# Patient Record
Sex: FEMALE | Race: WHITE | NOT HISPANIC OR LATINO | URBAN - METROPOLITAN AREA
[De-identification: names, ages, dates, MRNs, and addresses within clinical notes are randomized per-mention and may not be internally consistent; named-entity substitution may affect disease eponyms.]

---

## 2017-10-24 ENCOUNTER — OFFICE (OUTPATIENT)
Dept: URBAN - METROPOLITAN AREA CLINIC 64 | Facility: CLINIC | Age: 67
End: 2017-10-24
Payer: COMMERCIAL

## 2017-10-24 VITALS
SYSTOLIC BLOOD PRESSURE: 137 MMHG | HEIGHT: 64 IN | WEIGHT: 192 LBS | HEART RATE: 102 BPM | DIASTOLIC BLOOD PRESSURE: 66 MMHG

## 2017-10-24 DIAGNOSIS — R19.5 OTHER FECAL ABNORMALITIES: ICD-10-CM

## 2017-10-24 DIAGNOSIS — R10.31 RIGHT LOWER QUADRANT PAIN: ICD-10-CM

## 2017-10-24 DIAGNOSIS — K59.00 CONSTIPATION, UNSPECIFIED: ICD-10-CM

## 2017-10-24 DIAGNOSIS — M54.5 LOW BACK PAIN: ICD-10-CM

## 2017-10-24 DIAGNOSIS — R15.2 FECAL URGENCY: ICD-10-CM

## 2017-10-24 LAB
AMYLASE, SERUM: 28 U/L — LOW (ref 31–124)
C-REACTIVE PROTEIN, QUANT: 13.7 MG/L — HIGH (ref 0–4.9)
CBC WITH DIFFERENTIAL/PLATELET: BASO (ABSOLUTE): 0 X10E3/UL (ref 0–0.2)
CBC WITH DIFFERENTIAL/PLATELET: BASOS: 0 %
CBC WITH DIFFERENTIAL/PLATELET: EOS (ABSOLUTE): 0.1 X10E3/UL (ref 0–0.4)
CBC WITH DIFFERENTIAL/PLATELET: EOS: 1 %
CBC WITH DIFFERENTIAL/PLATELET: HEMATOCRIT: 46.4 % (ref 34–46.6)
CBC WITH DIFFERENTIAL/PLATELET: HEMATOLOGY COMMENTS: (no result)
CBC WITH DIFFERENTIAL/PLATELET: HEMOGLOBIN: 15.5 G/DL (ref 11.1–15.9)
CBC WITH DIFFERENTIAL/PLATELET: IMMATURE CELLS: (no result)
CBC WITH DIFFERENTIAL/PLATELET: IMMATURE GRANS (ABS): 0 X10E3/UL (ref 0–0.1)
CBC WITH DIFFERENTIAL/PLATELET: IMMATURE GRANULOCYTES: 0 %
CBC WITH DIFFERENTIAL/PLATELET: LYMPHS (ABSOLUTE): 2.9 X10E3/UL (ref 0.7–3.1)
CBC WITH DIFFERENTIAL/PLATELET: LYMPHS: 38 %
CBC WITH DIFFERENTIAL/PLATELET: MCH: 31.8 PG (ref 26.6–33)
CBC WITH DIFFERENTIAL/PLATELET: MCHC: 33.4 G/DL (ref 31.5–35.7)
CBC WITH DIFFERENTIAL/PLATELET: MCV: 95 FL (ref 79–97)
CBC WITH DIFFERENTIAL/PLATELET: MONOCYTES(ABSOLUTE): 0.5 X10E3/UL (ref 0.1–0.9)
CBC WITH DIFFERENTIAL/PLATELET: MONOCYTES: 6 %
CBC WITH DIFFERENTIAL/PLATELET: NEUTROPHILS (ABSOLUTE): 4.1 X10E3/UL (ref 1.4–7)
CBC WITH DIFFERENTIAL/PLATELET: NEUTROPHILS: 55 %
CBC WITH DIFFERENTIAL/PLATELET: NRBC: (no result)
CBC WITH DIFFERENTIAL/PLATELET: PLATELETS: 216 X10E3/UL (ref 150–379)
CBC WITH DIFFERENTIAL/PLATELET: RBC: 4.88 X10E6/UL (ref 3.77–5.28)
CBC WITH DIFFERENTIAL/PLATELET: RDW: 13.7 % (ref 12.3–15.4)
CBC WITH DIFFERENTIAL/PLATELET: WBC: 7.7 X10E3/UL (ref 3.4–10.8)
COMP. METABOLIC PANEL (14): A/G RATIO: 2.1 (ref 1.2–2.2)
COMP. METABOLIC PANEL (14): ALBUMIN, SERUM: 4.5 G/DL (ref 3.6–4.8)
COMP. METABOLIC PANEL (14): ALKALINE PHOSPHATASE, S: 139 IU/L — HIGH (ref 39–117)
COMP. METABOLIC PANEL (14): ALT (SGPT): 39 IU/L — HIGH (ref 0–32)
COMP. METABOLIC PANEL (14): AST (SGOT): 34 IU/L (ref 0–40)
COMP. METABOLIC PANEL (14): BILIRUBIN, TOTAL: 0.6 MG/DL (ref 0–1.2)
COMP. METABOLIC PANEL (14): BUN/CREATININE RATIO: 29 — HIGH (ref 12–28)
COMP. METABOLIC PANEL (14): BUN: 23 MG/DL (ref 8–27)
COMP. METABOLIC PANEL (14): CALCIUM, SERUM: 10.1 MG/DL (ref 8.7–10.3)
COMP. METABOLIC PANEL (14): CARBON DIOXIDE, TOTAL: 24 MMOL/L (ref 18–29)
COMP. METABOLIC PANEL (14): CHLORIDE, SERUM: 93 MMOL/L — LOW (ref 96–106)
COMP. METABOLIC PANEL (14): CREATININE, SERUM: 0.78 MG/DL (ref 0.57–1)
COMP. METABOLIC PANEL (14): EGFR IF AFRICN AM: 90 ML/MIN/1.73 (ref 59–?)
COMP. METABOLIC PANEL (14): EGFR IF NONAFRICN AM: 78 ML/MIN/1.73 (ref 59–?)
COMP. METABOLIC PANEL (14): GLOBULIN, TOTAL: 2.1 G/DL (ref 1.5–4.5)
COMP. METABOLIC PANEL (14): GLUCOSE, SERUM: 360 MG/DL — HIGH (ref 65–99)
COMP. METABOLIC PANEL (14): POTASSIUM, SERUM: 4 MMOL/L (ref 3.5–5.2)
COMP. METABOLIC PANEL (14): PROTEIN, TOTAL, SERUM: 6.6 G/DL (ref 6–8.5)
COMP. METABOLIC PANEL (14): SODIUM, SERUM: 140 MMOL/L (ref 134–144)
LIPASE, SERUM: 24 U/L (ref 14–72)
TSH: 3.13 UIU/ML (ref 0.45–4.5)

## 2017-10-24 PROCEDURE — 99203 OFFICE O/P NEW LOW 30 MIN: CPT | Performed by: NURSE PRACTITIONER

## 2017-10-24 RX ORDER — ONDANSETRON HYDROCHLORIDE 4 MG/1
16 TABLET, ORALLY DISINTEGRATING ORAL
Qty: 60 | Refills: 0 | Status: COMPLETED
Start: 2017-10-24 | End: 2020-12-04

## 2017-10-24 RX ORDER — POLYETHYLENE GLYCOL 3350 17 G/17G
17 POWDER, FOR SOLUTION ORAL
Qty: 1 | Refills: 2 | Status: COMPLETED
Start: 2017-10-24 | End: 2020-12-04

## 2017-10-24 RX ORDER — SORBITOL SOLUTION 70 %
SOLUTION, ORAL MISCELLANEOUS
Qty: 100 | Refills: 0 | Status: COMPLETED
Start: 2017-10-24 | End: 2020-12-04

## 2017-10-24 RX ORDER — DICYCLOMINE HYDROCHLORIDE 10 MG/1
40 CAPSULE ORAL
Qty: 60 | Refills: 1 | Status: COMPLETED
Start: 2017-10-24 | End: 2020-12-04

## 2018-01-31 ENCOUNTER — HOSPITAL ENCOUNTER (OUTPATIENT)
Dept: LAB | Facility: HOSPITAL | Age: 68
Discharge: HOME | End: 2018-01-31
Attending: SURGERY | Admitting: SURGERY

## 2018-01-31 LAB — CREAT UR-MCNC: 0.5 MG/DL (ref 0.4–1)

## 2018-02-07 ENCOUNTER — HOSPITAL ENCOUNTER (OUTPATIENT)
Dept: CARDIOLOGY | Facility: HOSPITAL | Age: 68
Discharge: HOME | End: 2018-02-07
Attending: SURGERY | Admitting: SURGERY

## 2018-04-25 ENCOUNTER — HOSPITAL ENCOUNTER (OUTPATIENT)
Dept: ONCOLOGY | Facility: CLINIC | Age: 68
Setting detail: INFUSION SERIES
Discharge: HOME | End: 2018-04-25
Attending: INTERNAL MEDICINE | Admitting: INTERNAL MEDICINE

## 2018-04-25 ENCOUNTER — CLINICAL SUPPORT (OUTPATIENT)
Dept: ONCOLOGY | Facility: HOSPITAL | Age: 68
End: 2018-04-25

## 2018-04-25 NOTE — PROGRESS NOTES
PATIENTS ONCOLOGY RECORD LOCATED IN [facility]      Subjective     Name:  LEVY GROSS     Date:  2018  Address:  [address] [place] [address]  Home: [phone]  :  1950 AGE:  68 y.o.        RECORDS OBTAINED:  Patients Oncology Record is located in [facility]

## 2019-01-02 ENCOUNTER — HOSPITAL ENCOUNTER (OUTPATIENT)
Dept: CARDIOLOGY | Facility: HOSPITAL | Age: 69
Discharge: HOME | End: 2019-01-02
Attending: FAMILY MEDICINE | Admitting: FAMILY MEDICINE

## 2020-07-31 ENCOUNTER — OFFICE VISIT (OUTPATIENT)
Dept: SURGERY | Facility: CLINIC | Age: 70
End: 2020-07-31

## 2020-07-31 VITALS
OXYGEN SATURATION: 98 % | WEIGHT: 186 LBS | HEIGHT: 64 IN | BODY MASS INDEX: 31.76 KG/M2 | SYSTOLIC BLOOD PRESSURE: 132 MMHG | TEMPERATURE: 97.3 F | HEART RATE: 72 BPM | DIASTOLIC BLOOD PRESSURE: 68 MMHG

## 2020-07-31 DIAGNOSIS — I97.640 SEROMA OF CIRCULATORY SYSTEM AFTER CARDIAC CATHETERIZATION: Primary | ICD-10-CM

## 2020-07-31 PROCEDURE — 99203 OFFICE O/P NEW LOW 30 MIN: CPT | Performed by: SURGERY

## 2020-07-31 RX ORDER — GLIPIZIDE 10 MG/1
10 TABLET, FILM COATED, EXTENDED RELEASE ORAL DAILY
COMMUNITY

## 2020-07-31 RX ORDER — HYDROCHLOROTHIAZIDE 25 MG/1
25 TABLET ORAL DAILY
COMMUNITY
Start: 2017-07-31 | End: 2021-06-23

## 2020-07-31 RX ORDER — ROSUVASTATIN CALCIUM 10 MG/1
10 TABLET, COATED ORAL DAILY
COMMUNITY
Start: 2018-01-31

## 2020-07-31 RX ORDER — METHADONE HYDROCHLORIDE 5 MG/1
10 TABLET ORAL EVERY MORNING
COMMUNITY

## 2020-07-31 RX ORDER — POTASSIUM CHLORIDE 750 MG/1
20 CAPSULE, EXTENDED RELEASE ORAL DAILY
COMMUNITY
Start: 2020-04-15

## 2020-07-31 RX ORDER — ESCITALOPRAM OXALATE 20 MG/1
TABLET ORAL
COMMUNITY
Start: 2017-09-08 | End: 2021-05-26

## 2020-07-31 RX ORDER — SENNA PLUS 8.6 MG/1
3 TABLET ORAL 2 TIMES DAILY
COMMUNITY

## 2020-07-31 RX ORDER — PRAMIPEXOLE DIHYDROCHLORIDE 1 MG/1
1 TABLET ORAL NIGHTLY
COMMUNITY
Start: 2018-01-31

## 2020-07-31 RX ORDER — LISINOPRIL 10 MG/1
10 TABLET ORAL DAILY
COMMUNITY
Start: 2020-03-11 | End: 2021-03-11

## 2020-07-31 RX ORDER — FUROSEMIDE 40 MG/1
TABLET ORAL
COMMUNITY
End: 2021-06-06

## 2020-07-31 NOTE — PROGRESS NOTES
"Subjective   Genny Soni is a 70 y.o. female.   No chief complaint on file.    /68   Pulse 72   Temp 97.3 °F (36.3 °C) (Temporal)   Ht 162.6 cm (64\")   Wt 84.4 kg (186 lb)   SpO2 98%   BMI 31.93 kg/m²     HISTORY OF PRESENT ILLNESS:  Patient is a very pleasant 70-year-old female who 2 years ago underwent a cardiac catheterization where it sounds like she had a significant post seizure hematoma.  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  It does not bother her.  Just confirmed by ultrasound.  It is been aspirated in the past and returns.      The following portions of the patient's history were reviewed and updated as appropriate: allergies, current medications, past family history, past medical history, past social history, past surgical history and problem list.    Review of Systems   Constitutional: Negative for activity change and diaphoresis.   HENT: Negative.  Negative for sore throat and voice change.    Eyes: Negative for blurred vision.   Respiratory: Negative for wheezing.    Cardiovascular: Negative for chest pain.   Endocrine: Negative.  Negative for polyuria.   Genitourinary: Negative for urinary incontinence.   Musculoskeletal: Positive for gait problem. Negative for arthralgias.   Skin: Negative for color change.   Neurological: Positive for numbness. Negative for dizziness, speech difficulty and confusion.   Hematological: Does not bruise/bleed easily.   Psychiatric/Behavioral: Negative for agitation.       Objective   Physical Exam   Constitutional: She is oriented to person, place, and time. She appears well-developed and well-nourished.   HENT:   Head: Normocephalic and atraumatic.   Eyes: EOM are normal.   Neck: Normal range of motion.   Cardiovascular: Normal rate.   Pulmonary/Chest: Effort normal.   Abdominal: Soft.   Musculoskeletal: Normal range of motion.   Lymphadenopathy:   5 cm mobile fluid collection consistent with ultrasound confirmed " chronic hematoma versus seroma   Neurological: She is alert and oriented to person, place, and time.   Skin: Skin is warm and dry.   Psychiatric: She has a normal mood and affect.         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  Secondary to this all of her other medical comorbidities I think there is no reason to proceed with any sort of intervention.  If she did decide to do something I would recommend she sees vascular secondary to her significant vascular history.    Lucie Burk MD  7/31/2020  9:15 AM

## 2020-08-21 ENCOUNTER — HOSPITAL ENCOUNTER (EMERGENCY)
Facility: HOSPITAL | Age: 70
Discharge: HOME | End: 2020-08-21
Attending: EMERGENCY MEDICINE | Admitting: EMERGENCY MEDICINE

## 2020-08-21 ENCOUNTER — APPOINTMENT (OUTPATIENT)
Dept: CT IMAGING | Facility: HOSPITAL | Age: 70
End: 2020-08-21

## 2020-08-21 VITALS
HEIGHT: 64 IN | SYSTOLIC BLOOD PRESSURE: 112 MMHG | HEART RATE: 68 BPM | OXYGEN SATURATION: 95 % | BODY MASS INDEX: 31.13 KG/M2 | RESPIRATION RATE: 16 BRPM | TEMPERATURE: 98.6 F | WEIGHT: 182.32 LBS | DIASTOLIC BLOOD PRESSURE: 74 MMHG

## 2020-08-21 DIAGNOSIS — H81.399 PERIPHERAL VERTIGO, UNSPECIFIED LATERALITY: Primary | ICD-10-CM

## 2020-08-21 DIAGNOSIS — R42 DIZZINESS: ICD-10-CM

## 2020-08-21 LAB
ANION GAP SERPL CALCULATED.3IONS-SCNC: 10 MMOL/L (ref 5–15)
BASOPHILS # BLD AUTO: 0 10*3/MM3 (ref 0–0.2)
BASOPHILS NFR BLD AUTO: 0.3 % (ref 0–1.5)
BUN SERPL-MCNC: 20 MG/DL (ref 8–23)
BUN SERPL-MCNC: ABNORMAL MG/DL
BUN/CREAT SERPL: ABNORMAL
CALCIUM SPEC-SCNC: 9.1 MG/DL (ref 8.6–10.5)
CHLORIDE SERPL-SCNC: 105 MMOL/L (ref 98–107)
CO2 SERPL-SCNC: 29 MMOL/L (ref 22–29)
CREAT SERPL-MCNC: 0.69 MG/DL (ref 0.57–1)
DEPRECATED RDW RBC AUTO: 45.1 FL (ref 37–54)
EOSINOPHIL # BLD AUTO: 0.1 10*3/MM3 (ref 0–0.4)
EOSINOPHIL NFR BLD AUTO: 1.9 % (ref 0.3–6.2)
ERYTHROCYTE [DISTWIDTH] IN BLOOD BY AUTOMATED COUNT: 13.7 % (ref 12.3–15.4)
GFR SERPL CREATININE-BSD FRML MDRD: 84 ML/MIN/1.73
GLUCOSE SERPL-MCNC: 202 MG/DL (ref 65–99)
HCT VFR BLD AUTO: 38.7 % (ref 34–46.6)
HGB BLD-MCNC: 12.8 G/DL (ref 12–15.9)
LYMPHOCYTES # BLD AUTO: 1.9 10*3/MM3 (ref 0.7–3.1)
LYMPHOCYTES NFR BLD AUTO: 25.3 % (ref 19.6–45.3)
MCH RBC QN AUTO: 31.4 PG (ref 26.6–33)
MCHC RBC AUTO-ENTMCNC: 33.1 G/DL (ref 31.5–35.7)
MCV RBC AUTO: 94.8 FL (ref 79–97)
MONOCYTES # BLD AUTO: 0.4 10*3/MM3 (ref 0.1–0.9)
MONOCYTES NFR BLD AUTO: 5.7 % (ref 5–12)
NEUTROPHILS NFR BLD AUTO: 5 10*3/MM3 (ref 1.7–7)
NEUTROPHILS NFR BLD AUTO: 66.8 % (ref 42.7–76)
NRBC BLD AUTO-RTO: 0.1 /100 WBC (ref 0–0.2)
PLATELET # BLD AUTO: 173 10*3/MM3 (ref 140–450)
PMV BLD AUTO: 7 FL (ref 6–12)
POTASSIUM SERPL-SCNC: 3.7 MMOL/L (ref 3.5–5.2)
RBC # BLD AUTO: 4.08 10*6/MM3 (ref 3.77–5.28)
SODIUM SERPL-SCNC: 144 MMOL/L (ref 136–145)
WBC # BLD AUTO: 7.4 10*3/MM3 (ref 3.4–10.8)

## 2020-08-21 PROCEDURE — 99284 EMERGENCY DEPT VISIT MOD MDM: CPT

## 2020-08-21 PROCEDURE — 85025 COMPLETE CBC W/AUTO DIFF WBC: CPT | Performed by: EMERGENCY MEDICINE

## 2020-08-21 PROCEDURE — 93005 ELECTROCARDIOGRAM TRACING: CPT | Performed by: EMERGENCY MEDICINE

## 2020-08-21 PROCEDURE — 70450 CT HEAD/BRAIN W/O DYE: CPT

## 2020-08-21 PROCEDURE — 80048 BASIC METABOLIC PNL TOTAL CA: CPT | Performed by: EMERGENCY MEDICINE

## 2020-08-21 RX ORDER — SODIUM CHLORIDE 0.9 % (FLUSH) 0.9 %
10 SYRINGE (ML) INJECTION AS NEEDED
Status: DISCONTINUED | OUTPATIENT
Start: 2020-08-21 | End: 2020-08-21 | Stop reason: HOSPADM

## 2020-08-21 RX ORDER — MECLIZINE HYDROCHLORIDE 25 MG/1
25 TABLET ORAL 3 TIMES DAILY PRN
Qty: 15 TABLET | Refills: 0 | Status: SHIPPED | OUTPATIENT
Start: 2020-08-21 | End: 2021-06-06

## 2020-08-21 RX ORDER — MECLIZINE HYDROCHLORIDE 25 MG/1
25 TABLET ORAL ONCE
Status: COMPLETED | OUTPATIENT
Start: 2020-08-21 | End: 2020-08-21

## 2020-08-21 RX ADMIN — MECLIZINE HYDROCHLORIDE 25 MG: 25 TABLET ORAL at 11:44

## 2020-08-21 RX ADMIN — SODIUM CHLORIDE 500 ML: 900 INJECTION, SOLUTION INTRAVENOUS at 11:44

## 2020-08-21 NOTE — ED PROVIDER NOTES
Subjective   History of Present Illness  Dizziness  70-year-old female states she has had some head swimming type dizziness for the last 2 months but has progressively worsened.  She states it caused her to fall a few times over the last couple of days.  She denies any focal numbness or weakness or headache or blurry vision.  She states she does feel some fullness in her ears.  She reports no fever or chills or vomiting or diarrhea.  She reports no chest pain or abdominal pain.  States symptoms seem to be worse when she tilts her head forward  Review of Systems   Constitutional: Negative.    HENT: Negative.    Eyes: Negative.    Respiratory: Negative.    Cardiovascular: Negative.    Gastrointestinal: Negative.    Genitourinary: Negative.    Musculoskeletal: Negative.    Skin: Negative.    Neurological: Positive for dizziness.   Hematological: Negative.    Psychiatric/Behavioral: Negative.        Past Medical History:   Diagnosis Date   • Arthritis    • Diabetes mellitus (CMS/[facility])    • Hyperlipidemia        Allergies   Allergen Reactions   • Ambien  [Zolpidem] Confusion   • Codeine Itching   • Sulfa Antibiotics Nausea And Vomiting     Makes her nauseated       No past surgical history on file.    No family history on file.    Social History     Socioeconomic History   • Marital status:      Spouse name: Not on file   • Number of children: Not on file   • Years of education: Not on file   • Highest education level: Not on file   Tobacco Use   • Smoking status: Current Some Day Smoker   • Smokeless tobacco: Never Used   Substance and Sexual Activity   • Alcohol use: Not Currently   • Drug use: Never   • Sexual activity: Defer     Prior to Admission medications    Medication Sig Start Date End Date Taking? Authorizing Provider   escitalopram (LEXAPRO) 20 MG tablet escitalopram 20 mg tablet   TAKE 1 TABLET BY MOUTH EVERY DAY 9/8/17   Provider, MD Pedro   furosemide (LASIX) 40 MG tablet furosemide 40 mg  "tablet   TAKE 1 TABLET BY MOUTH TWICE DAILY    Pedro Vazquez MD   glipizide (GLUCOTROL XL) 10 MG 24 hr tablet glipizide ER 10 mg tablet, extended release 24 hr   TAKE 1 TABLET BY MOUTH EVERY DAY    Pedro Vazquez MD   hydroCHLOROthiazide (HYDRODIURIL) 25 MG tablet hydrochlorothiazide 25 mg tablet 7/31/17   Pedro Vazquez MD   Insulin Glargine, 1 Unit Dial, (Toujeo SoloStar) 300 UNIT/ML solution pen-injector injection Toujeo SoloStar U-300 Insulin 300 unit/mL (1.5 mL) subcutaneous pen   Inject 60 units every day by subcutaneous route for 90 days.    Pedro Vazquez MD   lisinopril (PRINIVIL,ZESTRIL) 10 MG tablet Take 10 mg by mouth Daily. 3/11/20 3/11/21  Pedro Vazquez MD   metFORMIN (GLUCOPHAGE) 1000 MG tablet metformin 1,000 mg tablet 1/12/18   Pedro Vazquez MD   methadone (DOLOPHINE) 5 MG tablet methadone 5 mg tablet    Pedro Vazquez MD   potassium chloride (MICRO-K) 10 MEQ CR capsule potassium chloride ER 10 mEq capsule,extended release   TAKE 1 CAPSULE BY MOUTH TWICE DAILY 4/15/20   Pedro Vazquez MD   pramipexole (MIRAPEX) 1 MG tablet pramipexole 1 mg tablet 1/31/18   Pedro Vazquez MD   rosuvastatin (CRESTOR) 10 MG tablet rosuvastatin 10 mg tablet   TAKE 1 TABLET BY MOUTH EVERY DAY 1/31/18   Pedro Vazquez MD   Semaglutide,0.25 or 0.5MG/DOS, (Ozempic, 0.25 or 0.5 MG/DOSE,) 2 MG/1.5ML solution pen-injector 0.5 mg.    Pedro Vazquez MD   senna (senna) 8.6 MG tablet Take  by mouth.    Pedro Vazquez MD     /66 (Patient Position: Standing)   Pulse 52   Temp 98.6 °F (37 °C) (Oral)   Resp 16   Ht 162.6 cm (64\")   Wt 82.7 kg (182 lb 5.1 oz)   SpO2 96%   Breastfeeding No   BMI 31.30 kg/m²   I examined the patient using the appropriate personal protective equipment.          Objective   Physical Exam  General: Well-developed well-appearing, no acute distress, alert and appropriate  Eyes: Pupils round and reactive, " sclera nonicteric  HEENT: Mucous membranes moist, no mucosal swelling, tympanic membrane's clear bilaterally  Neck: Supple, no nuchal rigidity, no lymphadenopathy  Respirations: Respirations nonlabored, equal breath sounds bilaterally, clear lungs  Heart regular rate and rhythm, no murmurs rubs or gallops,   Abdomen soft nontender nondistended, no hepatosplenomegaly,  Extremities no clubbing cyanosis or edema, calves are symmetric and nontender  Neuro cranial nerves II through XII intact , normal sensory/motor function and strength in four extremities, no slurred speech, no facial droop, normal finger to nose, normal heel to shin, no nuchal rigidity  Psych oriented, pleasant affect  Skin no rash, brisk cap refill  Procedures           ED Course      Results for orders placed or performed during the hospital encounter of 08/21/20   Basic Metabolic Panel   Result Value Ref Range    Glucose 202 (H) 65 - 99 mg/dL    BUN      Creatinine 0.69 0.57 - 1.00 mg/dL    Sodium 144 136 - 145 mmol/L    Potassium 3.7 3.5 - 5.2 mmol/L    Chloride 105 98 - 107 mmol/L    CO2 29.0 22.0 - 29.0 mmol/L    Calcium 9.1 8.6 - 10.5 mg/dL    eGFR Non African Amer 84 >60 mL/min/1.73    BUN/Creatinine Ratio      Anion Gap 10.0 5.0 - 15.0 mmol/L   CBC Auto Differential   Result Value Ref Range    WBC 7.40 3.40 - 10.80 10*3/mm3    RBC 4.08 3.77 - 5.28 10*6/mm3    Hemoglobin 12.8 12.0 - 15.9 g/dL    Hematocrit 38.7 34.0 - 46.6 %    MCV 94.8 79.0 - 97.0 fL    MCH 31.4 26.6 - 33.0 pg    MCHC 33.1 31.5 - 35.7 g/dL    RDW 13.7 12.3 - 15.4 %    RDW-SD 45.1 37.0 - 54.0 fl    MPV 7.0 6.0 - 12.0 fL    Platelets 173 140 - 450 10*3/mm3    Neutrophil % 66.8 42.7 - 76.0 %    Lymphocyte % 25.3 19.6 - 45.3 %    Monocyte % 5.7 5.0 - 12.0 %    Eosinophil % 1.9 0.3 - 6.2 %    Basophil % 0.3 0.0 - 1.5 %    Neutrophils, Absolute 5.00 1.70 - 7.00 10*3/mm3    Lymphocytes, Absolute 1.90 0.70 - 3.10 10*3/mm3    Monocytes, Absolute 0.40 0.10 - 0.90 10*3/mm3     Eosinophils, Absolute 0.10 0.00 - 0.40 10*3/mm3    Basophils, Absolute 0.00 0.00 - 0.20 10*3/mm3    nRBC 0.1 0.0 - 0.2 /100 WBC   BUN   Result Value Ref Range    BUN 20 8 - 23 mg/dL     Ct Head Without Contrast    Result Date: 8/21/2020  No acute findings.  Electronically Signed By-Guilherme Lopez On:8/21/2020 12:09 PM This report was finalized on [id number] by  Guilherme Lopez, .                                         MDM  Patient presented with a vertigo type dizziness for the last couple of months.  She has a normal neurologic exam.  She was given oral meclizine.  She is resting company on reexamination her CT scan was negative and laboratory evaluation shows no acute abnormality.  She was advised the findings she is discharged good condition she is advised to follow-up with her primary care physician for consideration of neurology referral if symptoms persist.  She was prescribed meclizine for dizziness and given warning signs for return  Final diagnoses:   Peripheral vertigo, unspecified laterality   Dizziness            Mario Alberto Constantino MD  08/21/20 [id number]

## 2020-08-21 NOTE — DISCHARGE INSTRUCTIONS
Rest, drink plenty of fluids, avoid the heat.  Meclizine as prescribed for dizziness.  Return for increased pain, increased dizziness, numbness, weakness or any other concerns.

## 2020-08-27 ENCOUNTER — TRANSCRIBE ORDERS (OUTPATIENT)
Dept: ADMINISTRATIVE | Facility: HOSPITAL | Age: 70
End: 2020-08-27

## 2020-08-27 DIAGNOSIS — R42 DIZZINESS: Primary | ICD-10-CM

## 2020-09-08 ENCOUNTER — HOSPITAL ENCOUNTER (OUTPATIENT)
Dept: CARDIOLOGY | Facility: HOSPITAL | Age: 70
Discharge: HOME | End: 2020-09-08
Admitting: NURSE PRACTITIONER

## 2020-09-08 DIAGNOSIS — R42 DIZZINESS: ICD-10-CM

## 2020-09-08 LAB
BH CV XLRA MEAS LEFT CCA RATIO VEL: 142 CM/SEC
BH CV XLRA MEAS LEFT DIST CCA EDV: 21.7 CM/SEC
BH CV XLRA MEAS LEFT DIST CCA PSV: 128 CM/SEC
BH CV XLRA MEAS LEFT DIST ICA EDV: 23 CM/SEC
BH CV XLRA MEAS LEFT DIST ICA PSV: 129 CM/SEC
BH CV XLRA MEAS LEFT ICA RATIO VEL: -150 CM/SEC
BH CV XLRA MEAS LEFT ICA/CCA RATIO: -1.1
BH CV XLRA MEAS LEFT PROX CCA EDV: 21.7 CM/SEC
BH CV XLRA MEAS LEFT PROX CCA PSV: 142 CM/SEC
BH CV XLRA MEAS LEFT PROX ECA PSV: -182 CM/SEC
BH CV XLRA MEAS LEFT PROX ICA EDV: -27.7 CM/SEC
BH CV XLRA MEAS LEFT PROX ICA PSV: -148 CM/SEC
BH CV XLRA MEAS LEFT PROX SCLA PSV: 225 CM/SEC
BH CV XLRA MEAS LEFT VERTEBRAL A PSV: -70.8 CM/SEC
BH CV XLRA MEAS RIGHT CCA RATIO VEL: 169 CM/SEC
BH CV XLRA MEAS RIGHT DIST CCA EDV: 21.3 CM/SEC
BH CV XLRA MEAS RIGHT DIST CCA PSV: 122 CM/SEC
BH CV XLRA MEAS RIGHT DIST ICA EDV: -27.7 CM/SEC
BH CV XLRA MEAS RIGHT DIST ICA PSV: -140 CM/SEC
BH CV XLRA MEAS RIGHT ICA RATIO VEL: -140 CM/SEC
BH CV XLRA MEAS RIGHT ICA/CCA RATIO: -0.83
BH CV XLRA MEAS RIGHT PROX CCA EDV: 17.4 CM/SEC
BH CV XLRA MEAS RIGHT PROX CCA PSV: 169 CM/SEC
BH CV XLRA MEAS RIGHT PROX ECA PSV: -279 CM/SEC
BH CV XLRA MEAS RIGHT PROX ICA EDV: 30 CM/SEC
BH CV XLRA MEAS RIGHT PROX ICA PSV: 138 CM/SEC
BH CV XLRA MEAS RIGHT PROX SCLA PSV: 199 CM/SEC
BH CV XLRA MEAS RIGHT VERTEBRAL A PSV: 95.3 CM/SEC

## 2020-09-08 PROCEDURE — 93880 EXTRACRANIAL BILAT STUDY: CPT

## 2020-09-14 ENCOUNTER — TRANSCRIBE ORDERS (OUTPATIENT)
Dept: ADMINISTRATIVE | Facility: HOSPITAL | Age: 70
End: 2020-09-14

## 2020-09-14 ENCOUNTER — LAB (OUTPATIENT)
Dept: LAB | Facility: HOSPITAL | Age: 70
End: 2020-09-14

## 2020-09-14 ENCOUNTER — HOSPITAL ENCOUNTER (OUTPATIENT)
Dept: CARDIOLOGY | Facility: HOSPITAL | Age: 70
Discharge: HOME | End: 2020-09-14

## 2020-09-14 DIAGNOSIS — Z01.818 PRE-OP TESTING: ICD-10-CM

## 2020-09-14 DIAGNOSIS — Z01.818 PRE-OP TESTING: Primary | ICD-10-CM

## 2020-09-14 LAB
ANION GAP SERPL CALCULATED.3IONS-SCNC: 6 MMOL/L (ref 5–15)
BASOPHILS # BLD AUTO: 0.03 10*3/MM3 (ref 0–0.2)
BASOPHILS NFR BLD AUTO: 0.5 % (ref 0–1.5)
BUN SERPL-MCNC: 17 MG/DL (ref 8–23)
BUN/CREAT SERPL: 23.3 (ref 7–25)
CALCIUM SPEC-SCNC: 9.5 MG/DL (ref 8.6–10.5)
CHLORIDE SERPL-SCNC: 106 MMOL/L (ref 98–107)
CO2 SERPL-SCNC: 30 MMOL/L (ref 22–29)
CREAT SERPL-MCNC: 0.73 MG/DL (ref 0.57–1)
DEPRECATED RDW RBC AUTO: 42.4 FL (ref 37–54)
EOSINOPHIL # BLD AUTO: 0.15 10*3/MM3 (ref 0–0.4)
EOSINOPHIL NFR BLD AUTO: 2.4 % (ref 0.3–6.2)
ERYTHROCYTE [DISTWIDTH] IN BLOOD BY AUTOMATED COUNT: 12.4 % (ref 12.3–15.4)
GFR SERPL CREATININE-BSD FRML MDRD: 78 ML/MIN/1.73
GLUCOSE SERPL-MCNC: 110 MG/DL (ref 65–99)
HCT VFR BLD AUTO: 36.8 % (ref 34–46.6)
HGB BLD-MCNC: 12.4 G/DL (ref 12–15.9)
IMM GRANULOCYTES # BLD AUTO: 0.02 10*3/MM3 (ref 0–0.05)
IMM GRANULOCYTES NFR BLD AUTO: 0.3 % (ref 0–0.5)
LYMPHOCYTES # BLD AUTO: 2.1 10*3/MM3 (ref 0.7–3.1)
LYMPHOCYTES NFR BLD AUTO: 33.9 % (ref 19.6–45.3)
MCH RBC QN AUTO: 31.5 PG (ref 26.6–33)
MCHC RBC AUTO-ENTMCNC: 33.7 G/DL (ref 31.5–35.7)
MCV RBC AUTO: 93.4 FL (ref 79–97)
MONOCYTES # BLD AUTO: 0.39 10*3/MM3 (ref 0.1–0.9)
MONOCYTES NFR BLD AUTO: 6.3 % (ref 5–12)
NEUTROPHILS NFR BLD AUTO: 3.5 10*3/MM3 (ref 1.7–7)
NEUTROPHILS NFR BLD AUTO: 56.6 % (ref 42.7–76)
NRBC BLD AUTO-RTO: 0 /100 WBC (ref 0–0.2)
PLATELET # BLD AUTO: 161 10*3/MM3 (ref 140–450)
PMV BLD AUTO: 9.6 FL (ref 6–12)
POTASSIUM SERPL-SCNC: 4.2 MMOL/L (ref 3.5–5.2)
RBC # BLD AUTO: 3.94 10*6/MM3 (ref 3.77–5.28)
SODIUM SERPL-SCNC: 142 MMOL/L (ref 136–145)
WBC # BLD AUTO: 6.19 10*3/MM3 (ref 3.4–10.8)

## 2020-09-14 PROCEDURE — 93005 ELECTROCARDIOGRAM TRACING: CPT | Performed by: UROLOGY

## 2020-09-14 PROCEDURE — 80048 BASIC METABOLIC PNL TOTAL CA: CPT

## 2020-09-14 PROCEDURE — 36415 COLL VENOUS BLD VENIPUNCTURE: CPT

## 2020-09-14 PROCEDURE — 93010 ELECTROCARDIOGRAM REPORT: CPT | Performed by: INTERNAL MEDICINE

## 2020-09-14 PROCEDURE — C9803 HOPD COVID-19 SPEC COLLECT: HCPCS

## 2020-09-14 PROCEDURE — 85025 COMPLETE CBC W/AUTO DIFF WBC: CPT

## 2020-09-14 PROCEDURE — U0004 COV-19 TEST NON-CDC HGH THRU: HCPCS

## 2020-09-15 LAB — SARS-COV-2 RNA NOSE QL NAA+PROBE: NOT DETECTED

## 2020-10-14 ENCOUNTER — TRANSCRIBE ORDERS (OUTPATIENT)
Dept: ADMINISTRATIVE | Facility: HOSPITAL | Age: 70
End: 2020-10-14

## 2020-10-14 ENCOUNTER — HOSPITAL ENCOUNTER (OUTPATIENT)
Dept: CT IMAGING | Facility: HOSPITAL | Age: 70
Discharge: HOME | End: 2020-10-14

## 2020-10-14 ENCOUNTER — HOSPITAL ENCOUNTER (OUTPATIENT)
Dept: MRI IMAGING | Facility: HOSPITAL | Age: 70
Discharge: HOME | End: 2020-10-14

## 2020-10-14 DIAGNOSIS — M54.9 BACK PAIN, UNSPECIFIED BACK LOCATION, UNSPECIFIED BACK PAIN LATERALITY, UNSPECIFIED CHRONICITY: Primary | ICD-10-CM

## 2020-10-14 DIAGNOSIS — M54.50 LUMBAR BACK PAIN: Primary | ICD-10-CM

## 2020-10-14 DIAGNOSIS — M79.604 RIGHT LEG PAIN: ICD-10-CM

## 2020-10-14 DIAGNOSIS — M54.9 BACK PAIN, UNSPECIFIED BACK LOCATION, UNSPECIFIED BACK PAIN LATERALITY, UNSPECIFIED CHRONICITY: ICD-10-CM

## 2020-10-14 DIAGNOSIS — M54.50 LUMBAR BACK PAIN: ICD-10-CM

## 2020-10-14 LAB — CREAT BLDA-MCNC: 0.7 MG/DL (ref 0.6–1.3)

## 2020-10-14 PROCEDURE — 72132 CT LUMBAR SPINE W/DYE: CPT

## 2020-10-14 PROCEDURE — 82565 ASSAY OF CREATININE: CPT

## 2020-10-14 PROCEDURE — 0 IOPAMIDOL PER 1 ML: Performed by: NURSE PRACTITIONER

## 2020-10-14 RX ADMIN — IOPAMIDOL 100 ML: 755 INJECTION, SOLUTION INTRAVENOUS at 16:00

## 2020-11-02 ENCOUNTER — INPATIENT HOSPITAL (OUTPATIENT)
Dept: URBAN - METROPOLITAN AREA HOSPITAL 76 | Facility: HOSPITAL | Age: 70
End: 2020-11-02
Payer: COMMERCIAL

## 2020-11-02 DIAGNOSIS — R10.84 GENERALIZED ABDOMINAL PAIN: ICD-10-CM

## 2020-11-02 DIAGNOSIS — K59.00 CONSTIPATION, UNSPECIFIED: ICD-10-CM

## 2020-11-02 DIAGNOSIS — K62.5 HEMORRHAGE OF ANUS AND RECTUM: ICD-10-CM

## 2020-11-02 DIAGNOSIS — R19.7 DIARRHEA, UNSPECIFIED: ICD-10-CM

## 2020-11-02 PROCEDURE — 99222 1ST HOSP IP/OBS MODERATE 55: CPT | Performed by: NURSE PRACTITIONER

## 2020-11-03 ENCOUNTER — INPATIENT HOSPITAL (OUTPATIENT)
Dept: URBAN - METROPOLITAN AREA HOSPITAL 76 | Facility: HOSPITAL | Age: 70
End: 2020-11-03
Payer: COMMERCIAL

## 2020-11-03 DIAGNOSIS — K52.9 NONINFECTIVE GASTROENTERITIS AND COLITIS, UNSPECIFIED: ICD-10-CM

## 2020-11-03 DIAGNOSIS — K57.30 DIVERTICULOSIS OF LARGE INTESTINE WITHOUT PERFORATION OR ABS: ICD-10-CM

## 2020-11-03 DIAGNOSIS — K63.3 ULCER OF INTESTINE: ICD-10-CM

## 2020-11-03 DIAGNOSIS — R93.3 ABNORMAL FINDINGS ON DIAGNOSTIC IMAGING OF OTHER PARTS OF DI: ICD-10-CM

## 2020-11-03 DIAGNOSIS — K62.5 HEMORRHAGE OF ANUS AND RECTUM: ICD-10-CM

## 2020-11-03 DIAGNOSIS — R10.9 UNSPECIFIED ABDOMINAL PAIN: ICD-10-CM

## 2020-11-03 PROCEDURE — 45380 COLONOSCOPY AND BIOPSY: CPT | Performed by: INTERNAL MEDICINE

## 2020-12-04 ENCOUNTER — OFFICE (OUTPATIENT)
Dept: URBAN - METROPOLITAN AREA CLINIC 64 | Facility: CLINIC | Age: 70
End: 2020-12-04
Payer: COMMERCIAL

## 2020-12-04 VITALS — DIASTOLIC BLOOD PRESSURE: 46 MMHG | SYSTOLIC BLOOD PRESSURE: 103 MMHG | WEIGHT: 186 LBS | HEIGHT: 64 IN

## 2020-12-04 DIAGNOSIS — R14.0 ABDOMINAL DISTENSION (GASEOUS): ICD-10-CM

## 2020-12-04 DIAGNOSIS — R10.9 UNSPECIFIED ABDOMINAL PAIN: ICD-10-CM

## 2020-12-04 DIAGNOSIS — K59.00 CONSTIPATION, UNSPECIFIED: ICD-10-CM

## 2020-12-04 PROCEDURE — 99213 OFFICE O/P EST LOW 20 MIN: CPT | Performed by: NURSE PRACTITIONER

## 2020-12-04 RX ORDER — NALOXEGOL OXALATE 12.5 MG/1
12.5 TABLET, FILM COATED ORAL
Qty: 30 | Refills: 12 | Status: ACTIVE
Start: 2020-12-04

## 2021-02-12 ENCOUNTER — OFFICE (OUTPATIENT)
Dept: URBAN - METROPOLITAN AREA CLINIC 64 | Facility: CLINIC | Age: 71
End: 2021-02-12
Payer: MEDICARE

## 2021-02-12 VITALS — HEIGHT: 64 IN

## 2021-02-12 DIAGNOSIS — K59.00 CONSTIPATION, UNSPECIFIED: ICD-10-CM

## 2021-02-12 PROCEDURE — 99213 OFFICE O/P EST LOW 20 MIN: CPT | Mod: 95 | Performed by: NURSE PRACTITIONER

## 2021-04-05 ENCOUNTER — APPOINTMENT (OUTPATIENT)
Dept: CT IMAGING | Facility: HOSPITAL | Age: 71
End: 2021-04-05

## 2021-04-05 ENCOUNTER — HOSPITAL ENCOUNTER (EMERGENCY)
Facility: HOSPITAL | Age: 71
Discharge: HOME | End: 2021-04-05
Admitting: EMERGENCY MEDICINE

## 2021-04-05 VITALS
DIASTOLIC BLOOD PRESSURE: 72 MMHG | BODY MASS INDEX: 30.15 KG/M2 | OXYGEN SATURATION: 98 % | HEIGHT: 64 IN | RESPIRATION RATE: 16 BRPM | HEART RATE: 70 BPM | WEIGHT: 176.59 LBS | TEMPERATURE: 98.2 F | SYSTOLIC BLOOD PRESSURE: 118 MMHG

## 2021-04-05 DIAGNOSIS — S39.012A STRAIN OF LUMBAR REGION, INITIAL ENCOUNTER: ICD-10-CM

## 2021-04-05 DIAGNOSIS — M54.42 ACUTE LEFT-SIDED LOW BACK PAIN WITH LEFT-SIDED SCIATICA: ICD-10-CM

## 2021-04-05 DIAGNOSIS — S16.1XXA STRAIN OF NECK MUSCLE, INITIAL ENCOUNTER: ICD-10-CM
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
## 2021-04-05 LAB
ALBUMIN SERPL-MCNC: 3.9 G/DL (ref 3.5–5.2)
ALBUMIN/GLOB SERPL: 1.5 G/DL
ALP SERPL-CCNC: 100 U/L (ref 39–117)
ALT SERPL W P-5'-P-CCNC: 39 U/L (ref 1–33)
ANION GAP SERPL CALCULATED.3IONS-SCNC: 11 MMOL/L (ref 5–15)
AST SERPL-CCNC: 36 U/L (ref 1–32)
BASOPHILS # BLD AUTO: 0 10*3/MM3 (ref 0–0.2)
BASOPHILS NFR BLD AUTO: 0.3 % (ref 0–1.5)
BILIRUB SERPL-MCNC: 0.3 MG/DL (ref 0–1.2)
BILIRUB UR QL STRIP: NEGATIVE
BUN SERPL-MCNC: 27 MG/DL (ref 8–23)
BUN/CREAT SERPL: 28.7 (ref 7–25)
CALCIUM SPEC-SCNC: 9.6 MG/DL (ref 8.6–10.5)
CHLORIDE SERPL-SCNC: 102 MMOL/L (ref 98–107)
CLARITY UR: CLEAR
CO2 SERPL-SCNC: 30 MMOL/L (ref 22–29)
COLOR UR: YELLOW
CREAT SERPL-MCNC: 0.94 MG/DL (ref 0.57–1)
DEPRECATED RDW RBC AUTO: 47.3 FL (ref 37–54)
EOSINOPHIL # BLD AUTO: 0.1 10*3/MM3 (ref 0–0.4)
EOSINOPHIL NFR BLD AUTO: 1.7 % (ref 0.3–6.2)
ERYTHROCYTE [DISTWIDTH] IN BLOOD BY AUTOMATED COUNT: 14.1 % (ref 12.3–15.4)
GFR SERPL CREATININE-BSD FRML MDRD: 58 ML/MIN/1.73
GLOBULIN UR ELPH-MCNC: 2.6 GM/DL
GLUCOSE SERPL-MCNC: 99 MG/DL (ref 65–99)
GLUCOSE UR STRIP-MCNC: NEGATIVE MG/DL
HCT VFR BLD AUTO: 40.4 % (ref 34–46.6)
HGB BLD-MCNC: 13.6 G/DL (ref 12–15.9)
HGB UR QL STRIP.AUTO: NEGATIVE
HOLD SPECIMEN: NORMAL
KETONES UR QL STRIP: NEGATIVE
LEUKOCYTE ESTERASE UR QL STRIP.AUTO: NEGATIVE
LYMPHOCYTES # BLD AUTO: 2.3 10*3/MM3 (ref 0.7–3.1)
LYMPHOCYTES NFR BLD AUTO: 34.5 % (ref 19.6–45.3)
MCH RBC QN AUTO: 32 PG (ref 26.6–33)
MCHC RBC AUTO-ENTMCNC: 33.6 G/DL (ref 31.5–35.7)
MCV RBC AUTO: 95.4 FL (ref 79–97)
MONOCYTES # BLD AUTO: 0.4 10*3/MM3 (ref 0.1–0.9)
MONOCYTES NFR BLD AUTO: 5.6 % (ref 5–12)
NEUTROPHILS NFR BLD AUTO: 3.8 10*3/MM3 (ref 1.7–7)
NEUTROPHILS NFR BLD AUTO: 57.9 % (ref 42.7–76)
NITRITE UR QL STRIP: NEGATIVE
NRBC BLD AUTO-RTO: 0.1 /100 WBC (ref 0–0.2)
PH UR STRIP.AUTO: 7 [PH] (ref 5–8)
PLATELET # BLD AUTO: 167 10*3/MM3 (ref 140–450)
PMV BLD AUTO: 7.3 FL (ref 6–12)
POTASSIUM SERPL-SCNC: 3.8 MMOL/L (ref 3.5–5.2)
PROT SERPL-MCNC: 6.5 G/DL (ref 6–8.5)
PROT UR QL STRIP: NEGATIVE
RBC # BLD AUTO: 4.24 10*6/MM3 (ref 3.77–5.28)
SODIUM SERPL-SCNC: 143 MMOL/L (ref 136–145)
SP GR UR STRIP: 1.01 (ref 1–1.03)
TROPONIN T SERPL-MCNC: <0.01 NG/ML (ref 0–0.03)
UROBILINOGEN UR QL STRIP: NORMAL
WBC # BLD AUTO: 6.6 10*3/MM3 (ref 3.4–10.8)
WHOLE BLOOD HOLD SPECIMEN: NORMAL

## 2021-04-05 PROCEDURE — 93005 ELECTROCARDIOGRAM TRACING: CPT | Performed by: PHYSICIAN ASSISTANT

## 2021-04-05 PROCEDURE — 70450 CT HEAD/BRAIN W/O DYE: CPT

## 2021-04-05 PROCEDURE — 85025 COMPLETE CBC W/AUTO DIFF WBC: CPT | Performed by: PHYSICIAN ASSISTANT

## 2021-04-05 PROCEDURE — 96375 TX/PRO/DX INJ NEW DRUG ADDON: CPT

## 2021-04-05 PROCEDURE — 99284 EMERGENCY DEPT VISIT MOD MDM: CPT

## 2021-04-05 PROCEDURE — 80053 COMPREHEN METABOLIC PANEL: CPT | Performed by: PHYSICIAN ASSISTANT

## 2021-04-05 PROCEDURE — 81003 URINALYSIS AUTO W/O SCOPE: CPT | Performed by: PHYSICIAN ASSISTANT

## 2021-04-05 PROCEDURE — 72125 CT NECK SPINE W/O DYE: CPT

## 2021-04-05 PROCEDURE — 25010000002 HYDROMORPHONE PER 4 MG: Performed by: PHYSICIAN ASSISTANT

## 2021-04-05 PROCEDURE — 84484 ASSAY OF TROPONIN QUANT: CPT | Performed by: PHYSICIAN ASSISTANT

## 2021-04-05 PROCEDURE — 72131 CT LUMBAR SPINE W/O DYE: CPT

## 2021-04-05 PROCEDURE — 25010000002 ONDANSETRON PER 1 MG: Performed by: PHYSICIAN ASSISTANT

## 2021-04-05 PROCEDURE — 96374 THER/PROPH/DIAG INJ IV PUSH: CPT

## 2021-04-05 RX ORDER — SODIUM CHLORIDE 0.9 % (FLUSH) 0.9 %
10 SYRINGE (ML) INJECTION AS NEEDED
Status: DISCONTINUED | OUTPATIENT
Start: 2021-04-05 | End: 2021-04-05 | Stop reason: HOSPADM

## 2021-04-05 RX ORDER — ONDANSETRON 2 MG/ML
4 INJECTION INTRAMUSCULAR; INTRAVENOUS ONCE
Status: COMPLETED | OUTPATIENT
Start: 2021-04-05 | End: 2021-04-05

## 2021-04-05 RX ORDER — HYDROMORPHONE HCL 110MG/55ML
0.5 PATIENT CONTROLLED ANALGESIA SYRINGE INTRAVENOUS ONCE
Status: DISCONTINUED | OUTPATIENT
Start: 2021-04-05 | End: 2021-04-05

## 2021-04-05 RX ORDER — HYDROMORPHONE HCL 110MG/55ML
0.25 PATIENT CONTROLLED ANALGESIA SYRINGE INTRAVENOUS ONCE
Status: COMPLETED | OUTPATIENT
Start: 2021-04-05 | End: 2021-04-05

## 2021-04-05 RX ADMIN — ONDANSETRON 4 MG: 2 INJECTION, SOLUTION INTRAMUSCULAR; INTRAVENOUS at 17:48

## 2021-04-05 RX ADMIN — HYDROMORPHONE HYDROCHLORIDE 0.25 MG: 2 INJECTION, SOLUTION INTRAMUSCULAR; INTRAVENOUS; SUBCUTANEOUS at 17:50

## 2021-04-05 RX ADMIN — SODIUM CHLORIDE 500 ML: 9 INJECTION, SOLUTION INTRAVENOUS at 16:11

## 2021-04-05 NOTE — ED PROVIDER NOTES
Subjective   History:  Patient is a 71-year-old female who presents to the ER from her PCP after she called her to tell her that she fell yesterday and been having some dizziness.  Patient reports to me that she feels like she lost her balance she does not believe she had a syncopal episode or loss consciousness no nausea or vomiting reported.  Reports that she has had intermittent dizziness for the last year she has been worked up for this before and she does not want to be admitted today.  Patient's primary complaints here are that she has a history of low back spinal stenosis and that her fall seems to have irritated this.  She reports that she is in the process of getting referred to maybe neurosurgery she is already in pain management for this pain but she believes that she needs to be evaluated by a neurosurgeon for possible surgical fix.  She has no bowel or bladder incontinence.  It does radiate down her leg when she is standing.  She does report that after her fall she has had some neck pain.  Denies any chest pain shortness of breath abdominal pain nausea vomiting or diarrhea.    Onset: 1 day  Location: Head neck low back  Duration: Constant   character: Dizziness sharp low back pain and dull neck pain  Aggravating/Alleviating factors: none  Radiation left leg  Severity: moderate            Review of Systems   Constitutional: Negative for chills, diaphoresis, fatigue and fever.   HENT: Negative.    Respiratory: Negative for cough, choking and shortness of breath.    Cardiovascular: Negative for chest pain, palpitations and leg swelling.   Gastrointestinal: Negative for abdominal distention, abdominal pain, nausea and vomiting.   Genitourinary: Negative.    Musculoskeletal: Positive for back pain and neck pain.   Neurological: Positive for dizziness.   Psychiatric/Behavioral: Negative.        Past Medical History:   Diagnosis Date   • Arthritis    • Diabetes mellitus (CMS/[facility])    • Hyperlipidemia         Allergies   Allergen Reactions   • Ambien  [Zolpidem] Confusion   • Codeine Itching   • Sulfa Antibiotics Nausea And Vomiting     Makes her nauseated       History reviewed. No pertinent surgical history.    History reviewed. No pertinent family history.    Social History     Socioeconomic History   • Marital status:      Spouse name: Not on file   • Number of children: Not on file   • Years of education: Not on file   • Highest education level: Not on file   Tobacco Use   • Smoking status: Current Some Day Smoker   • Smokeless tobacco: Never Used   Substance and Sexual Activity   • Alcohol use: Not Currently   • Drug use: Never   • Sexual activity: Defer           Objective   Physical Exam  Vitals and nursing note reviewed.   Constitutional:       General: She is not in acute distress.     Appearance: Normal appearance. She is well-developed. She is not ill-appearing, toxic-appearing or diaphoretic.   HENT:      Head: Normocephalic and atraumatic.   Eyes:      Pupils: Pupils are equal, round, and reactive to light.   Cardiovascular:      Rate and Rhythm: Normal rate and regular rhythm.   Pulmonary:      Effort: Pulmonary effort is normal. No respiratory distress.      Breath sounds: Normal breath sounds. No stridor. No wheezing, rhonchi or rales.   Chest:      Chest wall: No tenderness.   Musculoskeletal:         General: Normal range of motion.      Cervical back: Normal range of motion and neck supple. No rigidity.   Skin:     General: Skin is warm and dry.   Neurological:      General: No focal deficit present.      Mental Status: She is alert and oriented to person, place, and time. Mental status is at baseline.      Cranial Nerves: No cranial nerve deficit.      Comments: Plantar flexion extension against resistance intact.  Left hip flexion and extension against resistance intact with increase in pain in low back.    Increase in pain with ROM of neck.  No focal pain over spinous process or  cervical paraspinal musculature.   Psychiatric:         Mood and Affect: Mood normal.         Behavior: Behavior normal.         Thought Content: Thought content normal.         Judgment: Judgment normal.         Procedures           ED Course  ED Course as of Apr 05 1739 Mon Apr 05, 2021 1736 EKG interpreted by ER physician reviewed by myself.  Sinus rhythm rate of 69    [MG]      ED Course User Index  [MG] Frida CareyKEANU           CT Head Without Contrast    Result Date: 4/5/2021  No acute intracranial abnormality. Brain MRI is more sensitive to evaluate for acute or subacute infarcts and to evaluate for intracranial metastatic disease.  Electronically Signed By-Jenn Javed MD On:4/5/2021 5:11 PM This report was finalized on [id number] by  Jenn Javed MD.    CT Cervical Spine Without Contrast    Result Date: 4/5/2021  No cervical spine fractures by CT.  Electronically Signed By-Jenn Javed MD On:4/5/2021 5:15 PM This report was finalized on [id number] by  Jenn Javed MD.    CT Lumbar Spine Without Contrast    Result Date: 4/5/2021  No lumbar spine fractures. Degenerative disc disease and facet degenerative changes described in detail.  Electronically Signed By-Jenn Javed MD On:4/5/2021 5:19 PM This report was finalized on [id number] by  Jenn Javed MD.    Labs Reviewed   COMPREHENSIVE METABOLIC PANEL - Abnormal; Notable for the following components:       Result Value    BUN 27 (*)     CO2 30.0 (*)     ALT (SGPT) 39 (*)     AST (SGOT) 36 (*)     eGFR Non African Amer 58 (*)     BUN/Creatinine Ratio 28.7 (*)     All other components within normal limits    Narrative:     GFR Normal >60  Chronic Kidney Disease <60  Kidney Failure <15     TROPONIN (IN-HOUSE) - Normal    Narrative:     Troponin T Reference Range:  <= 0.03 ng/mL-   Negative for AMI  >0.03 ng/mL-     Abnormal for myocardial necrosis.  Clinicians would have to utilize clinical acumen, EKG, Troponin and serial changes to determine  if it is an Acute Myocardial Infarction or myocardial injury due to an underlying chronic condition.       Results may be falsely decreased if patient taking Biotin.     CBC WITH AUTO DIFFERENTIAL - Normal   URINALYSIS W/ CULTURE IF INDICATED - Normal    Narrative:     Urine microscopic not indicated.   CBC AND DIFFERENTIAL    Narrative:     The following orders were created for panel order CBC & Differential.  Procedure                               Abnormality         Status                     ---------                               -----------         ------                     CBC Auto Differential[[id number]]        Normal              Final result                 Please view results for these tests on the individual orders.   EXTRA TUBES    Narrative:     The following orders were created for panel order Extra Tubes.  Procedure                               Abnormality         Status                     ---------                               -----------         ------                     Light Blue Top[[id number]]                                   Final result               Gold Top - SST[[id number]]                                   Final result                 Please view results for these tests on the individual orders.   LIGHT BLUE TOP   GOLD TOP - SST     Medications   sodium chloride 0.9 % flush 10 mL (has no administration in time range)   HYDROmorphone (DILAUDID) injection 0.5 mg (has no administration in time range)   ondansetron (ZOFRAN) injection 4 mg (has no administration in time range)   sodium chloride 0.9 % bolus 500 mL (0 mL Intravenous Stopped 4/5/21 8082)                                     MDM  Number of Diagnoses or Management Options  Acute left-sided low back pain with left-sided sciatica  Fall, initial encounter  Strain of lumbar region, initial encounter  Strain of neck muscle, initial encounter  Diagnosis management comments: I examined the patient using the appropriate personal  protective equipment.      DISPOSITION:   Chart Review:  Comorbidity:  has a past medical history of Arthritis, Diabetes mellitus (CMS/HCC), and Hyperlipidemia.    Imaging: Was interpreted by physician and reviewed by myself:  CT Head Without Contrast    Result Date: 4/5/2021  No acute intracranial abnormality. Brain MRI is more sensitive to evaluate for acute or subacute infarcts and to evaluate for intracranial metastatic disease.  Electronically Signed By-Jenn Javed MD On:4/5/2021 5:11 PM This report was finalized on [id number] by  Jenn Javed MD.    CT Cervical Spine Without Contrast    Result Date: 4/5/2021  No cervical spine fractures by CT.  Electronically Signed By-Jenn Javed MD On:4/5/2021 5:15 PM This report was finalized on [id number] by  Jenn Javed MD.    CT Lumbar Spine Without Contrast    Result Date: 4/5/2021  No lumbar spine fractures. Degenerative disc disease and facet degenerative changes described in detail.  Electronically Signed By-Jenn Javed MD On:4/5/2021 5:19 PM This report was finalized on [id number] by  Jenn Javed MD.      Disposition/Treatment:    Patient is a 71-year-old female who presents to the ER with low back pain neck pain after falling yesterday.  Patient called her PCP and told her that he was having headaches after the fall and dizziness and lightheadedness.  Patient herself reported to me that although she has had intermittent dizziness is been going on for over a year and she has been worked up for this previously she does not believe she had a syncopal episode yesterday she said that she feels like she lost her balance.  She does have worsening neck and low back pain but no bowel or bladder incontinence that is new.  No new radiculopathy just her continuous constant radiculopathy.  I do believe that patient would benefit from an outpatient MRI and neurosurgery referral these orders have been placed.  She was given a slight amount of pain medication.  She was  discharged home in stable condition.  Return precaution follow instructions were provided    Note I did offer the patient admittance for her dizziness and worsening back and leg pain and she declined saying that she has to go on vacation this week.         Amount and/or Complexity of Data Reviewed  Clinical lab tests: reviewed  Tests in the radiology section of CPT®: reviewed  Tests in the medicine section of CPT®: reviewed  Decide to obtain previous medical records or to obtain history from someone other than the patient: yes    Patient Progress  Patient progress: stable      Final diagnoses:   Fall, initial encounter   Strain of lumbar region, initial encounter   Strain of neck muscle, initial encounter   Acute left-sided low back pain with left-sided sciatica       ED Disposition  ED Disposition     ED Disposition Condition Comment    Discharge Stable           Gagan Aguilar MD  [address] [place] [address]  [phone]    Schedule an appointment as soon as possible for a visit   For further management         Medication List      No changes were made to your prescriptions during this visit.          Frida Carey PA-C  04/05/21 7257

## 2021-04-05 NOTE — ED NOTES
Pt reports falling yesterday landing on back. Pt states she hit the back of her head but denies LOC. Pt also reports bi lateral leg pain.      Allison Burton RN  04/05/21 4575

## 2021-04-05 NOTE — DISCHARGE INSTRUCTIONS
Return to the ER for any worsening symptoms follow-up with neurosurgery tomorrow for further work-up and management.

## 2021-04-08 LAB — QT INTERVAL: 404 MS

## 2021-04-19 ENCOUNTER — HOSPITAL ENCOUNTER (OUTPATIENT)
Dept: MRI IMAGING | Facility: HOSPITAL | Age: 71
Discharge: HOME | End: 2021-04-19
Admitting: PHYSICIAN ASSISTANT

## 2021-04-19 DIAGNOSIS — M54.42 ACUTE LEFT-SIDED LOW BACK PAIN WITH LEFT-SIDED SCIATICA: ICD-10-CM

## 2021-04-19 PROCEDURE — 72148 MRI LUMBAR SPINE W/O DYE: CPT

## 2021-04-26 ENCOUNTER — OFFICE VISIT (OUTPATIENT)
Dept: NEUROSURGERY | Facility: CLINIC | Age: 71
End: 2021-04-26

## 2021-04-26 ENCOUNTER — HOSPITAL ENCOUNTER (OUTPATIENT)
Dept: GENERAL RADIOLOGY | Facility: HOSPITAL | Age: 71
Discharge: HOME | End: 2021-04-26
Admitting: SPECIALIST

## 2021-04-26 VITALS
HEART RATE: 89 BPM | WEIGHT: 192 LBS | SYSTOLIC BLOOD PRESSURE: 133 MMHG | DIASTOLIC BLOOD PRESSURE: 73 MMHG | HEIGHT: 64 IN | BODY MASS INDEX: 32.78 KG/M2

## 2021-04-26 DIAGNOSIS — M54.16 LEFT LUMBAR RADICULOPATHY: ICD-10-CM

## 2021-04-26 DIAGNOSIS — Q76.2 CONGENITAL SPONDYLOLISTHESIS: ICD-10-CM

## 2021-04-26 DIAGNOSIS — Q76.2 CONGENITAL SPONDYLOLISTHESIS: Primary | ICD-10-CM

## 2021-04-26 PROCEDURE — 72120 X-RAY BEND ONLY L-S SPINE: CPT

## 2021-04-26 PROCEDURE — 99203 OFFICE O/P NEW LOW 30 MIN: CPT | Performed by: SPECIALIST

## 2021-04-26 NOTE — PROGRESS NOTES
Subjective   History of Present Illness: Genny Soni is a 71 y.o. female seen for evaluation of back pain left leg pain.    History Of Present Illness:  Genny is a 71-year-old female who has had chronic problems with back pain but has recently known severe back pain and left leg pain.  This is in L5 and S1 distribution and has become intractable and intolerable to her.  She has been on methadone for this.  She has tried physical therapy on a couple of occasions and was unable to get any relief.  She is diabetic and steroids both orally and through injections are not tolerated by her.  She did note that this last episode of pain began when she had a fall.    MRI scan shows her to have a grade 1 L5-S1 spondylolisthesis which is mild.  She has some mild foraminal stenosis but no definitive compression of the nerve seen on these films.  She has not had flexion-extension films done.  The following portions of the patient's history were reviewed and updated as appropriate: allergies, current medications, past family history, past medical history, past social history, past surgical history, and problem list.    Past Medical History:   Diagnosis Date   • Arthritis    • Diabetes mellitus (CMS/HCC)    • Hyperlipidemia    • Low back pain         History reviewed. No pertinent surgical history.       Current Outpatient Medications:   •  escitalopram (LEXAPRO) 20 MG tablet, escitalopram 20 mg tablet  TAKE 1 TABLET BY MOUTH EVERY DAY, Disp: , Rfl:   •  furosemide (LASIX) 40 MG tablet, furosemide 40 mg tablet  TAKE 1 TABLET BY MOUTH TWICE DAILY, Disp: , Rfl:   •  glipizide (GLUCOTROL XL) 10 MG 24 hr tablet, glipizide ER 10 mg tablet, extended release 24 hr  TAKE 1 TABLET BY MOUTH EVERY DAY, Disp: , Rfl:   •  hydroCHLOROthiazide (HYDRODIURIL) 25 MG tablet, hydrochlorothiazide 25 mg tablet, Disp: , Rfl:   •  Insulin Glargine, 1 Unit Dial, (Toujeo SoloStar) 300 UNIT/ML solution pen-injector injection, Toujeo SoloStar U-300 Insulin  "300 unit/mL (1.5 mL) subcutaneous pen  Inject 60 units every day by subcutaneous route for 90 days., Disp: , Rfl:   •  meclizine (ANTIVERT) 25 MG tablet, Take 1 tablet by mouth 3 (Three) Times a Day As Needed for Dizziness., Disp: 15 tablet, Rfl: 0  •  metFORMIN (GLUCOPHAGE) 1000 MG tablet, metformin 1,000 mg tablet, Disp: , Rfl:   •  methadone (DOLOPHINE) 5 MG tablet, methadone 5 mg tablet, Disp: , Rfl:   •  potassium chloride (MICRO-K) 10 MEQ CR capsule, potassium chloride ER 10 mEq capsule,extended release  TAKE 1 CAPSULE BY MOUTH TWICE DAILY, Disp: , Rfl:   •  pramipexole (MIRAPEX) 1 MG tablet, pramipexole 1 mg tablet, Disp: , Rfl:   •  rosuvastatin (CRESTOR) 10 MG tablet, rosuvastatin 10 mg tablet  TAKE 1 TABLET BY MOUTH EVERY DAY, Disp: , Rfl:   •  Semaglutide,0.25 or 0.5MG/DOS, (Ozempic, 0.25 or 0.5 MG/DOSE,) 2 MG/1.5ML solution pen-injector, 0.5 mg., Disp: , Rfl:   •  senna (senna) 8.6 MG tablet, Take  by mouth., Disp: , Rfl:      Social History     Socioeconomic History   • Marital status:      Spouse name: Not on file   • Number of children: Not on file   • Years of education: Not on file   • Highest education level: Not on file   Tobacco Use   • Smoking status: Current Some Day Smoker   • Smokeless tobacco: Never Used   Vaping Use   • Vaping Use: Never used   Substance and Sexual Activity   • Alcohol use: Not Currently   • Drug use: Never   • Sexual activity: Defer        History reviewed. No pertinent family history.     Review of Systems  Her diabetes seems to be well controlled at this time.  She denied any bowel or bladder changes.  Objective     Vitals:    04/26/21 1357   BP: 133/73   BP Location: Left arm   Patient Position: Sitting   Cuff Size: Adult   Pulse: 89   Weight: 87.1 kg (192 lb)   Height: 162.6 cm (64\")     Body mass index is 32.96 kg/m².      Physical Exam  Neurologic Exam  She is alert and oriented all modalities.  Her cranial nerve examination appears to be grossly intact.  She " has 5 or 5 motor strength but does have some breakaway weakness in the left foot.  Her gait is antalgic on the left leg.  Straight leg raise test is positive on the left at about 60 degrees.  Knee and ankle jerks are absent.      Assessment/Plan   Independent Review of Radiographic Studies:      I personally reviewed the images from the following studies.    Lumbar MRI scan    Medical Decision Making:      I think it she should have flexion-extension films done of the lumbar spine.  If she is moving at L5-S1 and L5 Smith procedure with fusion would be appropriate.  If she is not moving she would have to decide if it is worth her going through this procedure as I explained the procedure along with the risk complications of surgery and answer several of her questions.  She knows this may not relieve her pain so she wants to think about this but will call us after her x-rays were done.  Diagnoses and all orders for this visit:    1. Congenital spondylolisthesis (Primary)  -     XR Spine Lumbar Flex & Ext; Future    2. Left lumbar radiculopathy  -     XR Spine Lumbar Flex & Ext; Future      Return in about 1 month (around 5/26/2021) for Recheck.

## 2021-04-27 ENCOUNTER — TELEPHONE (OUTPATIENT)
Dept: NEUROSURGERY | Facility: CLINIC | Age: 71
End: 2021-04-27

## 2021-04-27 NOTE — TELEPHONE ENCOUNTER
Caller: Genny Soni    Relationship: Self    Best call back number: [phone]    What test was performed: YES/XRAY @[facility]      Additional notes: PT CALLED AND STATED HER XRAY WAS COMPLETED YESTERDAY AND IS IN THE CHART. SHE WANTED TO KNOW IF SHE CAN HAVE RESULTS OVER PHONE OR IF SHE HAS TO WAIT FOR HER NEXT APPT.     PLEASE CALL PT AND ADVISE.         THANK YOU

## 2021-05-26 ENCOUNTER — OFFICE VISIT (OUTPATIENT)
Dept: NEUROSURGERY | Facility: CLINIC | Age: 71
End: 2021-05-26

## 2021-05-26 VITALS
HEART RATE: 103 BPM | DIASTOLIC BLOOD PRESSURE: 61 MMHG | BODY MASS INDEX: 32.78 KG/M2 | SYSTOLIC BLOOD PRESSURE: 147 MMHG | WEIGHT: 192 LBS | HEIGHT: 64 IN

## 2021-05-26 DIAGNOSIS — M54.16 LEFT LUMBAR RADICULOPATHY: ICD-10-CM

## 2021-05-26 DIAGNOSIS — Q76.2 CONGENITAL SPONDYLOLISTHESIS: Primary | ICD-10-CM

## 2021-05-26 PROCEDURE — 99212 OFFICE O/P EST SF 10 MIN: CPT | Performed by: SPECIALIST

## 2021-05-26 RX ORDER — DULAGLUTIDE 1.5 MG/.5ML
1.5 INJECTION, SOLUTION SUBCUTANEOUS
COMMUNITY
Start: 2021-05-14

## 2021-05-26 RX ORDER — ARIPIPRAZOLE 2 MG/1
2 TABLET ORAL DAILY
COMMUNITY
Start: 2021-05-10

## 2021-05-26 RX ORDER — METHADONE HYDROCHLORIDE 10 MG/1
TABLET ORAL
COMMUNITY
Start: 2021-05-18 | End: 2021-05-26 | Stop reason: ALTCHOICE

## 2021-05-26 RX ORDER — HYDROCODONE BITARTRATE AND ACETAMINOPHEN 10; 325 MG/1; MG/1
.5-1 TABLET ORAL 3 TIMES DAILY PRN
COMMUNITY
Start: 2021-05-13

## 2021-05-26 RX ORDER — POTASSIUM CHLORIDE 750 MG/1
CAPSULE, EXTENDED RELEASE ORAL
COMMUNITY
Start: 2021-05-11 | End: 2021-05-26

## 2021-05-26 NOTE — PROGRESS NOTES
Subjective   History of Present Illness: Genny Soni is a 71 y.o. female returns for follow-up today.    History Of Present Illness: Genny has had marked increase in pain.  We did flexion-extension films of the L5-S1 spondylolisthesis severe there is gross instability with this with a movement 3 mm to 11 mm.  She is not able to control the pain now and is currently using methadone for this which she takes for other problems.  Hydrocodone did not help.    The following portions of the patient's history were reviewed and updated as appropriate: allergies, current medications, past family history, past medical history, past social history, past surgical history, and problem list.    Past Medical History:   Diagnosis Date   • Arthritis    • Diabetes mellitus (CMS/[facility])    • Hyperlipidemia    • Low back pain         History reviewed. No pertinent surgical history.       Current Outpatient Medications:   •  ARIPiprazole (ABILIFY) 2 MG tablet, Take 2 mg by mouth Daily., Disp: , Rfl:   •  furosemide (LASIX) 40 MG tablet, furosemide 40 mg tablet  TAKE 1 TABLET BY MOUTH TWICE DAILY, Disp: , Rfl:   •  glipizide (GLUCOTROL XL) 10 MG 24 hr tablet, glipizide ER 10 mg tablet, extended release 24 hr  TAKE 1 TABLET BY MOUTH EVERY DAY, Disp: , Rfl:   •  hydroCHLOROthiazide (HYDRODIURIL) 25 MG tablet, hydrochlorothiazide 25 mg tablet, Disp: , Rfl:   •  HYDROcodone-acetaminophen (NORCO)  MG per tablet, Take 0.5-1 tablets by mouth 3 (Three) Times a Day As Needed., Disp: , Rfl:   •  Insulin Glargine, 1 Unit Dial, (Toujeo SoloStar) 300 UNIT/ML solution pen-injector injection, Toujeo SoloStar U-300 Insulin 300 unit/mL (1.5 mL) subcutaneous pen  Inject 60 units every day by subcutaneous route for 90 days., Disp: , Rfl:   •  meclizine (ANTIVERT) 25 MG tablet, Take 1 tablet by mouth 3 (Three) Times a Day As Needed for Dizziness., Disp: 15 tablet, Rfl: 0  •  metFORMIN (GLUCOPHAGE) 1000 MG tablet, metformin 1,000 mg tablet, Disp: , Rfl:  "  •  methadone (DOLOPHINE) 5 MG tablet, methadone 5 mg tablet, Disp: , Rfl:   •  potassium chloride (MICRO-K) 10 MEQ CR capsule, potassium chloride ER 10 mEq capsule,extended release  TAKE 1 CAPSULE BY MOUTH TWICE DAILY, Disp: , Rfl:   •  potassium chloride (MICRO-K) 10 MEQ CR capsule, TAKE 2 CAPSULES BY MOUTH TWICE DAILY, Disp: , Rfl:   •  pramipexole (MIRAPEX) 1 MG tablet, pramipexole 1 mg tablet, Disp: , Rfl:   •  rosuvastatin (CRESTOR) 10 MG tablet, rosuvastatin 10 mg tablet  TAKE 1 TABLET BY MOUTH EVERY DAY, Disp: , Rfl:   •  Semaglutide,0.25 or 0.5MG/DOS, (Ozempic, 0.25 or 0.5 MG/DOSE,) 2 MG/1.5ML solution pen-injector, 0.5 mg., Disp: , Rfl:   •  senna (senna) 8.6 MG tablet, Take  by mouth., Disp: , Rfl:   •  Trulicity 1.5 MG/0.5ML solution pen-injector, ADMINISTER 1.5 MG UNDER THE SKIN 1 TIME A WEEK, Disp: , Rfl:   •  escitalopram (LEXAPRO) 20 MG tablet, escitalopram 20 mg tablet  TAKE 1 TABLET BY MOUTH EVERY DAY, Disp: , Rfl:   •  methadone (DOLOPHINE) 10 MG tablet, , Disp: , Rfl:      Social History     Socioeconomic History   • Marital status:      Spouse name: Not on file   • Number of children: Not on file   • Years of education: Not on file   • Highest education level: Not on file   Tobacco Use   • Smoking status: Former Smoker   • Smokeless tobacco: Never Used   Vaping Use   • Vaping Use: Never used   Substance and Sexual Activity   • Alcohol use: Not Currently   • Drug use: Never   • Sexual activity: Defer        History reviewed. No pertinent family history.     Review of Systems her activity has been diminished by the pain.    Objective     Vitals:    05/26/21 1425   BP: 147/61   BP Location: Left arm   Patient Position: Sitting   Cuff Size: Adult   Pulse: 103   Weight: 87.1 kg (192 lb)   Height: 162.6 cm (64\")     Body mass index is 32.96 kg/m².      Physical Exam  Neurologic Exam  She is in obvious discomfort.  Her motor strength is still 5/5.  Knee jerks and ankle jerks are absent.  Her " gait is slightly wide-based and she leans forward and uses a cane.  This is the only way she can achieve relief.    She has limited back mobility.  The hip mobility however is good with no evidence of knee pain with internal ex rotation of the hips.      Assessment/Plan   Independent Review of Radiographic Studies:      I personally reviewed the images from the following studies.    I reviewed the MRI scan and flexion-extension films.  She does have instability with the L5-S1 level from the spondylolisthesis.    Medical Decision Making:      I believe that she is going to require surgery for this and probably sooner than later as she does have to take care of her  at home who has dementia.  She will not be able to take care of him in this current situation with her pain.  Diagnoses and all orders for this visit:    1. Congenital spondylolisthesis (Primary)    2. Left lumbar radiculopathy      Return SURGERY TO BE SCHEDULED.

## 2021-05-27 ENCOUNTER — APPOINTMENT (OUTPATIENT)
Dept: GENERAL RADIOLOGY | Facility: HOSPITAL | Age: 71
End: 2021-05-27

## 2021-05-27 ENCOUNTER — ANESTHESIA EVENT (OUTPATIENT)
Dept: PERIOP | Facility: HOSPITAL | Age: 71
End: 2021-05-27

## 2021-05-27 ENCOUNTER — HOSPITAL ENCOUNTER (INPATIENT)
Facility: HOSPITAL | Age: 71
LOS: 2 days | Discharge: HOME | End: 2021-05-29
Attending: SPECIALIST | Admitting: SPECIALIST

## 2021-05-27 DIAGNOSIS — Z79.4 TYPE 2 DIABETES MELLITUS WITH DIABETIC PERIPHERAL ANGIOPATHY WITHOUT GANGRENE, WITH LONG-TERM CURRENT USE OF INSULIN (HCC): Primary | ICD-10-CM

## 2021-05-27 DIAGNOSIS — E11.51 TYPE 2 DIABETES MELLITUS WITH DIABETIC PERIPHERAL ANGIOPATHY WITHOUT GANGRENE, WITH LONG-TERM CURRENT USE OF INSULIN (HCC): Primary | ICD-10-CM

## 2021-05-27 DIAGNOSIS — Q76.2 CONGENITAL SPONDYLOLISTHESIS: Primary | ICD-10-CM

## 2021-05-27 LAB
ABO GROUP BLD: NORMAL
ANION GAP SERPL CALCULATED.3IONS-SCNC: 11 MMOL/L (ref 5–15)
APTT PPP: 25.1 SECONDS (ref 24–31)
BLD GP AB SCN SERPL QL: NEGATIVE
BUN SERPL-MCNC: 36 MG/DL (ref 8–23)
BUN/CREAT SERPL: 34.6 (ref 7–25)
CALCIUM SPEC-SCNC: 9.1 MG/DL (ref 8.6–10.5)
CHLORIDE SERPL-SCNC: 98 MMOL/L (ref 98–107)
CO2 SERPL-SCNC: 30 MMOL/L (ref 22–29)
CREAT SERPL-MCNC: 1.04 MG/DL (ref 0.57–1)
DEPRECATED RDW RBC AUTO: 46.4 FL (ref 37–54)
ERYTHROCYTE [DISTWIDTH] IN BLOOD BY AUTOMATED COUNT: 14 % (ref 12.3–15.4)
GFR SERPL CREATININE-BSD FRML MDRD: 52 ML/MIN/1.73
GLUCOSE BLDC GLUCOMTR-MCNC: 297 MG/DL (ref 70–105)
GLUCOSE BLDC GLUCOMTR-MCNC: 402 MG/DL (ref 70–105)
GLUCOSE SERPL-MCNC: 377 MG/DL (ref 65–99)
HCT VFR BLD AUTO: 38.3 % (ref 34–46.6)
HGB BLD-MCNC: 12.9 G/DL (ref 12–15.9)
INR PPP: 0.96 (ref 0.93–1.1)
MCH RBC QN AUTO: 32.1 PG (ref 26.6–33)
MCHC RBC AUTO-ENTMCNC: 33.8 G/DL (ref 31.5–35.7)
MCV RBC AUTO: 95.1 FL (ref 79–97)
MRSA DNA SPEC QL NAA+PROBE: NORMAL
PLATELET # BLD AUTO: 171 10*3/MM3 (ref 140–450)
PMV BLD AUTO: 7.4 FL (ref 6–12)
POTASSIUM SERPL-SCNC: 3.9 MMOL/L (ref 3.5–5.2)
PROTHROMBIN TIME: 10.6 SECONDS (ref 9.6–11.7)
RBC # BLD AUTO: 4.03 10*6/MM3 (ref 3.77–5.28)
RH BLD: POSITIVE
SARS-COV-2 RNA PNL SPEC NAA+PROBE: NOT DETECTED
SODIUM SERPL-SCNC: 139 MMOL/L (ref 136–145)
T&S EXPIRATION DATE: NORMAL
WBC # BLD AUTO: 6.5 10*3/MM3 (ref 3.4–10.8)

## 2021-05-27 PROCEDURE — 85730 THROMBOPLASTIN TIME PARTIAL: CPT | Performed by: SPECIALIST

## 2021-05-27 PROCEDURE — 86901 BLOOD TYPING SEROLOGIC RH(D): CPT | Performed by: SPECIALIST

## 2021-05-27 PROCEDURE — 93005 ELECTROCARDIOGRAM TRACING: CPT | Performed by: SPECIALIST

## 2021-05-27 PROCEDURE — 82962 GLUCOSE BLOOD TEST: CPT

## 2021-05-27 PROCEDURE — 85610 PROTHROMBIN TIME: CPT | Performed by: SPECIALIST

## 2021-05-27 PROCEDURE — 93010 ELECTROCARDIOGRAM REPORT: CPT | Performed by: INTERNAL MEDICINE

## 2021-05-27 PROCEDURE — 85027 COMPLETE CBC AUTOMATED: CPT | Performed by: SPECIALIST

## 2021-05-27 PROCEDURE — 86900 BLOOD TYPING SEROLOGIC ABO: CPT | Performed by: SPECIALIST

## 2021-05-27 PROCEDURE — 87635 SARS-COV-2 COVID-19 AMP PRB: CPT | Performed by: SPECIALIST

## 2021-05-27 PROCEDURE — 63710000001 INSULIN LISPRO (HUMAN) PER 5 UNITS: Performed by: SPECIALIST

## 2021-05-27 PROCEDURE — 80048 BASIC METABOLIC PNL TOTAL CA: CPT | Performed by: SPECIALIST

## 2021-05-27 PROCEDURE — 86900 BLOOD TYPING SEROLOGIC ABO: CPT

## 2021-05-27 PROCEDURE — 87641 MR-STAPH DNA AMP PROBE: CPT | Performed by: SPECIALIST

## 2021-05-27 PROCEDURE — 86850 RBC ANTIBODY SCREEN: CPT | Performed by: SPECIALIST

## 2021-05-27 PROCEDURE — 86901 BLOOD TYPING SEROLOGIC RH(D): CPT

## 2021-05-27 PROCEDURE — 71046 X-RAY EXAM CHEST 2 VIEWS: CPT

## 2021-05-27 RX ORDER — INSULIN LISPRO 100 [IU]/ML
0-9 INJECTION, SOLUTION INTRAVENOUS; SUBCUTANEOUS AS NEEDED
Status: DISCONTINUED | OUTPATIENT
Start: 2021-05-27 | End: 2021-05-29 | Stop reason: HOSPADM

## 2021-05-27 RX ORDER — INSULIN LISPRO 100 [IU]/ML
0-9 INJECTION, SOLUTION INTRAVENOUS; SUBCUTANEOUS
Status: DISCONTINUED | OUTPATIENT
Start: 2021-05-27 | End: 2021-05-29 | Stop reason: HOSPADM

## 2021-05-27 RX ORDER — PRAMIPEXOLE DIHYDROCHLORIDE 1 MG/1
1 TABLET ORAL NIGHTLY
Status: DISCONTINUED | OUTPATIENT
Start: 2021-05-27 | End: 2021-05-29 | Stop reason: HOSPADM

## 2021-05-27 RX ORDER — GLIPIZIDE 5 MG/1
1.25 TABLET ORAL
Status: DISCONTINUED | OUTPATIENT
Start: 2021-05-27 | End: 2021-05-29 | Stop reason: HOSPADM

## 2021-05-27 RX ORDER — LISINOPRIL 5 MG/1
10 TABLET ORAL DAILY
Status: DISCONTINUED | OUTPATIENT
Start: 2021-05-27 | End: 2021-05-27

## 2021-05-27 RX ORDER — SENNA PLUS 8.6 MG/1
1 TABLET ORAL 2 TIMES DAILY
Status: DISCONTINUED | OUTPATIENT
Start: 2021-05-27 | End: 2021-05-29 | Stop reason: HOSPADM

## 2021-05-27 RX ORDER — INSULIN GLARGINE 100 [IU]/ML
20 INJECTION, SOLUTION SUBCUTANEOUS EVERY MORNING
Status: DISCONTINUED | OUTPATIENT
Start: 2021-05-28 | End: 2021-05-28

## 2021-05-27 RX ORDER — LISINOPRIL 5 MG/1
10 TABLET ORAL
Status: DISCONTINUED | OUTPATIENT
Start: 2021-05-28 | End: 2021-05-29 | Stop reason: HOSPADM

## 2021-05-27 RX ORDER — METHADONE HYDROCHLORIDE 10 MG/1
15 TABLET ORAL NIGHTLY
Status: DISCONTINUED | OUTPATIENT
Start: 2021-05-27 | End: 2021-05-29 | Stop reason: HOSPADM

## 2021-05-27 RX ORDER — MECLIZINE HYDROCHLORIDE 25 MG/1
25 TABLET ORAL 3 TIMES DAILY PRN
Status: DISCONTINUED | OUTPATIENT
Start: 2021-05-27 | End: 2021-05-29 | Stop reason: HOSPADM

## 2021-05-27 RX ORDER — POTASSIUM CHLORIDE 750 MG/1
10 TABLET, FILM COATED, EXTENDED RELEASE ORAL DAILY
Status: DISCONTINUED | OUTPATIENT
Start: 2021-05-27 | End: 2021-05-29 | Stop reason: HOSPADM

## 2021-05-27 RX ORDER — ROSUVASTATIN CALCIUM 10 MG/1
10 TABLET, COATED ORAL DAILY
Status: DISCONTINUED | OUTPATIENT
Start: 2021-05-27 | End: 2021-05-29 | Stop reason: HOSPADM

## 2021-05-27 RX ORDER — HYDROCODONE BITARTRATE AND ACETAMINOPHEN 10; 325 MG/1; MG/1
1 TABLET ORAL 3 TIMES DAILY PRN
Status: DISCONTINUED | OUTPATIENT
Start: 2021-05-27 | End: 2021-05-29 | Stop reason: HOSPADM

## 2021-05-27 RX ORDER — ARIPIPRAZOLE 2 MG/1
2 TABLET ORAL DAILY
Status: DISCONTINUED | OUTPATIENT
Start: 2021-05-28 | End: 2021-05-29 | Stop reason: HOSPADM

## 2021-05-27 RX ORDER — NICOTINE POLACRILEX 4 MG
15 LOZENGE BUCCAL
Status: DISCONTINUED | OUTPATIENT
Start: 2021-05-27 | End: 2021-05-28

## 2021-05-27 RX ORDER — HYDROCHLOROTHIAZIDE 25 MG/1
25 TABLET ORAL DAILY
Status: DISCONTINUED | OUTPATIENT
Start: 2021-05-28 | End: 2021-05-29 | Stop reason: HOSPADM

## 2021-05-27 RX ORDER — LISINOPRIL 10 MG/1
10 TABLET ORAL DAILY
COMMUNITY

## 2021-05-27 RX ORDER — METHADONE HYDROCHLORIDE 10 MG/1
10 TABLET ORAL DAILY
Status: DISCONTINUED | OUTPATIENT
Start: 2021-05-28 | End: 2021-05-29 | Stop reason: HOSPADM

## 2021-05-27 RX ORDER — DEXTROSE MONOHYDRATE 25 G/50ML
25 INJECTION, SOLUTION INTRAVENOUS
Status: DISCONTINUED | OUTPATIENT
Start: 2021-05-27 | End: 2021-05-28

## 2021-05-27 RX ADMIN — METHADONE HYDROCHLORIDE 15 MG: 10 TABLET ORAL at 21:39

## 2021-05-27 RX ADMIN — PRAMIPEXOLE DIHYDROCHLORIDE 1 MG: 1 TABLET ORAL at 21:39

## 2021-05-27 RX ADMIN — SENNOSIDES 1 TABLET: 8.6 TABLET, FILM COATED ORAL at 21:39

## 2021-05-27 RX ADMIN — HYDROCODONE BITARTRATE AND ACETAMINOPHEN 1 TABLET: 10; 325 TABLET ORAL at 17:10

## 2021-05-27 RX ADMIN — INSULIN LISPRO 6 UNITS: 100 INJECTION, SOLUTION INTRAVENOUS; SUBCUTANEOUS at 19:56

## 2021-05-27 NOTE — ANESTHESIA PREPROCEDURE EVALUATION
Anesthesia Evaluation     Patient summary reviewed and Nursing notes reviewed   NPO Solid Status: > 6 hours  NPO Liquid Status: > 6 hours           Airway   Mallampati: II  TM distance: >3 FB  Neck ROM: full  No difficulty expected  Dental      Pulmonary - negative pulmonary ROS and normal exam    breath sounds clear to auscultation  Cardiovascular - normal exam    ECG reviewed  Rhythm: regular  Rate: normal    (+) PVD, hyperlipidemia,       Neuro/Psych- negative ROS  GI/Hepatic/Renal/Endo    (+) obesity,   diabetes mellitus poorly controlled,     Musculoskeletal     (+) arthralgias, back pain, chronic pain,   Abdominal  - normal exam   Substance History - negative use     OB/GYN          Other   arthritis,      ROS/Med Hx Other: history PONV                Anesthesia Plan    ASA 3     general     intravenous induction     Anesthetic plan, all risks, benefits, and alternatives have been provided, discussed and informed consent has been obtained with: patient.    Plan discussed with CRNA.

## 2021-05-28 ENCOUNTER — APPOINTMENT (OUTPATIENT)
Dept: GENERAL RADIOLOGY | Facility: HOSPITAL | Age: 71
End: 2021-05-28

## 2021-05-28 ENCOUNTER — ANESTHESIA (OUTPATIENT)
Dept: PERIOP | Facility: HOSPITAL | Age: 71
End: 2021-05-28

## 2021-05-28 PROBLEM — Q76.2 CONGENITAL SPONDYLOLISTHESIS: Status: RESOLVED | Noted: 2021-05-27 | Resolved: 2021-05-28

## 2021-05-28 PROBLEM — Q76.2 CONGENITAL SPONDYLOLISTHESIS OF LUMBOSACRAL REGION: Status: RESOLVED | Noted: 2021-05-27 | Resolved: 2021-05-28

## 2021-05-28 LAB
ANION GAP SERPL CALCULATED.3IONS-SCNC: 12 MMOL/L (ref 5–15)
APTT PPP: 24 SECONDS (ref 24–31)
BASOPHILS # BLD AUTO: 0 10*3/MM3 (ref 0–0.2)
BASOPHILS NFR BLD AUTO: 0.3 % (ref 0–1.5)
BUN SERPL-MCNC: 36 MG/DL (ref 8–23)
BUN/CREAT SERPL: 37.5 (ref 7–25)
CALCIUM SPEC-SCNC: 9.1 MG/DL (ref 8.6–10.5)
CHLORIDE SERPL-SCNC: 100 MMOL/L (ref 98–107)
CO2 SERPL-SCNC: 29 MMOL/L (ref 22–29)
CREAT SERPL-MCNC: 0.96 MG/DL (ref 0.57–1)
DEPRECATED RDW RBC AUTO: 46.4 FL (ref 37–54)
EOSINOPHIL # BLD AUTO: 0.2 10*3/MM3 (ref 0–0.4)
EOSINOPHIL NFR BLD AUTO: 3.2 % (ref 0.3–6.2)
ERYTHROCYTE [DISTWIDTH] IN BLOOD BY AUTOMATED COUNT: 14.1 % (ref 12.3–15.4)
GFR SERPL CREATININE-BSD FRML MDRD: 57 ML/MIN/1.73
GLUCOSE BLDC GLUCOMTR-MCNC: 118 MG/DL (ref 70–105)
GLUCOSE BLDC GLUCOMTR-MCNC: 139 MG/DL (ref 70–105)
GLUCOSE BLDC GLUCOMTR-MCNC: 238 MG/DL (ref 70–105)
GLUCOSE BLDC GLUCOMTR-MCNC: 245 MG/DL (ref 70–105)
GLUCOSE BLDC GLUCOMTR-MCNC: 352 MG/DL (ref 70–105)
GLUCOSE BLDC GLUCOMTR-MCNC: 379 MG/DL (ref 70–105)
GLUCOSE SERPL-MCNC: 145 MG/DL (ref 65–99)
HCT VFR BLD AUTO: 37.7 % (ref 34–46.6)
HGB BLD-MCNC: 12.6 G/DL (ref 12–15.9)
INR PPP: 0.96 (ref 0.93–1.1)
LYMPHOCYTES # BLD AUTO: 2.9 10*3/MM3 (ref 0.7–3.1)
LYMPHOCYTES NFR BLD AUTO: 41 % (ref 19.6–45.3)
MCH RBC QN AUTO: 31.9 PG (ref 26.6–33)
MCHC RBC AUTO-ENTMCNC: 33.5 G/DL (ref 31.5–35.7)
MCV RBC AUTO: 95.4 FL (ref 79–97)
MONOCYTES # BLD AUTO: 0.5 10*3/MM3 (ref 0.1–0.9)
MONOCYTES NFR BLD AUTO: 7.3 % (ref 5–12)
NEUTROPHILS NFR BLD AUTO: 3.4 10*3/MM3 (ref 1.7–7)
NEUTROPHILS NFR BLD AUTO: 48.2 % (ref 42.7–76)
NRBC BLD AUTO-RTO: 0.1 /100 WBC (ref 0–0.2)
PLATELET # BLD AUTO: 167 10*3/MM3 (ref 140–450)
PMV BLD AUTO: 7.3 FL (ref 6–12)
POTASSIUM SERPL-SCNC: 3.6 MMOL/L (ref 3.5–5.2)
PROTHROMBIN TIME: 10.6 SECONDS (ref 9.6–11.7)
RBC # BLD AUTO: 3.96 10*6/MM3 (ref 3.77–5.28)
SODIUM SERPL-SCNC: 141 MMOL/L (ref 136–145)
WBC # BLD AUTO: 7 10*3/MM3 (ref 3.4–10.8)

## 2021-05-28 PROCEDURE — 25010000002 PROPOFOL 200 MG/20ML EMULSION: Performed by: NURSE ANESTHETIST, CERTIFIED REGISTERED

## 2021-05-28 PROCEDURE — 85025 COMPLETE CBC W/AUTO DIFF WBC: CPT | Performed by: SPECIALIST

## 2021-05-28 PROCEDURE — 25010000002 HYDROMORPHONE PER 4 MG: Performed by: SPECIALIST

## 2021-05-28 PROCEDURE — C1713 ANCHOR/SCREW BN/BN,TIS/BN: HCPCS | Performed by: SPECIALIST

## 2021-05-28 PROCEDURE — 63710000001 INSULIN LISPRO (HUMAN) PER 5 UNITS: Performed by: SPECIALIST

## 2021-05-28 PROCEDURE — 80048 BASIC METABOLIC PNL TOTAL CA: CPT | Performed by: SPECIALIST

## 2021-05-28 PROCEDURE — 25010000002 HYDROMORPHONE PER 4 MG: Performed by: NURSE ANESTHETIST, CERTIFIED REGISTERED

## 2021-05-28 PROCEDURE — 22840 INSERT SPINE FIXATION DEVICE: CPT | Performed by: SPECIALIST

## 2021-05-28 PROCEDURE — C1889 IMPLANT/INSERT DEVICE, NOC: HCPCS | Performed by: SPECIALIST

## 2021-05-28 PROCEDURE — 01NB0ZZ RELEASE LUMBAR NERVE, OPEN APPROACH: ICD-10-PCS | Performed by: SPECIALIST

## 2021-05-28 PROCEDURE — 25010000003 BUPIVACAINE LIPOSOME 1.3 % SUSPENSION 10 ML VIAL: Performed by: SPECIALIST

## 2021-05-28 PROCEDURE — 25010000002 DEXAMETHASONE PER 1 MG: Performed by: NURSE ANESTHETIST, CERTIFIED REGISTERED

## 2021-05-28 PROCEDURE — 25010000002 NEOSTIGMINE 5 MG/5ML SOLUTION: Performed by: NURSE ANESTHETIST, CERTIFIED REGISTERED

## 2021-05-28 PROCEDURE — 25010000002 GENTAMICIN PER 80 MG: Performed by: SPECIALIST

## 2021-05-28 PROCEDURE — 25010000002 MIDAZOLAM PER 1 MG: Performed by: NURSE ANESTHETIST, CERTIFIED REGISTERED

## 2021-05-28 PROCEDURE — 25010000002 PROPOFOL 10 MG/ML EMULSION: Performed by: NURSE ANESTHETIST, CERTIFIED REGISTERED

## 2021-05-28 PROCEDURE — 25010000003 POTASSIUM CHLORIDE PER 2 MEQ: Performed by: SPECIALIST

## 2021-05-28 PROCEDURE — 25010000002 CEFAZOLIN PER 500 MG: Performed by: SPECIALIST

## 2021-05-28 PROCEDURE — 63047 LAM FACETEC & FORAMOT LUMBAR: CPT | Performed by: SPECIALIST

## 2021-05-28 PROCEDURE — 82962 GLUCOSE BLOOD TEST: CPT

## 2021-05-28 PROCEDURE — C9290 INJ, BUPIVACAINE LIPOSOME: HCPCS | Performed by: SPECIALIST

## 2021-05-28 PROCEDURE — 25010000002 ONDANSETRON PER 1 MG: Performed by: NURSE ANESTHETIST, CERTIFIED REGISTERED

## 2021-05-28 PROCEDURE — 63710000001 INSULIN GLARGINE PER 5 UNITS: Performed by: SPECIALIST

## 2021-05-28 PROCEDURE — 0SG3071 FUSION OF LUMBOSACRAL JOINT WITH AUTOLOGOUS TISSUE SUBSTITUTE, POSTERIOR APPROACH, POSTERIOR COLUMN, OPEN APPROACH: ICD-10-PCS | Performed by: SPECIALIST

## 2021-05-28 PROCEDURE — 76000 FLUOROSCOPY <1 HR PHYS/QHP: CPT

## 2021-05-28 PROCEDURE — 25010000002 FENTANYL CITRATE (PF) 100 MCG/2ML SOLUTION: Performed by: NURSE ANESTHETIST, CERTIFIED REGISTERED

## 2021-05-28 PROCEDURE — 85610 PROTHROMBIN TIME: CPT | Performed by: SPECIALIST

## 2021-05-28 PROCEDURE — 25010000002 ONDANSETRON PER 1 MG: Performed by: SPECIALIST

## 2021-05-28 PROCEDURE — 72100 X-RAY EXAM L-S SPINE 2/3 VWS: CPT

## 2021-05-28 PROCEDURE — 85730 THROMBOPLASTIN TIME PARTIAL: CPT | Performed by: SPECIALIST

## 2021-05-28 PROCEDURE — 22612 ARTHRD PST TQ 1NTRSPC LUMBAR: CPT | Performed by: SPECIALIST

## 2021-05-28 DEVICE — SCREW 54840046545 4.75 ATS MAS 6.5X45
Type: IMPLANTABLE DEVICE | Site: SPINE LUMBAR | Status: FUNCTIONAL
Brand: CD HORIZON® SPINAL SYSTEM

## 2021-05-28 DEVICE — FLOSEAL HEMOSTATIC MATRIX, 10 ML
Type: IMPLANTABLE DEVICE | Site: SPINE LUMBAR | Status: FUNCTIONAL
Brand: FLOSEAL

## 2021-05-28 RX ORDER — GLYCOPYRROLATE 1 MG/5 ML
SYRINGE (ML) INTRAVENOUS AS NEEDED
Status: DISCONTINUED | OUTPATIENT
Start: 2021-05-28 | End: 2021-05-28 | Stop reason: SURG

## 2021-05-28 RX ORDER — LIDOCAINE HYDROCHLORIDE 20 MG/ML
INJECTION, SOLUTION EPIDURAL; INFILTRATION; INTRACAUDAL; PERINEURAL AS NEEDED
Status: DISCONTINUED | OUTPATIENT
Start: 2021-05-28 | End: 2021-05-28 | Stop reason: SURG

## 2021-05-28 RX ORDER — SODIUM CHLORIDE AND POTASSIUM CHLORIDE 150; 450 MG/100ML; MG/100ML
100 INJECTION, SOLUTION INTRAVENOUS CONTINUOUS
Status: DISCONTINUED | OUTPATIENT
Start: 2021-05-28 | End: 2021-05-29 | Stop reason: HOSPADM

## 2021-05-28 RX ORDER — MIDAZOLAM HYDROCHLORIDE 1 MG/ML
INJECTION INTRAMUSCULAR; INTRAVENOUS AS NEEDED
Status: DISCONTINUED | OUTPATIENT
Start: 2021-05-28 | End: 2021-05-28 | Stop reason: SURG

## 2021-05-28 RX ORDER — FENTANYL CITRATE 50 UG/ML
INJECTION, SOLUTION INTRAMUSCULAR; INTRAVENOUS AS NEEDED
Status: DISCONTINUED | OUTPATIENT
Start: 2021-05-28 | End: 2021-05-28 | Stop reason: SURG

## 2021-05-28 RX ORDER — ONDANSETRON 2 MG/ML
INJECTION INTRAMUSCULAR; INTRAVENOUS AS NEEDED
Status: DISCONTINUED | OUTPATIENT
Start: 2021-05-28 | End: 2021-05-28 | Stop reason: SURG

## 2021-05-28 RX ORDER — DEXAMETHASONE SODIUM PHOSPHATE 4 MG/ML
INJECTION, SOLUTION INTRA-ARTICULAR; INTRALESIONAL; INTRAMUSCULAR; INTRAVENOUS; SOFT TISSUE AS NEEDED
Status: DISCONTINUED | OUTPATIENT
Start: 2021-05-28 | End: 2021-05-28 | Stop reason: SURG

## 2021-05-28 RX ORDER — OXYCODONE HYDROCHLORIDE 5 MG/1
7.5 TABLET ORAL EVERY 4 HOURS PRN
Status: DISCONTINUED | OUTPATIENT
Start: 2021-05-28 | End: 2021-05-29 | Stop reason: HOSPADM

## 2021-05-28 RX ORDER — INSULIN GLARGINE 100 [IU]/ML
5 INJECTION, SOLUTION SUBCUTANEOUS ONCE
Status: COMPLETED | OUTPATIENT
Start: 2021-05-28 | End: 2021-05-28

## 2021-05-28 RX ORDER — ACETAMINOPHEN 325 MG/1
650 TABLET ORAL ONCE AS NEEDED
Status: DISCONTINUED | OUTPATIENT
Start: 2021-05-28 | End: 2021-05-28 | Stop reason: HOSPADM

## 2021-05-28 RX ORDER — HYDROCODONE BITARTRATE AND ACETAMINOPHEN 7.5; 325 MG/1; MG/1
1 TABLET ORAL EVERY 4 HOURS PRN
Status: DISCONTINUED | OUTPATIENT
Start: 2021-05-28 | End: 2021-05-29 | Stop reason: HOSPADM

## 2021-05-28 RX ORDER — INSULIN GLARGINE 100 [IU]/ML
30 INJECTION, SOLUTION SUBCUTANEOUS EVERY MORNING
Status: DISCONTINUED | OUTPATIENT
Start: 2021-05-29 | End: 2021-05-29 | Stop reason: HOSPADM

## 2021-05-28 RX ORDER — PROPOFOL 10 MG/ML
INJECTION, EMULSION INTRAVENOUS AS NEEDED
Status: DISCONTINUED | OUTPATIENT
Start: 2021-05-28 | End: 2021-05-28 | Stop reason: SURG

## 2021-05-28 RX ORDER — ROCURONIUM BROMIDE 10 MG/ML
INJECTION, SOLUTION INTRAVENOUS AS NEEDED
Status: DISCONTINUED | OUTPATIENT
Start: 2021-05-28 | End: 2021-05-28 | Stop reason: SURG

## 2021-05-28 RX ORDER — HYDROMORPHONE HCL 110MG/55ML
0.5 PATIENT CONTROLLED ANALGESIA SYRINGE INTRAVENOUS
Status: DISCONTINUED | OUTPATIENT
Start: 2021-05-28 | End: 2021-05-29 | Stop reason: HOSPADM

## 2021-05-28 RX ORDER — KETOROLAC TROMETHAMINE 15 MG/ML
15 INJECTION, SOLUTION INTRAMUSCULAR; INTRAVENOUS EVERY 6 HOURS PRN
Status: DISCONTINUED | OUTPATIENT
Start: 2021-05-28 | End: 2021-05-29 | Stop reason: HOSPADM

## 2021-05-28 RX ORDER — ACETAMINOPHEN 650 MG/1
650 SUPPOSITORY RECTAL ONCE AS NEEDED
Status: DISCONTINUED | OUTPATIENT
Start: 2021-05-28 | End: 2021-05-28 | Stop reason: HOSPADM

## 2021-05-28 RX ORDER — KETAMINE HYDROCHLORIDE 10 MG/ML
INJECTION INTRAMUSCULAR; INTRAVENOUS AS NEEDED
Status: DISCONTINUED | OUTPATIENT
Start: 2021-05-28 | End: 2021-05-28 | Stop reason: SURG

## 2021-05-28 RX ORDER — EPHEDRINE SULFATE 50 MG/ML
INJECTION INTRAVENOUS AS NEEDED
Status: DISCONTINUED | OUTPATIENT
Start: 2021-05-28 | End: 2021-05-28 | Stop reason: SURG

## 2021-05-28 RX ORDER — ONDANSETRON 2 MG/ML
4 INJECTION INTRAMUSCULAR; INTRAVENOUS ONCE AS NEEDED
Status: COMPLETED | OUTPATIENT
Start: 2021-05-28 | End: 2021-05-28

## 2021-05-28 RX ORDER — SODIUM CHLORIDE 0.9 % (FLUSH) 0.9 %
3 SYRINGE (ML) INJECTION EVERY 12 HOURS SCHEDULED
Status: DISCONTINUED | OUTPATIENT
Start: 2021-05-28 | End: 2021-05-29 | Stop reason: HOSPADM

## 2021-05-28 RX ORDER — HYDROMORPHONE HCL 110MG/55ML
0.25 PATIENT CONTROLLED ANALGESIA SYRINGE INTRAVENOUS
Status: DISCONTINUED | OUTPATIENT
Start: 2021-05-28 | End: 2021-05-28 | Stop reason: HOSPADM

## 2021-05-28 RX ORDER — FENTANYL CITRATE 50 UG/ML
25 INJECTION, SOLUTION INTRAMUSCULAR; INTRAVENOUS
Status: DISCONTINUED | OUTPATIENT
Start: 2021-05-28 | End: 2021-05-28 | Stop reason: HOSPADM

## 2021-05-28 RX ORDER — LIDOCAINE HYDROCHLORIDE AND EPINEPHRINE 10; 10 MG/ML; UG/ML
INJECTION, SOLUTION INFILTRATION; PERINEURAL AS NEEDED
Status: DISCONTINUED | OUTPATIENT
Start: 2021-05-28 | End: 2021-05-28 | Stop reason: HOSPADM

## 2021-05-28 RX ORDER — PHENYLEPHRINE HCL IN 0.9% NACL 1 MG/10 ML
SYRINGE (ML) INTRAVENOUS AS NEEDED
Status: DISCONTINUED | OUTPATIENT
Start: 2021-05-28 | End: 2021-05-28 | Stop reason: SURG

## 2021-05-28 RX ORDER — SODIUM CHLORIDE, SODIUM LACTATE, POTASSIUM CHLORIDE, CALCIUM CHLORIDE 600; 310; 30; 20 MG/100ML; MG/100ML; MG/100ML; MG/100ML
INJECTION, SOLUTION INTRAVENOUS CONTINUOUS PRN
Status: DISCONTINUED | OUTPATIENT
Start: 2021-05-28 | End: 2021-05-28 | Stop reason: SURG

## 2021-05-28 RX ORDER — NEOSTIGMINE METHYLSULFATE 5 MG/5 ML
SYRINGE (ML) INTRAVENOUS AS NEEDED
Status: DISCONTINUED | OUTPATIENT
Start: 2021-05-28 | End: 2021-05-28 | Stop reason: SURG

## 2021-05-28 RX ORDER — ONDANSETRON 2 MG/ML
4 INJECTION INTRAMUSCULAR; INTRAVENOUS EVERY 6 HOURS PRN
Status: DISCONTINUED | OUTPATIENT
Start: 2021-05-28 | End: 2021-05-29 | Stop reason: HOSPADM

## 2021-05-28 RX ORDER — NALOXONE HCL 0.4 MG/ML
0.4 VIAL (ML) INJECTION
Status: DISCONTINUED | OUTPATIENT
Start: 2021-05-28 | End: 2021-05-29 | Stop reason: HOSPADM

## 2021-05-28 RX ORDER — ESMOLOL HYDROCHLORIDE 10 MG/ML
INJECTION INTRAVENOUS AS NEEDED
Status: DISCONTINUED | OUTPATIENT
Start: 2021-05-28 | End: 2021-05-28 | Stop reason: SURG

## 2021-05-28 RX ORDER — SODIUM CHLORIDE 0.9 % (FLUSH) 0.9 %
10 SYRINGE (ML) INJECTION AS NEEDED
Status: DISCONTINUED | OUTPATIENT
Start: 2021-05-28 | End: 2021-05-29 | Stop reason: HOSPADM

## 2021-05-28 RX ADMIN — ROCURONIUM BROMIDE 10 MG: 10 INJECTION INTRAVENOUS at 08:15

## 2021-05-28 RX ADMIN — HYDROMORPHONE HYDROCHLORIDE 0.5 MG: 2 INJECTION, SOLUTION INTRAMUSCULAR; INTRAVENOUS; SUBCUTANEOUS at 12:52

## 2021-05-28 RX ADMIN — SODIUM CHLORIDE, SODIUM LACTATE, POTASSIUM CHLORIDE, AND CALCIUM CHLORIDE: .6; .31; .03; .02 INJECTION, SOLUTION INTRAVENOUS at 10:14

## 2021-05-28 RX ADMIN — ROCURONIUM BROMIDE 10 MG: 10 INJECTION INTRAVENOUS at 09:38

## 2021-05-28 RX ADMIN — KETAMINE HYDROCHLORIDE 25 MG: 10 INJECTION, SOLUTION INTRAMUSCULAR; INTRAVENOUS at 08:16

## 2021-05-28 RX ADMIN — PROPOFOL 80 MCG/KG/MIN: 10 INJECTION, EMULSION INTRAVENOUS at 07:47

## 2021-05-28 RX ADMIN — ONDANSETRON 4 MG: 2 INJECTION INTRAMUSCULAR; INTRAVENOUS at 15:23

## 2021-05-28 RX ADMIN — Medication 100 MCG: at 08:08

## 2021-05-28 RX ADMIN — HYDROMORPHONE HYDROCHLORIDE 0.5 MG: 2 INJECTION, SOLUTION INTRAMUSCULAR; INTRAVENOUS; SUBCUTANEOUS at 18:59

## 2021-05-28 RX ADMIN — SODIUM CHLORIDE, SODIUM LACTATE, POTASSIUM CHLORIDE, AND CALCIUM CHLORIDE: .6; .31; .03; .02 INJECTION, SOLUTION INTRAVENOUS at 07:39

## 2021-05-28 RX ADMIN — INSULIN GLARGINE 5 UNITS: 100 INJECTION, SOLUTION SUBCUTANEOUS at 16:05

## 2021-05-28 RX ADMIN — OXYCODONE 7.5 MG: 5 TABLET ORAL at 15:23

## 2021-05-28 RX ADMIN — Medication 3 ML: at 21:01

## 2021-05-28 RX ADMIN — ESMOLOL HYDROCHLORIDE 20 MG: 10 INJECTION, SOLUTION INTRAVENOUS at 10:28

## 2021-05-28 RX ADMIN — SODIUM CHLORIDE AND POTASSIUM CHLORIDE 100 ML/HR: 4.5; 1.49 INJECTION, SOLUTION INTRAVENOUS at 14:50

## 2021-05-28 RX ADMIN — LIDOCAINE HYDROCHLORIDE 100 MG: 20 INJECTION, SOLUTION EPIDURAL; INFILTRATION; INTRACAUDAL; PERINEURAL at 07:44

## 2021-05-28 RX ADMIN — EPHEDRINE SULFATE 5 MG: 50 INJECTION INTRAVENOUS at 08:59

## 2021-05-28 RX ADMIN — KETAMINE HYDROCHLORIDE 10 MG: 10 INJECTION, SOLUTION INTRAMUSCULAR; INTRAVENOUS at 09:27

## 2021-05-28 RX ADMIN — Medication 100 MCG: at 08:44

## 2021-05-28 RX ADMIN — FENTANYL CITRATE 50 MCG: 50 INJECTION, SOLUTION INTRAMUSCULAR; INTRAVENOUS at 08:01

## 2021-05-28 RX ADMIN — HYDROMORPHONE HYDROCHLORIDE 0.5 MG: 2 INJECTION, SOLUTION INTRAMUSCULAR; INTRAVENOUS; SUBCUTANEOUS at 14:49

## 2021-05-28 RX ADMIN — MIDAZOLAM 1 MG: 1 INJECTION INTRAMUSCULAR; INTRAVENOUS at 07:39

## 2021-05-28 RX ADMIN — PRAMIPEXOLE DIHYDROCHLORIDE 1 MG: 1 TABLET ORAL at 21:01

## 2021-05-28 RX ADMIN — EPHEDRINE SULFATE 5 MG: 50 INJECTION INTRAVENOUS at 08:43

## 2021-05-28 RX ADMIN — Medication 100 MCG: at 08:59

## 2021-05-28 RX ADMIN — ROCURONIUM BROMIDE 10 MG: 10 INJECTION INTRAVENOUS at 08:45

## 2021-05-28 RX ADMIN — Medication 100 MCG: at 08:30

## 2021-05-28 RX ADMIN — ROCURONIUM BROMIDE 50 MG: 10 INJECTION INTRAVENOUS at 07:45

## 2021-05-28 RX ADMIN — HYDROMORPHONE HYDROCHLORIDE 0.5 MG: 2 INJECTION, SOLUTION INTRAMUSCULAR; INTRAVENOUS; SUBCUTANEOUS at 11:35

## 2021-05-28 RX ADMIN — ONDANSETRON 4 MG: 2 INJECTION INTRAMUSCULAR; INTRAVENOUS at 11:05

## 2021-05-28 RX ADMIN — PROPOFOL 10 MG: 10 INJECTION, EMULSION INTRAVENOUS at 08:25

## 2021-05-28 RX ADMIN — INSULIN LISPRO 8 UNITS: 100 INJECTION, SOLUTION INTRAVENOUS; SUBCUTANEOUS at 16:04

## 2021-05-28 RX ADMIN — Medication 0.6 MG: at 10:18

## 2021-05-28 RX ADMIN — SENNOSIDES 1 TABLET: 8.6 TABLET, FILM COATED ORAL at 21:01

## 2021-05-28 RX ADMIN — FENTANYL CITRATE 25 MCG: 50 INJECTION, SOLUTION INTRAMUSCULAR; INTRAVENOUS at 09:48

## 2021-05-28 RX ADMIN — INSULIN LISPRO 4 UNITS: 100 INJECTION, SOLUTION INTRAVENOUS; SUBCUTANEOUS at 10:48

## 2021-05-28 RX ADMIN — CEFAZOLIN SODIUM 2 G: 1 INJECTION, POWDER, FOR SOLUTION INTRAMUSCULAR; INTRAVENOUS at 07:49

## 2021-05-28 RX ADMIN — FENTANYL CITRATE 25 MCG: 50 INJECTION, SOLUTION INTRAMUSCULAR; INTRAVENOUS at 09:18

## 2021-05-28 RX ADMIN — OXYCODONE 7.5 MG: 5 TABLET ORAL at 19:50

## 2021-05-28 RX ADMIN — CEFAZOLIN SODIUM 2 G: 10 INJECTION, POWDER, FOR SOLUTION INTRAVENOUS at 16:05

## 2021-05-28 RX ADMIN — ONDANSETRON 4 MG: 2 INJECTION INTRAMUSCULAR; INTRAVENOUS at 21:01

## 2021-05-28 RX ADMIN — HYDROMORPHONE HYDROCHLORIDE 0.5 MG: 2 INJECTION, SOLUTION INTRAMUSCULAR; INTRAVENOUS; SUBCUTANEOUS at 11:12

## 2021-05-28 RX ADMIN — ONDANSETRON 4 MG: 2 INJECTION INTRAMUSCULAR; INTRAVENOUS at 10:20

## 2021-05-28 RX ADMIN — PROPOFOL 100 MG: 10 INJECTION, EMULSION INTRAVENOUS at 07:44

## 2021-05-28 RX ADMIN — Medication 4 MG: at 10:18

## 2021-05-28 RX ADMIN — METHADONE HYDROCHLORIDE 15 MG: 10 TABLET ORAL at 21:00

## 2021-05-28 RX ADMIN — ROCURONIUM BROMIDE 10 MG: 10 INJECTION INTRAVENOUS at 09:10

## 2021-05-28 RX ADMIN — DEXAMETHASONE SODIUM PHOSPHATE 10 MG: 4 INJECTION, SOLUTION INTRAMUSCULAR; INTRAVENOUS at 08:28

## 2021-05-28 RX ADMIN — HYDROMORPHONE HYDROCHLORIDE 0.25 MG: 2 INJECTION, SOLUTION INTRAMUSCULAR; INTRAVENOUS; SUBCUTANEOUS at 10:55

## 2021-05-28 RX ADMIN — GLIPIZIDE 1.25 MG: 5 TABLET ORAL at 16:04

## 2021-05-28 NOTE — ANESTHESIA POSTPROCEDURE EVALUATION
Patient: Genny Soni    Procedure Summary     Date: 05/28/21 Room / Location: [facility] OR  / [facility] MAIN OR    Anesthesia Start: 0739 Anesthesia Stop: 1036    Procedure: Lumbar 5 Smith procedure.  Lumbar 5 6 and sacral 1 transpedicular Solera screws and rods.  Posterior fusion with autologous bone. (N/A Spine Lumbar) Diagnosis:       Congenital spondylolisthesis      (Congenital spondylolisthesis [Q76.2])    Surgeons: Gagan Aguilar MD Provider: Pipo Edouard MD    Anesthesia Type: general ASA Status: 3          Anesthesia Type: general    Vitals  Vitals Value Taken Time   /43 05/28/21 1105   Temp 96.8 °F (36 °C) 05/28/21 1038   Pulse 81 05/28/21 1106   Resp 17 05/28/21 1053   SpO2 93 % 05/28/21 1106   Vitals shown include unvalidated device data.        Post Anesthesia Care and Evaluation    Patient location during evaluation: PACU  Patient participation: complete - patient cannot participate  Level of consciousness: responsive to light touch and responsive to physical stimuli  Pain score: 0  Pain management: adequate  Airway patency: patent  Anesthetic complications: No anesthetic complications  PONV Status: controlled  Cardiovascular status: acceptable and hemodynamically stable  Respiratory status: acceptable and face mask  Hydration status: acceptable    Comments: Satisfactory progress.Patient seen and examined postoperatively; vital signs stable; SpO2 greater than or equal to 90%; cardiopulmonary status stable; nausea/vomiting adequately controlled; pain adequately controlled; no apparent anesthesia complications; patient discharged from anesthesia care when discharge criteria were met

## 2021-05-28 NOTE — ANESTHESIA PROCEDURE NOTES
Airway  Urgency: elective    Date/Time: 5/28/2021 7:46 AM  Airway not difficult    General Information and Staff    Patient location during procedure: OR  Anesthesiologist: Pipo Edouard MD    Indications and Patient Condition  Indications for airway management: airway protection    Preoxygenated: yes  MILS maintained throughout  Mask difficulty assessment: 2 - vent by mask + OA or adjuvant +/- NMBA    Final Airway Details  Final airway type: endotracheal airway      Successful airway: ETT    Successful intubation technique: direct laryngoscopy  Endotracheal tube insertion site: oral  Blade: Donell  Blade size: 3  ETT size (mm): 7.0  Cormack-Lehane Classification: grade I - full view of glottis  Placement verified by: chest auscultation and capnometry   Measured from: lips  ETT/EBT  to lips (cm): 21  Number of attempts at approach: 1  Assessment: lips, teeth, and gum same as pre-op

## 2021-05-29 VITALS
WEIGHT: 189.38 LBS | BODY MASS INDEX: 37.18 KG/M2 | HEIGHT: 60 IN | TEMPERATURE: 97.8 F | RESPIRATION RATE: 18 BRPM | HEART RATE: 70 BPM | DIASTOLIC BLOOD PRESSURE: 53 MMHG | OXYGEN SATURATION: 95 % | SYSTOLIC BLOOD PRESSURE: 106 MMHG

## 2021-05-29 LAB
GLUCOSE BLDC GLUCOMTR-MCNC: 218 MG/DL (ref 70–105)
GLUCOSE BLDC GLUCOMTR-MCNC: 263 MG/DL (ref 70–105)

## 2021-05-29 PROCEDURE — 25010000002 ONDANSETRON PER 1 MG: Performed by: SPECIALIST

## 2021-05-29 PROCEDURE — 25010000002 CEFAZOLIN PER 500 MG: Performed by: SPECIALIST

## 2021-05-29 PROCEDURE — 99024 POSTOP FOLLOW-UP VISIT: CPT | Performed by: SPECIALIST

## 2021-05-29 PROCEDURE — 63710000001 INSULIN GLARGINE PER 5 UNITS: Performed by: SPECIALIST

## 2021-05-29 PROCEDURE — 25010000002 HYDROMORPHONE PER 4 MG: Performed by: SPECIALIST

## 2021-05-29 PROCEDURE — 63710000001 INSULIN LISPRO (HUMAN) PER 5 UNITS: Performed by: SPECIALIST

## 2021-05-29 PROCEDURE — 25010000002 KETOROLAC TROMETHAMINE PER 15 MG: Performed by: SPECIALIST

## 2021-05-29 PROCEDURE — 97162 PT EVAL MOD COMPLEX 30 MIN: CPT

## 2021-05-29 PROCEDURE — 97116 GAIT TRAINING THERAPY: CPT

## 2021-05-29 PROCEDURE — 82962 GLUCOSE BLOOD TEST: CPT

## 2021-05-29 RX ORDER — KETOROLAC TROMETHAMINE 10 MG/1
10 TABLET, FILM COATED ORAL EVERY 6 HOURS PRN
Qty: 25 TABLET | Refills: 0 | Status: SHIPPED | OUTPATIENT
Start: 2021-05-29 | End: 2021-06-11 | Stop reason: HOSPADM

## 2021-05-29 RX ORDER — CEPHALEXIN 500 MG/1
500 CAPSULE ORAL 3 TIMES DAILY
Qty: 12 CAPSULE | Refills: 0 | Status: SHIPPED | OUTPATIENT
Start: 2021-05-29 | End: 2021-06-06

## 2021-05-29 RX ADMIN — SENNOSIDES 1 TABLET: 8.6 TABLET, FILM COATED ORAL at 08:37

## 2021-05-29 RX ADMIN — GLIPIZIDE 1.25 MG: 5 TABLET ORAL at 08:37

## 2021-05-29 RX ADMIN — HYDROCODONE BITARTRATE AND ACETAMINOPHEN 1 TABLET: 7.5; 325 TABLET ORAL at 11:52

## 2021-05-29 RX ADMIN — ROSUVASTATIN CALCIUM 10 MG: 10 TABLET, FILM COATED ORAL at 08:36

## 2021-05-29 RX ADMIN — INSULIN LISPRO 4 UNITS: 100 INJECTION, SOLUTION INTRAVENOUS; SUBCUTANEOUS at 08:37

## 2021-05-29 RX ADMIN — LISINOPRIL 10 MG: 5 TABLET ORAL at 08:37

## 2021-05-29 RX ADMIN — KETOROLAC TROMETHAMINE 15 MG: 15 INJECTION, SOLUTION INTRAMUSCULAR; INTRAVENOUS at 06:25

## 2021-05-29 RX ADMIN — CEFAZOLIN SODIUM 2 G: 10 INJECTION, POWDER, FOR SOLUTION INTRAVENOUS at 00:07

## 2021-05-29 RX ADMIN — Medication 3 ML: at 08:38

## 2021-05-29 RX ADMIN — HYDROMORPHONE HYDROCHLORIDE 0.5 MG: 2 INJECTION, SOLUTION INTRAMUSCULAR; INTRAVENOUS; SUBCUTANEOUS at 03:09

## 2021-05-29 RX ADMIN — INSULIN LISPRO 6 UNITS: 100 INJECTION, SOLUTION INTRAVENOUS; SUBCUTANEOUS at 11:51

## 2021-05-29 RX ADMIN — HYDROCHLOROTHIAZIDE 25 MG: 25 TABLET ORAL at 08:37

## 2021-05-29 RX ADMIN — ARIPIPRAZOLE 2 MG: 2 TABLET ORAL at 08:37

## 2021-05-29 RX ADMIN — INSULIN GLARGINE 30 UNITS: 100 INJECTION, SOLUTION SUBCUTANEOUS at 08:38

## 2021-05-29 RX ADMIN — OXYCODONE 7.5 MG: 5 TABLET ORAL at 00:07

## 2021-05-29 RX ADMIN — METHADONE HYDROCHLORIDE 10 MG: 10 TABLET ORAL at 08:37

## 2021-05-29 RX ADMIN — OXYCODONE 7.5 MG: 5 TABLET ORAL at 06:03

## 2021-05-29 RX ADMIN — ONDANSETRON 4 MG: 2 INJECTION INTRAMUSCULAR; INTRAVENOUS at 06:25

## 2021-05-29 RX ADMIN — POTASSIUM CHLORIDE 10 MEQ: 750 TABLET, EXTENDED RELEASE ORAL at 08:37

## 2021-05-30 ENCOUNTER — READMISSION MANAGEMENT (OUTPATIENT)
Dept: CALL CENTER | Facility: HOSPITAL | Age: 71
End: 2021-05-30

## 2021-05-30 NOTE — OUTREACH NOTE
Prep Survey      Responses   [facility] facility patient discharged from?  [facility]   Is LACE score < 7 ?  No   Emergency Room discharge w/ pulse ox?  No   Eligibility  Readm Mgmt   Discharge diagnosis  Congenital spondylolisthesis Lumbar 5 Smith procedure.  Lumbar 5 6 and sacral 1 transpedicular Solera screws and rods.  Posterior fusion with autologous bone.   Does the patient have one of the following disease processes/diagnoses(primary or secondary)?  General Surgery   Does the patient have Home health ordered?  No   Is there a DME ordered?  No   Prep survey completed?  Yes          Cassidy Regan RN

## 2021-06-01 LAB — QT INTERVAL: 377 MS

## 2021-06-01 NOTE — CASE MANAGEMENT/SOCIAL WORK
Case Management Discharge Note      Final Note: home         Selected Continued Care - Discharged on 5/29/2021 Admission date: 5/27/2021 - Discharge disposition: Home or Self Care                 Final Discharge Disposition Code: 01 - home or self-care

## 2021-06-02 ENCOUNTER — READMISSION MANAGEMENT (OUTPATIENT)
Dept: CALL CENTER | Facility: HOSPITAL | Age: 71
End: 2021-06-02

## 2021-06-02 NOTE — OUTREACH NOTE
General Surgery Week 1 Survey      Responses   [facility] [place] patient discharged from?  [facility]   Does the patient have one of the following disease processes/diagnoses(primary or secondary)?  General Surgery   Week 1 attempt successful?  Yes   Call start time  1233   Call end time  1237   Discharge diagnosis  Congenital spondylolisthesis Lumbar 5 Smith procedure.  Lumbar 5 6 and sacral 1 transpedicular Solera screws and rods.  Posterior fusion with autologous bone.   Is patient permission given to speak with other caregiver?  Yes   Person spoke with today (if not patient) and relationship  Frank   Meds reviewed with patient/caregiver?  Yes   Is the patient having any side effects they believe may be caused by any medication additions or changes?  No   Does the patient have all medications related to this admission filled (includes all antibiotics, pain medications, etc.)  Yes   Is the patient taking all medications as directed (includes completed medication regime)?  Yes   Does the patient have a follow up appointment scheduled with their surgeon?  Yes   Has the patient kept scheduled appointments due by today?  N/A   Has home health visited the patient within 72 hours of discharge?  N/A   Psychosocial issues?  No   Did the patient receive a copy of their discharge instructions?  Yes   Nursing interventions  Reviewed instructions with patient   What is the patient's perception of their health status since discharge?  Improving   Nursing interventions  Nurse provided patient education   Is the patient /caregiver able to teach back basic post-op care?  Practice 'cough and deep breath', Drive as instructed by MD in discharge instructions, Take showers only when approved by MD-sponge bathe until then, No tub bath, swimming, or hot tub until instructed by MD, Keep incision areas clean,dry and protected, Do not remove steri-strips, Lifting as instructed by MD in discharge instructions   Is the patient/caregiver able to  teach back signs and symptoms of incisional infection?  Increased redness, swelling or pain at the incisonal site, Increased drainage or bleeding, Incisional warmth, Pus or odor from incision, Fever   Is the patient/caregiver able to teach back steps to recovery at home?  Set small, achievable goals for return to baseline health, Rest and rebuild strength, gradually increase activity, Eat a well-balance diet, Make a list of questions for surgeon's appointment   If the patient is a current smoker, are they able to teach back resources for cessation?  [id number]   Is the patient/caregiver able to teach back the hierarchy of who to call/visit for symptoms/problems? PCP, Specialist, Home health nurse, Urgent Care, ED, 911  Yes   Additional teach back comments  Trouble sleeping,  incision is healing well he says. She is eating well. Encouraged pcp f/u   Week 1 call completed?  Yes   Wrap up additional comments  Improving and will make a pcp appt he says.          Tia Burton RN

## 2021-06-06 ENCOUNTER — HOSPITAL ENCOUNTER (OUTPATIENT)
Facility: HOSPITAL | Age: 71
Setting detail: OBSERVATION
Discharge: HOME HEALTH | End: 2021-06-11
Attending: EMERGENCY MEDICINE | Admitting: FAMILY MEDICINE

## 2021-06-06 ENCOUNTER — APPOINTMENT (OUTPATIENT)
Dept: CT IMAGING | Facility: HOSPITAL | Age: 71
End: 2021-06-06

## 2021-06-06 ENCOUNTER — APPOINTMENT (OUTPATIENT)
Dept: GENERAL RADIOLOGY | Facility: HOSPITAL | Age: 71
End: 2021-06-06

## 2021-06-06 DIAGNOSIS — Z98.1 S/P LUMBAR SPINAL FUSION: ICD-10-CM

## 2021-06-06 DIAGNOSIS — I26.99 ACUTE PULMONARY EMBOLISM WITHOUT ACUTE COR PULMONALE, UNSPECIFIED PULMONARY EMBOLISM TYPE (HCC): Primary | ICD-10-CM

## 2021-06-06 DIAGNOSIS — R60.0 BILATERAL LEG EDEMA: ICD-10-CM

## 2021-06-06 LAB
ALBUMIN SERPL-MCNC: 3.9 G/DL (ref 3.5–5.2)
ALBUMIN/GLOB SERPL: 1.5 G/DL
ALP SERPL-CCNC: 127 U/L (ref 39–117)
ALT SERPL W P-5'-P-CCNC: 20 U/L (ref 1–33)
ANION GAP SERPL CALCULATED.3IONS-SCNC: 8 MMOL/L (ref 5–15)
APTT PPP: 25.1 SECONDS (ref 24–31)
AST SERPL-CCNC: 18 U/L (ref 1–32)
BACTERIA UR QL AUTO: ABNORMAL /HPF
BASOPHILS # BLD AUTO: 0 10*3/MM3 (ref 0–0.2)
BASOPHILS NFR BLD AUTO: 0.3 % (ref 0–1.5)
BILIRUB SERPL-MCNC: 0.4 MG/DL (ref 0–1.2)
BILIRUB UR QL STRIP: NEGATIVE
BUN SERPL-MCNC: 42 MG/DL (ref 8–23)
BUN/CREAT SERPL: 28 (ref 7–25)
CALCIUM SPEC-SCNC: 9.4 MG/DL (ref 8.6–10.5)
CHLORIDE SERPL-SCNC: 96 MMOL/L (ref 98–107)
CLARITY UR: ABNORMAL
CO2 SERPL-SCNC: 32 MMOL/L (ref 22–29)
COLOR UR: YELLOW
CREAT SERPL-MCNC: 1.5 MG/DL (ref 0.57–1)
DEPRECATED RDW RBC AUTO: 45.9 FL (ref 37–54)
EOSINOPHIL # BLD AUTO: 0.2 10*3/MM3 (ref 0–0.4)
EOSINOPHIL NFR BLD AUTO: 2.9 % (ref 0.3–6.2)
ERYTHROCYTE [DISTWIDTH] IN BLOOD BY AUTOMATED COUNT: 14 % (ref 12.3–15.4)
GFR SERPL CREATININE-BSD FRML MDRD: 34 ML/MIN/1.73
GLOBULIN UR ELPH-MCNC: 2.6 GM/DL
GLUCOSE SERPL-MCNC: 280 MG/DL (ref 65–99)
GLUCOSE UR STRIP-MCNC: ABNORMAL MG/DL
HCT VFR BLD AUTO: 32.9 % (ref 34–46.6)
HGB BLD-MCNC: 11.2 G/DL (ref 12–15.9)
HGB UR QL STRIP.AUTO: NEGATIVE
HOLD SPECIMEN: NORMAL
HYALINE CASTS UR QL AUTO: ABNORMAL /LPF
INR PPP: <0.93 (ref 0.93–1.1)
KETONES UR QL STRIP: NEGATIVE
LEUKOCYTE ESTERASE UR QL STRIP.AUTO: ABNORMAL
LYMPHOCYTES # BLD AUTO: 2.4 10*3/MM3 (ref 0.7–3.1)
LYMPHOCYTES NFR BLD AUTO: 31.9 % (ref 19.6–45.3)
MCH RBC QN AUTO: 31.8 PG (ref 26.6–33)
MCHC RBC AUTO-ENTMCNC: 33.9 G/DL (ref 31.5–35.7)
MCV RBC AUTO: 93.8 FL (ref 79–97)
MONOCYTES # BLD AUTO: 0.5 10*3/MM3 (ref 0.1–0.9)
MONOCYTES NFR BLD AUTO: 6.5 % (ref 5–12)
NEUTROPHILS NFR BLD AUTO: 4.3 10*3/MM3 (ref 1.7–7)
NEUTROPHILS NFR BLD AUTO: 58.4 % (ref 42.7–76)
NITRITE UR QL STRIP: NEGATIVE
NRBC BLD AUTO-RTO: 0.1 /100 WBC (ref 0–0.2)
NT-PROBNP SERPL-MCNC: 34.6 PG/ML (ref 0–900)
PH UR STRIP.AUTO: 8 [PH] (ref 5–8)
PLATELET # BLD AUTO: 222 10*3/MM3 (ref 140–450)
PMV BLD AUTO: 6.7 FL (ref 6–12)
POTASSIUM SERPL-SCNC: 5.1 MMOL/L (ref 3.5–5.2)
PROT SERPL-MCNC: 6.5 G/DL (ref 6–8.5)
PROT UR QL STRIP: NEGATIVE
PROTHROMBIN TIME: 10.2 SECONDS (ref 9.6–11.7)
RBC # BLD AUTO: 3.51 10*6/MM3 (ref 3.77–5.28)
RBC # UR: ABNORMAL /HPF
REF LAB TEST METHOD: ABNORMAL
SODIUM SERPL-SCNC: 136 MMOL/L (ref 136–145)
SP GR UR STRIP: 1.01 (ref 1–1.03)
SQUAMOUS #/AREA URNS HPF: ABNORMAL /HPF
TROPONIN T SERPL-MCNC: <0.01 NG/ML (ref 0–0.03)
UROBILINOGEN UR QL STRIP: ABNORMAL
WBC # BLD AUTO: 7.4 10*3/MM3 (ref 3.4–10.8)
WBC UR QL AUTO: ABNORMAL /HPF
WHOLE BLOOD HOLD SPECIMEN: NORMAL
WHOLE BLOOD HOLD SPECIMEN: NORMAL

## 2021-06-06 PROCEDURE — 85025 COMPLETE CBC W/AUTO DIFF WBC: CPT | Performed by: EMERGENCY MEDICINE

## 2021-06-06 PROCEDURE — 74177 CT ABD & PELVIS W/CONTRAST: CPT

## 2021-06-06 PROCEDURE — 81001 URINALYSIS AUTO W/SCOPE: CPT | Performed by: EMERGENCY MEDICINE

## 2021-06-06 PROCEDURE — 25010000002 HYDROMORPHONE PER 4 MG: Performed by: EMERGENCY MEDICINE

## 2021-06-06 PROCEDURE — 96366 THER/PROPH/DIAG IV INF ADDON: CPT

## 2021-06-06 PROCEDURE — 96375 TX/PRO/DX INJ NEW DRUG ADDON: CPT

## 2021-06-06 PROCEDURE — 71275 CT ANGIOGRAPHY CHEST: CPT

## 2021-06-06 PROCEDURE — G0378 HOSPITAL OBSERVATION PER HR: HCPCS

## 2021-06-06 PROCEDURE — 80053 COMPREHEN METABOLIC PANEL: CPT | Performed by: EMERGENCY MEDICINE

## 2021-06-06 PROCEDURE — 85730 THROMBOPLASTIN TIME PARTIAL: CPT | Performed by: EMERGENCY MEDICINE

## 2021-06-06 PROCEDURE — 93005 ELECTROCARDIOGRAM TRACING: CPT | Performed by: EMERGENCY MEDICINE

## 2021-06-06 PROCEDURE — 71045 X-RAY EXAM CHEST 1 VIEW: CPT

## 2021-06-06 PROCEDURE — 25010000002 HEPARIN (PORCINE) 25000-0.45 UT/250ML-% SOLUTION: Performed by: EMERGENCY MEDICINE

## 2021-06-06 PROCEDURE — 85610 PROTHROMBIN TIME: CPT | Performed by: EMERGENCY MEDICINE

## 2021-06-06 PROCEDURE — 0 IOPAMIDOL PER 1 ML: Performed by: EMERGENCY MEDICINE

## 2021-06-06 PROCEDURE — 25010000002 ONDANSETRON PER 1 MG: Performed by: EMERGENCY MEDICINE

## 2021-06-06 PROCEDURE — 99284 EMERGENCY DEPT VISIT MOD MDM: CPT

## 2021-06-06 PROCEDURE — 84484 ASSAY OF TROPONIN QUANT: CPT | Performed by: EMERGENCY MEDICINE

## 2021-06-06 PROCEDURE — 96376 TX/PRO/DX INJ SAME DRUG ADON: CPT

## 2021-06-06 PROCEDURE — 96365 THER/PROPH/DIAG IV INF INIT: CPT

## 2021-06-06 PROCEDURE — 83880 ASSAY OF NATRIURETIC PEPTIDE: CPT | Performed by: EMERGENCY MEDICINE

## 2021-06-06 RX ORDER — BUMETANIDE 1 MG/1
1 TABLET ORAL 2 TIMES DAILY
COMMUNITY
End: 2021-06-11 | Stop reason: HOSPADM

## 2021-06-06 RX ORDER — SODIUM CHLORIDE 0.9 % (FLUSH) 0.9 %
10 SYRINGE (ML) INJECTION AS NEEDED
Status: DISCONTINUED | OUTPATIENT
Start: 2021-06-06 | End: 2021-06-11 | Stop reason: HOSPADM

## 2021-06-06 RX ORDER — METHADONE HYDROCHLORIDE 5 MG/1
15 TABLET ORAL NIGHTLY
COMMUNITY
End: 2021-06-23

## 2021-06-06 RX ORDER — HEPARIN SODIUM 10000 [USP'U]/100ML
10.9 INJECTION, SOLUTION INTRAVENOUS
Status: DISCONTINUED | OUTPATIENT
Start: 2021-06-06 | End: 2021-06-09

## 2021-06-06 RX ORDER — POLYETHYLENE GLYCOL 3350 17 G/17G
17 POWDER, FOR SOLUTION ORAL DAILY PRN
COMMUNITY

## 2021-06-06 RX ORDER — ERGOCALCIFEROL 1.25 MG/1
50000 CAPSULE ORAL WEEKLY
COMMUNITY

## 2021-06-06 RX ORDER — ONDANSETRON 2 MG/ML
4 INJECTION INTRAMUSCULAR; INTRAVENOUS ONCE
Status: COMPLETED | OUTPATIENT
Start: 2021-06-06 | End: 2021-06-06

## 2021-06-06 RX ORDER — HYDROMORPHONE HCL 110MG/55ML
0.5 PATIENT CONTROLLED ANALGESIA SYRINGE INTRAVENOUS ONCE
Status: COMPLETED | OUTPATIENT
Start: 2021-06-06 | End: 2021-06-06

## 2021-06-06 RX ADMIN — HEPARIN SODIUM 10.9 UNITS/KG/HR: 10000 INJECTION, SOLUTION INTRAVENOUS at 21:56

## 2021-06-06 RX ADMIN — HYDROMORPHONE HYDROCHLORIDE 0.5 MG: 2 INJECTION, SOLUTION INTRAMUSCULAR; INTRAVENOUS; SUBCUTANEOUS at 22:39

## 2021-06-06 RX ADMIN — ONDANSETRON 4 MG: 2 INJECTION INTRAMUSCULAR; INTRAVENOUS at 22:39

## 2021-06-06 RX ADMIN — IOPAMIDOL 100 ML: 755 INJECTION, SOLUTION INTRAVENOUS at 19:36

## 2021-06-06 NOTE — ED TRIAGE NOTES
Pt reports intermittent SOA,BLE swelling,abdominal swelling for past 4 days with worsening SOA, denies any CP at this time. Denies any cardiac history.

## 2021-06-06 NOTE — ED PROVIDER NOTES
Subjective   History of Present Illness  71-year-old female states that she had lumbar surgery about 10 days ago and felt fine 2 days afterwards but then about 3 days afterwards she developed shortness of breath as well as swelling in both lower extremities.  Patient denies any cough or congestion fever chills nausea vomiting or diarrhea.  No history of DVT.  She has a history of arthritis as well as elevated cholesterol and insulin-dependent diabetes.  Patient has a history of aortofemoral bypass.  Review of Systems   Constitutional: Positive for activity change and fatigue.   HENT: Negative.    Eyes: Negative.    Respiratory: Positive for shortness of breath.    Cardiovascular: Positive for leg swelling.   Gastrointestinal: Negative.    Endocrine: Negative.    Genitourinary: Negative.    Musculoskeletal: Positive for back pain.   Skin: Negative.    Allergic/Immunologic: Negative.    Neurological: Negative.    Hematological: Negative.    Psychiatric/Behavioral: Positive for dysphoric mood.       Past Medical History:   Diagnosis Date   • Arthritis    • Diabetes mellitus (CMS/HCC)    • Hyperlipidemia    • Low back pain        Allergies   Allergen Reactions   • Ambien  [Zolpidem] Confusion   • Codeine Itching   • Sulfa Antibiotics Nausea And Vomiting     Makes her nauseated       Past Surgical History:   Procedure Laterality Date   • BACK SURGERY     • FEMORAL DISTAL BYPASS     • HYSTERECTOMY     • LUMBAR LAMINECTOMY WITH FUSION N/A 5/28/2021    Procedure: Lumbar 5 Smith procedure.  Lumbar 5 6 and sacral 1 transpedicular Solera screws and rods.  Posterior fusion with autologous bone.;  Surgeon: Gagan Aguilar MD;  Location: [facility];  Service: Neurosurgery;  Laterality: N/A;       History reviewed. No pertinent family history.    Social History     Socioeconomic History   • Marital status:      Spouse name: Not on file   • Number of children: Not on file   • Years of education: Not on file   • Highest  education level: Not on file   Tobacco Use   • Smoking status: Former Smoker   • Smokeless tobacco: Never Used   Vaping Use   • Vaping Use: Never used   Substance and Sexual Activity   • Alcohol use: Not Currently   • Drug use: Never   • Sexual activity: Defer           Objective   Physical Exam  Patient is awake and alert she is afebrile vital signs are stable her heart rate is 82 and her sats are 97% on room air the HEENT exam is unremarkable neck is supple without JVD the chest is clear cardiovascular exam reveals a regular rhythm without a gallop or murmur the abdomen is soft and nontender back shows a recent surgical incision scar.  There is no evidence of any infection.  The patient has edema from the feet to the waist.  She has good distal pulses negative Homans no focal neurologic deficit is noted.  Procedures           ED Course         EKG shows a sinus rhythm at 82 which is normal                        Results for orders placed or performed during the hospital encounter of 06/06/21   Comprehensive Metabolic Panel    Specimen: Arm, Left; Blood   Result Value Ref Range    Glucose 280 (H) 65 - 99 mg/dL    BUN 42 (H) 8 - 23 mg/dL    Creatinine 1.50 (H) 0.57 - 1.00 mg/dL    Sodium 136 136 - 145 mmol/L    Potassium 5.1 3.5 - 5.2 mmol/L    Chloride 96 (L) 98 - 107 mmol/L    CO2 32.0 (H) 22.0 - 29.0 mmol/L    Calcium 9.4 8.6 - 10.5 mg/dL    Total Protein 6.5 6.0 - 8.5 g/dL    Albumin 3.90 3.50 - 5.20 g/dL    ALT (SGPT) 20 1 - 33 U/L    AST (SGOT) 18 1 - 32 U/L    Alkaline Phosphatase 127 (H) 39 - 117 U/L    Total Bilirubin 0.4 0.0 - 1.2 mg/dL    eGFR Non African Amer 34 (L) >60 mL/min/1.73    Globulin 2.6 gm/dL    A/G Ratio 1.5 g/dL    BUN/Creatinine Ratio 28.0 (H) 7.0 - 25.0    Anion Gap 8.0 5.0 - 15.0 mmol/L   Protime-INR    Specimen: Arm, Left; Blood   Result Value Ref Range    Protime 10.2 9.6 - 11.7 Seconds    INR <0.93 (L) 0.93 - 1.10   aPTT    Specimen: Arm, Left; Blood   Result Value Ref Range    PTT  25.1 24.0 - 31.0 seconds   Urinalysis With Microscopic If Indicated (No Culture) - Urine, Clean Catch    Specimen: Urine, Clean Catch   Result Value Ref Range    Color, UA Yellow Yellow, Straw    Appearance, UA Hazy (A) Clear    pH, UA 8.0 5.0 - 8.0    Specific Gravity, UA 1.014 1.005 - 1.030    Glucose,  mg/dL (Trace) (A) Negative    Ketones, UA Negative Negative    Bilirubin, UA Negative Negative    Blood, UA Negative Negative    Protein, UA Negative Negative    Leuk Esterase, UA Trace (A) Negative    Nitrite, UA Negative Negative    Urobilinogen, UA 0.2 E.U./dL 0.2 - 1.0 E.U./dL   BNP    Specimen: Arm, Left; Blood   Result Value Ref Range    proBNP 34.6 0.0 - 900.0 pg/mL   Troponin    Specimen: Arm, Left; Blood   Result Value Ref Range    Troponin T <0.010 0.000 - 0.030 ng/mL   CBC Auto Differential    Specimen: Arm, Left; Blood   Result Value Ref Range    WBC 7.40 3.40 - 10.80 10*3/mm3    RBC 3.51 (L) 3.77 - 5.28 10*6/mm3    Hemoglobin 11.2 (L) 12.0 - 15.9 g/dL    Hematocrit 32.9 (L) 34.0 - 46.6 %    MCV 93.8 79.0 - 97.0 fL    MCH 31.8 26.6 - 33.0 pg    MCHC 33.9 31.5 - 35.7 g/dL    RDW 14.0 12.3 - 15.4 %    RDW-SD 45.9 37.0 - 54.0 fl    MPV 6.7 6.0 - 12.0 fL    Platelets 222 140 - 450 10*3/mm3    Neutrophil % 58.4 42.7 - 76.0 %    Lymphocyte % 31.9 19.6 - 45.3 %    Monocyte % 6.5 5.0 - 12.0 %    Eosinophil % 2.9 0.3 - 6.2 %    Basophil % 0.3 0.0 - 1.5 %    Neutrophils, Absolute 4.30 1.70 - 7.00 10*3/mm3    Lymphocytes, Absolute 2.40 0.70 - 3.10 10*3/mm3    Monocytes, Absolute 0.50 0.10 - 0.90 10*3/mm3    Eosinophils, Absolute 0.20 0.00 - 0.40 10*3/mm3    Basophils, Absolute 0.00 0.00 - 0.20 10*3/mm3    nRBC 0.1 0.0 - 0.2 /100 WBC   Urinalysis, Microscopic Only - Urine, Clean Catch    Specimen: Urine, Clean Catch   Result Value Ref Range    RBC, UA None Seen None Seen /HPF    WBC, UA 3-5 (A) None Seen /HPF    Bacteria, UA None Seen None Seen /HPF    Squamous Epithelial Cells, UA 0-2 None Seen, 0-2 /HPF     Hyaline Casts, UA None Seen None Seen /LPF    Methodology Automated Microscopy    ECG 12 Lead   Result Value Ref Range    QT Interval 363 ms   Light Blue Top   Result Value Ref Range    Extra Tube hold for add-on    Lavender Top   Result Value Ref Range    Extra Tube hold for add-on    Gold Top - SST   Result Value Ref Range    Extra Tube Hold for add-ons.      Medications   sodium chloride 0.9 % flush 10 mL (has no administration in time range)   sodium chloride 0.9 % flush 10 mL (has no administration in time range)   iopamidol (ISOVUE-370) 76 % injection 100 mL (100 mL Intravenous Given 6/6/21 1936)     CT Abdomen Pelvis With Contrast    Result Date: 6/6/2021  1. There is fluid in the colon which can be seen with diarrhea and clinical correlation is recommended. 2. There is mild bilateral hydronephrosis with no ureteral stone. This could possibly be due to bladder distention and clinical correlation is recommended. 3. There is a partly included fluid filled structure measuring at least 5.5 cm in the subcutaneous fat of the anterior medial right thigh that is of uncertain etiology. 4. Appendix is not identifiable. No secondary signs of appendicitis. 5. Patient has had an aortobifemoral graft. 6. Additional findings as reported above.  Electronically Signed By-Argelia Falcon MD On:6/6/2021 8:05 PM This report was finalized on [id number] by  Argelia Falcon MD.    XR Chest 1 View    Result Date: 6/6/2021   1. Stable exam, with no evidence of active disease.  Electronically Signed By-Argelia Falcon MD On:6/6/2021 5:23 PM This report was finalized on [id number] by  Argelia aFlcon MD.    CT Chest Pulmonary Embolism    Result Date: 6/6/2021  1. This study is positive for pulmonary emboli. There is thrombus in the left main pulmonary artery extending into left upper lobe vessels. Technically, I think this is likely a saddle embolus, with a thin wispy clot extending from the clot in the left main pulmonary artery  across the bifurcation and into the right main pulmonary artery. There is also likely thrombus in a segmental right upper lobe artery. 2. Scattered groundglass opacities in the lungs, nonspecific. 3. Please see today's CT abdomen dictation for description of abdominal findings.  Electronically Signed By-Argelia Falcon MD On:6/6/2021 7:58 PM This report was finalized on [id number] by  Argelia Falcon MD.              MDM  The patient has evidence of a pulmonary embolism in the left main involving predominantly the left upper lobe.  There is a wisp of clot that appears to cross to the right but this appears to be fairly insignificant.  The patient is hemodynamically stable.  There is no evidence of the right heart strain.  It was felt that the patient was not a candidate for embolectomy or TPA but that she could be started on a low-dose of heparin intravenously as a infusion.  The patient is agreeable to this.  Patient will be admitted to the intensive care unit  Final diagnoses:   Acute pulmonary embolism without acute cor pulmonale, unspecified pulmonary embolism type (CMS/HCC)   Bilateral leg edema   S/P lumbar spinal fusion       ED Disposition  ED Disposition     ED Disposition Condition Comment    Decision to Admit  Level of Care: Critical Care [6]   Diagnosis: Acute pulmonary embolism without acute cor pulmonale, unspecified pulmonary embolism type (CMS/HCC) [8170029]   Admitting Physician: GUANACO JO [[id number]]   Attending Physician: GUANACO JO [[id number]]   Isolate for COVID?: No [0]   Certification: I Certify That Inpatient Hospital Services Are Medically Necessary For Greater Than 2 Midnights            No follow-up provider specified.       Medication List      No changes were made to your prescriptions during this visit.          Tanner Kelly MD  06/06/21 7809

## 2021-06-07 ENCOUNTER — APPOINTMENT (OUTPATIENT)
Dept: CARDIOLOGY | Facility: HOSPITAL | Age: 71
End: 2021-06-07

## 2021-06-07 ENCOUNTER — READMISSION MANAGEMENT (OUTPATIENT)
Dept: CALL CENTER | Facility: HOSPITAL | Age: 71
End: 2021-06-07

## 2021-06-07 PROBLEM — F41.9 ANXIETY: Status: ACTIVE | Noted: 2021-06-07

## 2021-06-07 PROBLEM — K59.03 DRUG-INDUCED CONSTIPATION: Status: ACTIVE | Noted: 2019-04-22

## 2021-06-07 PROBLEM — E78.5 HYPERLIPIDEMIA: Status: ACTIVE | Noted: 2018-01-31

## 2021-06-07 PROBLEM — R42 DIZZINESS: Status: ACTIVE | Noted: 2018-12-27

## 2021-06-07 PROBLEM — S69.90XA INJURY OF HAND: Status: ACTIVE | Noted: 2018-05-22

## 2021-06-07 PROBLEM — G25.81 RLS (RESTLESS LEGS SYNDROME): Status: ACTIVE | Noted: 2021-06-07

## 2021-06-07 PROBLEM — R60.0 EDEMA OF LOWER EXTREMITY: Status: ACTIVE | Noted: 2017-12-06

## 2021-06-07 PROBLEM — G57.00 PIRIFORMIS SYNDROME: Status: ACTIVE | Noted: 2018-07-31

## 2021-06-07 PROBLEM — G47.00 PERSISTENT INSOMNIA: Status: ACTIVE | Noted: 2018-10-15

## 2021-06-07 PROBLEM — F41.1 GENERALIZED ANXIETY DISORDER: Status: ACTIVE | Noted: 2017-12-06

## 2021-06-07 PROBLEM — I82.409 DEEP VENOUS THROMBOSIS OF LOWER EXTREMITY (HCC): Status: ACTIVE | Noted: 2018-05-02

## 2021-06-07 PROBLEM — I26.99 PULMONARY EMBOLISM (HCC): Status: ACTIVE | Noted: 2018-05-02

## 2021-06-07 PROBLEM — S39.011A STRAIN OF MUSCLE OF RIGHT GROIN REGION: Status: ACTIVE | Noted: 2018-07-31

## 2021-06-07 PROBLEM — Z87.891 FORMER SMOKER: Status: ACTIVE | Noted: 2021-06-07

## 2021-06-07 PROBLEM — IMO0002 UNCONTROLLED TYPE 2 DIABETES MELLITUS: Status: ACTIVE | Noted: 2017-12-06

## 2021-06-07 PROBLEM — R06.09 EXERTIONAL DYSPNEA: Status: ACTIVE | Noted: 2018-02-09

## 2021-06-07 PROBLEM — E87.6 HYPOKALEMIA: Status: ACTIVE | Noted: 2018-07-31

## 2021-06-07 PROBLEM — R25.1 TREMORS OF NERVOUS SYSTEM: Status: ACTIVE | Noted: 2021-06-07

## 2021-06-07 PROBLEM — N17.9 ACUTE KIDNEY INJURY (HCC): Status: ACTIVE | Noted: 2021-06-07

## 2021-06-07 PROBLEM — M13.0 POLYARTHROPATHY: Status: ACTIVE | Noted: 2017-12-06

## 2021-06-07 PROBLEM — I10 BENIGN ESSENTIAL HYPERTENSION: Status: ACTIVE | Noted: 2018-07-12

## 2021-06-07 PROBLEM — H26.9 CATARACT: Status: ACTIVE | Noted: 2018-01-31

## 2021-06-07 PROBLEM — M79.7 FIBROMYALGIA: Status: ACTIVE | Noted: 2021-06-07

## 2021-06-07 PROBLEM — I70.613: Status: ACTIVE | Noted: 2018-01-31

## 2021-06-07 PROBLEM — I73.9 PERIPHERAL VASCULAR DISEASE: Status: ACTIVE | Noted: 2018-01-16

## 2021-06-07 LAB
ALBUMIN SERPL-MCNC: 3.6 G/DL (ref 3.5–5.2)
ALBUMIN/GLOB SERPL: 1.6 G/DL
ALP SERPL-CCNC: 111 U/L (ref 39–117)
ALT SERPL W P-5'-P-CCNC: 22 U/L (ref 1–33)
ANION GAP SERPL CALCULATED.3IONS-SCNC: 7 MMOL/L (ref 5–15)
ANION GAP SERPL CALCULATED.3IONS-SCNC: 8 MMOL/L (ref 5–15)
APTT PPP: 31.6 SECONDS (ref 61–76.5)
APTT PPP: 52.4 SECONDS (ref 61–76.5)
APTT PPP: 67.4 SECONDS (ref 61–76.5)
APTT PPP: 68.1 SECONDS (ref 61–76.5)
AST SERPL-CCNC: 27 U/L (ref 1–32)
BASOPHILS # BLD AUTO: 0 10*3/MM3 (ref 0–0.2)
BASOPHILS NFR BLD AUTO: 0.6 % (ref 0–1.5)
BH CV ECHO MEAS - ACS: 1.7 CM
BH CV ECHO MEAS - AO MAX PG (FULL): 4.1 MMHG
BH CV ECHO MEAS - AO MAX PG: 11.6 MMHG
BH CV ECHO MEAS - AO MEAN PG (FULL): 2.1 MMHG
BH CV ECHO MEAS - AO MEAN PG: 6.2 MMHG
BH CV ECHO MEAS - AO ROOT AREA (BSA CORRECTED): 1.6
BH CV ECHO MEAS - AO ROOT AREA: 7.6 CM^2
BH CV ECHO MEAS - AO ROOT DIAM: 3.1 CM
BH CV ECHO MEAS - AO V2 MAX: 170.3 CM/SEC
BH CV ECHO MEAS - AO V2 MEAN: 119.3 CM/SEC
BH CV ECHO MEAS - AO V2 VTI: 42.2 CM
BH CV ECHO MEAS - AORTIC HR: 73.3 BPM
BH CV ECHO MEAS - AORTIC R-R: 0.82 SEC
BH CV ECHO MEAS - AVA(I,A): 2.4 CM^2
BH CV ECHO MEAS - AVA(I,D): 2.4 CM^2
BH CV ECHO MEAS - AVA(V,A): 2.4 CM^2
BH CV ECHO MEAS - AVA(V,D): 2.4 CM^2
BH CV ECHO MEAS - BSA(HAYCOCK): 2.1 M^2
BH CV ECHO MEAS - BSA: 2 M^2
BH CV ECHO MEAS - BZI_BMI: 34.7 KILOGRAMS/M^2
BH CV ECHO MEAS - BZI_METRIC_HEIGHT: 162.6 CM
BH CV ECHO MEAS - BZI_METRIC_WEIGHT: 91.6 KG
BH CV ECHO MEAS - CI(AO): 11.9 L/MIN/M^2
BH CV ECHO MEAS - CI(LVOT): 3.8 L/MIN/M^2
BH CV ECHO MEAS - CO(AO): 23.4 L/MIN
BH CV ECHO MEAS - CO(LVOT): 7.4 L/MIN
BH CV ECHO MEAS - EDV(CUBED): 59 ML
BH CV ECHO MEAS - EDV(MOD-SP4): 40.2 ML
BH CV ECHO MEAS - EDV(TEICH): 65.6 ML
BH CV ECHO MEAS - EF(CUBED): 66.1 %
BH CV ECHO MEAS - EF(MOD-BP): 54 %
BH CV ECHO MEAS - EF(MOD-SP4): 54.4 %
BH CV ECHO MEAS - EF(TEICH): 58.3 %
BH CV ECHO MEAS - ESV(CUBED): 20 ML
BH CV ECHO MEAS - ESV(MOD-SP4): 18.3 ML
BH CV ECHO MEAS - ESV(TEICH): 27.4 ML
BH CV ECHO MEAS - FS: 30.3 %
BH CV ECHO MEAS - IVS/LVPW: 0.83
BH CV ECHO MEAS - IVSD: 0.87 CM
BH CV ECHO MEAS - LA DIMENSION(2D): 3.8 CM
BH CV ECHO MEAS - LV DIASTOLIC VOL/BSA (35-75): 20.5 ML/M^2
BH CV ECHO MEAS - LV MASS(C)D: 114.7 GRAMS
BH CV ECHO MEAS - LV MASS(C)DI: 58.4 GRAMS/M^2
BH CV ECHO MEAS - LV MAX PG: 7.5 MMHG
BH CV ECHO MEAS - LV MEAN PG: 4 MMHG
BH CV ECHO MEAS - LV SYSTOLIC VOL/BSA (12-30): 9.3 ML/M^2
BH CV ECHO MEAS - LV V1 MAX: 137.2 CM/SEC
BH CV ECHO MEAS - LV V1 MEAN: 93.4 CM/SEC
BH CV ECHO MEAS - LV V1 VTI: 34.5 CM
BH CV ECHO MEAS - LVIDD: 3.9 CM
BH CV ECHO MEAS - LVIDS: 2.7 CM
BH CV ECHO MEAS - LVOT AREA: 2.9 CM^2
BH CV ECHO MEAS - LVOT DIAM: 1.9 CM
BH CV ECHO MEAS - LVPWD: 1 CM
BH CV ECHO MEAS - MV A MAX VEL: 108 CM/SEC
BH CV ECHO MEAS - MV DEC SLOPE: 338.2 CM/SEC^2
BH CV ECHO MEAS - MV DEC TIME: 0.29 SEC
BH CV ECHO MEAS - MV E MAX VEL: 99.7 CM/SEC
BH CV ECHO MEAS - MV E/A: 0.92
BH CV ECHO MEAS - MV MAX PG: 3.8 MMHG
BH CV ECHO MEAS - MV MEAN PG: 1.9 MMHG
BH CV ECHO MEAS - MV V2 MAX: 97.6 CM/SEC
BH CV ECHO MEAS - MV V2 MEAN: 65.6 CM/SEC
BH CV ECHO MEAS - MV V2 VTI: 34.7 CM
BH CV ECHO MEAS - MVA(VTI): 2.9 CM^2
BH CV ECHO MEAS - PA MAX PG (FULL): 1.8 MMHG
BH CV ECHO MEAS - PA MAX PG: 6.7 MMHG
BH CV ECHO MEAS - PA V2 MAX: 128.9 CM/SEC
BH CV ECHO MEAS - PULM A REVS DUR: 0.11 SEC
BH CV ECHO MEAS - PULM A REVS VEL: 25 CM/SEC
BH CV ECHO MEAS - PULM DIAS VEL: 45.6 CM/SEC
BH CV ECHO MEAS - PULM S/D: 1.6
BH CV ECHO MEAS - PULM SYS VEL: 70.9 CM/SEC
BH CV ECHO MEAS - RAP SYSTOLE: 3 MMHG
BH CV ECHO MEAS - RV MAX PG: 4.9 MMHG
BH CV ECHO MEAS - RV MEAN PG: 2.5 MMHG
BH CV ECHO MEAS - RV V1 MAX: 110.1 CM/SEC
BH CV ECHO MEAS - RV V1 MEAN: 75.4 CM/SEC
BH CV ECHO MEAS - RV V1 VTI: 24.7 CM
BH CV ECHO MEAS - RVDD: 2.6 CM
BH CV ECHO MEAS - RVSP: 24.9 MMHG
BH CV ECHO MEAS - SI(AO): 162.6 ML/M^2
BH CV ECHO MEAS - SI(CUBED): 19.8 ML/M^2
BH CV ECHO MEAS - SI(LVOT): 51.4 ML/M^2
BH CV ECHO MEAS - SI(MOD-SP4): 11.1 ML/M^2
BH CV ECHO MEAS - SI(TEICH): 19.5 ML/M^2
BH CV ECHO MEAS - SV(AO): 319.4 ML
BH CV ECHO MEAS - SV(CUBED): 39 ML
BH CV ECHO MEAS - SV(LVOT): 101.1 ML
BH CV ECHO MEAS - SV(MOD-SP4): 21.9 ML
BH CV ECHO MEAS - SV(TEICH): 38.2 ML
BH CV ECHO MEAS - TR MAX VEL: 233.2 CM/SEC
BH CV LOW VAS LEFT GREATER SAPH AK VESSEL: 1
BH CV LOWER VASCULAR LEFT COMMON FEMORAL AUGMENT: NORMAL
BH CV LOWER VASCULAR LEFT COMMON FEMORAL COMPETENT: NORMAL
BH CV LOWER VASCULAR LEFT COMMON FEMORAL COMPRESS: NORMAL
BH CV LOWER VASCULAR LEFT COMMON FEMORAL PHASIC: NORMAL
BH CV LOWER VASCULAR LEFT COMMON FEMORAL SPONT: NORMAL
BH CV LOWER VASCULAR LEFT DISTAL FEMORAL COMPRESS: NORMAL
BH CV LOWER VASCULAR LEFT GASTRONEMIUS COMPRESS: NORMAL
BH CV LOWER VASCULAR LEFT GREATER SAPH AK COMPRESS: NORMAL
BH CV LOWER VASCULAR LEFT GREATER SAPH AK THROMBUS: NORMAL
BH CV LOWER VASCULAR LEFT GREATER SAPH BK COMPRESS: NORMAL
BH CV LOWER VASCULAR LEFT LESSER SAPH COMPRESS: NORMAL
BH CV LOWER VASCULAR LEFT MID FEMORAL AUGMENT: NORMAL
BH CV LOWER VASCULAR LEFT MID FEMORAL COMPETENT: NORMAL
BH CV LOWER VASCULAR LEFT MID FEMORAL COMPRESS: NORMAL
BH CV LOWER VASCULAR LEFT MID FEMORAL PHASIC: NORMAL
BH CV LOWER VASCULAR LEFT MID FEMORAL SPONT: NORMAL
BH CV LOWER VASCULAR LEFT PERONEAL COMPRESS: NORMAL
BH CV LOWER VASCULAR LEFT POPLITEAL AUGMENT: NORMAL
BH CV LOWER VASCULAR LEFT POPLITEAL COMPETENT: NORMAL
BH CV LOWER VASCULAR LEFT POPLITEAL COMPRESS: NORMAL
BH CV LOWER VASCULAR LEFT POPLITEAL PHASIC: NORMAL
BH CV LOWER VASCULAR LEFT POPLITEAL SPONT: NORMAL
BH CV LOWER VASCULAR LEFT POSTERIOR TIBIAL COMPRESS: NORMAL
BH CV LOWER VASCULAR LEFT PROXIMAL FEMORAL COMPRESS: NORMAL
BH CV LOWER VASCULAR LEFT SAPHENOFEMORAL JUNCTION COMPRESS: NORMAL
BH CV LOWER VASCULAR RIGHT COMMON FEMORAL AUGMENT: NORMAL
BH CV LOWER VASCULAR RIGHT COMMON FEMORAL COMPETENT: NORMAL
BH CV LOWER VASCULAR RIGHT COMMON FEMORAL COMPRESS: NORMAL
BH CV LOWER VASCULAR RIGHT COMMON FEMORAL PHASIC: NORMAL
BH CV LOWER VASCULAR RIGHT COMMON FEMORAL SPONT: NORMAL
BH CV LOWER VASCULAR RIGHT DISTAL FEMORAL COMPRESS: NORMAL
BH CV LOWER VASCULAR RIGHT GASTRONEMIUS COMPRESS: NORMAL
BH CV LOWER VASCULAR RIGHT GREATER SAPH AK COMPRESS: NORMAL
BH CV LOWER VASCULAR RIGHT GREATER SAPH BK COMPRESS: NORMAL
BH CV LOWER VASCULAR RIGHT LESSER SAPH COMPRESS: NORMAL
BH CV LOWER VASCULAR RIGHT MID FEMORAL AUGMENT: NORMAL
BH CV LOWER VASCULAR RIGHT MID FEMORAL COMPETENT: NORMAL
BH CV LOWER VASCULAR RIGHT MID FEMORAL COMPRESS: NORMAL
BH CV LOWER VASCULAR RIGHT MID FEMORAL PHASIC: NORMAL
BH CV LOWER VASCULAR RIGHT MID FEMORAL SPONT: NORMAL
BH CV LOWER VASCULAR RIGHT PERONEAL COMPRESS: NORMAL
BH CV LOWER VASCULAR RIGHT POPLITEAL AUGMENT: NORMAL
BH CV LOWER VASCULAR RIGHT POPLITEAL COMPETENT: NORMAL
BH CV LOWER VASCULAR RIGHT POPLITEAL COMPRESS: NORMAL
BH CV LOWER VASCULAR RIGHT POPLITEAL PHASIC: NORMAL
BH CV LOWER VASCULAR RIGHT POPLITEAL SPONT: NORMAL
BH CV LOWER VASCULAR RIGHT POSTERIOR TIBIAL COMPRESS: NORMAL
BH CV LOWER VASCULAR RIGHT PROXIMAL FEMORAL COMPRESS: NORMAL
BH CV LOWER VASCULAR RIGHT SAPHENOFEMORAL JUNCTION COMPRESS: NORMAL
BILIRUB SERPL-MCNC: 0.3 MG/DL (ref 0–1.2)
BUN SERPL-MCNC: 29 MG/DL (ref 8–23)
BUN SERPL-MCNC: 31 MG/DL (ref 8–23)
BUN/CREAT SERPL: 30.5 (ref 7–25)
BUN/CREAT SERPL: 32.3 (ref 7–25)
CALCIUM SPEC-SCNC: 9.1 MG/DL (ref 8.6–10.5)
CALCIUM SPEC-SCNC: 9.2 MG/DL (ref 8.6–10.5)
CHLORIDE SERPL-SCNC: 102 MMOL/L (ref 98–107)
CHLORIDE SERPL-SCNC: 103 MMOL/L (ref 98–107)
CO2 SERPL-SCNC: 29 MMOL/L (ref 22–29)
CO2 SERPL-SCNC: 30 MMOL/L (ref 22–29)
CREAT SERPL-MCNC: 0.95 MG/DL (ref 0.57–1)
CREAT SERPL-MCNC: 0.96 MG/DL (ref 0.57–1)
DEPRECATED RDW RBC AUTO: 46.4 FL (ref 37–54)
EOSINOPHIL # BLD AUTO: 0.2 10*3/MM3 (ref 0–0.4)
EOSINOPHIL NFR BLD AUTO: 2.6 % (ref 0.3–6.2)
ERYTHROCYTE [DISTWIDTH] IN BLOOD BY AUTOMATED COUNT: 14 % (ref 12.3–15.4)
GFR SERPL CREATININE-BSD FRML MDRD: 57 ML/MIN/1.73
GFR SERPL CREATININE-BSD FRML MDRD: 58 ML/MIN/1.73
GLOBULIN UR ELPH-MCNC: 2.3 GM/DL
GLUCOSE BLDC GLUCOMTR-MCNC: 182 MG/DL (ref 70–105)
GLUCOSE BLDC GLUCOMTR-MCNC: 248 MG/DL (ref 70–105)
GLUCOSE BLDC GLUCOMTR-MCNC: 282 MG/DL (ref 70–105)
GLUCOSE BLDC GLUCOMTR-MCNC: 282 MG/DL (ref 70–105)
GLUCOSE BLDC GLUCOMTR-MCNC: 288 MG/DL (ref 70–105)
GLUCOSE BLDC GLUCOMTR-MCNC: 304 MG/DL (ref 70–105)
GLUCOSE SERPL-MCNC: 126 MG/DL (ref 65–99)
GLUCOSE SERPL-MCNC: 261 MG/DL (ref 65–99)
HCT VFR BLD AUTO: 31.2 % (ref 34–46.6)
HGB BLD-MCNC: 10.7 G/DL (ref 12–15.9)
INR PPP: 0.96 (ref 0.93–1.1)
LYMPHOCYTES # BLD AUTO: 2.4 10*3/MM3 (ref 0.7–3.1)
LYMPHOCYTES NFR BLD AUTO: 34 % (ref 19.6–45.3)
MAXIMAL PREDICTED HEART RATE: 147 BPM
MCH RBC QN AUTO: 32.3 PG (ref 26.6–33)
MCHC RBC AUTO-ENTMCNC: 34.3 G/DL (ref 31.5–35.7)
MCV RBC AUTO: 94.2 FL (ref 79–97)
MONOCYTES # BLD AUTO: 0.4 10*3/MM3 (ref 0.1–0.9)
MONOCYTES NFR BLD AUTO: 6.4 % (ref 5–12)
NEUTROPHILS NFR BLD AUTO: 3.9 10*3/MM3 (ref 1.7–7)
NEUTROPHILS NFR BLD AUTO: 56.4 % (ref 42.7–76)
NRBC BLD AUTO-RTO: 0.1 /100 WBC (ref 0–0.2)
NT-PROBNP SERPL-MCNC: 38.1 PG/ML (ref 0–900)
PHOSPHATE SERPL-MCNC: 4.1 MG/DL (ref 2.5–4.5)
PLATELET # BLD AUTO: 193 10*3/MM3 (ref 140–450)
PMV BLD AUTO: 6.8 FL (ref 6–12)
POTASSIUM SERPL-SCNC: 4.8 MMOL/L (ref 3.5–5.2)
POTASSIUM SERPL-SCNC: 4.8 MMOL/L (ref 3.5–5.2)
PROT SERPL-MCNC: 5.9 G/DL (ref 6–8.5)
PROTHROMBIN TIME: 10.6 SECONDS (ref 9.6–11.7)
RBC # BLD AUTO: 3.31 10*6/MM3 (ref 3.77–5.28)
SODIUM SERPL-SCNC: 139 MMOL/L (ref 136–145)
SODIUM SERPL-SCNC: 140 MMOL/L (ref 136–145)
STRESS TARGET HR: 125 BPM
TROPONIN T SERPL-MCNC: <0.01 NG/ML (ref 0–0.03)
WBC # BLD AUTO: 6.9 10*3/MM3 (ref 3.4–10.8)

## 2021-06-07 PROCEDURE — 85730 THROMBOPLASTIN TIME PARTIAL: CPT | Performed by: INTERNAL MEDICINE

## 2021-06-07 PROCEDURE — 85730 THROMBOPLASTIN TIME PARTIAL: CPT | Performed by: EMERGENCY MEDICINE

## 2021-06-07 PROCEDURE — 63710000001 INSULIN LISPRO (HUMAN) PER 5 UNITS: Performed by: INTERNAL MEDICINE

## 2021-06-07 PROCEDURE — 99024 POSTOP FOLLOW-UP VISIT: CPT | Performed by: NEUROLOGICAL SURGERY

## 2021-06-07 PROCEDURE — 93306 TTE W/DOPPLER COMPLETE: CPT

## 2021-06-07 PROCEDURE — 82962 GLUCOSE BLOOD TEST: CPT

## 2021-06-07 PROCEDURE — G0378 HOSPITAL OBSERVATION PER HR: HCPCS

## 2021-06-07 PROCEDURE — 93970 EXTREMITY STUDY: CPT

## 2021-06-07 PROCEDURE — 63710000001 INSULIN GLARGINE PER 5 UNITS: Performed by: NURSE PRACTITIONER

## 2021-06-07 PROCEDURE — 25010000002 HEPARIN (PORCINE) 25000-0.45 UT/250ML-% SOLUTION: Performed by: EMERGENCY MEDICINE

## 2021-06-07 PROCEDURE — 96376 TX/PRO/DX INJ SAME DRUG ADON: CPT

## 2021-06-07 PROCEDURE — 85730 THROMBOPLASTIN TIME PARTIAL: CPT | Performed by: NURSE PRACTITIONER

## 2021-06-07 PROCEDURE — 85610 PROTHROMBIN TIME: CPT | Performed by: EMERGENCY MEDICINE

## 2021-06-07 PROCEDURE — 25010000002 ONDANSETRON PER 1 MG: Performed by: NURSE PRACTITIONER

## 2021-06-07 PROCEDURE — 93306 TTE W/DOPPLER COMPLETE: CPT | Performed by: INTERNAL MEDICINE

## 2021-06-07 PROCEDURE — 84100 ASSAY OF PHOSPHORUS: CPT | Performed by: INTERNAL MEDICINE

## 2021-06-07 PROCEDURE — 80053 COMPREHEN METABOLIC PANEL: CPT | Performed by: NURSE PRACTITIONER

## 2021-06-07 PROCEDURE — 85025 COMPLETE CBC W/AUTO DIFF WBC: CPT | Performed by: EMERGENCY MEDICINE

## 2021-06-07 PROCEDURE — 83880 ASSAY OF NATRIURETIC PEPTIDE: CPT | Performed by: INTERNAL MEDICINE

## 2021-06-07 PROCEDURE — 84484 ASSAY OF TROPONIN QUANT: CPT | Performed by: EMERGENCY MEDICINE

## 2021-06-07 PROCEDURE — 99233 SBSQ HOSP IP/OBS HIGH 50: CPT | Performed by: FAMILY MEDICINE

## 2021-06-07 PROCEDURE — 36415 COLL VENOUS BLD VENIPUNCTURE: CPT | Performed by: EMERGENCY MEDICINE

## 2021-06-07 PROCEDURE — 96366 THER/PROPH/DIAG IV INF ADDON: CPT

## 2021-06-07 RX ORDER — NICOTINE POLACRILEX 4 MG
15 LOZENGE BUCCAL
Status: DISCONTINUED | OUTPATIENT
Start: 2021-06-07 | End: 2021-06-11 | Stop reason: HOSPADM

## 2021-06-07 RX ORDER — METHADONE HYDROCHLORIDE 10 MG/1
15 TABLET ORAL NIGHTLY
Status: DISCONTINUED | OUTPATIENT
Start: 2021-06-07 | End: 2021-06-11 | Stop reason: HOSPADM

## 2021-06-07 RX ORDER — PRAMIPEXOLE DIHYDROCHLORIDE 0.5 MG/1
1 TABLET ORAL NIGHTLY
Status: DISCONTINUED | OUTPATIENT
Start: 2021-06-07 | End: 2021-06-11 | Stop reason: HOSPADM

## 2021-06-07 RX ORDER — ACETAMINOPHEN 325 MG/1
650 TABLET ORAL EVERY 4 HOURS PRN
Status: DISCONTINUED | OUTPATIENT
Start: 2021-06-07 | End: 2021-06-11 | Stop reason: HOSPADM

## 2021-06-07 RX ORDER — ACETAMINOPHEN 650 MG/1
650 SUPPOSITORY RECTAL EVERY 4 HOURS PRN
Status: DISCONTINUED | OUTPATIENT
Start: 2021-06-07 | End: 2021-06-11 | Stop reason: HOSPADM

## 2021-06-07 RX ORDER — INSULIN GLARGINE 100 [IU]/ML
46 INJECTION, SOLUTION SUBCUTANEOUS
Status: DISCONTINUED | OUTPATIENT
Start: 2021-06-07 | End: 2021-06-11 | Stop reason: HOSPADM

## 2021-06-07 RX ORDER — LIDOCAINE 50 MG/G
1 PATCH TOPICAL
Status: DISCONTINUED | OUTPATIENT
Start: 2021-06-07 | End: 2021-06-11 | Stop reason: HOSPADM

## 2021-06-07 RX ORDER — INSULIN LISPRO 100 [IU]/ML
0-24 INJECTION, SOLUTION INTRAVENOUS; SUBCUTANEOUS
Status: DISCONTINUED | OUTPATIENT
Start: 2021-06-07 | End: 2021-06-11 | Stop reason: HOSPADM

## 2021-06-07 RX ORDER — POLYETHYLENE GLYCOL 3350 17 G/17G
17 POWDER, FOR SOLUTION ORAL DAILY
Status: DISCONTINUED | OUTPATIENT
Start: 2021-06-07 | End: 2021-06-11 | Stop reason: HOSPADM

## 2021-06-07 RX ORDER — SODIUM CHLORIDE 9 MG/ML
50 INJECTION, SOLUTION INTRAVENOUS CONTINUOUS
Status: DISCONTINUED | OUTPATIENT
Start: 2021-06-07 | End: 2021-06-09

## 2021-06-07 RX ORDER — SODIUM CHLORIDE 0.9 % (FLUSH) 0.9 %
10 SYRINGE (ML) INJECTION AS NEEDED
Status: DISCONTINUED | OUTPATIENT
Start: 2021-06-07 | End: 2021-06-11 | Stop reason: HOSPADM

## 2021-06-07 RX ORDER — METHADONE HYDROCHLORIDE 10 MG/1
10 TABLET ORAL DAILY
Status: DISCONTINUED | OUTPATIENT
Start: 2021-06-07 | End: 2021-06-11 | Stop reason: HOSPADM

## 2021-06-07 RX ORDER — INSULIN LISPRO 100 [IU]/ML
0-9 INJECTION, SOLUTION INTRAVENOUS; SUBCUTANEOUS
Status: DISCONTINUED | OUTPATIENT
Start: 2021-06-07 | End: 2021-06-07

## 2021-06-07 RX ORDER — INSULIN LISPRO 100 [IU]/ML
0-24 INJECTION, SOLUTION INTRAVENOUS; SUBCUTANEOUS AS NEEDED
Status: DISCONTINUED | OUTPATIENT
Start: 2021-06-07 | End: 2021-06-11 | Stop reason: HOSPADM

## 2021-06-07 RX ORDER — INSULIN LISPRO 100 [IU]/ML
0-9 INJECTION, SOLUTION INTRAVENOUS; SUBCUTANEOUS AS NEEDED
Status: DISCONTINUED | OUTPATIENT
Start: 2021-06-07 | End: 2021-06-07

## 2021-06-07 RX ORDER — HYDROCHLOROTHIAZIDE 25 MG/1
25 TABLET ORAL DAILY
Status: DISCONTINUED | OUTPATIENT
Start: 2021-06-07 | End: 2021-06-07

## 2021-06-07 RX ORDER — MELATONIN
2000 DAILY
Status: DISCONTINUED | OUTPATIENT
Start: 2021-06-07 | End: 2021-06-11 | Stop reason: HOSPADM

## 2021-06-07 RX ORDER — LISINOPRIL 5 MG/1
10 TABLET ORAL DAILY
Status: DISCONTINUED | OUTPATIENT
Start: 2021-06-07 | End: 2021-06-07

## 2021-06-07 RX ORDER — HYDROCODONE BITARTRATE AND ACETAMINOPHEN 10; 325 MG/1; MG/1
1 TABLET ORAL EVERY 4 HOURS PRN
Status: DISCONTINUED | OUTPATIENT
Start: 2021-06-07 | End: 2021-06-11 | Stop reason: HOSPADM

## 2021-06-07 RX ORDER — ROSUVASTATIN CALCIUM 10 MG/1
10 TABLET, COATED ORAL DAILY
Status: DISCONTINUED | OUTPATIENT
Start: 2021-06-07 | End: 2021-06-11 | Stop reason: HOSPADM

## 2021-06-07 RX ORDER — ONDANSETRON 4 MG/1
4 TABLET, FILM COATED ORAL EVERY 6 HOURS PRN
Status: DISCONTINUED | OUTPATIENT
Start: 2021-06-07 | End: 2021-06-11 | Stop reason: HOSPADM

## 2021-06-07 RX ORDER — SODIUM CHLORIDE 0.9 % (FLUSH) 0.9 %
10 SYRINGE (ML) INJECTION EVERY 12 HOURS SCHEDULED
Status: DISCONTINUED | OUTPATIENT
Start: 2021-06-07 | End: 2021-06-11 | Stop reason: HOSPADM

## 2021-06-07 RX ORDER — ALUMINA, MAGNESIA, AND SIMETHICONE 2400; 2400; 240 MG/30ML; MG/30ML; MG/30ML
15 SUSPENSION ORAL EVERY 6 HOURS PRN
Status: DISCONTINUED | OUTPATIENT
Start: 2021-06-07 | End: 2021-06-11 | Stop reason: HOSPADM

## 2021-06-07 RX ORDER — ARIPIPRAZOLE 2 MG/1
2 TABLET ORAL DAILY
Status: DISCONTINUED | OUTPATIENT
Start: 2021-06-07 | End: 2021-06-11 | Stop reason: HOSPADM

## 2021-06-07 RX ORDER — SENNA PLUS 8.6 MG/1
3 TABLET ORAL 2 TIMES DAILY
Status: DISCONTINUED | OUTPATIENT
Start: 2021-06-07 | End: 2021-06-11 | Stop reason: HOSPADM

## 2021-06-07 RX ORDER — IPRATROPIUM BROMIDE AND ALBUTEROL SULFATE 2.5; .5 MG/3ML; MG/3ML
3 SOLUTION RESPIRATORY (INHALATION)
Status: DISCONTINUED | OUTPATIENT
Start: 2021-06-07 | End: 2021-06-11 | Stop reason: HOSPADM

## 2021-06-07 RX ORDER — ONDANSETRON 2 MG/ML
4 INJECTION INTRAMUSCULAR; INTRAVENOUS EVERY 6 HOURS PRN
Status: DISCONTINUED | OUTPATIENT
Start: 2021-06-07 | End: 2021-06-11 | Stop reason: HOSPADM

## 2021-06-07 RX ORDER — DEXTROSE MONOHYDRATE 25 G/50ML
25 INJECTION, SOLUTION INTRAVENOUS
Status: DISCONTINUED | OUTPATIENT
Start: 2021-06-07 | End: 2021-06-11 | Stop reason: HOSPADM

## 2021-06-07 RX ORDER — BUMETANIDE 1 MG/1
1 TABLET ORAL
Status: DISCONTINUED | OUTPATIENT
Start: 2021-06-07 | End: 2021-06-07

## 2021-06-07 RX ADMIN — INSULIN LISPRO 8 UNITS: 100 INJECTION, SOLUTION INTRAVENOUS; SUBCUTANEOUS at 21:26

## 2021-06-07 RX ADMIN — Medication 2000 UNITS: at 08:39

## 2021-06-07 RX ADMIN — PRAMIPEXOLE DIHYDROCHLORIDE 1 MG: 0.5 TABLET ORAL at 01:17

## 2021-06-07 RX ADMIN — LIDOCAINE 1 PATCH: 50 PATCH TOPICAL at 10:01

## 2021-06-07 RX ADMIN — SENNOSIDES 3 TABLET: 8.6 TABLET, FILM COATED ORAL at 08:39

## 2021-06-07 RX ADMIN — ROSUVASTATIN CALCIUM 10 MG: 10 TABLET, FILM COATED ORAL at 08:38

## 2021-06-07 RX ADMIN — ONDANSETRON 4 MG: 2 INJECTION INTRAMUSCULAR; INTRAVENOUS at 16:41

## 2021-06-07 RX ADMIN — PRAMIPEXOLE DIHYDROCHLORIDE 1 MG: 0.5 TABLET ORAL at 21:25

## 2021-06-07 RX ADMIN — BUMETANIDE 1 MG: 1 TABLET ORAL at 08:38

## 2021-06-07 RX ADMIN — METHADONE HYDROCHLORIDE 10 MG: 10 TABLET ORAL at 08:39

## 2021-06-07 RX ADMIN — SENNOSIDES 3 TABLET: 8.6 TABLET, FILM COATED ORAL at 21:25

## 2021-06-07 RX ADMIN — Medication 10 ML: at 21:26

## 2021-06-07 RX ADMIN — Medication 10 ML: at 01:19

## 2021-06-07 RX ADMIN — POLYETHYLENE GLYCOL 3350 17 G: 17 POWDER, FOR SOLUTION ORAL at 08:39

## 2021-06-07 RX ADMIN — HYDROCODONE BITARTRATE AND ACETAMINOPHEN 1 TABLET: 10; 325 TABLET ORAL at 06:49

## 2021-06-07 RX ADMIN — METHADONE HYDROCHLORIDE 15 MG: 10 TABLET ORAL at 01:16

## 2021-06-07 RX ADMIN — HYDROCHLOROTHIAZIDE 25 MG: 25 TABLET ORAL at 08:39

## 2021-06-07 RX ADMIN — HEPARIN SODIUM 13.9 UNITS/KG/HR: 10000 INJECTION, SOLUTION INTRAVENOUS at 11:08

## 2021-06-07 RX ADMIN — ARIPIPRAZOLE 2 MG: 2 TABLET ORAL at 08:39

## 2021-06-07 RX ADMIN — HYDROCODONE BITARTRATE AND ACETAMINOPHEN 1 TABLET: 10; 325 TABLET ORAL at 03:25

## 2021-06-07 RX ADMIN — INSULIN GLARGINE 46 UNITS: 100 INJECTION, SOLUTION SUBCUTANEOUS at 08:40

## 2021-06-07 RX ADMIN — INSULIN LISPRO 6 UNITS: 100 INJECTION, SOLUTION INTRAVENOUS; SUBCUTANEOUS at 11:08

## 2021-06-07 RX ADMIN — Medication 10 ML: at 08:39

## 2021-06-07 RX ADMIN — INSULIN LISPRO 16 UNITS: 100 INJECTION, SOLUTION INTRAVENOUS; SUBCUTANEOUS at 17:00

## 2021-06-07 RX ADMIN — METHADONE HYDROCHLORIDE 15 MG: 10 TABLET ORAL at 21:25

## 2021-06-07 NOTE — PROGRESS NOTES
PULMONARY/CRITICAL CARE PROGRESS NOTE         Name:  Genny Soni  Age: 71 y.o.  Sex:  female  :   1950  MRN:  [id number]     ROOM:  [facility] ICU /     ASSESSMENT & PLAN     F/U: Acute pulmonary embolism    Principal Problem:    Acute pulmonary embolism without acute cor pulmonale (CMS/HCC)  Active Problems:    Type 2 diabetes mellitus with diabetic peripheral angiopathy without gangrene, with long-term current use of insulin (CMS/HCC)    Anxiety    Benign essential hypertension    Drug-induced constipation    Fibromyalgia    Hyperlipidemia    PERLA (obstructive sleep apnea)    Peripheral vascular disease (CMS/HCC)    Restless leg syndrome    Uncontrolled type 2 diabetes mellitus (CMS/HCC)    Acute kidney injury (CMS/HCC)    Tremors of nervous system       Subjective:  Patient reports feeling somewhat better today  She reports severe back pain which is chronic however worsened by being in bed  She is requesting her home dose of methadone  Shortness of air is improving  Tolerating p.o. intake with good appetite  No nausea or vomiting  No abdominal pain    Assessment and plan:  Acute pulmonary embolism without acute cor pulmonale  -CT PE chest protocol: Thrombus in the left main pulmonary artery extending into left upper lobe vessels, possible saddle embolus with clot possibly extending into left main pulmonary artery across bifurcation into the right main pulmonary artery.  There is also concern for thrombus in segmental right upper lobe artery.  -Heparin gtt  continued  -ECHO  reviewed, EF 54%     Type 2 diabetes mellitus, uncontrolled  -Hold home glipizide while in ICU  -Sliding scale insulin for tight glycemic control  -Home Lantus ordered     Acute kidney injury  -Creatinine 1.50, baseline 0.94  -IV fluids started  -Avoid nephrotoxic agents  -Avoid hypotension  -nephrology consult     Essential hypertension  -Continue home hydrochlorothiazide  -Hold home lisinopril for  DARRYL     Hyperlipidemia  -Continue home rosuvastatin     Fibromyalgia  Opioid dependency  -Continue home methadone     Drug-induced constipation  -Continue home MiraLAX     Anxiety  -Continue home Abilify     Obstructive sleep apnea  -Wears BiPAP at night  -O2 2 L as needed at night     H/O tremors  Restless leg syndrome  -Continue home pramipexole     History of peripheral vascular disease     Code Status: FULL  VTE Prophylaxis: heparin drip  PUD Prophylaxis:  Tolerating p.o. diet    [place] & SUBJECTIVE   Genny Soni is a 71 y.o. female  has a past medical history of Arthritis, Diabetes mellitus (CMS/HCC), Hyperlipidemia, and Low back pain.   Patient presented to [facility] on 6/6/2021 with complaint of soreness of breath and swelling in bilateral lower extremities.  Of note patient had lumbar surgery 10 days ago, in which she stated that she felt fine afterwards but about 3 days ago started developed the shortness of breath which got increasingly worse and she came to the ER.  Patient endorses chest pain and shortness of breath, however denies palpitations, dizziness or syncope, headache, chills, or fever.  Denies abdominal pain, nausea, vomiting, diarrhea, constipation, loss of weight or loss of appetite, dysuria, blood in urine or stool.     In the ED, labs were obtained with abnormalities as follows: Glucose 280, creatinine 1.50, BUN 42, Hgb 11.2, HCT 32.9.  CXR completed in ED was stable with no evidence of active disease.  CT chest PE protocol was positive for pulmonary emboli.  Thrombus in the left main pulmonary artery extending into left upper lobe vessels, possible saddle embolus with clot possibly extending into left main pulmonary artery across bifurcation into the right main pulmonary artery.  There is also concern for thrombus in segmental right upper lobe artery.  Scattered groundglass opacities also found in the lungs.  Covid 19 swab was negative.       REVIEW OF SYSTEMS:  6/7: Patient is  "reporting severe back pain which is chronic however worsened by being in bed and fairly immobile.  Shortness of air is improving.  Using O2 at 2 L as needed.  Denies chest pain.    MEDICATIONS     SCHEDULED MEDICATIONS:   ARIPiprazole, 2 mg, Oral, Daily  bumetanide, 1 mg, Oral, BID  cholecalciferol, 2,000 Units, Oral, Daily  hydroCHLOROthiazide, 25 mg, Oral, Daily  insulin glargine, 46 Units, Subcutaneous, Daily With Breakfast  methadone, 10 mg, Oral, Daily  methadone, 15 mg, Oral, Nightly  polyethylene glycol, 17 g, Oral, Daily  pramipexole, 1 mg, Oral, Nightly  rosuvastatin, 10 mg, Oral, Daily  senna, 3 tablet, Oral, BID  sodium chloride, 10 mL, Intravenous, Q12H        CONTINUOUS INFUSIONS:   heparin, 10.9 Units/kg/hr, Last Rate: 13.9 Units/kg/hr (06/07/21 0560)  sodium chloride, 50 mL/hr, Last Rate: 50 mL/hr (06/07/21 0231)        PRN MEDS:  •  acetaminophen **OR** acetaminophen  •  aluminum-magnesium hydroxide-simethicone  •  heparin  •  heparin  •  HYDROcodone-acetaminophen  •  ipratropium-albuterol  •  ondansetron **OR** ondansetron  •  sodium chloride  •  [COMPLETED] Insert peripheral IV **AND** sodium chloride  •  sodium chloride    OBJECTIVE     VITAL SIGNS:  /43   Pulse 83   Temp 97.8 °F (36.6 °C) (Oral)   Resp 20   Ht 162.6 cm (64\")   Wt 91.6 kg (202 lb)   SpO2 96%   BMI 34.67 kg/m²     I/O FROM PREVIOUS 3 SHIFTS:  I/O last 3 completed shifts:  In: -   Out: 650 [Urine:650]    I/O THIS SHIFT:  No intake/output data recorded.    PHYSICAL EXAM:  General Appearance:  Alert, cooperative, no distress, appears stated age  Head:  Normocephalic, without obvious abnormality, atraumatic  Eyes:  PERRL, conjunctiva/corneas clear, EOM's intact     Neck:  Supple,  no JVD, trachea midline  Lungs:   Clear and equal throughout, respirations unlabored without accessory muscle use  Chest wall: Symmetrical chest wall movement  Heart:  Regular rate and rhythm, S1 and S2 normal. +MARKIE.  No rub or gallop  Abdomen:  " Soft, non-tender, bowel sounds active all four quadrants, no rebound or guarding  Extremities:  Extremities normal, no cyanosis.  BLE pitting edema  Skin:  No rashes or lesions  Neurologic:   Alert and oriented, no lateralizing deficits      RESULTS     LABS:  Lab Results (last 24 hours)     Procedure Component Value Units Date/Time    POC Glucose Once [[id number]]  (Abnormal) Collected: 06/07/21 0911    Specimen: Blood Updated: 06/07/21 0912     Glucose 182 mg/dL      Comment: Serial Number: [id number]:  [id number]       Troponin [[id number]]  (Normal) Collected: 06/07/21 0429    Specimen: Blood Updated: 06/07/21 0502     Troponin T <0.010 ng/mL     Narrative:      Troponin T Reference Range:  <= 0.03 ng/mL-   Negative for AMI  >0.03 ng/mL-     Abnormal for myocardial necrosis.  Clinicians would have to utilize clinical acumen, EKG, Troponin and serial changes to determine if it is an Acute Myocardial Infarction or myocardial injury due to an underlying chronic condition.       Results may be falsely decreased if patient taking Biotin.      aPTT [[id number]]  (Abnormal) Collected: 06/07/21 0429    Specimen: Blood Updated: 06/07/21 0454     PTT 31.6 seconds     Protime-INR [[id number]]  (Normal) Collected: 06/07/21 0429    Specimen: Blood Updated: 06/07/21 0454     Protime 10.6 Seconds      INR 0.96    CBC & Differential [[id number]]  (Abnormal) Collected: 06/07/21 0429    Specimen: Blood Updated: 06/07/21 0443    Narrative:      The following orders were created for panel order CBC & Differential.  Procedure                               Abnormality         Status                     ---------                               -----------         ------                     CBC Auto Differential[[id number]]        Abnormal            Final result                 Please view results for these tests on the individual orders.    CBC Auto Differential [[id number]]  (Abnormal) Collected: 06/07/21 0429    Specimen: Blood  Updated: 06/07/21 0443     WBC 6.90 10*3/mm3      RBC 3.31 10*6/mm3      Hemoglobin 10.7 g/dL      Hematocrit 31.2 %      MCV 94.2 fL      MCH 32.3 pg      MCHC 34.3 g/dL      RDW 14.0 %      RDW-SD 46.4 fl      MPV 6.8 fL      Platelets 193 10*3/mm3      Neutrophil % 56.4 %      Lymphocyte % 34.0 %      Monocyte % 6.4 %      Eosinophil % 2.6 %      Basophil % 0.6 %      Neutrophils, Absolute 3.90 10*3/mm3      Lymphocytes, Absolute 2.40 10*3/mm3      Monocytes, Absolute 0.40 10*3/mm3      Eosinophils, Absolute 0.20 10*3/mm3      Basophils, Absolute 0.00 10*3/mm3      nRBC 0.1 /100 WBC     BNP [[id number]]  (Normal) Collected: 06/06/21 1729    Specimen: Blood from Arm, Left Updated: 06/06/21 1919     proBNP 34.6 pg/mL     Narrative:      Among patients with dyspnea, NT-proBNP is highly sensitive for the detection of acute congestive heart failure. In addition NT-proBNP of <300 pg/ml effectively rules out acute congestive heart failure with 99% negative predictive value.    Results may be falsely decreased if patient taking Biotin.      Protime-INR [[id number]]  (Abnormal) Collected: 06/06/21 1729    Specimen: Blood from Arm, Left Updated: 06/06/21 1901     Protime 10.2 Seconds      INR <0.93    aPTT [[id number]]  (Normal) Collected: 06/06/21 1729    Specimen: Blood from Arm, Left Updated: 06/06/21 1901     PTT 25.1 seconds     [place] Draw [[id number]] Collected: 06/06/21 1729    Specimen: Blood from Arm, Left Updated: 06/06/21 1830    Narrative:      The following orders were created for panel order [place] Draw.  Procedure                               Abnormality         Status                     ---------                               -----------         ------                     Light Blue Top[[id number]]                                   Final result               Green Top (Gel)[[id number]]                                                             Lavender Top[[id number]]                                      Final result               Gold Top - SST[[id number]]                                   Final result                 Please view results for these tests on the individual orders.    Light Blue Top [[id number]] Collected: 06/06/21 1729    Specimen: Blood from Arm, Left Updated: 06/06/21 1830     Extra Tube hold for add-on     Comment: Auto resulted       Lavender Top [[id number]] Collected: 06/06/21 1729    Specimen: Blood from Arm, Left Updated: 06/06/21 1830     Extra Tube hold for add-on     Comment: Auto resulted       Gold Top - SST [[id number]] Collected: 06/06/21 1729    Specimen: Blood from Arm, Left Updated: 06/06/21 1830     Extra Tube Hold for add-ons.     Comment: Auto resulted.       Comprehensive Metabolic Panel [[id number]]  (Abnormal) Collected: 06/06/21 1729    Specimen: Blood from Arm, Left Updated: 06/06/21 1753     Glucose 280 mg/dL      BUN 42 mg/dL      Creatinine 1.50 mg/dL      Sodium 136 mmol/L      Potassium 5.1 mmol/L      Chloride 96 mmol/L      CO2 32.0 mmol/L      Calcium 9.4 mg/dL      Total Protein 6.5 g/dL      Albumin 3.90 g/dL      ALT (SGPT) 20 U/L      AST (SGOT) 18 U/L      Alkaline Phosphatase 127 U/L      Total Bilirubin 0.4 mg/dL      eGFR Non African Amer 34 mL/min/1.73      Globulin 2.6 gm/dL      A/G Ratio 1.5 g/dL      BUN/Creatinine Ratio 28.0     Anion Gap 8.0 mmol/L     Narrative:      GFR Normal >60  Chronic Kidney Disease <60  Kidney Failure <15      Troponin [[id number]]  (Normal) Collected: 06/06/21 1729    Specimen: Blood from Arm, Left Updated: 06/06/21 1753     Troponin T <0.010 ng/mL     Narrative:      Troponin T Reference Range:  <= 0.03 ng/mL-   Negative for AMI  >0.03 ng/mL-     Abnormal for myocardial necrosis.  Clinicians would have to utilize clinical acumen, EKG, Troponin and serial changes to determine if it is an Acute Myocardial Infarction or myocardial injury due to an underlying chronic condition.       Results may be falsely decreased if patient taking  Biotin.      CBC & Differential [[id number]]  (Abnormal) Collected: 06/06/21 1729    Specimen: Blood from Arm, Left Updated: 06/06/21 1745    Narrative:      The following orders were created for panel order CBC & Differential.  Procedure                               Abnormality         Status                     ---------                               -----------         ------                     CBC Auto Differential[[id number]]        Abnormal            Final result                 Please view results for these tests on the individual orders.    CBC Auto Differential [[id number]]  (Abnormal) Collected: 06/06/21 1729    Specimen: Blood from Arm, Left Updated: 06/06/21 1745     WBC 7.40 10*3/mm3      RBC 3.51 10*6/mm3      Hemoglobin 11.2 g/dL      Hematocrit 32.9 %      MCV 93.8 fL      MCH 31.8 pg      MCHC 33.9 g/dL      RDW 14.0 %      RDW-SD 45.9 fl      MPV 6.7 fL      Platelets 222 10*3/mm3      Neutrophil % 58.4 %      Lymphocyte % 31.9 %      Monocyte % 6.5 %      Eosinophil % 2.9 %      Basophil % 0.3 %      Neutrophils, Absolute 4.30 10*3/mm3      Lymphocytes, Absolute 2.40 10*3/mm3      Monocytes, Absolute 0.50 10*3/mm3      Eosinophils, Absolute 0.20 10*3/mm3      Basophils, Absolute 0.00 10*3/mm3      nRBC 0.1 /100 WBC     Urinalysis, Microscopic Only - Urine, Clean Catch [[id number]]  (Abnormal) Collected: 06/06/21 1724    Specimen: Urine, Clean Catch Updated: 06/06/21 1737     RBC, UA None Seen /HPF      WBC, UA 3-5 /HPF      Bacteria, UA None Seen /HPF      Squamous Epithelial Cells, UA 0-2 /HPF      Hyaline Casts, UA None Seen /LPF      Methodology Automated Microscopy    Urinalysis With Microscopic If Indicated (No Culture) - Urine, Clean Catch [[id number]]  (Abnormal) Collected: 06/06/21 1724    Specimen: Urine, Clean Catch Updated: 06/06/21 1737     Color, UA Yellow     Appearance, UA Hazy     Comment: Result checked         pH, UA 8.0     Specific Gravity, UA 1.014     Glucose,  mg/dL  (Trace)     Ketones, UA Negative     Bilirubin, UA Negative     Blood, UA Negative     Protein, UA Negative     Leuk Esterase, UA Trace     Nitrite, UA Negative     Urobilinogen, UA 0.2 E.U./dL           RADIOLOGY:  CT Abdomen Pelvis With Contrast    Result Date: 6/6/2021  CT ABDOMEN PELVIS W CONTRAST-  Date of Exam: 6/6/2021 7:00 PM  Indication: Short of breath and edema from the feet to the waist.  Comparison: 02/07/2018  Technique: Contiguous axial CT images were obtained from the lung bases to the superior iliac crests without contrast.  Following uneventful IV administration of 100 cc Isovue-370 and oral contrast, contiguous axial images obtained from lung bases to the pubic symphysis. Sagittal and coronal reconstructions were performed.  Automated exposure control and iterative reconstruction methods were used.  FINDINGS: No abnormality is seen in the liver, gallbladder, adrenals, or pancreas. The spleen is upper limits of normal in size. There is mild bilateral hydronephrosis, a change from the prior. There is mild dilatation of the right upper ureter. No ureteral stone is seen. The bladder is mildly distended. There is aortic calcification. The patient has had an aorto bifemoral graft, which was previously demonstrated.  No abnormality is seen in the stomach or small bowel. The appendix is not delineated. There is fluid in the colon and rectum. No free fluid or free air is seen. There is a partly included oval fluid collection in the medial aspect of the right upper flank that measures 3 x 5.5 cm, and was not present in 2018. There is some increased density in the subcutaneous fat of the abdominal and pelvic wall which could be from prior injections or surgery..   Bone windows show demineralization and postoperative changes in the lumbar spine. There is a neural stimulator in the left presacral area.      1. There is fluid in the colon which can be seen with diarrhea and clinical correlation is recommended.  2. There is mild bilateral hydronephrosis with no ureteral stone. This could possibly be due to bladder distention and clinical correlation is recommended. 3. There is a partly included fluid filled structure measuring at least 5.5 cm in the subcutaneous fat of the anterior medial right thigh that is of uncertain etiology. 4. Appendix is not identifiable. No secondary signs of appendicitis. 5. Patient has had an aortobifemoral graft. 6. Additional findings as reported above.  Electronically Signed By-Argelia Falcon MD On:6/6/2021 8:05 PM This report was finalized on [id number] by  Argelia Falcon MD.    XR Chest 1 View    Result Date: 6/6/2021  Examination: XR CHEST 1 VW-  Date of Exam: 6/6/2021 5:18 PM  Indication: Short of breath.   Comparison: 05/27/2021  Technique: 1 view of the chest  FINDINGS: Heart size is upper limits of normal. There is stable right diaphragmatic elevation. Lungs are clear. No pleural effusion or pneumothorax. No bone abnormality seen.       1. Stable exam, with no evidence of active disease.  Electronically Signed By-Argelia Falcon MD On:6/6/2021 5:23 PM This report was finalized on [id number] by  Argelia Falcon MD.    CT Chest Pulmonary Embolism    Result Date: 6/6/2021   DATE OF EXAM: 6/6/2021 7:00 PM  PROCEDURE: CT CHEST PULMONARY EMBOLISM-  INDICATIONS: Short of breath and recent surgery, leg swelling  COMPARISON: Portable chest 06/06/2021 CT abdomen 02/07/2018  TECHNIQUE: Routine transaxial slices were obtained through chest after administration of intravenous 100 ml of Isovue 370. Reconstructed coronal and sagittal images were also obtained. Automated exposure control and iterative reconstruction methods were used.  FINDINGS: Technically, the opacification of the pulmonary arteries is adequate. There is mild motion artifact. There is a defect consistent with an embolus in the left main pulmonary artery that into the 2 left upper lobe segmental arteries and some of their  subsegmental branches. There is probably a thin stringy clot that extends from the clot in the left main pulmonary artery across the bifurcation and into the right main pulmonary artery. Some thrombus in a segmental right upper lobe pulmonary artery is likely. There is no cardiomegaly or evidence of right heart strain. There is aortic and some coronary artery calcification.  No pleural effusion. There are some groundglass opacities in the lungs bilaterally. There is no airspace consolidation or pulmonary nodule.  Bone window show no significant abnormality.      1. This study is positive for pulmonary emboli. There is thrombus in the left main pulmonary artery extending into left upper lobe vessels. Technically, I think this is likely a saddle embolus, with a thin wispy clot extending from the clot in the left main pulmonary artery across the bifurcation and into the right main pulmonary artery. There is also likely thrombus in a segmental right upper lobe artery. 2. Scattered groundglass opacities in the lungs, nonspecific. 3. Please see today's CT abdomen dictation for description of abdominal findings.  Electronically Signed By-Argelia Falcon MD On:6/6/2021 7:58 PM This report was finalized on [id number] by  Argelia Falcon MD.    Duplex Venous Lower Extremity - Bilateral CAR    Result Date: 6/7/2021  · Acute left lower extremity superficial thrombophlebitis noted in the greater saphenous (above knee), 1 cm from the saphenofemoral junction. · All other veins appeared normal bilaterally.        ECHOCARDIOGRAM:    Electronically signed by CHANTALE Dutton, 06/07/21 at 09:18 EDT.     I personally have examined  and interviewed the patient. I have reviewed the history, data, problems, assessment and plan with our NP.  Critical care time in direct medical management (   ) minutes  Electronically signed by Abelardo Shaw MD, D,ABSM, 06/07/21, 10:17 PM EDT.

## 2021-06-07 NOTE — PROGRESS NOTES
LOS: 1 day   Patient Care Team:  Aida Lowry APRN as PCP - General (Nurse Practitioner)    Chief Complaint: Pulmonary embolus    Subjective     This patient was doing quite well after her surgery on May 28.  She had gone home but then began feeling poorly over the weekend and came to the emergency room.  She was found to have a saddle pulmonary embolus.    Interval History:     History taken from: patient chart    Objective      The patient has good movement in both lower extremities but they are very swollen.    Vital Signs  Temp:  [97.7 °F (36.5 °C)-98.5 °F (36.9 °C)] 97.7 °F (36.5 °C)  Heart Rate:  [67-92] 88  BP: ()/(31-64) 105/32       Results Review:     I reviewed the patient's new clinical results.  I reviewed the CT chest report      Assessment/Plan       Acute pulmonary embolism without acute cor pulmonale (CMS/HCC)    Type 2 diabetes mellitus with diabetic peripheral angiopathy without gangrene, with long-term current use of insulin (CMS/HCC)    Anxiety    Benign essential hypertension    Drug-induced constipation    Fibromyalgia    Hyperlipidemia    PERLA (obstructive sleep apnea)    Peripheral vascular disease (CMS/HCC)    Restless leg syndrome    Uncontrolled type 2 diabetes mellitus (CMS/HCC)    Acute kidney injury (CMS/HCC)    Tremors of nervous system      I told the patient that we simply could not use clot busting drugs in the face of a surgery that was only 10 days ago.  It is however okay to use full heparinization if necessary.  I told her that I was covering for Dr. Aguilar and we would be available if needed.      Donis Cruz MD  06/07/21  17:39 EDT

## 2021-06-07 NOTE — PLAN OF CARE
Goal Outcome Evaluation:  Plan of Care Reviewed With: patient        Progress: improving  Outcome Summary: The patient is on a Heparin drip. She has complained of leg cramps, which was helped by hot packs. Pedal pulses are present and strong. SCDs are not on due to being unaware of where the clot originated. The patient has transfer orders and a Hospitalist has been consulted.

## 2021-06-07 NOTE — H&P
PULMONARY/CRITICAL CARE HISTORY & PHYSICAL       PATIENT NAME:     Genny Soni  :     1950    MRN:     [id number]       ROOM:     [facility] ED      PRIMARY CARE PHYSICIAN:  Aida Lowry APRN    SUBJECTIVE     CHIEF COMPLAINT:   Shortness of breath    HISTORY OF PRESENT ILLNESS:  Genny Soni is a 71 y.o. female  has a past medical history of Arthritis, Diabetes mellitus (CMS/HCC), Hyperlipidemia, and Low back pain.   Patient presented to [facility] on 2021 with complaint of soreness of breath and swelling in bilateral lower extremities.  Of note patient had lumbar surgery 10 days ago, in which she stated that she felt fine afterwards but about 3 days ago started developed the shortness of breath which got increasingly worse and she came to the ER.  Patient endorses chest pain and shortness of breath, however denies palpitations, dizziness or syncope, headache, chills, or fever.  Denies abdominal pain, nausea, vomiting, diarrhea, constipation, loss of weight or loss of appetite, dysuria, blood in urine or stool.    In the ED, labs were obtained with abnormalities as follows: Glucose 280, creatinine 1.50, BUN 42, Hgb 11.2, HCT 32.9.  CXR completed in ED was stable with no evidence of active disease.  CT chest PE protocol was positive for pulmonary emboli.  Thrombus in the left main pulmonary artery extending into left upper lobe vessels, possible saddle embolus with clot possibly extending into left main pulmonary artery across bifurcation into the right main pulmonary artery.  There is also concern for thrombus in segmental right upper lobe artery.  Scattered groundglass opacities also found in the lungs.  Covid 19 swab was negative.    Pulmonary/Intensivist service was contacted for admission to ICU and further evaluation and treatment.      REVIEW OF SYSTEMS:  Pertinent items are noted in HPI, all other systems reviewed and negative      ASSESSMENT & PLAN     Principal Problem:     Acute pulmonary embolism without acute cor pulmonale (CMS/HCC)  Active Problems:    Type 2 diabetes mellitus with diabetic peripheral angiopathy without gangrene, with long-term current use of insulin (CMS/HCC)    Anxiety    Benign essential hypertension    Drug-induced constipation    Fibromyalgia    Hyperlipidemia    PERLA (obstructive sleep apnea)    Peripheral vascular disease (CMS/HCC)    Restless leg syndrome    Uncontrolled type 2 diabetes mellitus (CMS/HCC)    Acute kidney injury (CMS/HCC)    Tremors of nervous system     PLAN    Acute pulmonary embolism without acute cor pulmonale  -CT PE chest protocol: Thrombus in the left main pulmonary artery extending into left upper lobe vessels, possible saddle embolus with clot possibly extending into left main pulmonary artery across bifurcation into the right main pulmonary artery.  There is also concern for thrombus in segmental right upper lobe artery.  -Heparin gtt started  -ECHO ordered    Type 2 diabetes mellitus, uncontrolled  -Hold home glipizide while in ICU  -Sliding scale insulin for coverage  -Home Lantus ordered    Acute kidney injury  -Creatinine 1.50, baseline 0.94  -IV fluids started  -Avoid nephrotoxic agents  -Avoid hypotension  -nephrology consult    Essential hypertension  -Continue home hydrochlorothiazide  -Hold home lisinopril for DARRYL    Hyperlipidemia  -Continue home rosuvastatin    Fibromyalgia  Opioid dependency  -Continue home methadone    Drug-induced constipation  -Continue home MiraLAX    Anxiety  -Continue home Abilify    Obstructive sleep apnea  -Wears BiPAP at night  -O2 2 L as needed at night    H/O tremors  Restless leg syndrome  -Continue home pramipexole    History of peripheral vascular disease    Code Status: FULL  VTE Prophylaxis: heparin  PUD Prophylaxis: Diet ordered      HOSPITAL MEDICATIONS     SCHEDULED MEDICATIONS:  ARIPiprazole, 2 mg, Oral, Daily  bumetanide, 1 mg, Oral, BID  cholecalciferol, 2,000 Units, Oral,  "Daily  hydroCHLOROthiazide, 25 mg, Oral, Daily  insulin glargine, 46 Units, Subcutaneous, Daily With Breakfast  methadone, 10 mg, Oral, Daily  methadone, 15 mg, Oral, Nightly  polyethylene glycol, 17 g, Oral, Daily  pramipexole, 1 mg, Oral, Nightly  rosuvastatin, 10 mg, Oral, Daily  senna, 3 tablet, Oral, BID  sodium chloride, 10 mL, Intravenous, Q12H         CONTINUOUS INFUSIONS:    heparin, 10.9 Units/kg/hr, Last Rate: 10.9 Units/kg/hr (06/06/21 2156)  sodium chloride, 50 mL/hr         PRN MEDICATIONS:   heparin  •  heparin  •  sodium chloride  •  [COMPLETED] Insert peripheral IV **AND** sodium chloride       OBJECTIVE     VITAL SIGNS:  /59   Pulse 92   Temp 97.9 °F (36.6 °C)   Resp 20   Ht 162.6 cm (64\")   Wt 91.5 kg (201 lb 12.8 oz)   SpO2 94%   BMI 34.64 kg/m²     Wt Readings from Last 3 Encounters:   06/06/21 91.5 kg (201 lb 12.8 oz)   05/28/21 85.9 kg (189 lb 6 oz)   05/26/21 87.1 kg (192 lb)       INTAKE/OUTPUT:    Intake/Output Summary (Last 24 hours) at 6/6/2021 2349  Last data filed at 6/6/2021 2044  Gross per 24 hour   Intake --   Output 650 ml   Net -650 ml       PHYSICAL EXAM:   Constitutional:  Well developed, well nourished, no acute distress, non-toxic appearance   Eyes:  PERRL, conjunctiva normal, EOMI   HENT:  Atraumatic, external ears normal, nose normal, oropharynx moist, no pharyngeal exudates. Neck-normal range of motion, no tenderness  Respiratory: Bilateral expiratory diminished, non-labored respirations without accessory muscle use  Cardiovascular:  Normal rate, normal rhythm, no murmurs, no gallops, no rubs   GI:  Soft, nondistended, normal bowel sounds, nontender, no rebound or guarding   :  No costovertebral angle tenderness   Musculoskeletal:  No edema, no tenderness, no deformities  Integument:  Well hydrated, no rash, pale  Neurologic:  Alert & oriented x 3, normal motor function, normal sensory function, no gross motor deficits noted   Psychiatric:  Speech and behavior " appropriate      HISTORY     HISTORY:  Past Medical History:   Diagnosis Date   • Arthritis    • Diabetes mellitus (CMS/HCC)    • Hyperlipidemia    • Low back pain      Past Surgical History:   Procedure Laterality Date   • BACK SURGERY     • FEMORAL DISTAL BYPASS     • HYSTERECTOMY     • LUMBAR LAMINECTOMY WITH FUSION N/A 5/28/2021    Procedure: Lumbar 5 Smith procedure.  Lumbar 5 6 and sacral 1 transpedicular Solera screws and rods.  Posterior fusion with autologous bone.;  Surgeon: Gagan Aguilar MD;  Location: [facility] MAIN OR;  Service: Neurosurgery;  Laterality: N/A;     History reviewed. No pertinent family history.  Social History     Socioeconomic History   • Marital status:      Spouse name: Not on file   • Number of children: Not on file   • Years of education: Not on file   • Highest education level: Not on file   Tobacco Use   • Smoking status: Former Smoker   • Smokeless tobacco: Never Used   Vaping Use   • Vaping Use: Never used   Substance and Sexual Activity   • Alcohol use: Not Currently   • Drug use: Never   • Sexual activity: Defer        HOME MEDICATIONS:   Prior to Admission medications    Medication Sig Start Date End Date Taking? Authorizing Provider   ARIPiprazole (ABILIFY) 2 MG tablet Take 2 mg by mouth Daily. 5/10/21  Yes ProviderPedro MD   bumetanide (BUMEX) 1 MG tablet Take 1 mg by mouth 2 (Two) Times a Day.   Yes ProviderPedro MD   glipizide (GLUCOTROL XL) 10 MG 24 hr tablet Take 10 mg by mouth Daily.   Yes ProviderPedro MD   hydroCHLOROthiazide (HYDRODIURIL) 25 MG tablet Take 25 mg by mouth Daily. 7/31/17  Yes Pedro Vazquez MD   HYDROcodone-acetaminophen (NORCO)  MG per tablet Take 0.5-1 tablets by mouth 3 (Three) Times a Day As Needed. 5/13/21  Yes Pedro Vazquez MD   Insulin Glargine, 1 Unit Dial, (Toujeo SoloStar) 300 UNIT/ML solution pen-injector injection Inject 60 Units under the skin into the appropriate area as directed Every  Morning.   Yes Pedro Vazquez MD   ketorolac (TORADOL) 10 MG tablet Take 1 tablet by mouth Every 6 (Six) Hours As Needed for Moderate Pain  or Severe Pain . 5/29/21  Yes Gagan Aguilar MD   lisinopril (PRINIVIL,ZESTRIL) 10 MG tablet Take 10 mg by mouth Daily.   Yes Pedro Vazquez MD   methadone (DOLOPHINE) 5 MG tablet Take 10 mg by mouth Every Morning.   Yes Pedro Vazquez MD   methadone (DOLOPHINE) 5 MG tablet Take 15 mg by mouth Every Night.   Yes Pedro Vazquez MD   polyethylene glycol (MiraLax) 17 g packet Take 17 g by mouth Daily As Needed.   Yes Pedro Vazquez MD   potassium chloride (MICRO-K) 10 MEQ CR capsule Take 20 mEq by mouth Daily. 4/15/20  Yes Pedro Vazquez MD   pramipexole (MIRAPEX) 1 MG tablet Take 1 mg by mouth Every Night. 1/31/18  Yes Pedro Vazquez MD   rosuvastatin (CRESTOR) 10 MG tablet Take 10 mg by mouth Daily. 1/31/18  Yes Pedro Vazquez MD   senna (senna) 8.6 MG tablet Take 3 tablets by mouth 2 (Two) Times a Day.   Yes Pedro Vazquez MD   Trulicity 1.5 MG/0.5ML solution pen-injector Inject 1.5 mg under the skin into the appropriate area as directed Every 7 (Seven) Days. Thursday 5/14/21  Yes Pedro Vazquez MD   vitamin D (ERGOCALCIFEROL) 1.25 MG (89762 UT) capsule capsule Take 50,000 Units by mouth 1 (One) Time Per Week. Thursdays   Yes Pedro Vazquez MD   cephalexin (Keflex) 500 MG capsule Take 1 capsule by mouth 3 (Three) Times a Day. 5/29/21 6/6/21  Gagan Aguilar MD   furosemide (LASIX) 40 MG tablet furosemide 40 mg tablet   TAKE 1 TABLET BY MOUTH TWICE DAILY  6/6/21  Pedro Vazquez MD   meclizine (ANTIVERT) 25 MG tablet Take 1 tablet by mouth 3 (Three) Times a Day As Needed for Dizziness. 8/21/20 6/6/21  Mario Alberto Constantino MD   metFORMIN (GLUCOPHAGE) 1000 MG tablet metformin 1,000 mg tablet 1/12/18 6/6/21  Pedro Vazquez MD   Semaglutide,0.25 or 0.5MG/DOS, (Ozempic, 0.25 or 0.5 MG/DOSE,) 2  MG/1.5ML solution pen-injector 0.5 mg.  6/6/21  Provider, MD Pedro       ALLERGIES:  Ambien  [zolpidem], Codeine, and Sulfa antibiotics      RESULTS     LABS:  Lab Results (last 24 hours)     Procedure Component Value Units Date/Time    Urinalysis With Microscopic If Indicated (No Culture) - Urine, Clean Catch [[id number]]  (Abnormal) Collected: 06/06/21 1724    Specimen: Urine, Clean Catch Updated: 06/06/21 1737     Color, UA Yellow     Appearance, UA Hazy     Comment: Result checked         pH, UA 8.0     Specific Gravity, UA 1.014     Glucose,  mg/dL (Trace)     Ketones, UA Negative     Bilirubin, UA Negative     Blood, UA Negative     Protein, UA Negative     Leuk Esterase, UA Trace     Nitrite, UA Negative     Urobilinogen, UA 0.2 E.U./dL    Urinalysis, Microscopic Only - Urine, Clean Catch [[id number]]  (Abnormal) Collected: 06/06/21 1724    Specimen: Urine, Clean Catch Updated: 06/06/21 1737     RBC, UA None Seen /HPF      WBC, UA 3-5 /HPF      Bacteria, UA None Seen /HPF      Squamous Epithelial Cells, UA 0-2 /HPF      Hyaline Casts, UA None Seen /LPF      Methodology Automated Microscopy    CBC & Differential [[id number]]  (Abnormal) Collected: 06/06/21 1729    Specimen: Blood from Arm, Left Updated: 06/06/21 1745    Narrative:      The following orders were created for panel order CBC & Differential.  Procedure                               Abnormality         Status                     ---------                               -----------         ------                     CBC Auto Differential[[id number]]        Abnormal            Final result                 Please view results for these tests on the individual orders.    Comprehensive Metabolic Panel [[id number]]  (Abnormal) Collected: 06/06/21 1729    Specimen: Blood from Arm, Left Updated: 06/06/21 1753     Glucose 280 mg/dL      BUN 42 mg/dL      Creatinine 1.50 mg/dL      Sodium 136 mmol/L      Potassium 5.1 mmol/L      Chloride 96 mmol/L       CO2 32.0 mmol/L      Calcium 9.4 mg/dL      Total Protein 6.5 g/dL      Albumin 3.90 g/dL      ALT (SGPT) 20 U/L      AST (SGOT) 18 U/L      Alkaline Phosphatase 127 U/L      Total Bilirubin 0.4 mg/dL      eGFR Non African Amer 34 mL/min/1.73      Globulin 2.6 gm/dL      A/G Ratio 1.5 g/dL      BUN/Creatinine Ratio 28.0     Anion Gap 8.0 mmol/L     Narrative:      GFR Normal >60  Chronic Kidney Disease <60  Kidney Failure <15      Protime-INR [[id number]]  (Abnormal) Collected: 06/06/21 1729    Specimen: Blood from Arm, Left Updated: 06/06/21 1901     Protime 10.2 Seconds      INR <0.93    aPTT [[id number]]  (Normal) Collected: 06/06/21 1729    Specimen: Blood from Arm, Left Updated: 06/06/21 1901     PTT 25.1 seconds     BNP [[id number]]  (Normal) Collected: 06/06/21 1729    Specimen: Blood from Arm, Left Updated: 06/06/21 1919     proBNP 34.6 pg/mL     Narrative:      Among patients with dyspnea, NT-proBNP is highly sensitive for the detection of acute congestive heart failure. In addition NT-proBNP of <300 pg/ml effectively rules out acute congestive heart failure with 99% negative predictive value.    Results may be falsely decreased if patient taking Biotin.      Troponin [[id number]]  (Normal) Collected: 06/06/21 1729    Specimen: Blood from Arm, Left Updated: 06/06/21 1753     Troponin T <0.010 ng/mL     Narrative:      Troponin T Reference Range:  <= 0.03 ng/mL-   Negative for AMI  >0.03 ng/mL-     Abnormal for myocardial necrosis.  Clinicians would have to utilize clinical acumen, EKG, Troponin and serial changes to determine if it is an Acute Myocardial Infarction or myocardial injury due to an underlying chronic condition.       Results may be falsely decreased if patient taking Biotin.      CBC Auto Differential [[id number]]  (Abnormal) Collected: 06/06/21 1729    Specimen: Blood from Arm, Left Updated: 06/06/21 5094     WBC 7.40 10*3/mm3      RBC 3.51 10*6/mm3      Hemoglobin 11.2 g/dL      Hematocrit 32.9  %      MCV 93.8 fL      MCH 31.8 pg      MCHC 33.9 g/dL      RDW 14.0 %      RDW-SD 45.9 fl      MPV 6.7 fL      Platelets 222 10*3/mm3      Neutrophil % 58.4 %      Lymphocyte % 31.9 %      Monocyte % 6.5 %      Eosinophil % 2.9 %      Basophil % 0.3 %      Neutrophils, Absolute 4.30 10*3/mm3      Lymphocytes, Absolute 2.40 10*3/mm3      Monocytes, Absolute 0.50 10*3/mm3      Eosinophils, Absolute 0.20 10*3/mm3      Basophils, Absolute 0.00 10*3/mm3      nRBC 0.1 /100 WBC             MICRO:  Microbiology Results (last 10 days)     ** No results found for the last 240 hours. **            RADIOLOGY STUDIES:  Imaging Results (Last 72 Hours)     Procedure Component Value Units Date/Time    CT Abdomen Pelvis With Contrast [[id number]] Collected: 06/06/21 1958     Updated: 06/06/21 2007    Narrative:      CT ABDOMEN PELVIS W CONTRAST-     Date of Exam: 6/6/2021 7:00 PM     Indication: Short of breath and edema from the feet to the waist.     Comparison: 02/07/2018     Technique: Contiguous axial CT images were obtained from the lung bases  to the superior iliac crests without contrast.  Following uneventful IV  administration of 100 cc Isovue-370 and oral contrast, contiguous axial  images obtained from lung bases to the pubic symphysis. Sagittal and  coronal reconstructions were performed.  Automated exposure control and  iterative reconstruction methods were used.     FINDINGS:  No abnormality is seen in the liver, gallbladder, adrenals, or pancreas.  The spleen is upper limits of normal in size. There is mild bilateral  hydronephrosis, a change from the prior. There is mild dilatation of the  right upper ureter. No ureteral stone is seen. The bladder is mildly  distended. There is aortic calcification. The patient has had an aorto  bifemoral graft, which was previously demonstrated.     No abnormality is seen in the stomach or small bowel. The appendix is  not delineated. There is fluid in the colon and rectum. No  free fluid or  free air is seen. There is a partly included oval fluid collection in  the medial aspect of the right upper flank that measures 3 x 5.5 cm, and  was not present in 2018. There is some increased density in the  subcutaneous fat of the abdominal and pelvic wall which could be from  prior injections or surgery..      Bone windows show demineralization and postoperative changes in the  lumbar spine. There is a neural stimulator in the left presacral area.       Impression:      1. There is fluid in the colon which can be seen with diarrhea and  clinical correlation is recommended.  2. There is mild bilateral hydronephrosis with no ureteral stone. This  could possibly be due to bladder distention and clinical correlation is  recommended.  3. There is a partly included fluid filled structure measuring at least  5.5 cm in the subcutaneous fat of the anterior medial right thigh that  is of uncertain etiology.  4. Appendix is not identifiable. No secondary signs of appendicitis.  5. Patient has had an aortobifemoral graft.  6. Additional findings as reported above.     Electronically Signed By-Argelia Falcon MD On:6/6/2021 8:05 PM  This report was finalized on [id number] by  Argelia Falcon MD.    CT Chest Pulmonary Embolism [[id number]] Collected: 06/06/21 1946     Updated: 06/06/21 2005    Narrative:         DATE OF EXAM:  6/6/2021 7:00 PM     PROCEDURE:  CT CHEST PULMONARY EMBOLISM-     INDICATIONS:   Short of breath and recent surgery, leg swelling     COMPARISON:   Portable chest 06/06/2021 CT abdomen 02/07/2018     TECHNIQUE:  Routine transaxial slices were obtained through chest after  administration of intravenous 100 ml of Isovue 370. Reconstructed  coronal and sagittal images were also obtained. Automated exposure  control and iterative reconstruction methods were used.      FINDINGS:  Technically, the opacification of the pulmonary arteries is adequate.  There is mild motion artifact. There is a  defect consistent with an  embolus in the left main pulmonary artery that into the 2 left upper  lobe segmental arteries and some of their subsegmental branches. There  is probably a thin stringy clot that extends from the clot in the left  main pulmonary artery across the bifurcation and into the right main  pulmonary artery. Some thrombus in a segmental right upper lobe  pulmonary artery is likely. There is no cardiomegaly or evidence of  right heart strain. There is aortic and some coronary artery  calcification.     No pleural effusion. There are some groundglass opacities in the lungs  bilaterally. There is no airspace consolidation or pulmonary nodule.     Bone window show no significant abnormality.       Impression:      1. This study is positive for pulmonary emboli. There is thrombus in the  left main pulmonary artery extending into left upper lobe vessels.  Technically, I think this is likely a saddle embolus, with a thin wispy   clot extending from the clot in the left main pulmonary artery across  the bifurcation and into the right main pulmonary artery. There is also  likely thrombus in a segmental right upper lobe artery.  2. Scattered groundglass opacities in the lungs, nonspecific.  3. Please see today's CT abdomen dictation for description of abdominal  findings.     Electronically Signed By-Argelia Falcon MD On:6/6/2021 7:58 PM  This report was finalized on [id number] by  Argelia Falcon MD.    XR Chest 1 View [[id number]] Collected: 06/06/21 1723     Updated: 06/06/21 1725    Narrative:      Examination: XR CHEST 1 VW-     Date of Exam: 6/6/2021 5:18 PM     Indication: Short of breath.       Comparison: 05/27/2021     Technique: 1 view of the chest      FINDINGS:  Heart size is upper limits of normal. There is stable right  diaphragmatic elevation. Lungs are clear. No pleural effusion or  pneumothorax. No bone abnormality seen.       Impression:         1. Stable exam, with no evidence of active  disease.     Electronically Signed By-Argelia Falcon MD On:6/6/2021 5:23 PM  This report was finalized on [id number] by  Argelia Falcon MD.          Results for orders placed during the hospital encounter of 09/08/20    Duplex carotid ultrasound CAR    Interpretation Summary  · Proximal right internal carotid artery moderate stenosis.  · Proximal left internal carotid artery moderate stenosis.          ECHOCARDIOGRAM: PENDING         I reviewed the patient's new clinical results.    I discussed the patient's findings and my recommendations with patient and nursing staff.  I have discussed plan with on-call attending physician, who agrees with this plan of care.    Appropriate PPE worn during assessment of patient per established guidelines.      Electronically signed by CAHNTALE Campbell, 06/06/21, 11:49 PM EDT.

## 2021-06-07 NOTE — CONSULTS
INITIAL CONSULT NOTE      Patient Name: Genny Soni  : 1950  MRN: [id number]  Primary Care Physician: Aida Lowry APRN  Date of admission: 2021    Patient Care Team:  Aida Lowry APRN as PCP - General (Nurse Practitioner)        Reason for Consult:       Acute kidney injury  Subjective   History of Present Illness:   Chief Complaint:   Chief Complaint   Patient presents with   • Chest Pain     HISTORY:  Genny Soni is a 71 y.o. female with past medical history of arthritis, diabetes mellitus, back problem, hyperlipidemia, recently had back surgery done discharged from the hospital about 5 days back.. who presents with increasing shortness of breath not feeling well bilateral lower extremity swelling right greater than left.  As per patient after the back surgery she was doing fine and she started walking better but developed increasing shortness of breath for the last 3 days.  No other complaint.  Found to have creatinine of 1.5 at the time of admission with increased blood sugar.  Chest x-ray PE protocol was positive for pulmonary emboli.  Patient baseline renal functions are stable.  Not taking any nonsteroidal anti-inflammatory drugs at home only taking Tylenol arthritis and also narcotics.  No other labs available at this time urine studies showed no proteinuria.          Review of systems:  All other review of system unremarkable except shortness of breath  Constitutional: No fever, no chills, no lethargy, no weakness.  HEENT:  No headache, otalgia, itchy eyes, nasal discharge or sore throat.  Cardiac:  No chest pain, but significant dyspnea, orthopnea or PND.  Chest:              No cough, phlegm or wheezing.  Abdomen:  No abdominal pain, nausea or vomiting.  Neuro:  No focal weakness, abnormal movements orseizure like activity.  :   No hematuria, no pyuria, no dysuria, no flank pain.  ROS was otherwise negative except as mentioned in the Native.       Personal History:      Past Medical History:   Past Medical History:   Diagnosis Date   • Arthritis    • Benign essential hypertension 7/12/2018   • Diabetes mellitus (CMS/HCC)    • Elevated cholesterol    • History of transfusion    • Hyperlipidemia    • Low back pain    • Sleep apnea        Surgical History:      Past Surgical History:   Procedure Laterality Date   • BACK SURGERY     • FEMORAL DISTAL BYPASS     • HYSTERECTOMY     • JOINT REPLACEMENT     • LUMBAR LAMINECTOMY WITH FUSION N/A 5/28/2021    Procedure: Lumbar 5 Smith procedure.  Lumbar 5 6 and sacral 1 transpedicular Solera screws and rods.  Posterior fusion with autologous bone.;  Surgeon: Gagan Aguilar MD;  Location: [facility] MAIN OR;  Service: Neurosurgery;  Laterality: N/A;       Family History: family history is not on file. Otherwise pertinent FHx was reviewed and unremarkable.     Social History:  reports that she has quit smoking. She has never used smokeless tobacco. She reports previous alcohol use. She reports that she does not use drugs.    Medications:  Prior to Admission medications    Medication Sig Start Date End Date Taking? Authorizing Provider   ARIPiprazole (ABILIFY) 2 MG tablet Take 2 mg by mouth Daily. 5/10/21  Yes ProviderPedro MD   bumetanide (BUMEX) 1 MG tablet Take 1 mg by mouth 2 (Two) Times a Day.   Yes ProviderPedro MD   glipizide (GLUCOTROL XL) 10 MG 24 hr tablet Take 10 mg by mouth Daily.   Yes ProviderPedro MD   hydroCHLOROthiazide (HYDRODIURIL) 25 MG tablet Take 25 mg by mouth Daily. 7/31/17  Yes Pedro Vazquez MD   HYDROcodone-acetaminophen (NORCO)  MG per tablet Take 0.5-1 tablets by mouth 3 (Three) Times a Day As Needed. 5/13/21  Yes Pedro Vazquez MD   Insulin Glargine, 1 Unit Dial, (Toujeo SoloStar) 300 UNIT/ML solution pen-injector injection Inject 60 Units under the skin into the appropriate area as directed Every Morning.   Yes Pedro Vazquez MD   ketorolac (TORADOL) 10 MG tablet  Take 1 tablet by mouth Every 6 (Six) Hours As Needed for Moderate Pain  or Severe Pain . 5/29/21  Yes Gagan Aguilar MD   lisinopril (PRINIVIL,ZESTRIL) 10 MG tablet Take 10 mg by mouth Daily.   Yes Pedro Vazquez MD   methadone (DOLOPHINE) 5 MG tablet Take 10 mg by mouth Every Morning.   Yes Pedro Vazquez MD   methadone (DOLOPHINE) 5 MG tablet Take 15 mg by mouth Every Night.   Yes ePdro Vazquez MD   polyethylene glycol (MiraLax) 17 g packet Take 17 g by mouth Daily As Needed.   Yes Pedro Vazquez MD   potassium chloride (MICRO-K) 10 MEQ CR capsule Take 20 mEq by mouth Daily. 4/15/20  Yes Pedro Vazquez MD   pramipexole (MIRAPEX) 1 MG tablet Take 1 mg by mouth Every Night. 1/31/18  Yes Pedro Vazquez MD   rosuvastatin (CRESTOR) 10 MG tablet Take 10 mg by mouth Daily. 1/31/18  Yes Pedro Vazquez MD   senna (senna) 8.6 MG tablet Take 3 tablets by mouth 2 (Two) Times a Day.   Yes Pedro Vazquez MD   Trulicity 1.5 MG/0.5ML solution pen-injector Inject 1.5 mg under the skin into the appropriate area as directed Every 7 (Seven) Days. Thursday 5/14/21  Yes Pedro Vazquez MD   vitamin D (ERGOCALCIFEROL) 1.25 MG (59968 UT) capsule capsule Take 50,000 Units by mouth 1 (One) Time Per Week. Thursdays   Yes Pedro Vazquez MD     Scheduled Meds:ARIPiprazole, 2 mg, Oral, Daily  bumetanide, 1 mg, Oral, BID  cholecalciferol, 2,000 Units, Oral, Daily  hydroCHLOROthiazide, 25 mg, Oral, Daily  insulin glargine, 46 Units, Subcutaneous, Daily With Breakfast  insulin lispro, 0-9 Units, Subcutaneous, TID AC  lidocaine, 1 patch, Transdermal, Q24H  methadone, 10 mg, Oral, Daily  methadone, 15 mg, Oral, Nightly  polyethylene glycol, 17 g, Oral, Daily  pramipexole, 1 mg, Oral, Nightly  rosuvastatin, 10 mg, Oral, Daily  senna, 3 tablet, Oral, BID  sodium chloride, 10 mL, Intravenous, Q12H      Continuous Infusions:heparin, 10.9 Units/kg/hr, Last Rate: 13.9 Units/kg/hr  (06/07/21 0535)  sodium chloride, 50 mL/hr, Last Rate: 50 mL/hr (06/07/21 0231)      PRN Meds:•  acetaminophen **OR** acetaminophen  •  aluminum-magnesium hydroxide-simethicone  •  dextrose  •  dextrose  •  glucagon (human recombinant)  •  heparin  •  heparin  •  HYDROcodone-acetaminophen  •  insulin lispro **AND** insulin lispro  •  ipratropium-albuterol  •  ondansetron **OR** ondansetron  •  sodium chloride  •  [COMPLETED] Insert peripheral IV **AND** sodium chloride  •  sodium chloride  Allergies:    Allergies   Allergen Reactions   • Ambien  [Zolpidem] Confusion   • Codeine Itching   • Sulfa Antibiotics Nausea And Vomiting     Makes her nauseated       Objective   Exam:     Vital Signs  Temp:  [97.8 °F (36.6 °C)-98.5 °F (36.9 °C)] 97.8 °F (36.6 °C)  Heart Rate:  [67-92] 86  Resp:  [20] 20  BP: ()/(33-98) 118/37  SpO2:  [87 %-100 %] 95 %  on   ;   Device (Oxygen Therapy): nasal cannula  Body mass index is 34.67 kg/m².  EXAM  General: Elderly white  female in no acute distress.    Head:      Normocephalic and atraumatic.    Eyes:      PERRL/EOM intact, conjunctivae and sclerae clear without nystagmus.    Neck:      No masses, thyromegaly,  trachea central   Lungs:    Clear bilaterally to auscultation.    Heart:      Regular rate and rhythm, no murmur no gallop  Abd:        Soft, nontender, not distended, bowel sounds positive, no shifting dullness.  Msk:        No deformity or scoliosis noted of thoracic or lumbar spine.    Pulses:   Pulses normal in all 4 extremities.    Extremities:        No cyanosis or clubbing--+1-2 edema.    Neuro:    No focal deficits.   alert oriented x3  Skin:       Intact without lesions or rashes.    Psych:    Alert and cooperative; normal mood and affect; normal attention span       Results Review:  I have personally reviewed most recent Data :  [facility]  CBC    Results from last 7 days   Lab Units 06/07/21  0429 06/06/21  1729   WBC 10*3/mm3 6.90 7.40    HEMOGLOBIN g/dL 10.7* 11.2*   PLATELETS 10*3/mm3 193 222     CMP   Results from last 7 days   Lab Units 06/06/21  1729   SODIUM mmol/L 136   POTASSIUM mmol/L 5.1   CHLORIDE mmol/L 96*   CO2 mmol/L 32.0*   BUN mg/dL 42*   CREATININE mg/dL 1.50*   GLUCOSE mg/dL 280*   ALBUMIN g/dL 3.90   BILIRUBIN mg/dL 0.4   ALK PHOS U/L 127*   AST (SGOT) U/L 18   ALT (SGPT) U/L 20     ABG      CT Abdomen Pelvis With Contrast    Result Date: 6/6/2021  1. There is fluid in the colon which can be seen with diarrhea and clinical correlation is recommended. 2. There is mild bilateral hydronephrosis with no ureteral stone. This could possibly be due to bladder distention and clinical correlation is recommended. 3. There is a partly included fluid filled structure measuring at least 5.5 cm in the subcutaneous fat of the anterior medial right thigh that is of uncertain etiology. 4. Appendix is not identifiable. No secondary signs of appendicitis. 5. Patient has had an aortobifemoral graft. 6. Additional findings as reported above.  Electronically Signed By-Argelia Falcon MD On:6/6/2021 8:05 PM This report was finalized on [id number] by  Argelia Falcon MD.    XR Chest 1 View    Result Date: 6/6/2021   1. Stable exam, with no evidence of active disease.  Electronically Signed By-Argelia Falcon MD On:6/6/2021 5:23 PM This report was finalized on [id number] by  Argelia Falcon MD.    CT Chest Pulmonary Embolism    Result Date: 6/6/2021  1. This study is positive for pulmonary emboli. There is thrombus in the left main pulmonary artery extending into left upper lobe vessels. Technically, I think this is likely a saddle embolus, with a thin wispy clot extending from the clot in the left main pulmonary artery across the bifurcation and into the right main pulmonary artery. There is also likely thrombus in a segmental right upper lobe artery. 2. Scattered groundglass opacities in the lungs, nonspecific. 3. Please see today's CT abdomen  dictation for description of abdominal findings.  Electronically Signed By-Argelia Falcon MD On:6/6/2021 7:58 PM This report was finalized on [id number] by  Argelia Falcon MD.      Results for orders placed during the hospital encounter of 06/06/21    ADULT TRANSTHORACIC ECHO COMPLETE W/ CONT IF NECESSARY PER PROTOCOL    Interpretation Summary  · Estimated right ventricular systolic pressure from tricuspid regurgitation is normal (<35 mmHg).  · Calculated left ventricular EF = 54% Estimated left ventricular EF was in agreement with the calculated left ventricular EF.  · Left ventricular diastolic function was normal.    Essentially unremarkable study  Normal LV and RV size and function  Normal atrial sizes  No significant valvular regurgitation or stenosis seen  Valve leaflets are thin and pliable with no mobile echodensities  No Doppler evidence for ASD or PFO although no bubble study performed in the present study  Normal myocardial thickness no LVOT obstruction no intracavitary obstruction  No masses or effusion seen  Normal diastolic function by criteria  No pulmonary hypertension seen as measured noninvasively  Normal right left-sided pressures estimated noninvasively  EF 60%        Assessment/Plan   Assessment and Plan:         Acute pulmonary embolism without acute cor pulmonale (CMS/HCC)    Type 2 diabetes mellitus with diabetic peripheral angiopathy without gangrene, with long-term current use of insulin (CMS/HCC)    Anxiety    Benign essential hypertension    Drug-induced constipation    Fibromyalgia    Hyperlipidemia    PERLA (obstructive sleep apnea)    Peripheral vascular disease (CMS/HCC)    Restless leg syndrome    Uncontrolled type 2 diabetes mellitus (CMS/HCC)    Acute kidney injury (CMS/HCC)    Tremors of nervous system    ASSESSMENT:  • DARRYL in a patient with no significant chronic kidney disease likely related to some hemodynamic instability in the presence of pulmonary embolism.  Repeat labs are  pending there is a possibility of renal functions getting worse.  Also contributing factor some contrast-induced nephropathy  • Acute pulmonary embolism  • Essential hypertension  • History of diabetes mellitus which is not well controlled  • Restless leg syndrome  • History of fibromyalgia  • Normal echocardiogram      Plan:     • Patient DARRYL seems to be multifactorial may be mild ATN contrast nephropathy, volume status hemodynamic instability  • Repeat labs will be ordered at this time follow-up with the results  • Follow-up with a urine studies most likely it will show as a prerenal  • Control blood pressure aggressively at this time blood pressure is stable  • Hold ACE inhibitors at this time  • Hold diuretics as to me patient is not volume overloaded edema is secondary to DVT likely  • There is possibility renal functions may get worse before start getting better.    Note started  by Smith Marquez MD,   [facility] kidney consultant  [phone]

## 2021-06-07 NOTE — CASE MANAGEMENT/SOCIAL WORK
"Discharge Planning Assessment   [place]     Patient Name: Genny Soni  MRN: [id number]  Today's Date: 6/7/2021    Admit Date: 6/6/2021    Discharge Needs Assessment     Row Name 06/07/21 1227       Living Environment    Lives With  spouse    Current Living Arrangements  home/apartment/condo    Primary Care Provided by  self    Provides Primary Care For  no one    Family Caregiver if Needed  spouse    Quality of Family Relationships  supportive    Able to Return to Prior Arrangements  yes       Resource/Environmental Concerns    Resource/Environmental Concerns  none    Transportation Concerns  car, none       Transition Planning    Patient/Family Anticipates Transition to  home with family    Patient/Family Anticipated Services at Transition  none    Transportation Anticipated  family or friend will provide       Discharge Needs Assessment    Readmission Within the Last 30 Days  other (see comments) readmission with PE    Equipment Currently Used at Home  cane, straight;walker, standard    Concerns to be Addressed  discharge planning    Anticipated Changes Related to Illness  inability to care for self    Equipment Needed After Discharge  none    Discharge Facility/Level of Care Needs  home with home health    Provided Post Acute Provider List?  Yes    Post Acute Provider List  Home Health    Provided Post Acute Provider Quality & Resource List?  Yes    Post Acute Provider Quality and Resource List  Home Health    Delivered To  Patient    Method of Delivery  In person    Patient's Choice of Community Agency(s)  Refused to give a choice. States, \"i will if i need it\".    Discharge Coordination/Progress  Spoke to harsh at bedside. She denies dc needs at this time.        Discharge Plan     Row Name 06/07/21 1308       Plan    Plan  Possible home health needs pending progress    Plan Comments  See assessment notes        Continued Care and Services - Admitted Since 6/6/2021    Coordination has not been started for " this encounter.         Demographic Summary     Row Name 06/07/21 1213       General Information    Admission Type  inpatient    Arrived From  emergency department    Required Notices Provided  Important Message from Medicare    Referral Source  admission list    Reason for Consult  discharge planning    Preferred Language  English        Functional Status     Row Name 06/07/21 1226       Functional Status    Usual Activity Tolerance  moderate    Current Activity Tolerance  fair    Functional Status Comments  Per patient, she had back surgery 2 weeks ago       Functional Status, IADL    Medications  independent    Meal Preparation  independent    Housekeeping  assistive person    Laundry  assistive person    Shopping  assistive person    IADL Comments  aptient states she had surgery a few weeks ago and has usued assist in some ADLs       Mental Status    General Appearance WDL  WDL       Mental Status Summary    Recent Changes in Mental Status/Cognitive Functioning  no changes        Met with patient in room wearing PPE: mask, face shield/goggles, gloves, gown.      Maintained distance greater than six feet and spent less than 15 minutes in the room.      Tracie Mazariegos RN  Complex Case Manager  [facility] Care Coordination  [phone]-cell  [phone]-office  [phone]-fax  [email or web address]  Lianet Mazariegos RN

## 2021-06-07 NOTE — CONSULTS
[facility] Medicine Services      Patient Name: Genny Soni  : 1950  MRN: [id number]  Primary Care Physician: Aida Lowry APRN  Date of admission: 2021    Patient Care Team:  Aida Lowry APRN as PCP - General (Nurse Practitioner)          Subjective   History Present Illness     Chief Complaint:   Chief Complaint   Patient presents with   • Chest Pain         Ms. Soni is a 71 y.o. female with past medical history of diabetes mellitus type 2, hyperlipidemia, arthritis, chronic low back pain on methadone, and recent lumbar surgery 10 days ago presented to [facility] ED 2021 with complaints of shortness of breath associated with chest pain for approximately 3 days. She also noted bilateral lower extremity swelling. 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 She was not discharged on oral anticoagulation and has not been on anticoagulation since that time. Her mother had a history of blood clots.     In the ED CT PE protocol was positive for pulmonary emboli.  Thrombus in left main pulmonary artery extending into the left upper lobe vessels, possible saddle embolus with clot possibly extending into the left main pulmonary artery across bifurcation into the right main pulmonary artery.  There is also concern for thrombus in segmental right upper lobe artery.  Scattered groundglass opacities also found in lungs.  Covid swab was negative.  Patient was started on heparin drip and admitted to the ICU for further treatment of pulmonary emboli.    2021 nephrology consulted for DARRYL.  Downgrade orders out of ICU, Hospitalist group consulted for medical management        Review of Systems   Constitutional: Negative.   HENT: Negative.    Eyes: Negative.    Cardiovascular: Negative.    Respiratory: Positive for shortness of breath.    Endocrine: Negative.     Hematologic/Lymphatic: Negative.    Skin: Negative.    Musculoskeletal: Negative.    Gastrointestinal: Negative.    Genitourinary: Negative.    Neurological: Negative.    Psychiatric/Behavioral: Negative.    Allergic/Immunologic: Negative.    All other systems reviewed and are negative.      Personal History     Past Medical History:   Past Medical History:   Diagnosis Date   • Arthritis    • Benign essential hypertension 7/12/2018   • Diabetes mellitus (CMS/HCC)    • Elevated cholesterol    • History of transfusion    • Hyperlipidemia    • Low back pain    • Sleep apnea        Surgical History:      Past Surgical History:   Procedure Laterality Date   • BACK SURGERY     • FEMORAL DISTAL BYPASS     • HYSTERECTOMY     • JOINT REPLACEMENT     • LUMBAR LAMINECTOMY WITH FUSION N/A 5/28/2021    Procedure: Lumbar 5 Smith procedure.  Lumbar 5 6 and sacral 1 transpedicular Solera screws and rods.  Posterior fusion with autologous bone.;  Surgeon: Gagan Aguilar MD;  Location: [facility] MAIN OR;  Service: Neurosurgery;  Laterality: N/A;           Family History: family history is not on file. Family History was reviewed.     Social History:  reports that she has quit smoking. She has never used smokeless tobacco. She reports previous alcohol use. She reports that she does not use drugs.      Medications:  Prior to Admission medications    Medication Sig Start Date End Date Taking? Authorizing Provider   ARIPiprazole (ABILIFY) 2 MG tablet Take 2 mg by mouth Daily. 5/10/21  Yes ProviderPedro MD   bumetanide (BUMEX) 1 MG tablet Take 1 mg by mouth 2 (Two) Times a Day.   Yes Provider, MD Pedro   glipizide (GLUCOTROL XL) 10 MG 24 hr tablet Take 10 mg by mouth Daily.   Yes ProviderPedro MD   hydroCHLOROthiazide (HYDRODIURIL) 25 MG tablet Take 25 mg by mouth Daily. 7/31/17  Yes ProviderPedro MD   HYDROcodone-acetaminophen (NORCO)  MG per tablet Take 0.5-1 tablets by mouth 3 (Three) Times a Day As  Needed. 5/13/21  Yes Pedro Vazquez MD   Insulin Glargine, 1 Unit Dial, (Toujeo SoloStar) 300 UNIT/ML solution pen-injector injection Inject 60 Units under the skin into the appropriate area as directed Every Morning.   Yes Pedro Vazquez MD   ketorolac (TORADOL) 10 MG tablet Take 1 tablet by mouth Every 6 (Six) Hours As Needed for Moderate Pain  or Severe Pain . 5/29/21  Yes Gagan Aguilar MD   lisinopril (PRINIVIL,ZESTRIL) 10 MG tablet Take 10 mg by mouth Daily.   Yes Pedro Vazquez MD   methadone (DOLOPHINE) 5 MG tablet Take 10 mg by mouth Every Morning.   Yes Pedro Vazquez MD   methadone (DOLOPHINE) 5 MG tablet Take 15 mg by mouth Every Night.   Yes Pedro Vazquez MD   polyethylene glycol (MiraLax) 17 g packet Take 17 g by mouth Daily As Needed.   Yes Pedro Vazquez MD   potassium chloride (MICRO-K) 10 MEQ CR capsule Take 20 mEq by mouth Daily. 4/15/20  Yes Pedro Vazquez MD   pramipexole (MIRAPEX) 1 MG tablet Take 1 mg by mouth Every Night. 1/31/18  Yes Pedro Vazquez MD   rosuvastatin (CRESTOR) 10 MG tablet Take 10 mg by mouth Daily. 1/31/18  Yes Pedro Vazquez MD   senna (senna) 8.6 MG tablet Take 3 tablets by mouth 2 (Two) Times a Day.   Yes Pedro Vazquez MD   Trulicity 1.5 MG/0.5ML solution pen-injector Inject 1.5 mg under the skin into the appropriate area as directed Every 7 (Seven) Days. Thursday 5/14/21  Yes Pedro Vazquez MD   vitamin D (ERGOCALCIFEROL) 1.25 MG (22926 UT) capsule capsule Take 50,000 Units by mouth 1 (One) Time Per Week. Thursdays   Yes Pedro Vazquez MD       Allergies:    Allergies   Allergen Reactions   • Ambien  [Zolpidem] Confusion   • Codeine Itching   • Sulfa Antibiotics Nausea And Vomiting     Makes her nauseated       Objective   Objective     Vital Signs  Temp:  [97.8 °F (36.6 °C)-98.5 °F (36.9 °C)] 97.8 °F (36.6 °C)  Heart Rate:  [67-92] 86  Resp:  [20] 20  BP: ()/(33-98)  118/37  SpO2:  [87 %-100 %] 95 %  on   ;   Device (Oxygen Therapy): nasal cannula  Body mass index is 34.67 kg/m².    Physical Exam  Constitutional:       Appearance: She is normal weight.   HENT:      Head: Normocephalic.   Eyes:      Pupils: Pupils are equal, round, and reactive to light.   Cardiovascular:      Rate and Rhythm: Normal rate and regular rhythm.      Pulses: Normal pulses.      Heart sounds: Normal heart sounds.   Pulmonary:      Effort: Pulmonary effort is normal.      Breath sounds: Normal breath sounds.   Abdominal:      General: Bowel sounds are normal.      Palpations: Abdomen is soft.   Musculoskeletal:         General: Normal range of motion.      Cervical back: Normal range of motion.      Right lower leg: Edema present.      Left lower leg: Edema present.   Skin:     General: Skin is warm.   Neurological:      Mental Status: She is alert and oriented to person, place, and time.   Psychiatric:         Mood and Affect: Mood normal.         Behavior: Behavior normal.         Results Review:  I have personally reviewed most recent lab results and radiology images and interpretations and agree with findings    Results from last 7 days   Lab Units 06/07/21  0429   WBC 10*3/mm3 6.90   HEMOGLOBIN g/dL 10.7*   HEMATOCRIT % 31.2*   PLATELETS 10*3/mm3 193   INR  0.96     Results from last 7 days   Lab Units 06/07/21  1014 06/07/21  0429 06/06/21  1729   SODIUM mmol/L 139 140 136   POTASSIUM mmol/L 4.8 4.8 5.1   CHLORIDE mmol/L 102 103 96*   CO2 mmol/L 30.0* 29.0 32.0*   BUN mg/dL 29* 31* 42*   CREATININE mg/dL 0.95 0.96 1.50*   GLUCOSE mg/dL 261* 126* 280*   CALCIUM mg/dL 9.2 9.1 9.4   ALT (SGPT) U/L 22  --  20   AST (SGOT) U/L 27  --  18   TROPONIN T ng/mL  --  <0.010 <0.010   PROBNP pg/mL  --   --  34.6     Estimated Creatinine Clearance: 57.9 mL/min (by C-G formula based on SCr of 0.95 mg/dL).  Brief Urine Lab Results  (Last result in the past 365 days)      Color   Clarity   Blood   Leuk Est    Nitrite   Protein   CREAT   Urine HCG        06/06/21 1724 Yellow Hazy  Comment:  Result checked  Negative Trace Negative Negative               Microbiology Results (last 10 days)     ** No results found for the last 240 hours. **          ECG/EMG Results (most recent)     Procedure Component Value Units Date/Time    ECG 12 Lead [[id number]] Collected: 06/06/21 1631     Updated: 06/06/21 1632     QT Interval 363 ms     Narrative:      HEART RATE= 82  bpm  RR Interval= 728  ms  RI Interval= 181  ms  P Horizontal Axis= 5  deg  P Front Axis= 36  deg  QRSD Interval= 79  ms  QT Interval= 363  ms  QRS Axis= 57  deg  T Wave Axis= 77  deg  - NORMAL ECG -  Sinus rhythm  Electronically Signed By:   Date and Time of Study: 2021-06-06 16:31:05    ADULT TRANSTHORACIC ECHO COMPLETE W/ CONT IF NECESSARY PER PROTOCOL [[id number]] Collected: 06/07/21 0742     Updated: 06/07/21 0948     BSA 2.0 m^2      RVIDd 2.6 cm      IVSd 0.87 cm      LVIDd 3.9 cm      LVIDs 2.7 cm      LVPWd 1.0 cm      IVS/LVPW 0.83     FS 30.3 %      EDV(Teich) 65.6 ml      ESV(Teich) 27.4 ml      EF(Teich) 58.3 %      EDV(cubed) 59.0 ml      ESV(cubed) 20.0 ml      EF(cubed) 66.1 %      LV mass(C)d 114.7 grams      LV mass(C)dI 58.4 grams/m^2      SV(Teich) 38.2 ml      SI(Teich) 19.5 ml/m^2      SV(cubed) 39.0 ml      SI(cubed) 19.8 ml/m^2      Ao root diam 3.1 cm      Ao root area 7.6 cm^2      ACS 1.7 cm      LVOT diam 1.9 cm      LVOT area 2.9 cm^2      EDV(MOD-sp4) 40.2 ml      ESV(MOD-sp4) 18.3 ml      EF(MOD-sp4) 54.4 %      SV(MOD-sp4) 21.9 ml      SI(MOD-sp4) 11.1 ml/m^2      Ao root area (BSA corrected) 1.6     LV Morgan Vol (BSA corrected) 20.5 ml/m^2      LV Sys Vol (BSA corrected) 9.3 ml/m^2      Aortic R-R 0.82 sec      Aortic HR 73.3 BPM      MV E max simran 99.7 cm/sec      MV A max simran 108.0 cm/sec      MV E/A 0.92     MV V2 max 97.6 cm/sec      MV max PG 3.8 mmHg      MV V2 mean 65.6 cm/sec      MV mean PG 1.9 mmHg      MV V2 VTI 34.7 cm       MVA(VTI) 2.9 cm^2      MV dec slope 338.2 cm/sec^2      MV dec time 0.29 sec      Ao pk zay 170.3 cm/sec      Ao max PG 11.6 mmHg      Ao max PG (full) 4.1 mmHg      Ao V2 mean 119.3 cm/sec      Ao mean PG 6.2 mmHg      Ao mean PG (full) 2.1 mmHg      Ao V2 VTI 42.2 cm      EILEEN(I,A) 2.4 cm^2      EILEEN(I,D) 2.4 cm^2      EILEEN(V,A) 2.4 cm^2      EILEEN(V,D) 2.4 cm^2      LV V1 max PG 7.5 mmHg      LV V1 mean PG 4.0 mmHg      LV V1 max 137.2 cm/sec      LV V1 mean 93.4 cm/sec      LV V1 VTI 34.5 cm      CO(Ao) 23.4 l/min      CI(Ao) 11.9 l/min/m^2      SV(Ao) 319.4 ml      SI(Ao) 162.6 ml/m^2      CO(LVOT) 7.4 l/min      CI(LVOT) 3.8 l/min/m^2      SV(LVOT) 101.1 ml      SI(LVOT) 51.4 ml/m^2      PA V2 max 128.9 cm/sec      PA max PG 6.7 mmHg      PA max PG (full) 1.8 mmHg      RV V1 max PG 4.9 mmHg      RV V1 mean PG 2.5 mmHg      RV V1 max 110.1 cm/sec      RV V1 mean 75.4 cm/sec      RV V1 VTI 24.7 cm      TR max zay 233.2 cm/sec      RVSP(TR) 24.9 mmHg      RAP systole 3.0 mmHg      Pulm Sys Zay 70.9 cm/sec      Pulm Morgan Zay 45.6 cm/sec      Pulm S/D 1.6     Pulm A Revs Dur 0.11 sec      Pulm A Revs Zay 25.0 cm/sec       CV ECHO SCOTTIE - BZI_BMI 34.7 kilograms/m^2       CV ECHO SCOTTIE - BSA(HAYCOCK) 2.1 m^2       CV ECHO SCOTTIE - BZI_METRIC_WEIGHT 91.6 kg       CV ECHO SCOTTIE - BZI_METRIC_HEIGHT 162.6 cm      EF(MOD-bp) 54.0 %      LA dimension(2D) 3.8 cm     Narrative:      · Estimated right ventricular systolic pressure from tricuspid   regurgitation is normal (<35 mmHg).  · Calculated left ventricular EF = 54% Estimated left ventricular EF was   in agreement with the calculated left ventricular EF.  · Left ventricular diastolic function was normal.     Essentially unremarkable study  Normal LV and RV size and function  Normal atrial sizes  No significant valvular regurgitation or stenosis seen  Valve leaflets are thin and pliable with no mobile echodensities  No Doppler evidence for ASD or PFO although no bubble  study performed in   the present study  Normal myocardial thickness no LVOT obstruction no intracavitary   obstruction  No masses or effusion seen  Normal diastolic function by criteria  No pulmonary hypertension seen as measured noninvasively  Normal right left-sided pressures estimated noninvasively  EF 60%            Results for orders placed during the hospital encounter of 06/06/21    Duplex Venous Lower Extremity - Bilateral CAR    Interpretation Summary  · Acute left lower extremity superficial thrombophlebitis noted in the greater saphenous (above knee), 1 cm from the saphenofemoral junction.  · All other veins appeared normal bilaterally.      Results for orders placed during the hospital encounter of 06/06/21    ADULT TRANSTHORACIC ECHO COMPLETE W/ CONT IF NECESSARY PER PROTOCOL    Interpretation Summary  · Estimated right ventricular systolic pressure from tricuspid regurgitation is normal (<35 mmHg).  · Calculated left ventricular EF = 54% Estimated left ventricular EF was in agreement with the calculated left ventricular EF.  · Left ventricular diastolic function was normal.    Essentially unremarkable study  Normal LV and RV size and function  Normal atrial sizes  No significant valvular regurgitation or stenosis seen  Valve leaflets are thin and pliable with no mobile echodensities  No Doppler evidence for ASD or PFO although no bubble study performed in the present study  Normal myocardial thickness no LVOT obstruction no intracavitary obstruction  No masses or effusion seen  Normal diastolic function by criteria  No pulmonary hypertension seen as measured noninvasively  Normal right left-sided pressures estimated noninvasively  EF 60%      CT Abdomen Pelvis With Contrast    Result Date: 6/6/2021  1. There is fluid in the colon which can be seen with diarrhea and clinical correlation is recommended. 2. There is mild bilateral hydronephrosis with no ureteral stone. This could possibly be due to  bladder distention and clinical correlation is recommended. 3. There is a partly included fluid filled structure measuring at least 5.5 cm in the subcutaneous fat of the anterior medial right thigh that is of uncertain etiology. 4. Appendix is not identifiable. No secondary signs of appendicitis. 5. Patient has had an aortobifemoral graft. 6. Additional findings as reported above.  Electronically Signed By-Argelia Falcon MD On:6/6/2021 8:05 PM This report was finalized on [id number] by  Argelia Falcon MD.    XR Chest 1 View    Result Date: 6/6/2021   1. Stable exam, with no evidence of active disease.  Electronically Signed By-Argelia Falcon MD On:6/6/2021 5:23 PM This report was finalized on [id number] by  Argelia Falcon MD.    CT Chest Pulmonary Embolism    Result Date: 6/6/2021  1. This study is positive for pulmonary emboli. There is thrombus in the left main pulmonary artery extending into left upper lobe vessels. Technically, I think this is likely a saddle embolus, with a thin wispy clot extending from the clot in the left main pulmonary artery across the bifurcation and into the right main pulmonary artery. There is also likely thrombus in a segmental right upper lobe artery. 2. Scattered groundglass opacities in the lungs, nonspecific. 3. Please see today's CT abdomen dictation for description of abdominal findings.  Electronically Signed By-Argelia Falcon MD On:6/6/2021 7:58 PM This report was finalized on [id number] by  Argelia Falcon MD.        Estimated Creatinine Clearance: 57.9 mL/min (by C-G formula based on SCr of 0.95 mg/dL).    Assessment/Plan   Assessment/Plan       Active Hospital Problems    Diagnosis  POA   • **Acute pulmonary embolism without acute cor pulmonale (CMS/HCC) [I26.99]  Yes     Priority: High   • Acute kidney injury (CMS/HCC) [N17.9]  Unknown     Priority: High   • Anxiety [F41.9]  Yes   • Fibromyalgia [M79.7]  Yes   • Tremors of nervous system [R25.1]  Unknown   • Restless  leg syndrome [G25.81]  Unknown   • Type 2 diabetes mellitus with diabetic peripheral angiopathy without gangrene, with long-term current use of insulin (CMS/HCC) [E11.51, Z79.4]  Not Applicable   • Drug-induced constipation [K59.03]  Yes   • Benign essential hypertension [I10]  Yes   • Hyperlipidemia [E78.5]  Yes   • Peripheral vascular disease (CMS/HCC) [I73.9]  Yes   • Uncontrolled type 2 diabetes mellitus (CMS/HCC) [E11.65]  Yes   • PERLA (obstructive sleep apnea) [G47.33]  Yes      Resolved Hospital Problems   No resolved problems to display.       Acute pulmonary embolism without acute cor pulmonale  -CT PE chest protocol: Thrombus in the left main pulmonary artery extending into left upper lobe vessels, possible saddle embolus with clot possibly extending into left main pulmonary artery across bifurcation into the right main pulmonary artery.  There is also concern for thrombus in segmental right upper lobe artery.  -pulmonary emboli--provoked. 10 days after surgical procedure   -2D echo: Estimated right ventricular systolic pressure from tricuspid regurgitation is normal (<35 mmHg). Calculated left ventricular EF = 54% Estimated left ventricular EF was in agreement with the calculated left ventricular EF. Left ventricular diastolic function was normal  -venous duplex pending   -Heparin gtt, anticipate transition to eliquis in the next 24 hours  -pulmonary following     Type 2 diabetes mellitus, uncontrolled  -Hold home glipizide while inpatient   -cont home lantus  -Sliding scale insulin for coverage     Acute kidney injury  -Creatinine 1.50, baseline 0.94  -IV fluids started  -Avoid nephrotoxic agents  -Avoid hypotension  -nephrology consulted      Essential hypertension  -holding home ACE-I and HCTZ due to DARRYL     Hyperlipidemia  -Continue home rosuvastatin     Fibromyalgia  Opioid dependency  -Continue home methadone (follows with Dr. Mast palliative pain management MD)     Drug-induced  constipation  -Continue home MiraLAX     Anxiety  -Continue home Abilify     Obstructive sleep apnea  -noncompliant with CPAP at night   -O2 2 L as needed at night     H/O tremors  Restless leg syndrome  -Continue home pramipexole     History of peripheral vascular disease w/ bypass         VTE Prophylaxis -   Mechanical Order History:     None      Pharmalogical Order History:      Ordered     Dose Route Frequency Stop    06/06/21 2147  heparin bolus from bag 5,000 Units      5,000 Units IV Once 06/06/21 2201    06/06/21 2147  heparin 05373 units/250 mL (100 units/mL) in 0.45 % NaCl infusion  9.97 mL/hr      10.9 Units/kg/hr IV Titrated --    06/06/21 2147  heparin bolus from bag 2,500 Units      2,500 Units IV Every 6 Hours PRN --    06/06/21 2147  heparin bolus from bag 5,000 Units      5,000 Units IV Every 6 Hours PRN 06/08/21 2143                CODE STATUS:    Code Status and Medical Interventions:   Ordered at: 06/07/21 0045     Code Status:    CPR     Medical Interventions (Level of Support Prior to Arrest):    Full       This patient has been examined wearing appropriate Personal Protective Equipment. 06/07/21      I discussed the patient's findings and my recommendations with patient.      Signature: Electronically signed by CHANTALE Ladd, 06/07/21, 10:58 AM EDT.    [facility] Hospitalist Team    Attending attestation:    I performed a history and physical examination of the patient. I reviewed the nurse practitioner's note and agree with the documented findings and plan of care.    S:    Patient is a 71-year-old female with history of type 2 diabetes and previous thromboembolic event with recent spinal surgery with significant lower extremity edema with findings of new PE and possible DVTs.  Initially admitted to ICU due to signs of saddle embolus but now stabilizing.  Hospitalist consulted for medical management.    O:    Vital signs reviewed    General: Obese elderly female lying in bed  breathing comfortably on nasal cannula no acute distress  HEENT: NC/AT, EOMI, mucosa moist  Heart: Regular, rate controlled  Chest: Normal work of breathing, moving air well no wheezing or crackles  Abdominal: Soft. NT/ND.   Musculoskeletal: Normal ROM.  Significant bilateral lower extremity edema with erythema.  Neurological: AAOx3, no focal deficits  Skin: Skin is warm and dry. No rash  Psychiatric: Pleasant and conversational    A/P    Chest pain-in context with history of previous thromboembolic event and recent surgery patient high risk for new thromboembolic event.  Patient found to have significant clot burden with signs of questionable saddle embolus  -Heparin initiated, patient adamant that she wants to be transition to DOAC  -Can follow-up outpatient with hematology for hypercoagulability work-up  -Echo showing no signs of right heart strain  -Pulmonology consulted patient stable to be transferred out of ICU  -Wean oxygen as tolerated  -Negative BNP, negative troponins good prognostic sign  -Follow-up lower extremity Dopplers    Thromboembolic event-patient reports previously noted appears it may have been provoked  -Consider hematology follow-up outpatient for hyper coag  -Patient will need to be anticoagulated for lifetime    Anemia-uncertain chronicity at this time but no obvious signs of bleeding  -Daily CBC    Type 2 diabetes-patient has been hyperglycemic in the hospital though this may be due to significant amount of sympathetic nervous system activation  -Sliding scale  -Home long-acting  -Diabetic diet    Renal insufficiency-patient's baseline normal, nephrology consulted from the ICU  -IV fluid management per nephrology  -Daily creatinine    Referral vascular disease-patient with previous history of vascular surgery and bypass  -Continue statin and antiplatelets    Hypertension/hyperlipidemia/fibromyalgia/opioid dependency/anxiety/chronic constipation/restless leg syndrome-chronic in  nature  -Resume home medication as clinically appropriate    Obesity-BMI of 34  -Lifestyle modifications    Electronically signed by Volodymyr Burleson MD, 06/07/21, 12:58 PM EDT.

## 2021-06-07 NOTE — OUTREACH NOTE
General Surgery Week 2 Survey      Responses   [facility] patient discharged from?  [facility]   Does the patient have one of the following disease processes/diagnoses(primary or secondary)?  General Surgery   Week 2 attempt successful?  No   Revoke  Readmitted          Cassidy Regan RN

## 2021-06-08 ENCOUNTER — APPOINTMENT (OUTPATIENT)
Dept: GENERAL RADIOLOGY | Facility: HOSPITAL | Age: 71
End: 2021-06-08

## 2021-06-08 LAB
ANION GAP SERPL CALCULATED.3IONS-SCNC: 6 MMOL/L (ref 5–15)
APTT PPP: 67.5 SECONDS (ref 61–76.5)
BASOPHILS # BLD AUTO: 0 10*3/MM3 (ref 0–0.2)
BASOPHILS NFR BLD AUTO: 0.4 % (ref 0–1.5)
BUN SERPL-MCNC: 27 MG/DL (ref 8–23)
BUN/CREAT SERPL: 27.3 (ref 7–25)
CALCIUM SPEC-SCNC: 8.9 MG/DL (ref 8.6–10.5)
CHLORIDE SERPL-SCNC: 102 MMOL/L (ref 98–107)
CO2 SERPL-SCNC: 30 MMOL/L (ref 22–29)
CREAT SERPL-MCNC: 0.99 MG/DL (ref 0.57–1)
CRP SERPL-MCNC: 0.8 MG/DL (ref 0–0.5)
DEPRECATED RDW RBC AUTO: 46.8 FL (ref 37–54)
EOSINOPHIL # BLD AUTO: 0.2 10*3/MM3 (ref 0–0.4)
EOSINOPHIL NFR BLD AUTO: 2.7 % (ref 0.3–6.2)
ERYTHROCYTE [DISTWIDTH] IN BLOOD BY AUTOMATED COUNT: 14.2 % (ref 12.3–15.4)
ERYTHROCYTE [SEDIMENTATION RATE] IN BLOOD: 25 MM/HR (ref 0–30)
GFR SERPL CREATININE-BSD FRML MDRD: 55 ML/MIN/1.73
GLUCOSE BLDC GLUCOMTR-MCNC: 158 MG/DL (ref 70–105)
GLUCOSE BLDC GLUCOMTR-MCNC: 174 MG/DL (ref 70–105)
GLUCOSE BLDC GLUCOMTR-MCNC: 192 MG/DL (ref 70–105)
GLUCOSE BLDC GLUCOMTR-MCNC: 203 MG/DL (ref 70–105)
GLUCOSE BLDC GLUCOMTR-MCNC: 256 MG/DL (ref 70–105)
GLUCOSE SERPL-MCNC: 135 MG/DL (ref 65–99)
HCT VFR BLD AUTO: 29.8 % (ref 34–46.6)
HGB BLD-MCNC: 10.2 G/DL (ref 12–15.9)
LYMPHOCYTES # BLD AUTO: 2.5 10*3/MM3 (ref 0.7–3.1)
LYMPHOCYTES NFR BLD AUTO: 36.3 % (ref 19.6–45.3)
MAGNESIUM SERPL-MCNC: 2.4 MG/DL (ref 1.6–2.4)
MCH RBC QN AUTO: 32.4 PG (ref 26.6–33)
MCHC RBC AUTO-ENTMCNC: 34.4 G/DL (ref 31.5–35.7)
MCV RBC AUTO: 94.2 FL (ref 79–97)
MONOCYTES # BLD AUTO: 0.5 10*3/MM3 (ref 0.1–0.9)
MONOCYTES NFR BLD AUTO: 6.8 % (ref 5–12)
NEUTROPHILS NFR BLD AUTO: 3.7 10*3/MM3 (ref 1.7–7)
NEUTROPHILS NFR BLD AUTO: 53.8 % (ref 42.7–76)
NRBC BLD AUTO-RTO: 0 /100 WBC (ref 0–0.2)
PHOSPHATE SERPL-MCNC: 4.2 MG/DL (ref 2.5–4.5)
PLATELET # BLD AUTO: 187 10*3/MM3 (ref 140–450)
PMV BLD AUTO: 6.9 FL (ref 6–12)
POTASSIUM SERPL-SCNC: 4.8 MMOL/L (ref 3.5–5.2)
QT INTERVAL: 363 MS
RBC # BLD AUTO: 3.16 10*6/MM3 (ref 3.77–5.28)
SODIUM SERPL-SCNC: 138 MMOL/L (ref 136–145)
WBC # BLD AUTO: 6.9 10*3/MM3 (ref 3.4–10.8)

## 2021-06-08 PROCEDURE — 99024 POSTOP FOLLOW-UP VISIT: CPT | Performed by: NURSE PRACTITIONER

## 2021-06-08 PROCEDURE — 84100 ASSAY OF PHOSPHORUS: CPT | Performed by: NURSE PRACTITIONER

## 2021-06-08 PROCEDURE — 94799 UNLISTED PULMONARY SVC/PX: CPT

## 2021-06-08 PROCEDURE — 96366 THER/PROPH/DIAG IV INF ADDON: CPT

## 2021-06-08 PROCEDURE — 83735 ASSAY OF MAGNESIUM: CPT | Performed by: NURSE PRACTITIONER

## 2021-06-08 PROCEDURE — 25010000002 ONDANSETRON PER 1 MG: Performed by: NURSE PRACTITIONER

## 2021-06-08 PROCEDURE — 25010000002 HEPARIN (PORCINE) 25000-0.45 UT/250ML-% SOLUTION: Performed by: EMERGENCY MEDICINE

## 2021-06-08 PROCEDURE — 63710000001 INSULIN LISPRO (HUMAN) PER 5 UNITS: Performed by: INTERNAL MEDICINE

## 2021-06-08 PROCEDURE — 63710000001 INSULIN GLARGINE PER 5 UNITS: Performed by: NURSE PRACTITIONER

## 2021-06-08 PROCEDURE — 85025 COMPLETE CBC W/AUTO DIFF WBC: CPT | Performed by: EMERGENCY MEDICINE

## 2021-06-08 PROCEDURE — 36415 COLL VENOUS BLD VENIPUNCTURE: CPT | Performed by: EMERGENCY MEDICINE

## 2021-06-08 PROCEDURE — 85730 THROMBOPLASTIN TIME PARTIAL: CPT | Performed by: EMERGENCY MEDICINE

## 2021-06-08 PROCEDURE — 85652 RBC SED RATE AUTOMATED: CPT | Performed by: NURSE PRACTITIONER

## 2021-06-08 PROCEDURE — G0378 HOSPITAL OBSERVATION PER HR: HCPCS

## 2021-06-08 PROCEDURE — 96376 TX/PRO/DX INJ SAME DRUG ADON: CPT

## 2021-06-08 PROCEDURE — 97163 PT EVAL HIGH COMPLEX 45 MIN: CPT

## 2021-06-08 PROCEDURE — 82962 GLUCOSE BLOOD TEST: CPT

## 2021-06-08 PROCEDURE — 86140 C-REACTIVE PROTEIN: CPT | Performed by: NURSE PRACTITIONER

## 2021-06-08 PROCEDURE — 74018 RADEX ABDOMEN 1 VIEW: CPT

## 2021-06-08 PROCEDURE — 80048 BASIC METABOLIC PNL TOTAL CA: CPT | Performed by: FAMILY MEDICINE

## 2021-06-08 PROCEDURE — 99233 SBSQ HOSP IP/OBS HIGH 50: CPT | Performed by: FAMILY MEDICINE

## 2021-06-08 RX ORDER — LORAZEPAM 0.5 MG/1
0.5 TABLET ORAL EVERY 6 HOURS PRN
Status: DISCONTINUED | OUTPATIENT
Start: 2021-06-08 | End: 2021-06-11 | Stop reason: HOSPADM

## 2021-06-08 RX ADMIN — METHADONE HYDROCHLORIDE 10 MG: 10 TABLET ORAL at 08:30

## 2021-06-08 RX ADMIN — ARIPIPRAZOLE 2 MG: 2 TABLET ORAL at 08:31

## 2021-06-08 RX ADMIN — INSULIN LISPRO 8 UNITS: 100 INJECTION, SOLUTION INTRAVENOUS; SUBCUTANEOUS at 17:52

## 2021-06-08 RX ADMIN — LIDOCAINE 1 PATCH: 50 PATCH TOPICAL at 08:31

## 2021-06-08 RX ADMIN — INSULIN LISPRO 4 UNITS: 100 INJECTION, SOLUTION INTRAVENOUS; SUBCUTANEOUS at 21:02

## 2021-06-08 RX ADMIN — ROSUVASTATIN CALCIUM 10 MG: 10 TABLET, FILM COATED ORAL at 08:30

## 2021-06-08 RX ADMIN — Medication 2000 UNITS: at 08:30

## 2021-06-08 RX ADMIN — HYDROCODONE BITARTRATE AND ACETAMINOPHEN 1 TABLET: 10; 325 TABLET ORAL at 00:12

## 2021-06-08 RX ADMIN — INSULIN LISPRO 4 UNITS: 100 INJECTION, SOLUTION INTRAVENOUS; SUBCUTANEOUS at 08:31

## 2021-06-08 RX ADMIN — ONDANSETRON 4 MG: 2 INJECTION INTRAMUSCULAR; INTRAVENOUS at 19:09

## 2021-06-08 RX ADMIN — SENNOSIDES 3 TABLET: 8.6 TABLET, FILM COATED ORAL at 08:31

## 2021-06-08 RX ADMIN — SENNOSIDES 3 TABLET: 8.6 TABLET, FILM COATED ORAL at 20:59

## 2021-06-08 RX ADMIN — INSULIN GLARGINE 46 UNITS: 100 INJECTION, SOLUTION SUBCUTANEOUS at 08:30

## 2021-06-08 RX ADMIN — SODIUM CHLORIDE 50 ML/HR: 9 INJECTION, SOLUTION INTRAVENOUS at 14:39

## 2021-06-08 RX ADMIN — HYDROCODONE BITARTRATE AND ACETAMINOPHEN 1 TABLET: 10; 325 TABLET ORAL at 04:58

## 2021-06-08 RX ADMIN — POLYETHYLENE GLYCOL 3350 17 G: 17 POWDER, FOR SOLUTION ORAL at 08:31

## 2021-06-08 RX ADMIN — METHADONE HYDROCHLORIDE 15 MG: 10 TABLET ORAL at 20:58

## 2021-06-08 RX ADMIN — HEPARIN SODIUM 14.9 UNITS/KG/HR: 10000 INJECTION, SOLUTION INTRAVENOUS at 03:59

## 2021-06-08 RX ADMIN — Medication 10 ML: at 08:32

## 2021-06-08 RX ADMIN — HYDROCODONE BITARTRATE AND ACETAMINOPHEN 1 TABLET: 10; 325 TABLET ORAL at 20:58

## 2021-06-08 RX ADMIN — INSULIN LISPRO 12 UNITS: 100 INJECTION, SOLUTION INTRAVENOUS; SUBCUTANEOUS at 11:43

## 2021-06-08 RX ADMIN — PRAMIPEXOLE DIHYDROCHLORIDE 1 MG: 0.5 TABLET ORAL at 20:58

## 2021-06-08 NOTE — PROGRESS NOTES
PULMONARY/CRITICAL CARE PROGRESS NOTE         Name:  Genny Soni  Age: 71 y.o.  Sex:  female  :   1950  MRN:  [id number]     ROOM:  [facility] ICU 2304/     ASSESSMENT & PLAN     F/U: Acute pulmonary embolism    Principal Problem:    Acute pulmonary embolism without acute cor pulmonale (CMS/HCC)  Active Problems:    Type 2 diabetes mellitus with diabetic peripheral angiopathy without gangrene, with long-term current use of insulin (CMS/HCC)    Anxiety    Benign essential hypertension    Drug-induced constipation    Fibromyalgia    Hyperlipidemia    PERLA (obstructive sleep apnea)    Peripheral vascular disease (CMS/HCC)    Restless leg syndrome    Uncontrolled type 2 diabetes mellitus (CMS/HCC)    Acute kidney injury (CMS/HCC)    Tremors of nervous system       Subjective:  -MMOF, hospitalist following  -On heparin, therapeutic  -Increased to 4 LPM N/C.  -Increase mobilization  -Discontinue brownlee catheter    Assessment and plan:  Acute pulmonary embolism without acute cor pulmonale  -CT PE chest protocol: Thrombus in the left main pulmonary artery extending into left upper lobe vessels, possible saddle embolus with clot possibly extending into left main pulmonary artery across bifurcation into the right main pulmonary artery.  There is also concern for thrombus in segmental right upper lobe artery.  -Heparin gtt  continued  -ECHO  reviewed, EF 54%     Type 2 diabetes mellitus, uncontrolled  -Hold home glipizide while in ICU  -Sliding scale insulin for tight glycemic control  -Home Lantus ordered     Acute kidney injury  -Creatinine 1.50, baseline 0.94  -IV fluids started  -Avoid nephrotoxic agents  -Avoid hypotension  -nephrology consult     Essential hypertension  -Continue home hydrochlorothiazide  -Hold home lisinopril for DARRYL     Hyperlipidemia  -Continue home rosuvastatin     Fibromyalgia  Opioid dependency  -Continue home methadone     Drug-induced constipation  -Continue home MiraLAX      Anxiety  -Continue home Abilify     Obstructive sleep apnea  -Wears BiPAP at night  -O2 2 L as needed at night     H/O tremors  Restless leg syndrome  -Continue home pramipexole     History of peripheral vascular disease     Code Status: FULL  VTE Prophylaxis: heparin drip  PUD Prophylaxis:  Tolerating p.o. diet    [place] & SUBJECTIVE   Genny Soni is a 71 y.o. female  has a past medical history of Arthritis, Diabetes mellitus (CMS/HCC), Hyperlipidemia, and Low back pain.   Patient presented to [facility] on 6/6/2021 with complaint of soreness of breath and swelling in bilateral lower extremities.  Of note patient had lumbar surgery 10 days ago, in which she stated that she felt fine afterwards but about 3 days ago started developed the shortness of breath which got increasingly worse and she came to the ER.  Patient endorses chest pain and shortness of breath, however denies palpitations, dizziness or syncope, headache, chills, or fever.  Denies abdominal pain, nausea, vomiting, diarrhea, constipation, loss of weight or loss of appetite, dysuria, blood in urine or stool.     In the ED, labs were obtained with abnormalities as follows: Glucose 280, creatinine 1.50, BUN 42, Hgb 11.2, HCT 32.9.  CXR completed in ED was stable with no evidence of active disease.  CT chest PE protocol was positive for pulmonary emboli.  Thrombus in the left main pulmonary artery extending into left upper lobe vessels, possible saddle embolus with clot possibly extending into left main pulmonary artery across bifurcation into the right main pulmonary artery.  There is also concern for thrombus in segmental right upper lobe artery.  Scattered groundglass opacities also found in the lungs.  Covid 19 swab was negative.       6/7: Patient is reporting severe back pain which is chronic however worsened by being in bed and fairly immobile.  Shortness of air is improving.  Using O2 at 2 L as needed.  Denies chest pain.  6/8: Patient  "reports feeling somewhat better today. She reports severe back pain which is chronic however worsened by being in bed. Shortness of air is improving.  Tolerating p.o. intake with good appetite.  No nausea or vomiting.  No abdominal pain    REVIEW OF SYSTEMS:  Pertinent items are noted in HPI, all other systems reviewed and negative      MEDICATIONS     SCHEDULED MEDICATIONS:   ARIPiprazole, 2 mg, Oral, Daily  cholecalciferol, 2,000 Units, Oral, Daily  insulin glargine, 46 Units, Subcutaneous, Daily With Breakfast  insulin lispro, 0-24 Units, Subcutaneous, 4x Daily With Meals & Nightly  lidocaine, 1 patch, Transdermal, Q24H  methadone, 10 mg, Oral, Daily  methadone, 15 mg, Oral, Nightly  polyethylene glycol, 17 g, Oral, Daily  pramipexole, 1 mg, Oral, Nightly  rosuvastatin, 10 mg, Oral, Daily  senna, 3 tablet, Oral, BID  sodium chloride, 10 mL, Intravenous, Q12H        CONTINUOUS INFUSIONS:   heparin, 10.9 Units/kg/hr, Last Rate: 14.9 Units/kg/hr (06/08/21 2910)  sodium chloride, 50 mL/hr, Last Rate: 50 mL/hr (06/07/21 023)        PRN MEDS:    acetaminophen **OR** acetaminophen    aluminum-magnesium hydroxide-simethicone    dextrose    dextrose    glucagon (human recombinant)    heparin    heparin    HYDROcodone-acetaminophen    insulin lispro **AND** insulin lispro    ipratropium-albuterol    ondansetron **OR** ondansetron    sodium chloride    [COMPLETED] Insert peripheral IV **AND** sodium chloride    sodium chloride    OBJECTIVE     VITAL SIGNS:  /41   Pulse 82   Temp 97.6 °F (36.4 °C) (Oral)   Resp 20   Ht 162.6 cm (64\")   Wt 91.5 kg (201 lb 11.5 oz)   SpO2 95%   BMI 34.63 kg/m²     I/O FROM PREVIOUS 3 SHIFTS:  I/O last 3 completed shifts:  In: 2654 [P.O.:1830; I.V.:824]  Out: 5350 [Urine:5350]    I/O THIS SHIFT:  No intake/output data recorded.    PHYSICAL EXAM:  General Appearance:  Alert, cooperative, no distress, appears stated age  Head:  Normocephalic, without obvious abnormality, " atraumatic  Eyes:  PERRL, conjunctiva/corneas clear, EOM's intact     Neck:  Supple,  no JVD, trachea midline  Lungs:   Clear and equal throughout, respirations unlabored without accessory muscle use  Chest wall: Symmetrical chest wall movement  Heart:  Regular rate and rhythm, S1 and S2 normal. +MARKIE.  No rub or gallop  Abdomen:  Soft, non-tender, bowel sounds active all four quadrants, no rebound or guarding  Extremities:  Extremities normal, no cyanosis.  BLE pitting edema  Skin:  No rashes or lesions  Neurologic:   Alert and oriented, no lateralizing deficits      RESULTS     LABS:  Lab Results (last 24 hours)       Procedure Component Value Units Date/Time    POC Glucose Once [[id number]]  (Abnormal) Collected: 06/08/21 0802    Specimen: Blood Updated: 06/08/21 0805     Glucose 174 mg/dL      Comment: Serial Number: [id number]:  [id number]       Basic Metabolic Panel [[id number]]  (Abnormal) Collected: 06/08/21 0327    Specimen: Blood Updated: 06/08/21 0405     Glucose 135 mg/dL      BUN 27 mg/dL      Creatinine 0.99 mg/dL      Sodium 138 mmol/L      Potassium 4.8 mmol/L      Chloride 102 mmol/L      CO2 30.0 mmol/L      Calcium 8.9 mg/dL      eGFR Non African Amer 55 mL/min/1.73      BUN/Creatinine Ratio 27.3     Anion Gap 6.0 mmol/L     Narrative:      GFR Normal >60  Chronic Kidney Disease <60  Kidney Failure <15      Phosphorus [[id number]]  (Normal) Collected: 06/08/21 0327    Specimen: Blood Updated: 06/08/21 0405     Phosphorus 4.2 mg/dL     Magnesium [[id number]]  (Normal) Collected: 06/08/21 0327    Specimen: Blood Updated: 06/08/21 0405     Magnesium 2.4 mg/dL     aPTT [[id number]]  (Normal) Collected: 06/08/21 0327    Specimen: Blood Updated: 06/08/21 0359     PTT 67.5 seconds     CBC & Differential [[id number]]  (Abnormal) Collected: 06/08/21 0327    Specimen: Blood Updated: 06/08/21 0345    Narrative:      The following orders were created for panel order CBC & Differential.  Procedure                                Abnormality         Status                     ---------                               -----------         ------                     CBC Auto Differential[[id number]]        Abnormal            Final result                 Please view results for these tests on the individual orders.    CBC Auto Differential [[id number]]  (Abnormal) Collected: 06/08/21 0327    Specimen: Blood Updated: 06/08/21 0345     WBC 6.90 10*3/mm3      RBC 3.16 10*6/mm3      Hemoglobin 10.2 g/dL      Hematocrit 29.8 %      MCV 94.2 fL      MCH 32.4 pg      MCHC 34.4 g/dL      RDW 14.2 %      RDW-SD 46.8 fl      MPV 6.9 fL      Platelets 187 10*3/mm3      Neutrophil % 53.8 %      Lymphocyte % 36.3 %      Monocyte % 6.8 %      Eosinophil % 2.7 %      Basophil % 0.4 %      Neutrophils, Absolute 3.70 10*3/mm3      Lymphocytes, Absolute 2.50 10*3/mm3      Monocytes, Absolute 0.50 10*3/mm3      Eosinophils, Absolute 0.20 10*3/mm3      Basophils, Absolute 0.00 10*3/mm3      nRBC 0.0 /100 WBC     aPTT [[id number]]  (Normal) Collected: 06/07/21 2256    Specimen: Blood Updated: 06/07/21 2331     PTT 67.4 seconds      Comment: Result checked        POC Glucose Once [[id number]]  (Abnormal) Collected: 06/07/21 2013    Specimen: Blood Updated: 06/07/21 2014     Glucose 248 mg/dL      Comment: Serial Number: [id number]:  [id number]       POC Glucose Once [[id number]]  (Abnormal) Collected: 06/07/21 1712    Specimen: Blood Updated: 06/07/21 1715     Glucose 282 mg/dL      Comment: Serial Number: [id number]:  [id number]       aPTT [[id number]]  (Abnormal) Collected: 06/07/21 1652    Specimen: Blood Updated: 06/07/21 1711     PTT 52.4 seconds     POC Glucose Once [[id number]]  (Abnormal) Collected: 06/07/21 1651    Specimen: Blood Updated: 06/07/21 1653     Glucose 304 mg/dL      Comment: Serial Number: [id number]:  [id number]                RADIOLOGY:  No Radiology Exams Resulted Within Past 24 Hours    ECHOCARDIOGRAM:  Results for  orders placed during the hospital encounter of 06/06/21    ADULT TRANSTHORACIC ECHO COMPLETE W/ CONT IF NECESSARY PER PROTOCOL    Interpretation Summary  · Estimated right ventricular systolic pressure from tricuspid regurgitation is normal (<35 mmHg).  · Calculated left ventricular EF = 54% Estimated left ventricular EF was in agreement with the calculated left ventricular EF.  · Left ventricular diastolic function was normal.    Essentially unremarkable study  Normal LV and RV size and function  Normal atrial sizes  No significant valvular regurgitation or stenosis seen  Valve leaflets are thin and pliable with no mobile echodensities  No Doppler evidence for ASD or PFO although no bubble study performed in the present study  Normal myocardial thickness no LVOT obstruction no intracavitary obstruction  No masses or effusion seen  Normal diastolic function by criteria  No pulmonary hypertension seen as measured noninvasively  Normal right left-sided pressures estimated noninvasively  EF 60%    Electronically signed by CHANTALE Diaz, 06/08/21 at 09:23 EDT.     I personally have examined  and interviewed the patient. I have reviewed the history, data, problems, assessment and plan with our NP.  Critical care time in direct medical management (   ) minutes  Electronically signed by Abelardo Shaw MD, D,ABSM, 06/08/21, 9:59 PM EDT.

## 2021-06-08 NOTE — PLAN OF CARE
Problem: Adult Inpatient Plan of Care  Goal: Plan of Care Review  Outcome: Ongoing, Progressing  Flowsheets  Taken 6/8/2021 1604  Plan of Care Reviewed With: patient  Outcome Summary: Pt is 70 yo female s/p urgent CABG x5 on 6/7/2021.  Pt initially admitted for NSTEMI and found to have severe 3 vessel CAD.  Pt presents with multiple lines including [place] limiting functional mobility.  Pt requiring minAx2-modAx2 for sit <-> stand transfer and able to stand x1 minute before needing seated rest break due to fatigue.  Pt able to stand and march in place x5 reps with modA.  Pt's spouse works and pt far from functional mobility baseline with multiple lines/tubes.  PT recommending IP rehab to address deficits at this time.  PT will continue to follow 5x/week and assess d/c needs pending progress.

## 2021-06-08 NOTE — PROGRESS NOTES
[facility] Medicine Services Daily Progress Note      Hospitalist Team  LOS 2 days      Patient Care Team:  Aida Lowry APRN as PCP - General (Nurse Practitioner)    Patient Location: 2304/1      Subjective   Subjective     Chief Complaint / Subjective  Chief Complaint   Patient presents with   • Chest Pain     Patient notably more sluggish today.  Patient denies any chest pain breathing comfortably at rest.  Discussion of transition to oral anticoagulation if cleared by neurosurgery, patient would like to be on DOAC, does not want Coumadin at all.  Some concern had of drainage from spinal surgery site.    Brief Synopsis of Hospital Course/HPI    Ms. Soni is a 71 y.o. female with past medical history of diabetes mellitus type 2, hyperlipidemia, arthritis, chronic low back pain on methadone, and recent lumbar surgery 10 days ago presented to [facility] ED 6/6/2021 with complaints of shortness of breath associated with chest pain for approximately 3 days. She also noted bilateral lower extremity swelling. 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 She was not discharged on oral anticoagulation and has not been on anticoagulation since that time. Her mother had a history of blood clots.      In the ED CT PE protocol was positive for pulmonary emboli.  Thrombus in left main pulmonary artery extending into the left upper lobe vessels, possible saddle embolus with clot possibly extending into the left main pulmonary artery across bifurcation into the right main pulmonary artery.  There is also concern for thrombus in segmental right upper lobe artery.  Scattered groundglass opacities also found in lungs.  Covid swab was negative.  Patient was started on heparin drip and admitted to the ICU for further treatment of pulmonary emboli.     6/7/2021 nephrology consulted for DARRYL.  Downgrade  "orders out of ICU, Hospitalist group consulted for medical management      Date::          Review of Systems   Constitutional: Positive for malaise/fatigue. Negative for chills and fever.   HENT: Negative for hoarse voice and stridor.    Eyes: Negative for double vision and photophobia.   Cardiovascular: Negative for chest pain and paroxysmal nocturnal dyspnea.   Respiratory: Negative for cough and shortness of breath.    Skin: Positive for color change and rash.   Gastrointestinal: Negative for nausea and vomiting.   Genitourinary: Negative for dysuria and flank pain.   Neurological: Positive for weakness. Negative for dizziness.   Psychiatric/Behavioral: Negative for altered mental status. The patient is not nervous/anxious.          Objective   Objective      Vital Signs  Temp:  [97.4 °F (36.3 °C)-98.1 °F (36.7 °C)] 97.4 °F (36.3 °C)  Heart Rate:  [] 82  BP: ()/(32-68) 100/41  Oxygen Therapy  SpO2: 95 %  Device (Oxygen Therapy): nasal cannula  Flow (L/min): 4  Oxygen Concentration (%): 4  Flowsheet Rows      First Filed Value   Admission Height  162.6 cm (64\") Documented at 06/06/2021 1603   Admission Weight  91.5 kg (201 lb 12.8 oz) Documented at 06/06/2021 1603        Intake & Output (last 3 days)       06/05 0701 - 06/06 0700 06/06 0701 - 06/07 0700 06/07 0701 - 06/08 0700 06/08 0701 - 06/09 0700    P.O.   1830 360    I.V. (mL/kg)   824 (9) 609 (6.7)    Total Intake(mL/kg)   2654 (29) 969 (10.6)    Urine (mL/kg/hr)  650 4700 (2.1) 800 (1.1)    Stool   0 0    Total Output  650 4700 800    Net  -650 -2046 +169            Stool Unmeasured Occurrence   0 x 1 x        Lines, Drains & Airways    Active LDAs     Name:   Placement date:   Placement time:   Site:   Days:    Peripheral IV 06/06/21 1655 Left;Lateral Antecubital   06/06/21    1655    Antecubital   1    Peripheral IV 06/07/21 1400 Left;Posterior Forearm   06/07/21    1400    Forearm   1                  Physical Exam:    Physical Exam  General: " Obese elderly female lying in bed breathing comfortably on nasal cannula at 4 L no acute distress  HEENT: NC/AT, EOMI, mucosa moist  Heart: Regular, rate controlled  Chest: Normal work of breathing, moving air well no wheezing or crackles  Abdominal: Soft. NT/ND.   Musculoskeletal: Normal ROM.  Significant bilateral lower extremity edema with erythema.  Neurological: AAOx3, no focal deficits  Skin: Skin is warm and dry.  Back surgery dressing applied  Psychiatric: Pleasant and conversational       Wounds (last 24 hours)      LDA Wound     Row Name 06/08/21 1151 06/08/21 1000 06/08/21 0813       Wound 05/28/21 0814 lower lumbar spine Incision    Wound - Properties Group Placement Date: 05/28/21  - Placement Time: 0814  - Present on Hospital Admission: N  - Orientation: lower  -MH Location: lumbar spine  -MH Primary Wound Type: Incision  -MH Additional Comments: DRESSING- DOYLE MARSHRM  -    Closure  Adhesive bandage  -SR  Adhesive bandage  -SR  Approximated;Open to air  -SR    Base  --  -SR  pink  -SR  maroon/purple  -SR    Periwound  --  --  --  -SR    Drainage Characteristics/Odor  --  -SR  serosanguineous  -SR  serosanguineous  -SR    Drainage Amount  --  -SR  large  -SR  moderate  -SR    Care, Wound  --  cleansed with;sterile water  -SR  --    Dressing Care  --  dressing applied;border dressing;gauze  -SR  --    Retired Wound - Properties Group Date first assessed: 05/28/21  - Time first assessed: 0814  - Present on Hospital Admission: N  -MH Location: lumbar spine  -MH Primary Wound Type: Incision  -MH Additional Comments: DRESSING- JOE MARSH  -    Row Name 06/08/21 0400 06/08/21 0000 06/07/21 2000       Wound 05/28/21 0814 lower lumbar spine Incision    Wound - Properties Group Placement Date: 05/28/21  - Placement Time: 0814  - Present on Hospital Admission: N  - Orientation: lower  -MH Location: lumbar spine  -MH Primary Wound Type: Incision  -MH Additional Comments: DRESSING-  EXOFIN, COVADERM  -MH    Closure  Approximated;Open to air  -AB  Approximated;Open to air  -AB  Approximated;Open to air  -AB    Base  clean;dry  -AB  clean;dry  -AB  clean;dry  -AB    Periwound  intact  -AB  intact  -AB  intact  -AB    Retired Wound - Properties Group Date first assessed: 05/28/21  - Time first assessed: 0814  - Present on Hospital Admission: N  - Location: lumbar spine  - Primary Wound Type: Incision  - Additional Comments: DRESSING- EXOFIN, COVADERM  -MH      User Key  (r) = Recorded By, (t) = Taken By, (c) = Cosigned By    Initials Name Provider Type    Alice Mcclendon, RN Registered Nurse    Isabel Wu RN Registered Nurse    Deann Edmonds RN Registered Nurse          Procedures:              Results Review:     I reviewed the patient's new clinical results.      Lab Results (last 24 hours)     Procedure Component Value Units Date/Time    POC Glucose Once [[id number]]  (Abnormal) Collected: 06/08/21 1123    Specimen: Blood Updated: 06/08/21 1125     Glucose 256 mg/dL      Comment: Serial Number: [id number]:  [id number]       POC Glucose Once [[id number]]  (Abnormal) Collected: 06/08/21 0802    Specimen: Blood Updated: 06/08/21 0805     Glucose 174 mg/dL      Comment: Serial Number: [id number]:  [id number]       Basic Metabolic Panel [[id number]]  (Abnormal) Collected: 06/08/21 0327    Specimen: Blood Updated: 06/08/21 0405     Glucose 135 mg/dL      BUN 27 mg/dL      Creatinine 0.99 mg/dL      Sodium 138 mmol/L      Potassium 4.8 mmol/L      Chloride 102 mmol/L      CO2 30.0 mmol/L      Calcium 8.9 mg/dL      eGFR Non African Amer 55 mL/min/1.73      BUN/Creatinine Ratio 27.3     Anion Gap 6.0 mmol/L     Narrative:      GFR Normal >60  Chronic Kidney Disease <60  Kidney Failure <15      Phosphorus [[id number]]  (Normal) Collected: 06/08/21 0327    Specimen: Blood Updated: 06/08/21 0405     Phosphorus 4.2 mg/dL     Magnesium [[id number]]  (Normal) Collected:  06/08/21 0327    Specimen: Blood Updated: 06/08/21 0405     Magnesium 2.4 mg/dL     aPTT [[id number]]  (Normal) Collected: 06/08/21 0327    Specimen: Blood Updated: 06/08/21 0359     PTT 67.5 seconds     CBC & Differential [[id number]]  (Abnormal) Collected: 06/08/21 0327    Specimen: Blood Updated: 06/08/21 0345    Narrative:      The following orders were created for panel order CBC & Differential.  Procedure                               Abnormality         Status                     ---------                               -----------         ------                     CBC Auto Differential[[id number]]        Abnormal            Final result                 Please view results for these tests on the individual orders.    CBC Auto Differential [[id number]]  (Abnormal) Collected: 06/08/21 0327    Specimen: Blood Updated: 06/08/21 0345     WBC 6.90 10*3/mm3      RBC 3.16 10*6/mm3      Hemoglobin 10.2 g/dL      Hematocrit 29.8 %      MCV 94.2 fL      MCH 32.4 pg      MCHC 34.4 g/dL      RDW 14.2 %      RDW-SD 46.8 fl      MPV 6.9 fL      Platelets 187 10*3/mm3      Neutrophil % 53.8 %      Lymphocyte % 36.3 %      Monocyte % 6.8 %      Eosinophil % 2.7 %      Basophil % 0.4 %      Neutrophils, Absolute 3.70 10*3/mm3      Lymphocytes, Absolute 2.50 10*3/mm3      Monocytes, Absolute 0.50 10*3/mm3      Eosinophils, Absolute 0.20 10*3/mm3      Basophils, Absolute 0.00 10*3/mm3      nRBC 0.0 /100 WBC     aPTT [[id number]]  (Normal) Collected: 06/07/21 2256    Specimen: Blood Updated: 06/07/21 2331     PTT 67.4 seconds      Comment: Result checked        POC Glucose Once [[id number]]  (Abnormal) Collected: 06/07/21 2013    Specimen: Blood Updated: 06/07/21 2014     Glucose 248 mg/dL      Comment: Serial Number: [id number]:  [id number]           No results found for: HGBA1C  Results from last 7 days   Lab Units 06/07/21  0429 06/06/21  1729   INR  0.96 <0.93*           No results found for: LIPASE  Lab Results   Component  Value Date    CHLPL 156 08/31/2020    TRIG 281 (H) 08/31/2020    HDL 40 (L) 08/31/2020    LDL 60 08/31/2020       No results found for: INTRAOP, PREDX, FINALDX, COMDX    Microbiology Results (last 10 days)     ** No results found for the last 240 hours. **          ECG/EMG Results (most recent)     Procedure Component Value Units Date/Time    ECG 12 Lead [[id number]] Collected: 06/06/21 1631     Updated: 06/06/21 1632     QT Interval 363 ms     Narrative:      HEART RATE= 82  bpm  RR Interval= 728  ms  WY Interval= 181  ms  P Horizontal Axis= 5  deg  P Front Axis= 36  deg  QRSD Interval= 79  ms  QT Interval= 363  ms  QRS Axis= 57  deg  T Wave Axis= 77  deg  - NORMAL ECG -  Sinus rhythm  Electronically Signed By:   Date and Time of Study: 2021-06-06 16:31:05    ADULT TRANSTHORACIC ECHO COMPLETE W/ CONT IF NECESSARY PER PROTOCOL [[id number]] Collected: 06/07/21 0742     Updated: 06/07/21 0948     BSA 2.0 m^2      RVIDd 2.6 cm      IVSd 0.87 cm      LVIDd 3.9 cm      LVIDs 2.7 cm      LVPWd 1.0 cm      IVS/LVPW 0.83     FS 30.3 %      EDV(Teich) 65.6 ml      ESV(Teich) 27.4 ml      EF(Teich) 58.3 %      EDV(cubed) 59.0 ml      ESV(cubed) 20.0 ml      EF(cubed) 66.1 %      LV mass(C)d 114.7 grams      LV mass(C)dI 58.4 grams/m^2      SV(Teich) 38.2 ml      SI(Teich) 19.5 ml/m^2      SV(cubed) 39.0 ml      SI(cubed) 19.8 ml/m^2      Ao root diam 3.1 cm      Ao root area 7.6 cm^2      ACS 1.7 cm      LVOT diam 1.9 cm      LVOT area 2.9 cm^2      EDV(MOD-sp4) 40.2 ml      ESV(MOD-sp4) 18.3 ml      EF(MOD-sp4) 54.4 %      SV(MOD-sp4) 21.9 ml      SI(MOD-sp4) 11.1 ml/m^2      Ao root area (BSA corrected) 1.6     LV Morgan Vol (BSA corrected) 20.5 ml/m^2      LV Sys Vol (BSA corrected) 9.3 ml/m^2      Aortic R-R 0.82 sec      Aortic HR 73.3 BPM      MV E max simran 99.7 cm/sec      MV A max simran 108.0 cm/sec      MV E/A 0.92     MV V2 max 97.6 cm/sec      MV max PG 3.8 mmHg      MV V2 mean 65.6 cm/sec      MV mean PG 1.9 mmHg      MV  V2 VTI 34.7 cm      MVA(VTI) 2.9 cm^2      MV dec slope 338.2 cm/sec^2      MV dec time 0.29 sec      Ao pk zay 170.3 cm/sec      Ao max PG 11.6 mmHg      Ao max PG (full) 4.1 mmHg      Ao V2 mean 119.3 cm/sec      Ao mean PG 6.2 mmHg      Ao mean PG (full) 2.1 mmHg      Ao V2 VTI 42.2 cm      EILEEN(I,A) 2.4 cm^2      EILEEN(I,D) 2.4 cm^2      EILEEN(V,A) 2.4 cm^2      EILEEN(V,D) 2.4 cm^2      LV V1 max PG 7.5 mmHg      LV V1 mean PG 4.0 mmHg      LV V1 max 137.2 cm/sec      LV V1 mean 93.4 cm/sec      LV V1 VTI 34.5 cm      CO(Ao) 23.4 l/min      CI(Ao) 11.9 l/min/m^2      SV(Ao) 319.4 ml      SI(Ao) 162.6 ml/m^2      CO(LVOT) 7.4 l/min      CI(LVOT) 3.8 l/min/m^2      SV(LVOT) 101.1 ml      SI(LVOT) 51.4 ml/m^2      PA V2 max 128.9 cm/sec      PA max PG 6.7 mmHg      PA max PG (full) 1.8 mmHg      RV V1 max PG 4.9 mmHg      RV V1 mean PG 2.5 mmHg      RV V1 max 110.1 cm/sec      RV V1 mean 75.4 cm/sec      RV V1 VTI 24.7 cm      TR max zay 233.2 cm/sec      RVSP(TR) 24.9 mmHg      RAP systole 3.0 mmHg      Pulm Sys Zay 70.9 cm/sec      Pulm Morgan Zay 45.6 cm/sec      Pulm S/D 1.6     Pulm A Revs Dur 0.11 sec      Pulm A Revs Zay 25.0 cm/sec       CV ECHO SCOTTIE - BZI_BMI 34.7 kilograms/m^2       CV ECHO SCOTTIE - BSA(HAYCOCK) 2.1 m^2       CV ECHO SCOTTIE - BZI_METRIC_WEIGHT 91.6 kg       CV ECHO SCOTTIE - BZI_METRIC_HEIGHT 162.6 cm      EF(MOD-bp) 54.0 %      LA dimension(2D) 3.8 cm     Narrative:      · Estimated right ventricular systolic pressure from tricuspid   regurgitation is normal (<35 mmHg).  · Calculated left ventricular EF = 54% Estimated left ventricular EF was   in agreement with the calculated left ventricular EF.  · Left ventricular diastolic function was normal.     Essentially unremarkable study  Normal LV and RV size and function  Normal atrial sizes  No significant valvular regurgitation or stenosis seen  Valve leaflets are thin and pliable with no mobile echodensities  No Doppler evidence for ASD or PFO  although no bubble study performed in   the present study  Normal myocardial thickness no LVOT obstruction no intracavitary   obstruction  No masses or effusion seen  Normal diastolic function by criteria  No pulmonary hypertension seen as measured noninvasively  Normal right left-sided pressures estimated noninvasively  EF 60%            Results for orders placed during the hospital encounter of 06/06/21    Duplex Venous Lower Extremity - Bilateral CAR    Interpretation Summary  · Acute left lower extremity superficial thrombophlebitis noted in the greater saphenous (above knee), 1 cm from the saphenofemoral junction.  · All other veins appeared normal bilaterally.      Results for orders placed during the hospital encounter of 06/06/21    ADULT TRANSTHORACIC ECHO COMPLETE W/ CONT IF NECESSARY PER PROTOCOL    Interpretation Summary  · Estimated right ventricular systolic pressure from tricuspid regurgitation is normal (<35 mmHg).  · Calculated left ventricular EF = 54% Estimated left ventricular EF was in agreement with the calculated left ventricular EF.  · Left ventricular diastolic function was normal.    Essentially unremarkable study  Normal LV and RV size and function  Normal atrial sizes  No significant valvular regurgitation or stenosis seen  Valve leaflets are thin and pliable with no mobile echodensities  No Doppler evidence for ASD or PFO although no bubble study performed in the present study  Normal myocardial thickness no LVOT obstruction no intracavitary obstruction  No masses or effusion seen  Normal diastolic function by criteria  No pulmonary hypertension seen as measured noninvasively  Normal right left-sided pressures estimated noninvasively  EF 60%      CT Abdomen Pelvis With Contrast    Result Date: 6/6/2021  1. There is fluid in the colon which can be seen with diarrhea and clinical correlation is recommended. 2. There is mild bilateral hydronephrosis with no ureteral stone. This could  possibly be due to bladder distention and clinical correlation is recommended. 3. There is a partly included fluid filled structure measuring at least 5.5 cm in the subcutaneous fat of the anterior medial right thigh that is of uncertain etiology. 4. Appendix is not identifiable. No secondary signs of appendicitis. 5. Patient has had an aortobifemoral graft. 6. Additional findings as reported above.  Electronically Signed By-Argelia Falcon MD On:6/6/2021 8:05 PM This report was finalized on [id number] by  Argelia Falcon MD.    XR Chest 1 View    Result Date: 6/6/2021   1. Stable exam, with no evidence of active disease.  Electronically Signed By-Argelia Falcon MD On:6/6/2021 5:23 PM This report was finalized on [id number] by  Argelia Falcon MD.    CT Chest Pulmonary Embolism    Result Date: 6/6/2021  1. This study is positive for pulmonary emboli. There is thrombus in the left main pulmonary artery extending into left upper lobe vessels. Technically, I think this is likely a saddle embolus, with a thin wispy clot extending from the clot in the left main pulmonary artery across the bifurcation and into the right main pulmonary artery. There is also likely thrombus in a segmental right upper lobe artery. 2. Scattered groundglass opacities in the lungs, nonspecific. 3. Please see today's CT abdomen dictation for description of abdominal findings.  Electronically Signed By-Argelia Falcon MD On:6/6/2021 7:58 PM This report was finalized on [id number] by  Argelia Falcon MD.    XR Abdomen KUB    Result Date: 6/8/2021   1. Large stool burden in the ascending colon. Mild to moderate stool burden in the transverse and descending colon. 2. The bowel gas pattern has a grossly nonobstructive appearance.  Electronically Signed By-Mary Kate Craft MD On:6/8/2021 9:45 AM This report was finalized on [id number] by  Mary Kate Craft MD.          Xrays, labs reviewed personally by physician.    Medication Review:   I have  reviewed the patient's current medication list      Scheduled Meds  ARIPiprazole, 2 mg, Oral, Daily  cholecalciferol, 2,000 Units, Oral, Daily  insulin glargine, 46 Units, Subcutaneous, Daily With Breakfast  insulin lispro, 0-24 Units, Subcutaneous, 4x Daily With Meals & Nightly  lidocaine, 1 patch, Transdermal, Q24H  methadone, 10 mg, Oral, Daily  methadone, 15 mg, Oral, Nightly  polyethylene glycol, 17 g, Oral, Daily  pramipexole, 1 mg, Oral, Nightly  rosuvastatin, 10 mg, Oral, Daily  senna, 3 tablet, Oral, BID  sodium chloride, 10 mL, Intravenous, Q12H        Meds Infusions  heparin, 10.9 Units/kg/hr, Last Rate: 14.9 Units/kg/hr (06/08/21 0359)  sodium chloride, 50 mL/hr, Last Rate: 50 mL/hr (06/08/21 1439)        Meds PRN  •  acetaminophen **OR** acetaminophen  •  aluminum-magnesium hydroxide-simethicone  •  dextrose  •  dextrose  •  glucagon (human recombinant)  •  heparin  •  heparin  •  HYDROcodone-acetaminophen  •  insulin lispro **AND** insulin lispro  •  ipratropium-albuterol  •  LORazepam  •  ondansetron **OR** ondansetron  •  sodium chloride  •  [COMPLETED] Insert peripheral IV **AND** sodium chloride  •  sodium chloride        Assessment/Plan   Assessment/Plan     Active Hospital Problems    Diagnosis  POA   • **Acute pulmonary embolism without acute cor pulmonale (CMS/[facility]) [I26.99]  Yes   • Anxiety [F41.9]  Yes   • Fibromyalgia [M79.7]  Yes   • Acute kidney injury (CMS/[facility]) [N17.9]  Unknown   • Tremors of nervous system [R25.1]  Unknown   • Restless leg syndrome [G25.81]  Unknown   • Type 2 diabetes mellitus with diabetic peripheral angiopathy without gangrene, with long-term current use of insulin (CMS/[facility]) [E11.51, Z79.4]  Not Applicable   • Drug-induced constipation [K59.03]  Yes   • Benign essential hypertension [I10]  Yes   • Hyperlipidemia [E78.5]  Yes   • Peripheral vascular disease (CMS/[facility]) [I73.9]  Yes   • Uncontrolled type 2 diabetes mellitus (CMS/[facility]) [E11.65]  Yes   • PERLA (obstructive  sleep apnea) [G47.33]  Yes      Resolved Hospital Problems   No resolved problems to display.       MEDICAL DECISION MAKING COMPLEXITY BY PROBLEM:     Chest pain-in context with history of previous thromboembolic event and recent surgery patient high risk for new thromboembolic event.  Patient found to have significant clot burden with signs of questionable saddle embolus  -Heparin initiated, patient adamant that she wants to be transition to DOAC, will transition if cleared by neurosurgery  -Can follow-up outpatient with hematology for hypercoagulability work-up  -Echo showing no signs of right heart strain  -Pulmonology consulted patient stable to be transferred out of ICU  -Wean oxygen as tolerated  -Negative BNP, negative troponins good prognostic sign  -Follow-up lower extremity Dopplers     Thromboembolic event-patient reports previously noted, appears it may have been provoked  -Consider hematology follow-up outpatient for hyper coag  -Patient will need to be anticoagulated for lifetime     Anemia-uncertain chronicity at this time but no obvious signs of bleeding, stable  -Daily CBC     Type 2 diabetes-patient has been hyperglycemic in the hospital though this may be due to significant amount of sympathetic nervous system activation  -Sliding scale  -Home long-acting  -Diabetic diet     Renal insufficiency-patient's baseline normal, nephrology consulted from the ICU  -IV fluid management per nephrology  -Daily creatinine     Peripheral vascular disease-patient with previous history of vascular surgery and bypass  -Continue statin and antiplatelets     Hypertension/hyperlipidemia/fibromyalgia/opioid dependency/anxiety/chronic constipation/restless leg syndrome-chronic in nature  -Resume home medication as clinically appropriate     Obesity-BMI of 34  -Lifestyle modifications    VTE Prophylaxis -   Mechanical Order History:     None      Pharmalogical Order History:      Ordered     Dose Route Frequency Stop     06/06/21 2147  heparin bolus from bag 5,000 Units      5,000 Units IV Once 06/06/21 2201    06/06/21 2147  heparin 93789 units/250 mL (100 units/mL) in 0.45 % NaCl infusion  9.97 mL/hr      10.9 Units/kg/hr IV Titrated --    06/06/21 2147  heparin bolus from bag 2,500 Units      2,500 Units IV Every 6 Hours PRN --    06/06/21 2147  heparin bolus from bag 5,000 Units      5,000 Units IV Every 6 Hours PRN 06/08/21 2143                  Code Status -   Code Status and Medical Interventions:   Ordered at: 06/07/21 0045     Code Status:    CPR     Medical Interventions (Level of Support Prior to Arrest):    Full       This patient has been examined wearing appropriate Personal Protective Equipment and discussed with hospital infection control department. 06/08/21        Discharge Planning  pending        Electronically signed by Volodymyr Burleson MD, 06/08/21, 15:10 EDT.  [facility] Hospitalist Team

## 2021-06-08 NOTE — PROGRESS NOTES
LOS: 2 days   Patient Care Team:  Aida Lowry APRN as PCP - General (Nurse Practitioner)    Chief Complaint: Shortness of air    Subjective         Interval History:   Patient lying in bed in no acute distress.  She is awake and alert with family present.  She does have a bit of right-sided buttock pain that bothers her when she moves.  I was contacted this afternoon from nursing regarding some drainage from the incisional area.    History taken from: patient    Objective      Alert and oriented by 3  Speech is intact and coherent articulate with good content and production  Motor strength is 5/5 in both upper and lower extremities with no focal motor deficits  Bilateral lower extremities with marked edema  Incision is well approximated with no redness, gross swelling or obvious drainage.  When palpated small amount of serosanguineous fluid does seep from lower end of incision.  Right buttock TTP  Bilateral buttocks scattered bruising    Vital Signs  Temp:  [97.4 °F (36.3 °C)-98.1 °F (36.7 °C)] 97.4 °F (36.3 °C)  Heart Rate:  [] 82  BP: ()/(33-68) 100/41       Results Review:     I reviewed the patient's new clinical results.      Assessment/Plan       Acute pulmonary embolism without acute cor pulmonale (CMS/HCC)    Type 2 diabetes mellitus with diabetic peripheral angiopathy without gangrene, with long-term current use of insulin (CMS/HCC)    Anxiety    Benign essential hypertension    Drug-induced constipation    Fibromyalgia    Hyperlipidemia    PERLA (obstructive sleep apnea)    Peripheral vascular disease (CMS/HCC)    Restless leg syndrome    Uncontrolled type 2 diabetes mellitus (CMS/HCC)    Acute kidney injury (CMS/HCC)    Tremors of nervous system      Patient's incision was palpated where a very small amount of serosanguineous fluid did seep from lower edge of incisional area.  There was no pain upon palpation.  Evidently, patient sutures were inadvertently removed when patient was at  home a couple days prior from a misplaced bandage.  She had no drainage at that time per her family members or patient.  She did have some pain along her right buttock but she is quite bruised there as well.  Nursing reports more sluggishness today.  We will keep an eye on it although it is felt this is possible small seroma.    Patient has been afebrile and has no leukocytosis.  Plans are to monitor dressing for any more drainage in light of being on heparin drip.  Nursing to reinforce dressing as needed.  Sed rate and CRP     This patient was examined wearing appropriate personal protective equipment.     I discussed my findings with patient, family, nursing staff and Dr. Gerard.    Cassidy Crane, CHANTALE  06/08/21  17:09 EDT

## 2021-06-08 NOTE — THERAPY EVALUATION
Patient Name: Genny Soni  : 1950    MRN: [id number]                              Today's Date: 2021       Admit Date: 2021    Visit Dx:     ICD-10-CM ICD-9-CM   1. Acute pulmonary embolism without acute cor pulmonale, unspecified pulmonary embolism type (CMS/[facility])  I26.99 415.19   2. Bilateral leg edema  R60.0 782.3   3. 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, bilateral legs (CMS/[facility])   • Deep venous thrombosis of lower extremity (CMS/[facility])   • Dizziness   • Drug-induced constipation   • Edema of lower extremity   • Essential tremor   • Exertional dyspnea   • Fibromyalgia   • Former smoker   • Generalized anxiety disorder   • H/O total knee replacement   • Hypokalemia   • Injury of hand   • Hyperlipidemia   • PERLA (obstructive sleep apnea)   • Peripheral vascular disease (CMS/[facility])   • Persistent insomnia   • Piriformis syndrome   • Pain management   • Polyarthropathy   • Restless leg syndrome   • Strain of muscle of right groin region   • Type II diabetes mellitus with HbA1C goal to be determined (CMS/[facility])   • Uncontrolled type 2 diabetes mellitus (CMS/[facility])   • Vitamin D deficiency   • Pulmonary embolism (CMS/[facility])   • Acute kidney injury (CMS/[facility])   • Tremors of nervous system     Past Medical History:   Diagnosis Date   • Arthritis    • Benign essential hypertension 2018   • Diabetes mellitus (CMS/[facility])    • Elevated cholesterol    • History of transfusion    • Hyperlipidemia    • Low back pain    • Sleep apnea      Past Surgical History:    Procedure Laterality Date   • BACK SURGERY     • FEMORAL DISTAL BYPASS     • HYSTERECTOMY     • JOINT REPLACEMENT     • LUMBAR LAMINECTOMY WITH FUSION N/A 5/28/2021    Procedure: Lumbar 5 Smith procedure.  Lumbar 5 6 and sacral 1 transpedicular Solera screws and rods.  Posterior fusion with autologous bone.;  Surgeon: Gagan Aguilar MD;  Location: [facility] MAIN OR;  Service: Neurosurgery;  Laterality: N/A;     General Information     [facility] Name 06/08/21 1557          Physical Therapy Time and Intention    Document Type  evaluation  -     Mode of Treatment  physical therapy  -     Row Name 06/08/21 1557          General Information    Patient Profile Reviewed  yes  -     Prior Level of Function  independent:  -     Existing Precautions/Restrictions  sternal;oxygen therapy device and L/min;fall  -     Barriers to Rehab  medically complex  -     Row Name 06/08/21 1557          Living Environment    Lives With  spouse  works full time  -     Row Name 06/08/21 1557          Home Main Entrance    Number of Stairs, Main Entrance  -- ramp to enter  -     Row Name 06/08/21 1557          Cognition    Orientation Status (Cognition)  oriented x 3  -     Row Name 06/08/21 1557          Safety Issues, Functional Mobility    Safety Issues Affecting Function (Mobility)  insight into deficits/self-awareness;safety precaution awareness  -     Impairments Affecting Function (Mobility)  pain;strength;endurance/activity tolerance;postural/trunk control;balance;shortness of breath  -       User Key  (r) = Recorded By, (t) = Taken By, (c) = Cosigned By    Initials Name Provider Type     Ct Coker, PT Physical Therapist        Mobility     Row Name 06/08/21 1558          Bed Mobility    Bed Mobility  -- up in chair  -[facility] Name 06/08/21 1558          Sit-Stand Transfer    Sit-Stand [place] (Transfers)  2 person assist;moderate assist (50% patient effort)  -     Row Name 06/08/21 1558           Gait/Stairs (Locomotion)    [place] Level (Gait)  unable to assess  -HC     Comment (Gait/Stairs)  pt able to march in place x5 reps with modA; Cardiac level II  -       User Key  (r) = Recorded By, (t) = Taken By, (c) = Cosigned By    Initials Name Provider Type     Ct Coker, PT Physical Therapist        Obj/Interventions     Row Name 06/08/21 1559          Range of Motion Comprehensive    Comment, General Range of Motion  BLEs WFL  -HC     Row Name 06/08/21 1559          Strength Comprehensive (MMT)    Comment, General Manual Muscle Testing (MMT) Assessment  BLEs grossly 4/5  -HC     Row Name 06/08/21 1559          Balance    Balance Assessment  sitting static balance;sitting dynamic balance;standing static balance;standing dynamic balance  -HC     Static Sitting Balance  mild impairment  -HC     Dynamic Sitting Balance  moderate impairment  -HC     Static Standing Balance  moderate impairment  -HC     Dynamic Standing Balance  moderate impairment  -HC     Row Name 06/08/21 1559          Sensory Assessment (Somatosensory)    Sensory Assessment (Somatosensory)  -- pt reports decreased sensation in bilateral hands  -       User Key  (r) = Recorded By, (t) = Taken By, (c) = Cosigned By    Initials Name Provider Type     Ct Coker, PT Physical Therapist        Goals/Plan     Row Name 06/08/21 1603          Transfer Goal 1 (PT)    Activity/Assistive Device (Transfer Goal 1, PT)  transfers, all  -HC     [place] Level/Cues Needed (Transfer Goal 1, PT)  contact guard assist  -HC     Time Frame (Transfer Goal 1, PT)  2 weeks  -     Row Name 06/08/21 1603          Gait Training Goal 1 (PT)    Activity/Assistive Device (Gait Training Goal 1, PT)  gait (walking locomotion);assistive device use  -HC     [place] Level (Gait Training Goal 1, PT)  contact guard assist  -HC     Distance (Gait Training Goal 1, PT)  150 ft  -HC     Time Frame (Gait Training Goal 1, PT)  2 weeks  -      Row Name 06/08/21 1603          Patient Education Goal (PT)    Activity (Patient Education Goal, PT)  Pt will be able to state 3/3 sternal precautions.  -HC     [place]/Cues/Accuracy (Memory Goal 2, PT)  independent  -HC     Time Frame (Patient Education Goal, PT)  2 weeks  -HC       User Key  (r) = Recorded By, (t) = Taken By, (c) = Cosigned By    Initials Name Provider Type    HC Ct Coker, PT Physical Therapist        Clinical Impression     Row Name 06/08/21 1600          Pain    Additional Documentation  Pain Scale: FACES Pre/Post-Treatment (Group)  -     Row Name 06/08/21 1600          Pain Scale: FACES Pre/Post-Treatment    Pain: FACES Scale, Pretreatment  6-->hurts even more  -     Posttreatment Pain Rating  8-->hurts whole lot  -     Pre/Posttreatment Pain Comment  incisional chest discomfort, pain at chest tube site  -     Row Name 06/08/21 1600          Plan of Care Review    Outcome Summary  Pt is 72 yo female s/p urgent CABG x5 on 6/7/2021.  Pt initially admitted for NSTEMI and found to have severe 3 vessel CAD.  -     Row Name 06/08/21 1600          Therapy Assessment/Plan (PT)    Patient/Family Therapy Goals Statement (PT)  increase strength and mobility  -     Rehab Potential (PT)  good, to achieve stated therapy goals  -     Criteria for Skilled Interventions Met (PT)  yes;skilled treatment is necessary  -     Predicted Duration of Therapy Intervention (PT)  until d/c  -     Row Name 06/08/21 1600          Vital Signs    Pre Systolic BP Rehab  125  -HC     Pre Treatment Diastolic BP  51  -HC     Post Systolic BP Rehab  137  -HC     Post Treatment Diastolic BP  67  -HC     Pretreatment Heart Rate (beats/min)  91  -HC     Intratreatment Heart Rate (beats/min)  109  -HC     Posttreatment Heart Rate (beats/min)  83  -HC     Pre SpO2 (%)  97  -HC     O2 Delivery Pre Treatment  supplemental O2  -HC     Intra SpO2 (%)  96  -HC     O2 Delivery Intra Treatment  supplemental O2   -HC     Post SpO2 (%)  95  -HC     O2 Delivery Post Treatment  supplemental O2  -HC     Row Name 06/08/21 1600          Positioning and Restraints    Pre-Treatment Position  sitting in chair/recliner  -HC     Post Treatment Position  chair  -HC     In Chair  notified nsg;call light within reach;encouraged to call for assist;exit alarm on daughter present  -HC       User Key  (r) = Recorded By, (t) = Taken By, (c) = Cosigned By    Initials Name Provider Type     Ct Coker, PT Physical Therapist        Outcome Measures     Row Name 06/08/21 1604          How much help from another person do you currently need...    Turning from your back to your side while in flat bed without using bedrails?  2  -HC     Moving from lying on back to sitting on the side of a flat bed without bedrails?  2  -HC     Moving to and from a bed to a chair (including a wheelchair)?  2  -HC     Standing up from a chair using your arms (e.g., wheelchair, bedside chair)?  2  -HC     Climbing 3-5 steps with a railing?  1  -HC     To walk in hospital room?  1  -HC     AM-PAC 6 Clicks Score (PT)  10  -HC     Row Name 06/08/21 1604          Functional Assessment    Outcome Measure Options  AM-PAC 6 Clicks Basic Mobility (PT)  -HC       User Key  (r) = Recorded By, (t) = Taken By, (c) = Cosigned By    Initials Name Provider Type     Ct Coker, PT Physical Therapist        Physical Therapy Education                 Title: PT OT SLP Therapies (In Progress)     Topic: Physical Therapy (In Progress)     Point: Mobility training (In Progress)     Learning Progress Summary           Patient Acceptance, E, NR by  at 6/8/2021 1604                   Point: Precautions (In Progress)     Learning Progress Summary           Patient Acceptance, E, NR by  at 6/8/2021 1604                               User Key     Initials Effective Dates Name Provider Type [facility] 04/17/20 -  Ct Coker, PT Physical Therapist PT               PT Recommendation and Plan  Planned Therapy Interventions (PT): balance training, bed mobility training, gait training, home exercise program, patient/family education, transfer training, strengthening, ROM (range of motion), neuromuscular re-education  Plan of Care Reviewed With: patient  Outcome Summary: Pt is 72 yo female s/p urgent CABG x5 on 6/7/2021.  Pt initially admitted for NSTEMI and found to have severe 3 vessel CAD.  Pt presents with multiple lines including [place] limiting functional mobility.  Pt requiring minAx2-modAx2 for sit <-> stand transfer and able to stand x1 minute before needing seated rest break due to fatigue.  Pt able to stand and march in place x5 reps with modA.  Pt's spouse works and pt far from functional mobility baseline with multiple lines/tubes.  PT recommending IP rehab to address deficits at this time.  PT will continue to follow 5x/week and assess d/c needs pending progress.     Time Calculation:   PT Charges     Row Name 06/08/21 1607             Time Calculation    Start Time  1450  -HC      Stop Time  1519  -      Time Calculation (min)  29 min  -      PT Received On  06/08/21  -      PT - Next Appointment  06/09/21  -      PT Goal Re-Cert Due Date  06/22/21  -         Time Calculation- PT    Total Timed Code Minutes- PT  0 minute(s)  -        User Key  (r) = Recorded By, (t) = Taken By, (c) = Cosigned By    Initials Name Provider Type     Ct Coker, PT Physical Therapist        Therapy Charges for Today     Code Description Service Date Service Provider Modifiers Qty    [id number]  PT EVAL HIGH COMPLEXITY 4 6/8/2021 Ct Coker, PT GP 1          PT G-Codes  Outcome Measure Options: AM-PAC 6 Clicks Basic Mobility (PT)  AM-PAC 6 Clicks Score (PT): 10    Ct Coker PT  6/8/2021

## 2021-06-08 NOTE — PROGRESS NOTES
PROGRESS NOTE      Patient Name: Genny Soni  : 1950  MRN: [id number]  Primary Care Physician: Aida Lowry APRN  Date of admission: 2021    Patient Care Team:  Aida Lowry APRN as PCP - General (Nurse Practitioner)        Subjective   Subjective:     Patient is feeling a lot better than yesterday shortness of breath has improved  Review of systems:  All other review of system unremarkable      Allergies:    Allergies   Allergen Reactions   • Ambien  [Zolpidem] Confusion   • Codeine Itching   • Sulfa Antibiotics Nausea And Vomiting     Makes her nauseated       Objective   Exam:     Vital Signs  Temp:  [97.6 °F (36.4 °C)-98.1 °F (36.7 °C)] 97.6 °F (36.4 °C)  Heart Rate:  [] 82  BP: ()/(31-68) 100/41  SpO2:  [85 %-100 %] 95 %  on  Flow (L/min):  [4] 4;   Device (Oxygen Therapy): nasal cannula  Body mass index is 34.63 kg/m².    General: Elderly white  female in no acute distress.    Head:      Normocephalic and atraumatic.    Eyes:      PERRL/EOM intact, conjunctiva and sclera clear with out nystagmus.    Neck:      No masses, thyromegaly,  trachea central with normal respiratory effort   Lungs:    Clear bilaterally to auscultation.    Heart:      Regular rate and rhythm, no murmur no gallop  Abd:        Soft, nontender, not distended, bowel sounds positive, no shifting dullness   Pulses:   Pulses palpable  Extr:        No cyanosis or clubbing--+1 edema.    Neuro:    No focal deficits.   alert oriented x3  Skin:       Intact without lesions or rashes.    Psych:    Alert and cooperative; normal mood and affect; .      Results Review:  I have personally reviewed most recent Data :  CBC    Results from last 7 days   Lab Units 21  0327 21  1729   WBC 10*3/mm3 6.90 6.90 7.40   HEMOGLOBIN g/dL 10.2* 10.7* 11.2*   PLATELETS 10*3/mm3 187 193 222     CMP   Results from last 7 days   Lab Units 21  0327 21  1014 21  0429 21  6108    SODIUM mmol/L 138 139 140 136   POTASSIUM mmol/L 4.8 4.8 4.8 5.1   CHLORIDE mmol/L 102 102 103 96*   CO2 mmol/L 30.0* 30.0* 29.0 32.0*   BUN mg/dL 27* 29* 31* 42*   CREATININE mg/dL 0.99 0.95 0.96 1.50*   GLUCOSE mg/dL 135* 261* 126* 280*   ALBUMIN g/dL  --  3.60  --  3.90   BILIRUBIN mg/dL  --  0.3  --  0.4   ALK PHOS U/L  --  111  --  127*   AST (SGOT) U/L  --  27  --  18   ALT (SGPT) U/L  --  22  --  20     ABG      CT Abdomen Pelvis With Contrast    Result Date: 6/6/2021  1. There is fluid in the colon which can be seen with diarrhea and clinical correlation is recommended. 2. There is mild bilateral hydronephrosis with no ureteral stone. This could possibly be due to bladder distention and clinical correlation is recommended. 3. There is a partly included fluid filled structure measuring at least 5.5 cm in the subcutaneous fat of the anterior medial right thigh that is of uncertain etiology. 4. Appendix is not identifiable. No secondary signs of appendicitis. 5. Patient has had an aortobifemoral graft. 6. Additional findings as reported above.  Electronically Signed By-Argelia Falcon MD On:6/6/2021 8:05 PM This report was finalized on [id number] by  Argelia Falcon MD.    XR Chest 1 View    Result Date: 6/6/2021   1. Stable exam, with no evidence of active disease.  Electronically Signed By-Argelia Falcon MD On:6/6/2021 5:23 PM This report was finalized on [id number] by  Argelia Falcon MD.    CT Chest Pulmonary Embolism    Result Date: 6/6/2021  1. This study is positive for pulmonary emboli. There is thrombus in the left main pulmonary artery extending into left upper lobe vessels. Technically, I think this is likely a saddle embolus, with a thin wispy clot extending from the clot in the left main pulmonary artery across the bifurcation and into the right main pulmonary artery. There is also likely thrombus in a segmental right upper lobe artery. 2. Scattered groundglass opacities in the lungs,  nonspecific. 3. Please see today's CT abdomen dictation for description of abdominal findings.  Electronically Signed By-Argelia Falcon MD On:6/6/2021 7:58 PM This report was finalized on [id number] by  Argelia Falcon MD.      Results for orders placed during the hospital encounter of 06/06/21    ADULT TRANSTHORACIC ECHO COMPLETE W/ CONT IF NECESSARY PER PROTOCOL    Interpretation Summary  · Estimated right ventricular systolic pressure from tricuspid regurgitation is normal (<35 mmHg).  · Calculated left ventricular EF = 54% Estimated left ventricular EF was in agreement with the calculated left ventricular EF.  · Left ventricular diastolic function was normal.    Essentially unremarkable study  Normal LV and RV size and function  Normal atrial sizes  No significant valvular regurgitation or stenosis seen  Valve leaflets are thin and pliable with no mobile echodensities  No Doppler evidence for ASD or PFO although no bubble study performed in the present study  Normal myocardial thickness no LVOT obstruction no intracavitary obstruction  No masses or effusion seen  Normal diastolic function by criteria  No pulmonary hypertension seen as measured noninvasively  Normal right left-sided pressures estimated noninvasively  EF 60%    Scheduled Meds:ARIPiprazole, 2 mg, Oral, Daily  cholecalciferol, 2,000 Units, Oral, Daily  insulin glargine, 46 Units, Subcutaneous, Daily With Breakfast  insulin lispro, 0-24 Units, Subcutaneous, 4x Daily With Meals & Nightly  lidocaine, 1 patch, Transdermal, Q24H  methadone, 10 mg, Oral, Daily  methadone, 15 mg, Oral, Nightly  polyethylene glycol, 17 g, Oral, Daily  pramipexole, 1 mg, Oral, Nightly  rosuvastatin, 10 mg, Oral, Daily  senna, 3 tablet, Oral, BID  sodium chloride, 10 mL, Intravenous, Q12H      Continuous Infusions:heparin, 10.9 Units/kg/hr, Last Rate: 14.9 Units/kg/hr (06/08/21 0359)  sodium chloride, 50 mL/hr, Last Rate: 50 mL/hr (06/07/21 0231)      PRN Meds:•   acetaminophen **OR** acetaminophen  •  aluminum-magnesium hydroxide-simethicone  •  dextrose  •  dextrose  •  glucagon (human recombinant)  •  heparin  •  heparin  •  HYDROcodone-acetaminophen  •  insulin lispro **AND** insulin lispro  •  ipratropium-albuterol  •  ondansetron **OR** ondansetron  •  sodium chloride  •  [COMPLETED] Insert peripheral IV **AND** sodium chloride  •  sodium chloride    Assessment/Plan   Assessment and Plan:         Acute pulmonary embolism without acute cor pulmonale (CMS/HCC)    Type 2 diabetes mellitus with diabetic peripheral angiopathy without gangrene, with long-term current use of insulin (CMS/[facility])    Anxiety    Benign essential hypertension    Drug-induced constipation    Fibromyalgia    Hyperlipidemia    PERLA (obstructive sleep apnea)    Peripheral vascular disease (CMS/[facility])    Restless leg syndrome    Uncontrolled type 2 diabetes mellitus (CMS/[facility])    Acute kidney injury (CMS/[facility])    Tremors of nervous system    ASSESSMENT:      · DARRYL in a patient with no significant chronic kidney disease likely related to some hemodynamic instability in the presence of pulmonary embolism.  Repeat labs are pending there is a possibility of renal functions getting worse.  Also contributing factor some contrast-induced nephropathy  · Acute pulmonary embolism  · Essential hypertension  · History of diabetes mellitus which is not well controlled  · Restless leg syndrome  · History of fibromyalgia  · Normal echocardiogram        Plan:      · Patient DARRYL seems to be multifactorial may be mild ATN contrast nephropathy, volume status hemodynamic instability which is already getting better at this time.  · Patient volume status is acceptable no need for diuretics or extra IV fluid at this time.    · Hemodynamics are stable.    · Control blood pressure aggressively at this time blood pressure is stable  · Hold ACE inhibitors at this time but okay to be started before discharge.  · Hold diuretics as to me  patient is not volume overloaded edema is secondary to DVT likely      Electronically signed by Smith Marquez MD, 06/08/21, 9:35 AM EDT.   [facility] kidney consultant  [phone]

## 2021-06-08 NOTE — PLAN OF CARE
Goal Outcome Evaluation:                   Patient transferred over here from ICU, patient having some drainage from lumbar site. Patient is on heparin gtt for PE, 2 therapeutic PTT so recheck in the morning.

## 2021-06-09 LAB
ANION GAP SERPL CALCULATED.3IONS-SCNC: 8 MMOL/L (ref 5–15)
APTT PPP: 29.2 SECONDS (ref 61–76.5)
APTT PPP: 45.8 SECONDS (ref 61–76.5)
APTT PPP: 51.9 SECONDS (ref 61–76.5)
BASOPHILS # BLD AUTO: 0 10*3/MM3 (ref 0–0.2)
BASOPHILS NFR BLD AUTO: 0.3 % (ref 0–1.5)
BUN SERPL-MCNC: 16 MG/DL (ref 8–23)
BUN/CREAT SERPL: 20.5 (ref 7–25)
CALCIUM SPEC-SCNC: 9.5 MG/DL (ref 8.6–10.5)
CHLORIDE SERPL-SCNC: 103 MMOL/L (ref 98–107)
CO2 SERPL-SCNC: 29 MMOL/L (ref 22–29)
CREAT SERPL-MCNC: 0.78 MG/DL (ref 0.57–1)
DEPRECATED RDW RBC AUTO: 46.4 FL (ref 37–54)
EOSINOPHIL # BLD AUTO: 0.2 10*3/MM3 (ref 0–0.4)
EOSINOPHIL NFR BLD AUTO: 2.4 % (ref 0.3–6.2)
ERYTHROCYTE [DISTWIDTH] IN BLOOD BY AUTOMATED COUNT: 14.1 % (ref 12.3–15.4)
GFR SERPL CREATININE-BSD FRML MDRD: 72 ML/MIN/1.73
GLUCOSE BLDC GLUCOMTR-MCNC: 139 MG/DL (ref 70–105)
GLUCOSE BLDC GLUCOMTR-MCNC: 142 MG/DL (ref 70–105)
GLUCOSE BLDC GLUCOMTR-MCNC: 196 MG/DL (ref 70–105)
GLUCOSE BLDC GLUCOMTR-MCNC: 259 MG/DL (ref 70–105)
GLUCOSE BLDC GLUCOMTR-MCNC: 274 MG/DL (ref 70–105)
GLUCOSE SERPL-MCNC: 127 MG/DL (ref 65–99)
HCT VFR BLD AUTO: 30.5 % (ref 34–46.6)
HGB BLD-MCNC: 10.4 G/DL (ref 12–15.9)
LYMPHOCYTES # BLD AUTO: 2.3 10*3/MM3 (ref 0.7–3.1)
LYMPHOCYTES NFR BLD AUTO: 35.6 % (ref 19.6–45.3)
MAGNESIUM SERPL-MCNC: 2.2 MG/DL (ref 1.6–2.4)
MCH RBC QN AUTO: 32.1 PG (ref 26.6–33)
MCHC RBC AUTO-ENTMCNC: 34.2 G/DL (ref 31.5–35.7)
MCV RBC AUTO: 93.9 FL (ref 79–97)
MONOCYTES # BLD AUTO: 0.4 10*3/MM3 (ref 0.1–0.9)
MONOCYTES NFR BLD AUTO: 6.1 % (ref 5–12)
NEUTROPHILS NFR BLD AUTO: 3.5 10*3/MM3 (ref 1.7–7)
NEUTROPHILS NFR BLD AUTO: 55.6 % (ref 42.7–76)
NRBC BLD AUTO-RTO: 0.1 /100 WBC (ref 0–0.2)
PHOSPHATE SERPL-MCNC: 3.5 MG/DL (ref 2.5–4.5)
PLATELET # BLD AUTO: 199 10*3/MM3 (ref 140–450)
PMV BLD AUTO: 6.7 FL (ref 6–12)
POTASSIUM SERPL-SCNC: 4.4 MMOL/L (ref 3.5–5.2)
RBC # BLD AUTO: 3.25 10*6/MM3 (ref 3.77–5.28)
SODIUM SERPL-SCNC: 140 MMOL/L (ref 136–145)
WBC # BLD AUTO: 6.4 10*3/MM3 (ref 3.4–10.8)

## 2021-06-09 PROCEDURE — 99232 SBSQ HOSP IP/OBS MODERATE 35: CPT | Performed by: FAMILY MEDICINE

## 2021-06-09 PROCEDURE — 85730 THROMBOPLASTIN TIME PARTIAL: CPT | Performed by: EMERGENCY MEDICINE

## 2021-06-09 PROCEDURE — 63710000001 INSULIN LISPRO (HUMAN) PER 5 UNITS: Performed by: INTERNAL MEDICINE

## 2021-06-09 PROCEDURE — G0378 HOSPITAL OBSERVATION PER HR: HCPCS

## 2021-06-09 PROCEDURE — 94618 PULMONARY STRESS TESTING: CPT

## 2021-06-09 PROCEDURE — 85025 COMPLETE CBC W/AUTO DIFF WBC: CPT | Performed by: NURSE PRACTITIONER

## 2021-06-09 PROCEDURE — 25010000002 HEPARIN (PORCINE) 25000-0.45 UT/250ML-% SOLUTION: Performed by: EMERGENCY MEDICINE

## 2021-06-09 PROCEDURE — 36415 COLL VENOUS BLD VENIPUNCTURE: CPT | Performed by: EMERGENCY MEDICINE

## 2021-06-09 PROCEDURE — 80048 BASIC METABOLIC PNL TOTAL CA: CPT | Performed by: FAMILY MEDICINE

## 2021-06-09 PROCEDURE — 85730 THROMBOPLASTIN TIME PARTIAL: CPT | Performed by: FAMILY MEDICINE

## 2021-06-09 PROCEDURE — 63710000001 INSULIN GLARGINE PER 5 UNITS: Performed by: NURSE PRACTITIONER

## 2021-06-09 PROCEDURE — 83735 ASSAY OF MAGNESIUM: CPT | Performed by: NURSE PRACTITIONER

## 2021-06-09 PROCEDURE — 82962 GLUCOSE BLOOD TEST: CPT

## 2021-06-09 PROCEDURE — 99024 POSTOP FOLLOW-UP VISIT: CPT | Performed by: NURSE PRACTITIONER

## 2021-06-09 PROCEDURE — 96366 THER/PROPH/DIAG IV INF ADDON: CPT

## 2021-06-09 PROCEDURE — 84100 ASSAY OF PHOSPHORUS: CPT | Performed by: NURSE PRACTITIONER

## 2021-06-09 PROCEDURE — 97161 PT EVAL LOW COMPLEX 20 MIN: CPT

## 2021-06-09 RX ADMIN — SENNOSIDES 3 TABLET: 8.6 TABLET, FILM COATED ORAL at 08:57

## 2021-06-09 RX ADMIN — LIDOCAINE 1 PATCH: 50 PATCH TOPICAL at 08:58

## 2021-06-09 RX ADMIN — APIXABAN 10 MG: 5 TABLET, FILM COATED ORAL at 20:50

## 2021-06-09 RX ADMIN — INSULIN LISPRO 4 UNITS: 100 INJECTION, SOLUTION INTRAVENOUS; SUBCUTANEOUS at 11:33

## 2021-06-09 RX ADMIN — APIXABAN 10 MG: 5 TABLET, FILM COATED ORAL at 11:33

## 2021-06-09 RX ADMIN — METHADONE HYDROCHLORIDE 15 MG: 10 TABLET ORAL at 20:50

## 2021-06-09 RX ADMIN — ARIPIPRAZOLE 2 MG: 2 TABLET ORAL at 08:57

## 2021-06-09 RX ADMIN — POLYETHYLENE GLYCOL 3350 17 G: 17 POWDER, FOR SOLUTION ORAL at 08:56

## 2021-06-09 RX ADMIN — INSULIN GLARGINE 46 UNITS: 100 INJECTION, SOLUTION SUBCUTANEOUS at 08:55

## 2021-06-09 RX ADMIN — PRAMIPEXOLE DIHYDROCHLORIDE 1 MG: 0.5 TABLET ORAL at 20:50

## 2021-06-09 RX ADMIN — HEPARIN SODIUM 14.9 UNITS/KG/HR: 10000 INJECTION, SOLUTION INTRAVENOUS at 01:56

## 2021-06-09 RX ADMIN — HYDROCODONE BITARTRATE AND ACETAMINOPHEN 1 TABLET: 10; 325 TABLET ORAL at 05:33

## 2021-06-09 RX ADMIN — INSULIN LISPRO 12 UNITS: 100 INJECTION, SOLUTION INTRAVENOUS; SUBCUTANEOUS at 20:50

## 2021-06-09 RX ADMIN — Medication 2000 UNITS: at 08:57

## 2021-06-09 RX ADMIN — Medication 10 ML: at 00:42

## 2021-06-09 RX ADMIN — METHADONE HYDROCHLORIDE 10 MG: 10 TABLET ORAL at 08:56

## 2021-06-09 RX ADMIN — SENNOSIDES 3 TABLET: 8.6 TABLET, FILM COATED ORAL at 20:49

## 2021-06-09 RX ADMIN — INSULIN LISPRO 12 UNITS: 100 INJECTION, SOLUTION INTRAVENOUS; SUBCUTANEOUS at 17:01

## 2021-06-09 RX ADMIN — ROSUVASTATIN CALCIUM 10 MG: 10 TABLET, FILM COATED ORAL at 08:57

## 2021-06-09 RX ADMIN — Medication 10 ML: at 20:51

## 2021-06-09 NOTE — PROGRESS NOTES
PROGRESS NOTE      Patient Name: Genny Soni  : 1950  MRN: [id number]  Primary Care Physician: Aida Lowry APRN  Date of admission: 2021    Patient Care Team:  Aida Lowry APRN as PCP - General (Nurse Practitioner)        Subjective   Subjective:     Patient is feeling a lot better than yesterday shortness of breath has improved  Review of systems:  All other review of system unremarkable      Allergies:    Allergies   Allergen Reactions   • Ambien  [Zolpidem] Confusion   • Codeine Itching   • Sulfa Antibiotics Nausea And Vomiting     Makes her nauseated       Objective   Exam:     Vital Signs  Temp:  [98.1 °F (36.7 °C)-98.4 °F (36.9 °C)] 98.3 °F (36.8 °C)  Heart Rate:  [69-81] 69  Resp:  [16-18] 18  BP: (120-136)/(70-81) 121/74  SpO2:  [89 %-100 %] 100 %  on  Flow (L/min):  [4] 4;   Device (Oxygen Therapy): nasal cannula  Body mass index is 34.91 kg/m².    General: Elderly white  female in no acute distress.    Head:      Normocephalic and atraumatic.    Eyes:      PERRL/EOM intact, conjunctiva and sclera clear with out nystagmus.    Neck:      No masses, thyromegaly,  trachea central with normal respiratory effort   Lungs:    Clear bilaterally to auscultation.    Heart:      Regular rate and rhythm, no murmur no gallop  Abd:        Soft, nontender, not distended, bowel sounds positive, no shifting dullness   Pulses:   Pulses palpable  Extr:        No cyanosis or clubbing--+1 edema.    Neuro:    No focal deficits.   alert oriented x3  Skin:       Intact without lesions or rashes.    Psych:    Alert and cooperative; normal mood and affect; .      Results Review:  I have personally reviewed most recent Data :  CBC    Results from last 7 days   Lab Units 21  0255 21  0327 21  0429 21  1729   WBC 10*3/mm3 6.40 6.90 6.90 7.40   HEMOGLOBIN g/dL 10.4* 10.2* 10.7* 11.2*   PLATELETS 10*3/mm3 199 187 193 222     CMP   Results from last 7 days   Lab Units 21  6566  06/08/21  0327 06/07/21  1014 06/07/21  0429 06/06/21  1729   SODIUM mmol/L 140 138 139 140 136   POTASSIUM mmol/L 4.4 4.8 4.8 4.8 5.1   CHLORIDE mmol/L 103 102 102 103 96*   CO2 mmol/L 29.0 30.0* 30.0* 29.0 32.0*   BUN mg/dL 16 27* 29* 31* 42*   CREATININE mg/dL 0.78 0.99 0.95 0.96 1.50*   GLUCOSE mg/dL 127* 135* 261* 126* 280*   ALBUMIN g/dL  --   --  3.60  --  3.90   BILIRUBIN mg/dL  --   --  0.3  --  0.4   ALK PHOS U/L  --   --  111  --  127*   AST (SGOT) U/L  --   --  27  --  18   ALT (SGPT) U/L  --   --  22  --  20     ABG      XR Abdomen KUB    Result Date: 6/8/2021   1. Large stool burden in the ascending colon. Mild to moderate stool burden in the transverse and descending colon. 2. The bowel gas pattern has a grossly nonobstructive appearance.  Electronically Signed By-Mary Kate Craft MD On:6/8/2021 9:45 AM This report was finalized on [id number] by  Mary Kaet Craft MD.      Results for orders placed during the hospital encounter of 06/06/21    ADULT TRANSTHORACIC ECHO COMPLETE W/ CONT IF NECESSARY PER PROTOCOL    Interpretation Summary  · Estimated right ventricular systolic pressure from tricuspid regurgitation is normal (<35 mmHg).  · Calculated left ventricular EF = 54% Estimated left ventricular EF was in agreement with the calculated left ventricular EF.  · Left ventricular diastolic function was normal.    Essentially unremarkable study  Normal LV and RV size and function  Normal atrial sizes  No significant valvular regurgitation or stenosis seen  Valve leaflets are thin and pliable with no mobile echodensities  No Doppler evidence for ASD or PFO although no bubble study performed in the present study  Normal myocardial thickness no LVOT obstruction no intracavitary obstruction  No masses or effusion seen  Normal diastolic function by criteria  No pulmonary hypertension seen as measured noninvasively  Normal right left-sided pressures estimated noninvasively  EF 60%    Scheduled Meds:apixaban, 10  mg, Oral, BID   Followed by  [START ON 6/16/2021] apixaban, 5 mg, Oral, BID  ARIPiprazole, 2 mg, Oral, Daily  cholecalciferol, 2,000 Units, Oral, Daily  insulin glargine, 46 Units, Subcutaneous, Daily With Breakfast  insulin lispro, 0-24 Units, Subcutaneous, 4x Daily With Meals & Nightly  lidocaine, 1 patch, Transdermal, Q24H  methadone, 10 mg, Oral, Daily  methadone, 15 mg, Oral, Nightly  polyethylene glycol, 17 g, Oral, Daily  pramipexole, 1 mg, Oral, Nightly  rosuvastatin, 10 mg, Oral, Daily  senna, 3 tablet, Oral, BID  sodium chloride, 10 mL, Intravenous, Q12H      Continuous Infusions:   PRN Meds:•  acetaminophen **OR** acetaminophen  •  aluminum-magnesium hydroxide-simethicone  •  dextrose  •  dextrose  •  glucagon (human recombinant)  •  HYDROcodone-acetaminophen  •  insulin lispro **AND** insulin lispro  •  ipratropium-albuterol  •  LORazepam  •  ondansetron **OR** ondansetron  •  sodium chloride  •  [COMPLETED] Insert peripheral IV **AND** sodium chloride  •  sodium chloride    Assessment/Plan   Assessment and Plan:         Acute pulmonary embolism without acute cor pulmonale (CMS/HCC)    Type 2 diabetes mellitus with diabetic peripheral angiopathy without gangrene, with long-term current use of insulin (CMS/[facility])    Anxiety    Benign essential hypertension    Drug-induced constipation    Fibromyalgia    Hyperlipidemia    PERLA (obstructive sleep apnea)    Peripheral vascular disease (CMS/HCC)    Restless leg syndrome    Uncontrolled type 2 diabetes mellitus (CMS/HCC)    Acute kidney injury (CMS/[facility])    Tremors of nervous system    ASSESSMENT:      · DARRYL in a patient with no significant chronic kidney disease likely related to some hemodynamic instability in the presence of pulmonary embolism.  Repeat labs are pending there is a possibility of renal functions getting worse.  Also contributing factor some contrast-induced nephropathy  · Acute pulmonary embolism  · Essential hypertension  · History of diabetes  mellitus which is not well controlled  · Restless leg syndrome  · History of fibromyalgia  · Normal echocardiogram        Plan:      · Patient DARRYL seems to be multifactorial may be mild ATN contrast nephropathy, volume status hemodynamic instability which is already getting better at this time.  · Patient volume status is acceptable no need for diuretics or extra IV fluid at this time.    · Hemodynamics are stable.    · Renal function better   · Control blood pressure aggressively at this time blood pressure is stable  · Hold ACE inhibitors at this time but okay to be started before discharge.  · Hold diuretics as to me patient is not volume overloaded edema is secondary to DVT likely      Electronically signed by Smith Marquez MD, 06/08/21, 9:35 AM EDT.   [facility] kidney consultant  [phone]

## 2021-06-09 NOTE — PLAN OF CARE
Problem: Adult Inpatient Plan of Care  Goal: Plan of Care Review  Flowsheets  Taken 6/9/2021 1522  Plan of Care Reviewed With: patient  Outcome Summary: Pt is 72 yo female admitted for BLE edema, SOA.  Pt d/o acute PE left main pulmonary artery with concern for thrombus right upper lobe artery per CT chest.  Pt s/p recent lower spine sx L5-S1 on 5/28/2021. Pt recently on heparin and now on oral anticoagulation per RN.  PMH: anxiety, fibromyalgia, DM, PERLA, PVD.  Pt presents up in chair finishing lunch.  Pt able to complete transfers with indep, ambulation using RW with mod indep.  Pt able to stand for ADLs x6 minutes indep with occasional UE support.  Pt's with no SOA noted and O2 measured 94-99% during treatment session.  Pt appears close to recent mobility baseline.  Pt reports she has RW/rollator at home and can utilize at home initially as needed.  Pt can be up with nursing staff using RW.  No further inpatient PT needs.  Plan home.

## 2021-06-09 NOTE — CASE MANAGEMENT/SOCIAL WORK
Discharge Planning Assessment   [place]     Patient Name: Genny Soni  MRN: [id number]  Today's Date: 6/9/2021    Admit Date: 6/6/2021          Plan    Plan  return home with possible oxygen at [facility]    Patient/Family in Agreement with Plan  yes    Plan Comments  spoke to patient in room; she does not oxygen at home; she has a rolloator; bsc and shower chair; awaiting therapy evaluation; per md patient will need eliquis when discharged; per monisha with retail pharmacy eliquis is $133.00/month; coupon will take care of first month;       Carol naegele rn  Case management  Office number [phone]  Cell phone [phone]

## 2021-06-09 NOTE — CASE MANAGEMENT/SOCIAL WORK
Discharge Planning Assessment   [place]     Patient Name: Genny Soni  MRN: [id number]  Today's Date: 6/9/2021    Admit Date: 6/6/2021          Plan    Plan  return home [place] [facility]    Patient/Family in Agreement with Plan  yes    Plan Comments  spoke to patient in room; she does not oxygen at home; she has a rolloator; bsc and shower chair; awaiting therapy evaluation; per md patient will need eliquis when discharged; per monisha with retail pharmacy eliquis is $133.00/month; coupon will take care of first month;       Carol naegele rn  Case management  Office number [phone]  Cell phone [phone]

## 2021-06-09 NOTE — PROGRESS NOTES
Exercise Oximetry    Patient Name:Genny Soni   MRN: [id number]   Date: 06/09/21             ROOM AIR BASELINE   SpO2% 98% room air   Heart Rate    Blood Pressure      EXERCISE ON ROOM AIR SpO2% EXERCISE ON O2 @ LPM SpO2%   1 MINUTE 98 1 MINUTE    2 MINUTES 98 2 MINUTES    3 MINUTES 98 3 MINUTES    4 MINUTES 98 4 MINUTES    5 MINUTES 98 5 MINUTES    6 MINUTES 98 6 MINUTES               Distance Walked   Distance Walked   Dyspnea (Janet Scale)   Dyspnea (Janet Scale)   Fatigue (Janet Scale)   Fatigue (Janet Scale)   SpO2% Post Exercise  99% room air SpO2% Post Exercise   HR Post Exercise   HR Post Exercise   Time to Recovery   Time to Recovery     Comments:

## 2021-06-09 NOTE — PLAN OF CARE
Goal Outcome Evaluation:  Plan of Care Reviewed With: patient  Progress: improving  Pt on heparin drip. Pain resolved with norco. No complaints identified during shift. Will continue to monitor.

## 2021-06-09 NOTE — THERAPY EVALUATION
Patient Name: Genny Soni  : 1950    MRN: [id number]                              Today's Date: 2021       Admit Date: 2021    Visit Dx:     ICD-10-CM ICD-9-CM   1. Acute pulmonary embolism without acute cor pulmonale, unspecified pulmonary embolism type (CMS/[facility])  I26.99 415.19   2. Bilateral leg edema  R60.0 782.3   3. 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, bilateral legs (CMS/[facility])   • Deep venous thrombosis of lower extremity (CMS/[facility])   • Dizziness   • Drug-induced constipation   • Edema of lower extremity   • Essential tremor   • Exertional dyspnea   • Fibromyalgia   • Former smoker   • Generalized anxiety disorder   • H/O total knee replacement   • Hypokalemia   • Injury of hand   • Hyperlipidemia   • PERLA (obstructive sleep apnea)   • Peripheral vascular disease (CMS/[facility])   • Persistent insomnia   • Piriformis syndrome   • Pain management   • Polyarthropathy   • Restless leg syndrome   • Strain of muscle of right groin region   • Type II diabetes mellitus with HbA1C goal to be determined (CMS/[facility])   • Uncontrolled type 2 diabetes mellitus (CMS/[facility])   • Vitamin D deficiency   • Pulmonary embolism (CMS/[facility])   • Acute kidney injury (CMS/[facility])   • Tremors of nervous system     Past Medical History:   Diagnosis Date   • Arthritis    • Benign essential hypertension 2018   • Diabetes mellitus (CMS/[facility])    • Elevated cholesterol    • History of transfusion    • Hyperlipidemia    • Low back pain    • Sleep apnea      Past Surgical History:    Procedure Laterality Date   • BACK SURGERY     • FEMORAL DISTAL BYPASS     • HYSTERECTOMY     • JOINT REPLACEMENT     • LUMBAR LAMINECTOMY WITH FUSION N/A 5/28/2021    Procedure: Lumbar 5 Smith procedure.  Lumbar 5 6 and sacral 1 transpedicular Solera screws and rods.  Posterior fusion with autologous bone.;  Surgeon: Gagan Aguilar MD;  Location: [facility] MAIN OR;  Service: Neurosurgery;  Laterality: N/A;     General Information     Row Name 06/09/21 1517          Physical Therapy Time and Intention    Document Type  evaluation  -     Mode of Treatment  physical therapy  -     Row Name 06/09/21 1517          General Information    Patient Profile Reviewed  yes  -HC     Prior Level of Function  independent:  -     Row Name 06/09/21 1517          Living Environment    Lives With  spouse Pt normally provides care to , however 's family has been assisting with care since pt's surgery  -     Row Name 06/09/21 1517          Home Main Entrance    Number of Stairs, Main Entrance  two  -     Row Name 06/09/21 1517          Cognition    Orientation Status (Cognition)  oriented x 4  -     Row Name 06/09/21 1517          Safety Issues, Functional Mobility    Impairments Affecting Function (Mobility)  pain  -HC       User Key  (r) = Recorded By, (t) = Taken By, (c) = Cosigned By    Initials Name Provider Type    HC Ct Coker, PT Physical Therapist        Mobility     Row Name 06/09/21 1518          Bed Mobility    Bed Mobility  sit-supine  -HC     Sit-Supine Chestnut (Bed Mobility)  modified independence  -     Row Name 06/09/21 1518          Sit-Stand Transfer    Sit-Stand Chestnut (Transfers)  modified independence  -     Row Name 06/09/21 1518          Gait/Stairs (Locomotion)    Chestnut Level (Gait)  modified independence  -     Assistive Device (Gait)  walker, front-wheeled  -     Distance in Feet (Gait)  300 ft  -     Deviations/Abnormal Patterns (Gait)  gait  speed decreased  -     Comment (Gait/Stairs)  no signs of LOB or safety concerns noted with functional mobility  -       User Key  (r) = Recorded By, (t) = Taken By, (c) = Cosigned By    Initials Name Provider Type     Ct Coker, PT Physical Therapist        Obj/Interventions     [facility] Name 06/09/21 1519          Range of Motion Comprehensive    General Range of Motion  no range of motion deficits identified  -[facility] Name 06/09/21 1519          Strength Comprehensive (MMT)    Comment, General Manual Muscle Testing (MMT) Assessment  BUEs WFL, BLEs 4/5  -[facility] Name 06/09/21 1519          Balance    Balance Assessment  sitting static balance;sitting dynamic balance;standing static balance;standing dynamic balance  -     Static Sitting Balance  WFL  -     Dynamic Sitting Balance  WFL  -     Static Standing Balance  WFL  -     Dynamic Standing Balance  WFL  -[facility] Name 06/09/21 1519          Sensory Assessment (Somatosensory)    Sensory Assessment (Somatosensory)  sensation intact  -       User Key  (r) = Recorded By, (t) = Taken By, (c) = Cosigned By    Initials Name Provider Type     Ct Coker, PT Physical Therapist        Goals/Plan    No documentation.       Clinical Impression     [facility] Name 06/09/21 1519          Pain    Additional Documentation  Pain Scale: FACES Pre/Post-Treatment (Group)  -[facility] Name 06/09/21 1519          Pain Scale: Numbers Pre/Post-Treatment    Pain Intervention(s)  Repositioned;Rest  -[facility] Name 06/09/21 1519          Pain Scale: FACES Pre/Post-Treatment    Pain: FACES Scale, Pretreatment  6-->hurts even more  -HC     Posttreatment Pain Rating  6-->hurts even more  -HC     Pre/Posttreatment Pain Comment  pt reports chronic pain and back pain from recent sx  -[facility] Name 06/09/21 1519          Plan of Care Review    Outcome Summary  Pt is 70 yo female admitted for BLE edema, SOA.  Pt d/o acute PE left main pulmonary artery with concern  for thrombus right upper lobe artery per CT chest.  Pt with recent lower spine sx L5-S1 on 5/28/2021. PMH: anxiety, fibromyalgia, DM, PERLA, PVD.  -     Row Name 06/09/21 1519          Therapy Assessment/Plan (PT)    Patient/Family Therapy Goals Statement (PT)  return home  -HC     Criteria for Skilled Interventions Met (PT)  no problems identified which require skilled intervention  -HC     Row Name 06/09/21 1519          Vital Signs    Intra SpO2 (%)  96  -HC     O2 Delivery Intra Treatment  room air  -HC     Post SpO2 (%)  99  -HC     O2 Delivery Post Treatment  room air  -HC     Row Name 06/09/21 1519          Positioning and Restraints    Pre-Treatment Position  sitting in chair/recliner  -HC     Post Treatment Position  bed  -HC     In Bed  notified nsg;call light within reach;encouraged to call for assist;exit alarm on  -HC       User Key  (r) = Recorded By, (t) = Taken By, (c) = Cosigned By    Initials Name Provider Type    Ct Armstrong PT Physical Therapist        Outcome Measures     Row Name 06/09/21 1522          How much help from another person do you currently need...    Turning from your back to your side while in flat bed without using bedrails?  4  -HC     Moving from lying on back to sitting on the side of a flat bed without bedrails?  4  -HC     Moving to and from a bed to a chair (including a wheelchair)?  4  -HC     Standing up from a chair using your arms (e.g., wheelchair, bedside chair)?  4  -HC     Climbing 3-5 steps with a railing?  3  -HC     To walk in hospital room?  4  -HC     AM-PAC 6 Clicks Score (PT)  23  -HC     Row Name 06/09/21 1522          Functional Assessment    Outcome Measure Options  AM-PAC 6 Clicks Basic Mobility (PT)  -HC       User Key  (r) = Recorded By, (t) = Taken By, (c) = Cosigned By    Initials Name Provider Type    Ct Armstrong PT Physical Therapist        Physical Therapy Education                 Title: PT OT SLP Therapies (Done)     Topic:  Physical Therapy (Done)     Point: Mobility training (Done)     Learning Progress Summary           Patient Acceptance, E, VU by  at 6/9/2021 1522    Acceptance, E,TB, VU,DU by  at 6/9/2021 0249    Acceptance, E, NR by HC at 6/8/2021 1604                   Point: Precautions (Done)     Learning Progress Summary           Patient Acceptance, E, VU by HC at 6/9/2021 1522    Acceptance, E,TB, VU,DU by  at 6/9/2021 0249    Acceptance, E, NR by HC at 6/8/2021 1604                               User Key     Initials Effective Dates Name Provider Type Discipline     04/17/20 -  Ct Coker, PT Physical Therapist PT     03/15/21 -  Klever Subramanian, RN Registered Nurse Nurse              PT Recommendation and Plan     Plan of Care Reviewed With: patient  Outcome Summary: Pt is 70 yo female admitted for BLE edema, SOA.  Pt d/o acute PE left main pulmonary artery with concern for thrombus right upper lobe artery per CT chest.  Pt s/p recent lower spine sx L5-S1 on 5/28/2021. PMH: anxiety, fibromyalgia, DM, PERLA, PVD.  Pt presents up in chair finishing lunch.  Pt able to complete transfers with indep, ambulation using RW with mod indep.  Pt able to stand for ADLs x6 minutes indep with occasional UE support.  Pt's with no SOA noted and O2 measured 94-99% during treatment session.  Pt appears close to recent mobility baseline.  Pt reports she has RW/rollator at home and can utilize at home initially as needed.  Pt can be up with nursing staff using RW.  No further inpatient PT needs.  Plan home.     Time Calculation:   PT Charges     Row Name 06/09/21 1526             Time Calculation    Start Time  1317  -      Stop Time  1344  -      Time Calculation (min)  27 min  -      PT Received On  06/09/21  -         Time Calculation- PT    Total Timed Code Minutes- PT  0 minute(s)  -        User Key  (r) = Recorded By, (t) = Taken By, (c) = Cosigned By    Initials Name Provider Type    HC Ct Coker, PT  Physical Therapist        Therapy Charges for Today     Code Description Service Date Service Provider Modifiers Qty    [id number] HC PT EVAL LOW COMPLEXITY 4 6/9/2021 Ct Coker, PT GP 1          PT G-Codes  Outcome Measure Options: AM-PAC 6 Clicks Basic Mobility (PT)  AM-PAC 6 Clicks Score (PT): 23    Ct Coker, PT  6/9/2021

## 2021-06-09 NOTE — PLAN OF CARE
Goal Outcome Evaluation:                   Patient was switched to oral anticoagulation today, eloquis. PT saw patient and cleared patient to go home. Possible discharge today or tomorrow.

## 2021-06-09 NOTE — PROGRESS NOTES
LOS: 3 days   Patient Care Team:  Aida Lowry APRN as PCP - General (Nurse Practitioner)    Chief Complaint: Shortness of air    Subjective         Interval History:   Patient lying in bed in no acute distress.  Patient is awake and alert and ready to go home.  She has no complaints of back pain or leg pain.  She states that the right buttock pain she had yesterday is gone.    History taken from: patient    Objective      Alert and oriented by 3  Speech is intact and coherent articulate with good content and production  Motor strength is 5/5 in both upper and lower extremities with no focal motor deficits  Bilateral lower extremities with marked edema  Incision is well approximated with no redness, gross swelling or obvious drainage.    Bilateral buttocks scattered bruising    Vital Signs  Temp:  [97.4 °F (36.3 °C)-98.4 °F (36.9 °C)] 98.3 °F (36.8 °C)  Heart Rate:  [69-81] 69  Resp:  [16-18] 18  BP: (115-136)/(31-81) 121/74       Results Review:     I reviewed the patient's new clinical results.  Sed rate normal  CRP .80    Assessment/Plan       Acute pulmonary embolism without acute cor pulmonale (CMS/HCC)    Type 2 diabetes mellitus with diabetic peripheral angiopathy without gangrene, with long-term current use of insulin (CMS/HCC)    Anxiety    Benign essential hypertension    Drug-induced constipation    Fibromyalgia    Hyperlipidemia    PERLA (obstructive sleep apnea)    Peripheral vascular disease (CMS/HCC)    Restless leg syndrome    Uncontrolled type 2 diabetes mellitus (CMS/HCC)    Acute kidney injury (CMS/HCC)    Tremors of nervous system      There was no drainage upon palpation of patient's incision today.  There is no signs of any obvious swelling.  This was most likely very small seroma.  Her sed rate was normal and CRP was minimally elevated.  Patient feels like her legs are stronger today and she is ready to go home.  I did tell her that they plan on transitioning her to some oral  anticoagulation which is fine with us.  We will see her at her scheduled follow-up appointment in outpatient clinic.    This patient was examined wearing appropriate personal protective equipment.     I discussed my findings with patient, family, nursing staff and Dr. Gerard.    CHANTALE Cotto  06/09/21  09:58 EDT

## 2021-06-09 NOTE — PROGRESS NOTES
"PULMONARY CRITICAL CARE Progress  NOTE      PATIENT IDENTIFICATION:  Name: Genny Soni  MRN: [id number]  :  1950     Age: 71 y.o.  Sex: female    DATE OF Note:  2021   Referring Physician: Larry Smiley MD                  Subjective:   Feeling better, no new issue, no SOB no chest pain, no nausea or vomiting, no change in bowel habit, no dysuria,  no new  skin rash or itching.      Objective:  tMax 24 hrs: Temp (24hrs), Av.3 °F (36.8 °C), Min:98.1 °F (36.7 °C), Max:98.4 °F (36.9 °C)      Vitals Ranges:   Temp:  [98.1 °F (36.7 °C)-98.4 °F (36.9 °C)] 98.3 °F (36.8 °C)  Heart Rate:  [69-81] 69  Resp:  [16-18] 18  BP: (120-136)/(70-81) 121/74    Intake and Output Last 3 Shifts:   I/O last 3 completed shifts:  In:  [P.O.:940; I.V.:1169]  Out: 2800 [Urine:2800]    Exam:  /74 (BP Location: Left arm, Patient Position: Lying)   Pulse 69   Temp 98.3 °F (36.8 °C) (Oral)   Resp 18   Ht 162.6 cm (64\")   Wt 92.3 kg (203 lb 6.4 oz)   SpO2 100%   BMI 34.91 kg/m²     General Appearance:     HEENT:  Normocephalic, without obvious abnormality, Conjunctiva/corneas clear,.  Normal external ear canals, Nares normal, no drainage     Neck:  Supple, symmetrical, trachea midline. No JVD.  Lungs /Chest wall:   Bilateral basal rhonchi, respirations unlabored symmetrical wall movement.     Heart:  Regular rate and rhythm, systolic murmur PMI left sternal border  Abdomen: Soft, non-tender, no masses, no organomegaly.    Extremities: Trace edema no clubbing or Cyanosis        Medications:    Current Facility-Administered Medications:   •  acetaminophen (TYLENOL) tablet 650 mg, 650 mg, Oral, Q4H PRN **OR** acetaminophen (TYLENOL) suppository 650 mg, 650 mg, Rectal, Q4H PRN, Lilliana Louie APRN  •  aluminum-magnesium hydroxide-simethicone (MAALOX MAX) 400-400-40 MG/5ML suspension 15 mL, 15 mL, Oral, Q6H PRN, Lilliana Louie APRN  •  apixaban (ELIQUIS) tablet 10 mg, 10 mg, Oral, BID, 10 mg at 21 " 1133 **FOLLOWED BY** [START ON 6/16/2021] apixaban (ELIQUIS) tablet 5 mg, 5 mg, Oral, BID, Volodymyr Burleson MD  •  ARIPiprazole (ABILIFY) tablet 2 mg, 2 mg, Oral, Daily, Lilliana Louie APRN, 2 mg at 06/09/21 0857  •  cholecalciferol (VITAMIN D3) tablet 2,000 Units, 2,000 Units, Oral, Daily, Lilliana Louie APRN, 2,000 Units at 06/09/21 0857  •  dextrose (D50W) 25 g/ 50mL Intravenous Solution 25 g, 25 g, Intravenous, Q15 Min PRN, Abelardo Shaw MD  •  dextrose (GLUTOSE) oral gel 15 g, 15 g, Oral, Q15 Min PRN, Abelardo Shaw MD  •  glucagon (human recombinant) (GLUCAGEN DIAGNOSTIC) injection 1 mg, 1 mg, Subcutaneous, Q15 Min PRN, Abelardo Shaw MD  •  HYDROcodone-acetaminophen (NORCO)  MG per tablet 1 tablet, 1 tablet, Oral, Q4H PRN, Lilliana Louie APRN, 1 tablet at 06/09/21 0533  •  insulin glargine (LANTUS, SEMGLEE) injection 46 Units, 46 Units, Subcutaneous, Daily With Breakfast, Lilliana Louie APRN, 46 Units at 06/09/21 0855  •  insulin lispro (ADMELOG) injection 0-24 Units, 0-24 Units, Subcutaneous, 4x Daily With Meals & Nightly, 12 Units at 06/09/21 1701 **AND** insulin lispro (ADMELOG) injection 0-24 Units, 0-24 Units, Subcutaneous, PRN, Abelardo Shaw MD  •  ipratropium-albuterol (DUO-NEB) nebulizer solution 3 mL, 3 mL, Nebulization, Q2H PRN, Lilliana Louie APRN  •  lidocaine (LIDODERM) 5 % 1 patch, 1 patch, Transdermal, Q24H, Abelardo Shaw MD, 1 patch at 06/09/21 0858  •  LORazepam (ATIVAN) tablet 0.5 mg, 0.5 mg, Oral, Q6H PRN, Volodymyr Burleson MD  •  methadone (DOLOPHINE) tablet 10 mg, 10 mg, Oral, Daily, Lilliana Louie APRN, 10 mg at 06/09/21 0856  •  methadone (DOLOPHINE) tablet 15 mg, 15 mg, Oral, Nightly, Lilliana Louie APRN, 15 mg at 06/08/21 2058  •  ondansetron (ZOFRAN) tablet 4 mg, 4 mg, Oral, Q6H PRN **OR** ondansetron (ZOFRAN) injection 4 mg, 4 mg, Intravenous, Q6H PRN, Lilliana Louie APRN, 4 mg at 06/08/21  1909  •  polyethylene glycol (MIRALAX) packet 17 g, 17 g, Oral, Daily, Lilliana Louie APRN, 17 g at 06/09/21 0856  •  pramipexole (MIRAPEX) tablet 1 mg, 1 mg, Oral, Nightly, Lilliana Louie APRN, 1 mg at 06/08/21 2058  •  rosuvastatin (CRESTOR) tablet 10 mg, 10 mg, Oral, Daily, Lilliana Louie APRN, 10 mg at 06/09/21 0857  •  senna (SENOKOT) tablet 3 tablet, 3 tablet, Oral, BID, Lilliana Louie APRN, 3 tablet at 06/09/21 0857  •  sodium chloride 0.9 % flush 10 mL, 10 mL, Intravenous, PRN, Tanner Kelly MD  •  [COMPLETED] Insert peripheral IV, , , Once **AND** sodium chloride 0.9 % flush 10 mL, 10 mL, Intravenous, PRN, Tanner Kelly MD  •  sodium chloride 0.9 % flush 10 mL, 10 mL, Intravenous, Q12H, Lilliana Louie APRN, 10 mL at 06/09/21 0042  •  sodium chloride 0.9 % flush 10 mL, 10 mL, Intravenous, PRN, Lilliana Louie APRN    Data Review:  All labs (24hrs):   Recent Results (from the past 24 hour(s))   Sedimentation Rate    Collection Time: 06/08/21  6:11 PM    Specimen: Blood   Result Value Ref Range    Sed Rate 25 0 - 30 mm/hr   C-reactive Protein    Collection Time: 06/08/21  6:11 PM    Specimen: Blood   Result Value Ref Range    C-Reactive Protein 0.80 (H) 0.00 - 0.50 mg/dL   POC Glucose Once    Collection Time: 06/08/21  7:48 PM    Specimen: Blood   Result Value Ref Range    Glucose 192 (H) 70 - 105 mg/dL   POC Glucose Once    Collection Time: 06/08/21 11:14 PM    Specimen: Blood   Result Value Ref Range    Glucose 158 (H) 70 - 105 mg/dL   POC Glucose Once    Collection Time: 06/09/21 12:43 AM    Specimen: Blood   Result Value Ref Range    Glucose 139 (H) 70 - 105 mg/dL   aPTT    Collection Time: 06/09/21  2:55 AM    Specimen: Blood   Result Value Ref Range    PTT 45.8 (L) 61.0 - 76.5 seconds   Magnesium    Collection Time: 06/09/21  2:55 AM    Specimen: Blood   Result Value Ref Range    Magnesium 2.2 1.6 - 2.4 mg/dL   Phosphorus    Collection Time:  06/09/21  2:55 AM    Specimen: Blood   Result Value Ref Range    Phosphorus 3.5 2.5 - 4.5 mg/dL   Basic Metabolic Panel    Collection Time: 06/09/21  2:55 AM    Specimen: Blood   Result Value Ref Range    Glucose 127 (H) 65 - 99 mg/dL    BUN 16 8 - 23 mg/dL    Creatinine 0.78 0.57 - 1.00 mg/dL    Sodium 140 136 - 145 mmol/L    Potassium 4.4 3.5 - 5.2 mmol/L    Chloride 103 98 - 107 mmol/L    CO2 29.0 22.0 - 29.0 mmol/L    Calcium 9.5 8.6 - 10.5 mg/dL    eGFR Non African Amer 72 >60 mL/min/1.73    BUN/Creatinine Ratio 20.5 7.0 - 25.0    Anion Gap 8.0 5.0 - 15.0 mmol/L   CBC Auto Differential    Collection Time: 06/09/21  2:55 AM    Specimen: Blood   Result Value Ref Range    WBC 6.40 3.40 - 10.80 10*3/mm3    RBC 3.25 (L) 3.77 - 5.28 10*6/mm3    Hemoglobin 10.4 (L) 12.0 - 15.9 g/dL    Hematocrit 30.5 (L) 34.0 - 46.6 %    MCV 93.9 79.0 - 97.0 fL    MCH 32.1 26.6 - 33.0 pg    MCHC 34.2 31.5 - 35.7 g/dL    RDW 14.1 12.3 - 15.4 %    RDW-SD 46.4 37.0 - 54.0 fl    MPV 6.7 6.0 - 12.0 fL    Platelets 199 140 - 450 10*3/mm3    Neutrophil % 55.6 42.7 - 76.0 %    Lymphocyte % 35.6 19.6 - 45.3 %    Monocyte % 6.1 5.0 - 12.0 %    Eosinophil % 2.4 0.3 - 6.2 %    Basophil % 0.3 0.0 - 1.5 %    Neutrophils, Absolute 3.50 1.70 - 7.00 10*3/mm3    Lymphocytes, Absolute 2.30 0.70 - 3.10 10*3/mm3    Monocytes, Absolute 0.40 0.10 - 0.90 10*3/mm3    Eosinophils, Absolute 0.20 0.00 - 0.40 10*3/mm3    Basophils, Absolute 0.00 0.00 - 0.20 10*3/mm3    nRBC 0.1 0.0 - 0.2 /100 WBC   POC Glucose Once    Collection Time: 06/09/21  7:33 AM    Specimen: Blood   Result Value Ref Range    Glucose 142 (H) 70 - 105 mg/dL   aPTT    Collection Time: 06/09/21  8:01 AM    Specimen: Blood   Result Value Ref Range    PTT 51.9 (L) 61.0 - 76.5 seconds   POC Glucose Once    Collection Time: 06/09/21 11:17 AM    Specimen: Blood   Result Value Ref Range    Glucose 196 (H) 70 - 105 mg/dL   aPTT    Collection Time: 06/09/21  2:43 PM    Specimen: Blood   Result Value  Ref Range    PTT 29.2 (L) 61.0 - 76.5 seconds   POC Glucose Once    Collection Time: 06/09/21  4:19 PM    Specimen: Blood   Result Value Ref Range    Glucose 259 (H) 70 - 105 mg/dL        Imaging:  XR Abdomen KUB  Narrative: DATE OF EXAM:  6/8/2021 9:08 AM     PROCEDURE:  XR ABDOMEN KUB-     INDICATIONS:  abdominal distention; I26.99-Other pulmonary embolism without acute cor  pulmonale; R60.0-Localized edema; Z98.1-Arthrodesis status     COMPARISON:  CT abdomen 6/6/2021     TECHNIQUE:   Single radiographic view of the abdomen was obtained.        FINDINGS:  There is a nonspecific but nonobstructive appearing bowel gas pattern.  There is mild generalized gaseous distention of the colon. There is a  large stool burden within the ascending colon, and mild to moderate  stool burden in the transverse and descending colon. No abnormally  dilated small bowel loops are identified.     There are L5-S1 posterior spinal fusion changes. A sacral stimulator  device is in place. Right iliac vascular stent is noted. Surgical clips  are seen along midline of the mid to lower abdomen, and within the  bilateral inguinal regions. There is moderate degenerative narrowing of  the bilateral hip joint spaces..     There is mild asymmetric elevation right hemidiaphragm. Lungs are clear.  Heart size is normal. No suspicious osseous abnormalities.      Impression:    1. Large stool burden in the ascending colon. Mild to moderate stool  burden in the transverse and descending colon.  2. The bowel gas pattern has a grossly nonobstructive appearance.     Electronically Signed By-Mary Kate Craft MD On:6/8/2021 9:45 AM  This report was finalized on [id number] by  Mary Kate Craft MD.       ASSESSMENT:    Acute pulmonary embolism without acute cor pulmonale (CMS/HCC)    Type 2 diabetes mellitus with diabetic peripheral angiopathy without gangrene, with long-term current use of insulin (CMS/HCC)    Anxiety    Benign essential hypertension     Drug-induced constipation    Fibromyalgia    Hyperlipidemia    PERLA (obstructive sleep apnea)    Peripheral vascular disease (CMS/HCC)    Restless leg syndrome    Uncontrolled type 2 diabetes mellitus (CMS/HCC)    Acute kidney injury (CMS/HCC)    Tremors of nervous system     PLAN:  Anti coagulations   Bronchodilator  Inhaled corticosteroids  Electrolytes/ glycemic control  DVT and GI prophylaxis.    Total Critical care time in direct medical management (   ) minutes  Abelardo Shaw MD. D, ABSM.     6/9/2021  18:00 EDT

## 2021-06-10 LAB
ANION GAP SERPL CALCULATED.3IONS-SCNC: 9 MMOL/L (ref 5–15)
APTT PPP: 27.3 SECONDS (ref 61–76.5)
BASOPHILS # BLD AUTO: 0 10*3/MM3 (ref 0–0.2)
BASOPHILS NFR BLD AUTO: 0.4 % (ref 0–1.5)
BUN SERPL-MCNC: 14 MG/DL (ref 8–23)
BUN/CREAT SERPL: 20 (ref 7–25)
CALCIUM SPEC-SCNC: 9.4 MG/DL (ref 8.6–10.5)
CHLORIDE SERPL-SCNC: 104 MMOL/L (ref 98–107)
CO2 SERPL-SCNC: 26 MMOL/L (ref 22–29)
CREAT SERPL-MCNC: 0.7 MG/DL (ref 0.57–1)
DEPRECATED RDW RBC AUTO: 46.4 FL (ref 37–54)
EOSINOPHIL # BLD AUTO: 0.2 10*3/MM3 (ref 0–0.4)
EOSINOPHIL NFR BLD AUTO: 2.5 % (ref 0.3–6.2)
ERYTHROCYTE [DISTWIDTH] IN BLOOD BY AUTOMATED COUNT: 14.1 % (ref 12.3–15.4)
GFR SERPL CREATININE-BSD FRML MDRD: 82 ML/MIN/1.73
GLUCOSE BLDC GLUCOMTR-MCNC: 114 MG/DL (ref 70–105)
GLUCOSE BLDC GLUCOMTR-MCNC: 136 MG/DL (ref 70–105)
GLUCOSE BLDC GLUCOMTR-MCNC: 184 MG/DL (ref 70–105)
GLUCOSE BLDC GLUCOMTR-MCNC: 245 MG/DL (ref 70–105)
GLUCOSE SERPL-MCNC: 195 MG/DL (ref 65–99)
HCT VFR BLD AUTO: 30.9 % (ref 34–46.6)
HGB BLD-MCNC: 10.7 G/DL (ref 12–15.9)
LYMPHOCYTES # BLD AUTO: 1.8 10*3/MM3 (ref 0.7–3.1)
LYMPHOCYTES NFR BLD AUTO: 30 % (ref 19.6–45.3)
MAGNESIUM SERPL-MCNC: 2 MG/DL (ref 1.6–2.4)
MCH RBC QN AUTO: 32.7 PG (ref 26.6–33)
MCHC RBC AUTO-ENTMCNC: 34.8 G/DL (ref 31.5–35.7)
MCV RBC AUTO: 94 FL (ref 79–97)
MONOCYTES # BLD AUTO: 0.3 10*3/MM3 (ref 0.1–0.9)
MONOCYTES NFR BLD AUTO: 5.6 % (ref 5–12)
NEUTROPHILS NFR BLD AUTO: 3.8 10*3/MM3 (ref 1.7–7)
NEUTROPHILS NFR BLD AUTO: 61.5 % (ref 42.7–76)
NRBC BLD AUTO-RTO: 0.1 /100 WBC (ref 0–0.2)
PHOSPHATE SERPL-MCNC: 3.1 MG/DL (ref 2.5–4.5)
PLATELET # BLD AUTO: 203 10*3/MM3 (ref 140–450)
PMV BLD AUTO: 6.5 FL (ref 6–12)
POTASSIUM SERPL-SCNC: 4.5 MMOL/L (ref 3.5–5.2)
RBC # BLD AUTO: 3.28 10*6/MM3 (ref 3.77–5.28)
SODIUM SERPL-SCNC: 139 MMOL/L (ref 136–145)
TROPONIN T SERPL-MCNC: <0.01 NG/ML (ref 0–0.03)
WBC # BLD AUTO: 6.2 10*3/MM3 (ref 3.4–10.8)

## 2021-06-10 PROCEDURE — 99226 PR SBSQ OBSERVATION CARE/DAY 35 MINUTES: CPT | Performed by: FAMILY MEDICINE

## 2021-06-10 PROCEDURE — 85730 THROMBOPLASTIN TIME PARTIAL: CPT | Performed by: EMERGENCY MEDICINE

## 2021-06-10 PROCEDURE — G0378 HOSPITAL OBSERVATION PER HR: HCPCS

## 2021-06-10 PROCEDURE — 96376 TX/PRO/DX INJ SAME DRUG ADON: CPT

## 2021-06-10 PROCEDURE — 80048 BASIC METABOLIC PNL TOTAL CA: CPT | Performed by: FAMILY MEDICINE

## 2021-06-10 PROCEDURE — 84484 ASSAY OF TROPONIN QUANT: CPT | Performed by: FAMILY MEDICINE

## 2021-06-10 PROCEDURE — 82962 GLUCOSE BLOOD TEST: CPT

## 2021-06-10 PROCEDURE — 83735 ASSAY OF MAGNESIUM: CPT | Performed by: NURSE PRACTITIONER

## 2021-06-10 PROCEDURE — 25010000002 ONDANSETRON PER 1 MG: Performed by: NURSE PRACTITIONER

## 2021-06-10 PROCEDURE — 93010 ELECTROCARDIOGRAM REPORT: CPT | Performed by: INTERNAL MEDICINE

## 2021-06-10 PROCEDURE — 85025 COMPLETE CBC W/AUTO DIFF WBC: CPT | Performed by: EMERGENCY MEDICINE

## 2021-06-10 PROCEDURE — 63710000001 INSULIN GLARGINE PER 5 UNITS: Performed by: NURSE PRACTITIONER

## 2021-06-10 PROCEDURE — 84100 ASSAY OF PHOSPHORUS: CPT | Performed by: NURSE PRACTITIONER

## 2021-06-10 PROCEDURE — 93005 ELECTROCARDIOGRAM TRACING: CPT | Performed by: FAMILY MEDICINE

## 2021-06-10 PROCEDURE — 63710000001 INSULIN LISPRO (HUMAN) PER 5 UNITS: Performed by: INTERNAL MEDICINE

## 2021-06-10 RX ADMIN — ONDANSETRON 4 MG: 2 INJECTION INTRAMUSCULAR; INTRAVENOUS at 07:33

## 2021-06-10 RX ADMIN — INSULIN GLARGINE 46 UNITS: 100 INJECTION, SOLUTION SUBCUTANEOUS at 07:34

## 2021-06-10 RX ADMIN — METHADONE HYDROCHLORIDE 10 MG: 10 TABLET ORAL at 07:35

## 2021-06-10 RX ADMIN — ROSUVASTATIN CALCIUM 10 MG: 10 TABLET, FILM COATED ORAL at 07:35

## 2021-06-10 RX ADMIN — HYDROCODONE BITARTRATE AND ACETAMINOPHEN 1 TABLET: 10; 325 TABLET ORAL at 02:55

## 2021-06-10 RX ADMIN — Medication 10 ML: at 20:32

## 2021-06-10 RX ADMIN — PRAMIPEXOLE DIHYDROCHLORIDE 1 MG: 0.5 TABLET ORAL at 20:31

## 2021-06-10 RX ADMIN — INSULIN LISPRO 4 UNITS: 100 INJECTION, SOLUTION INTRAVENOUS; SUBCUTANEOUS at 13:02

## 2021-06-10 RX ADMIN — LIDOCAINE 1 PATCH: 50 PATCH TOPICAL at 09:20

## 2021-06-10 RX ADMIN — INSULIN LISPRO 8 UNITS: 100 INJECTION, SOLUTION INTRAVENOUS; SUBCUTANEOUS at 20:31

## 2021-06-10 RX ADMIN — APIXABAN 10 MG: 5 TABLET, FILM COATED ORAL at 07:34

## 2021-06-10 RX ADMIN — SENNOSIDES 3 TABLET: 8.6 TABLET, FILM COATED ORAL at 20:31

## 2021-06-10 RX ADMIN — METHADONE HYDROCHLORIDE 15 MG: 10 TABLET ORAL at 20:31

## 2021-06-10 RX ADMIN — Medication 2000 UNITS: at 07:33

## 2021-06-10 RX ADMIN — ARIPIPRAZOLE 2 MG: 2 TABLET ORAL at 07:33

## 2021-06-10 RX ADMIN — SENNOSIDES 3 TABLET: 8.6 TABLET, FILM COATED ORAL at 07:33

## 2021-06-10 RX ADMIN — APIXABAN 10 MG: 5 TABLET, FILM COATED ORAL at 20:31

## 2021-06-10 RX ADMIN — POLYETHYLENE GLYCOL 3350 17 G: 17 POWDER, FOR SOLUTION ORAL at 07:34

## 2021-06-10 NOTE — PLAN OF CARE
Goal Outcome Evaluation:  Plan of Care Reviewed With: patient  Progress: improving  Pt rested throughout shift no concerns or complaints noted.

## 2021-06-10 NOTE — CASE MANAGEMENT/SOCIAL WORK
Discharge Planning Assessment   [place]     Patient Name: Genny Soni  MRN: [id number]  Today's Date: 6/10/2021    Admit Date: 6/6/2021          Plan    Plan  anticipate return home    Patient/Family in Agreement with Plan  yes    Plan Comments  spoke to patient in room with ppe(mask and goggles) staying 6 feet away and less than 15 mins; patient denies any needs for discharge ie as home health or any dme;  informed patient that per retail [facility] patient will get first month free with coupon and next month will be $133.00 a month; she is agreeable to this  cost       Carol naegele rn  Case management  Office number [phone]  Cell phone [phone]

## 2021-06-10 NOTE — PLAN OF CARE
Goal Outcome Evaluation:  Plan of Care Reviewed With: patient  Progress: improving    No issues addressed during shift. Will continue to monitor

## 2021-06-10 NOTE — PLAN OF CARE
Problem: Adult Inpatient Plan of Care  Goal: Absence of Hospital-Acquired Illness or Injury  Intervention: Identify and Manage Fall Risk  Recent Flowsheet Documentation  Taken 6/10/2021 1300 by Lucia Michelle RN  Safety Promotion/Fall Prevention: safety round/check completed  Taken 6/10/2021 1100 by Lucia Michelle RN  Safety Promotion/Fall Prevention: safety round/check completed  Taken 6/10/2021 0900 by Lucia Michelle RN  Safety Promotion/Fall Prevention: safety round/check completed  Taken 6/10/2021 0736 by Lucai Michelle RN  Safety Promotion/Fall Prevention:   activity supervised   assistive device/personal items within reach   clutter free environment maintained   nonskid shoes/slippers when out of bed   safety round/check completed   room organization consistent  Taken 6/10/2021 0700 by Lucia Michelle RN  Safety Promotion/Fall Prevention: safety round/check completed  Intervention: Prevent Skin Injury  Recent Flowsheet Documentation  Taken 6/10/2021 0736 by Lucia Michelle RN  Skin Protection:   skin-to-device areas padded   skin-to-skin areas padded   incontinence pads utilized  Intervention: Prevent Infection  Recent Flowsheet Documentation  Taken 6/10/2021 0736 by Lucia Michelle RN  Infection Prevention:   visitors restricted/screened   single patient room provided   hand hygiene promoted   equipment surfaces disinfected  Goal: Optimal Comfort and Wellbeing  Intervention: Provide Person-Centered Care  Recent Flowsheet Documentation  Taken 6/10/2021 0736 by Lucia Michelle RN  Trust Relationship/Rapport:   care explained   choices provided   questions answered   questions encouraged   thoughts/feelings acknowledged   reassurance provided     Problem: Fall Injury Risk  Goal: Absence of Fall and Fall-Related Injury  Intervention: Identify and Manage Contributors to Fall Injury Risk  Recent Flowsheet Documentation  Taken 6/10/2021 0736 by Lucia Michelle RN  Medication Review/Management: medications  reviewed  Self-Care Promotion: independence encouraged  Intervention: Promote Injury-Free Environment  Recent Flowsheet Documentation  Taken 6/10/2021 1300 by Lucia Michelle RN  Safety Promotion/Fall Prevention: safety round/check completed  Taken 6/10/2021 1100 by Lucia Michelle RN  Safety Promotion/Fall Prevention: safety round/check completed  Taken 6/10/2021 0900 by Lucia Michelle RN  Safety Promotion/Fall Prevention: safety round/check completed  Taken 6/10/2021 0736 by Lucia Michelle RN  Safety Promotion/Fall Prevention:   activity supervised   assistive device/personal items within reach   clutter free environment maintained   nonskid shoes/slippers when out of bed   safety round/check completed   room organization consistent  Taken 6/10/2021 0700 by Lucia Michelle RN  Safety Promotion/Fall Prevention: safety round/check completed     Problem: Skin Injury Risk Increased  Goal: Skin Health and Integrity  Intervention: Optimize Skin Protection  Recent Flowsheet Documentation  Taken 6/10/2021 0736 by Lucia Michelle RN  Pressure Reduction Techniques:   frequent weight shift encouraged   weight shift assistance provided   heels elevated off bed   pressure points protected  Skin Protection:   skin-to-device areas padded   skin-to-skin areas padded   incontinence pads utilized  Intervention: Promote and Optimize Oral Intake  Recent Flowsheet Documentation  Taken 6/10/2021 0736 by Lucia Michelle RN  Oral Nutrition Promotion: medicated   Goal Outcome Evaluation:

## 2021-06-10 NOTE — PROGRESS NOTES
"PULMONARY CRITICAL CARE Progress  NOTE      PATIENT IDENTIFICATION:  Name: Genny Soni  MRN: [id number]  :  1950     Age: 71 y.o.  Sex: female    DATE OF Note:  6/10/2021   Referring Physician: Larry Smiley MD                  Subjective:   More leg swelling  Feeling nauseous today   no SOB no chest pain,   no change in bowel habit, no dysuria,  no new  skin rash or itching.      Objective:  tMax 24 hrs: Temp (24hrs), Av.3 °F (36.8 °C), Min:97.9 °F (36.6 °C), Max:98.6 °F (37 °C)      Vitals Ranges:   Temp:  [97.9 °F (36.6 °C)-98.6 °F (37 °C)] 98.5 °F (36.9 °C)  Heart Rate:  [70-79] 70  Resp:  [15-17] 15  BP: (123-128)/(43-79) 128/79    Intake and Output Last 3 Shifts:   I/O last 3 completed shifts:  In: 600 [P.O.:600]  Out: -     Exam:  /79 (BP Location: Left arm, Patient Position: Sitting)   Pulse 70   Temp 98.5 °F (36.9 °C) (Oral)   Resp 15   Ht 162.6 cm (64\")   Wt 87.2 kg (192 lb 3.2 oz)   SpO2 97%   BMI 32.99 kg/m²     General Appearance:   Alert awake no distress eating breakfast  HEENT:  Normocephalic, without obvious abnormality, Conjunctiva/corneas clear,.  Normal external ear canals, Nares normal, no drainage     Neck:  Supple, symmetrical, trachea midline. No JVD.  Lungs /Chest wall:   Bilateral basal rhonchi, respirations unlabored symmetrical wall movement.     Heart:  Regular rate and rhythm, systolic murmur PMI left sternal border  Abdomen: Soft, non-tender, no masses, no organomegaly.    Extremities: + edema no clubbing or Cyanosis        Medications:    Current Facility-Administered Medications:   •  acetaminophen (TYLENOL) tablet 650 mg, 650 mg, Oral, Q4H PRN **OR** acetaminophen (TYLENOL) suppository 650 mg, 650 mg, Rectal, Q4H PRN, Liban, Lilliana Margaret, APRN  •  aluminum-magnesium hydroxide-simethicone (MAALOX MAX) 400-400-40 MG/5ML suspension 15 mL, 15 mL, Oral, Q6H PRN, Lilliana Louie APRN  •  apixaban (ELIQUIS) tablet 10 mg, 10 mg, Oral, BID, 10 mg at 06/10/21 " 0734 **FOLLOWED BY** [START ON 6/16/2021] apixaban (ELIQUIS) tablet 5 mg, 5 mg, Oral, BID, Volodymyr Burleson MD  •  ARIPiprazole (ABILIFY) tablet 2 mg, 2 mg, Oral, Daily, Lilliana Louie APRN, 2 mg at 06/10/21 0733  •  cholecalciferol (VITAMIN D3) tablet 2,000 Units, 2,000 Units, Oral, Daily, Lilliana Louie APRN, 2,000 Units at 06/10/21 0733  •  dextrose (D50W) 25 g/ 50mL Intravenous Solution 25 g, 25 g, Intravenous, Q15 Min PRN, Abelardo Shaw MD  •  dextrose (GLUTOSE) oral gel 15 g, 15 g, Oral, Q15 Min PRN, Abelardo Shaw MD  •  glucagon (human recombinant) (GLUCAGEN DIAGNOSTIC) injection 1 mg, 1 mg, Subcutaneous, Q15 Min PRN, Abelardo Shaw MD  •  HYDROcodone-acetaminophen (NORCO)  MG per tablet 1 tablet, 1 tablet, Oral, Q4H PRN, Lillaina Louie APRN, 1 tablet at 06/10/21 0255  •  insulin glargine (LANTUS, SEMGLEE) injection 46 Units, 46 Units, Subcutaneous, Daily With Breakfast, Lilliana Louie APRN, 46 Units at 06/10/21 0734  •  insulin lispro (ADMELOG) injection 0-24 Units, 0-24 Units, Subcutaneous, 4x Daily With Meals & Nightly, 12 Units at 06/09/21 2050 **AND** insulin lispro (ADMELOG) injection 0-24 Units, 0-24 Units, Subcutaneous, PRN, Abelardo Shaw MD  •  ipratropium-albuterol (DUO-NEB) nebulizer solution 3 mL, 3 mL, Nebulization, Q2H PRN, Lilliana Louie APRN  •  lidocaine (LIDODERM) 5 % 1 patch, 1 patch, Transdermal, Q24H, Abelardo Shaw MD, 1 patch at 06/09/21 0858  •  LORazepam (ATIVAN) tablet 0.5 mg, 0.5 mg, Oral, Q6H PRN, Volodymyr Burleson MD  •  methadone (DOLOPHINE) tablet 10 mg, 10 mg, Oral, Daily, Lilliana Louie APRN, 10 mg at 06/10/21 0735  •  methadone (DOLOPHINE) tablet 15 mg, 15 mg, Oral, Nightly, Lilliana Louie APRN, 15 mg at 06/09/21 2050  •  ondansetron (ZOFRAN) tablet 4 mg, 4 mg, Oral, Q6H PRN **OR** ondansetron (ZOFRAN) injection 4 mg, 4 mg, Intravenous, Q6H PRN, Lilliana Louie APRN, 4 mg at 06/10/21  0733  •  polyethylene glycol (MIRALAX) packet 17 g, 17 g, Oral, Daily, Lilliana Louie APRN, 17 g at 06/10/21 0734  •  pramipexole (MIRAPEX) tablet 1 mg, 1 mg, Oral, Nightly, Lilliana Louie APRN, 1 mg at 06/09/21 2050  •  rosuvastatin (CRESTOR) tablet 10 mg, 10 mg, Oral, Daily, Lilliana Louie APRN, 10 mg at 06/10/21 0735  •  senna (SENOKOT) tablet 3 tablet, 3 tablet, Oral, BID, Lilliana Louie APRN, 3 tablet at 06/10/21 0733  •  sodium chloride 0.9 % flush 10 mL, 10 mL, Intravenous, PRN, Tanner Kelly MD  •  [COMPLETED] Insert peripheral IV, , , Once **AND** sodium chloride 0.9 % flush 10 mL, 10 mL, Intravenous, PRN, Tanner Kelly MD  •  sodium chloride 0.9 % flush 10 mL, 10 mL, Intravenous, Q12H, Lilliana Louie APRN, 10 mL at 06/09/21 2051  •  sodium chloride 0.9 % flush 10 mL, 10 mL, Intravenous, PRN, Lilliana Louie APRN    Data Review:  All labs (24hrs):   Recent Results (from the past 24 hour(s))   POC Glucose Once    Collection Time: 06/09/21 11:17 AM    Specimen: Blood   Result Value Ref Range    Glucose 196 (H) 70 - 105 mg/dL   aPTT    Collection Time: 06/09/21  2:43 PM    Specimen: Blood   Result Value Ref Range    PTT 29.2 (L) 61.0 - 76.5 seconds   POC Glucose Once    Collection Time: 06/09/21  4:19 PM    Specimen: Blood   Result Value Ref Range    Glucose 259 (H) 70 - 105 mg/dL   POC Glucose Once    Collection Time: 06/09/21  8:25 PM    Specimen: Blood   Result Value Ref Range    Glucose 274 (H) 70 - 105 mg/dL   CBC Auto Differential    Collection Time: 06/10/21  2:36 AM    Specimen: Blood   Result Value Ref Range    WBC 6.20 3.40 - 10.80 10*3/mm3    RBC 3.28 (L) 3.77 - 5.28 10*6/mm3    Hemoglobin 10.7 (L) 12.0 - 15.9 g/dL    Hematocrit 30.9 (L) 34.0 - 46.6 %    MCV 94.0 79.0 - 97.0 fL    MCH 32.7 26.6 - 33.0 pg    MCHC 34.8 31.5 - 35.7 g/dL    RDW 14.1 12.3 - 15.4 %    RDW-SD 46.4 37.0 - 54.0 fl    MPV 6.5 6.0 - 12.0 fL    Platelets 203 140 -  450 10*3/mm3    Neutrophil % 61.5 42.7 - 76.0 %    Lymphocyte % 30.0 19.6 - 45.3 %    Monocyte % 5.6 5.0 - 12.0 %    Eosinophil % 2.5 0.3 - 6.2 %    Basophil % 0.4 0.0 - 1.5 %    Neutrophils, Absolute 3.80 1.70 - 7.00 10*3/mm3    Lymphocytes, Absolute 1.80 0.70 - 3.10 10*3/mm3    Monocytes, Absolute 0.30 0.10 - 0.90 10*3/mm3    Eosinophils, Absolute 0.20 0.00 - 0.40 10*3/mm3    Basophils, Absolute 0.00 0.00 - 0.20 10*3/mm3    nRBC 0.1 0.0 - 0.2 /100 WBC   aPTT    Collection Time: 06/10/21  2:36 AM    Specimen: Blood   Result Value Ref Range    PTT 27.3 (L) 61.0 - 76.5 seconds   Magnesium    Collection Time: 06/10/21  2:36 AM    Specimen: Blood   Result Value Ref Range    Magnesium 2.0 1.6 - 2.4 mg/dL   Phosphorus    Collection Time: 06/10/21  2:36 AM    Specimen: Blood   Result Value Ref Range    Phosphorus 3.1 2.5 - 4.5 mg/dL   POC Glucose Once    Collection Time: 06/10/21  7:16 AM    Specimen: Blood   Result Value Ref Range    Glucose 136 (H) 70 - 105 mg/dL        Imaging:  XR Abdomen KUB  Narrative: DATE OF EXAM:  6/8/2021 9:08 AM     PROCEDURE:  XR ABDOMEN KUB-     INDICATIONS:  abdominal distention; I26.99-Other pulmonary embolism without acute cor  pulmonale; R60.0-Localized edema; Z98.1-Arthrodesis status     COMPARISON:  CT abdomen 6/6/2021     TECHNIQUE:   Single radiographic view of the abdomen was obtained.        FINDINGS:  There is a nonspecific but nonobstructive appearing bowel gas pattern.  There is mild generalized gaseous distention of the colon. There is a  large stool burden within the ascending colon, and mild to moderate  stool burden in the transverse and descending colon. No abnormally  dilated small bowel loops are identified.     There are L5-S1 posterior spinal fusion changes. A sacral stimulator  device is in place. Right iliac vascular stent is noted. Surgical clips  are seen along midline of the mid to lower abdomen, and within the  bilateral inguinal regions. There is moderate  degenerative narrowing of  the bilateral hip joint spaces..     There is mild asymmetric elevation right hemidiaphragm. Lungs are clear.  Heart size is normal. No suspicious osseous abnormalities.      Impression:    1. Large stool burden in the ascending colon. Mild to moderate stool  burden in the transverse and descending colon.  2. The bowel gas pattern has a grossly nonobstructive appearance.     Electronically Signed By-Mary Kate Craft MD On:6/8/2021 9:45 AM  This report was finalized on [id number] by  Mary Kate Craft MD.       ASSESSMENT:    Acute pulmonary embolism without acute cor pulmonale (CMS/HCC)    Type 2 diabetes mellitus with diabetic peripheral angiopathy without gangrene, with long-term current use of insulin (CMS/HCC)    Anxiety    Benign essential hypertension    Drug-induced constipation    Fibromyalgia    Hyperlipidemia    PERLA (obstructive sleep apnea)    Peripheral vascular disease (CMS/HCC)    Restless leg syndrome    Uncontrolled type 2 diabetes mellitus (CMS/HCC)    Acute kidney injury (CMS/HCC)    Tremors of nervous system     PLAN:  Anti coagulations  Patient might benefit from diuretics we will add Lasix  Bronchodilator  Inhaled corticosteroids  Electrolytes/ glycemic control  DVT and GI prophylaxis.    Total Critical care time in direct medical management (   ) minutes  Abelardo Shaw MD. D, ABSM.     6/10/2021  08:38 EDT

## 2021-06-10 NOTE — PROGRESS NOTES
[facility] Medicine Services Daily Progress Note      Hospitalist Team  LOS 4 days      Patient Care Team:  Aida Lowry APRN as PCP - General (Nurse Practitioner)    Patient Location: [facility]/1      Subjective   Subjective     Chief Complaint / Subjective  Chief Complaint   Patient presents with   • Chest Pain     Patient tolerated Eliquis.  Walking oximetry indicating patient not needing home oxygen.  However patient reports overnight started having episodes of feeling lightheaded.  Orthostatics repeated and are negative.  Ordered EKG showing no signs of cardiac ischemia.  Patient noted to have transient hypoxia on room air when getting up from being seated going into the mid 80s for a few seconds then rebounding.  Given significant burden of clot would elect to continue monitoring patient overnight and possibly consider repeating 6-minute walk.    Brief Synopsis of Hospital Course/HPI    Ms. Soni is a 71 y.o. female with past medical history of diabetes mellitus type 2, hyperlipidemia, arthritis, chronic low back pain on methadone, and recent lumbar surgery 10 days ago presented to [facility] ED 6/6/2021 with complaints of shortness of breath associated with chest pain for approximately 3 days. She also noted bilateral lower extremity swelling. 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 She was not discharged on oral anticoagulation and has not been on anticoagulation since that time. Her mother had a history of blood clots.      In the ED CT PE protocol was positive for pulmonary emboli.  Thrombus in left main pulmonary artery extending into the left upper lobe vessels, possible saddle embolus with clot possibly extending into the left main pulmonary artery across bifurcation into the right main pulmonary artery.  There is also concern for thrombus in segmental right upper lobe  "artery.  Scattered groundglass opacities also found in lungs.  Covid swab was negative.  Patient was started on heparin drip and admitted to the ICU for further treatment of pulmonary emboli.     6/7/2021 nephrology consulted for DARRYL.  Downgrade orders out of ICU, Hospitalist group consulted for medical management    6/8/2021: Patient notably more sluggish today.  Patient denies any chest pain breathing comfortably at rest.  Discussion of transition to oral anticoagulation if cleared by neurosurgery, patient would like to be on DOAC, does not want Coumadin at all.  Some concern had of drainage from spinal surgery site.    6/9/2021: Patient's spinal surgery site monitored by neurosurgery team no signs of infection at this time.  Patient cleared to start DOAC, Eliquis ordered.  Patient worked with physical therapy.    Date::          Review of Systems   Constitutional: Positive for malaise/fatigue. Negative for chills and fever.   HENT: Negative for hoarse voice and stridor.    Eyes: Negative for double vision and photophobia.   Cardiovascular: Negative for chest pain and paroxysmal nocturnal dyspnea.   Respiratory: Negative for cough and shortness of breath.    Skin: Positive for color change. Negative for rash.   Gastrointestinal: Negative for nausea and vomiting.   Genitourinary: Negative for dysuria and flank pain.   Neurological: Positive for light-headedness. Negative for dizziness and weakness.   Psychiatric/Behavioral: Negative for altered mental status. The patient is not nervous/anxious.          Objective   Objective      Vital Signs  Temp:  [97.9 °F (36.6 °C)-98.6 °F (37 °C)] 98.5 °F (36.9 °C)  Heart Rate:  [67-91] 67  Resp:  [15-17] 17  BP: (123-153)/(43-94) 146/55  Oxygen Therapy  SpO2: 97 %  Pulse Oximetry Type: Intermittent  Device (Oxygen Therapy): room air  Flow (L/min): 4  Oxygen Concentration (%): 4  Flowsheet Rows      First Filed Value   Admission Height  162.6 cm (64\") Documented at 06/06/2021 " 1603   Admission Weight  91.5 kg (201 lb 12.8 oz) Documented at 06/06/2021 1603        Intake & Output (last 3 days)       06/07 0701 - 06/08 0700 06/08 0701 - 06/09 0700 06/09 0701 - 06/10 0700 06/10 0701 - 06/11 0700    P.O. 1830 460 600     I.V. (mL/kg) 824 (9) 609 (6.6)      Total Intake(mL/kg) 2654 (29) 1069 (11.6) 600 (6.9)     Urine (mL/kg/hr) 4700 (2.1) 800 (0.4)      Stool 0 0      Total Output 4700 800      Net -2046 +269 +600             Urine Unmeasured Occurrence  2 x 2 x     Stool Unmeasured Occurrence 0 x 1 x          Lines, Drains & Airways    Active LDAs     Name:   Placement date:   Placement time:   Site:   Days:    Peripheral IV 06/06/21 1655 Left;Lateral Antecubital   06/06/21    1655    Antecubital   1    Peripheral IV 06/07/21 1400 Left;Posterior Forearm   06/07/21    1400    Forearm   1                  Physical Exam:    Physical Exam  General: Obese elderly female lying in bed breathing comfortably room air no acute distress  HEENT: NC/AT, EOMI, mucosa moist  Heart: Regular, rate controlled  Chest: Normal work of breathing, moving air well no wheezing or crackles  Abdominal: Soft. NT/ND.   Musculoskeletal: Normal ROM.  Significant bilateral lower extremity edema with erythema.  Neurological: AAOx3, no focal deficits  Skin: Skin is warm and dry.  Back surgery dressing applied  Psychiatric: Pleasant and conversational       Wounds (last 24 hours)      LDA Wound     Row Name 06/10/21 1300 06/10/21 1100 06/10/21 0900       Wound 05/28/21 0814 lower lumbar spine Incision    Wound - Properties Group Placement Date: 05/28/21  - Placement Time: 0814  - Present on Hospital Admission: N  -MH Orientation: lower  -MH Location: lumbar spine  -MH Primary Wound Type: Incision  -MH Additional Comments: DRESSING- EXOFIN, COVADERM  -MH    Closure  Adhesive bandage  -HB  Adhesive bandage  -HB  Adhesive bandage  -HB    Base  dressing in place, unable to visualize  -HB  dressing in place, unable to visualize   -HB  dressing in place, unable to visualize  -HB    Periwound  intact  -HB  intact  -HB  intact  -HB    Drainage Characteristics/Odor  serosanguineous  -HB  serosanguineous  -HB  serosanguineous  -HB    Drainage Amount  moderate  -HB  moderate  -HB  moderate  -HB    Retired Wound - Properties Group Date first assessed: 05/28/21  - Time first assessed: 0814 - Present on Hospital Admission: N  - Location: lumbar spine  -MH Primary Wound Type: Incision  -MH Additional Comments: DRESSING- EXOFIN, COVADERM  -MH    Row Name 06/10/21 0736 06/10/21 0700 06/09/21 2000       Wound 05/28/21 0814 lower lumbar spine Incision    Wound - Properties Group Placement Date: 05/28/21  - Placement Time: 0814 - Present on Hospital Admission: N  - Orientation: lower  - Location: lumbar spine  -MH Primary Wound Type: Incision  -MH Additional Comments: DRESSING- EXOFIN, COVADERM  -MH    Dressing Appearance  --  --  dry;intact  -AB    Closure  Adhesive bandage  -HB  Adhesive bandage  -HB  Adhesive bandage  -AB    Base  dressing in place, unable to visualize  -HB  dressing in place, unable to visualize  -HB  dressing in place, unable to visualize  -AB    Periwound  intact  -HB  intact  -HB  --    Drainage Characteristics/Odor  serosanguineous  -HB  serosanguineous  -HB  --    Drainage Amount  moderate  -HB  moderate  -HB  --    Retired Wound - Properties Group Date first assessed: 05/28/21  - Time first assessed: 0814  - Present on Hospital Admission: N  - Location: lumbar spine  -MH Primary Wound Type: Incision  -MH Additional Comments: DRESSING- EXOFIN, COVADERM  -MH      User Key  (r) = Recorded By, (t) = Taken By, (c) = Cosigned By    Initials Name Provider Type    Alice Mcclendon, RN Registered Nurse    Lucia Downey, RN Registered Nurse    Loretta Knapp RN Registered Nurse          Procedures:              Results Review:     I reviewed the patient's new clinical results.      Lab Results (last 24  hours)     Procedure Component Value Units Date/Time    Basic Metabolic Panel [[id number]]  (Abnormal) Collected: 06/10/21 1140    Specimen: Blood Updated: 06/10/21 1227     Glucose 195 mg/dL      BUN 14 mg/dL      Creatinine 0.70 mg/dL      Sodium 139 mmol/L      Potassium 4.5 mmol/L      Comment: Slight hemolysis detected by analyzer. Results may be affected.        Chloride 104 mmol/L      CO2 26.0 mmol/L      Calcium 9.4 mg/dL      eGFR Non African Amer 82 mL/min/1.73      BUN/Creatinine Ratio 20.0     Anion Gap 9.0 mmol/L     Narrative:      GFR Normal >60  Chronic Kidney Disease <60  Kidney Failure <15      Troponin [[id number]]  (Normal) Collected: 06/10/21 1140    Specimen: Blood Updated: 06/10/21 1227     Troponin T <0.010 ng/mL     Narrative:      Troponin T Reference Range:  <= 0.03 ng/mL-   Negative for AMI  >0.03 ng/mL-     Abnormal for myocardial necrosis.  Clinicians would have to utilize clinical acumen, EKG, Troponin and serial changes to determine if it is an Acute Myocardial Infarction or myocardial injury due to an underlying chronic condition.       Results may be falsely decreased if patient taking Biotin.      POC Glucose Once [[id number]]  (Abnormal) Collected: 06/10/21 1200    Specimen: Blood Updated: 06/10/21 1202     Glucose 184 mg/dL      Comment: Serial Number: [id number]:  [id number]       POC Glucose Once [[id number]]  (Abnormal) Collected: 06/10/21 0716    Specimen: Blood Updated: 06/10/21 0717     Glucose 136 mg/dL      Comment: Serial Number: [id number]:  [id number]       Phosphorus [[id number]]  (Normal) Collected: 06/10/21 0236    Specimen: Blood Updated: 06/10/21 0406     Phosphorus 3.1 mg/dL     Magnesium [[id number]]  (Normal) Collected: 06/10/21 0236    Specimen: Blood Updated: 06/10/21 0405     Magnesium 2.0 mg/dL     aPTT [[id number]]  (Abnormal) Collected: 06/10/21 0236    Specimen: Blood Updated: 06/10/21 0351     PTT 27.3 seconds     CBC & Differential [[id number]]   (Abnormal) Collected: 06/10/21 0236    Specimen: Blood Updated: 06/10/21 0337    Narrative:      The following orders were created for panel order CBC & Differential.  Procedure                               Abnormality         Status                     ---------                               -----------         ------                     CBC Auto Differential[[id number]]        Abnormal            Final result                 Please view results for these tests on the individual orders.    CBC Auto Differential [[id number]]  (Abnormal) Collected: 06/10/21 0236    Specimen: Blood Updated: 06/10/21 0337     WBC 6.20 10*3/mm3      RBC 3.28 10*6/mm3      Hemoglobin 10.7 g/dL      Hematocrit 30.9 %      MCV 94.0 fL      MCH 32.7 pg      MCHC 34.8 g/dL      RDW 14.1 %      RDW-SD 46.4 fl      MPV 6.5 fL      Platelets 203 10*3/mm3      Neutrophil % 61.5 %      Lymphocyte % 30.0 %      Monocyte % 5.6 %      Eosinophil % 2.5 %      Basophil % 0.4 %      Neutrophils, Absolute 3.80 10*3/mm3      Lymphocytes, Absolute 1.80 10*3/mm3      Monocytes, Absolute 0.30 10*3/mm3      Eosinophils, Absolute 0.20 10*3/mm3      Basophils, Absolute 0.00 10*3/mm3      nRBC 0.1 /100 WBC     POC Glucose Once [[id number]]  (Abnormal) Collected: 06/09/21 2025    Specimen: Blood Updated: 06/09/21 2027     Glucose 274 mg/dL      Comment: Serial Number: [id number]:  [id number]           No results found for: HGBA1C  Results from last 7 days   Lab Units 06/07/21  0429 06/06/21  1729   INR  0.96 <0.93*           No results found for: LIPASE  Lab Results   Component Value Date    CHLPL 156 08/31/2020    TRIG 281 (H) 08/31/2020    HDL 40 (L) 08/31/2020    LDL 60 08/31/2020       No results found for: INTRAOP, PREDX, FINALDX, COMDX    Microbiology Results (last 10 days)     ** No results found for the last 240 hours. **          ECG/EMG Results (most recent)     Procedure Component Value Units Date/Time    ADULT TRANSTHORACIC ECHO COMPLETE W/ CONT  IF NECESSARY PER PROTOCOL [[id number]] Collected: 06/07/21 0742     Updated: 06/07/21 0948     BSA 2.0 m^2      RVIDd 2.6 cm      IVSd 0.87 cm      LVIDd 3.9 cm      LVIDs 2.7 cm      LVPWd 1.0 cm      IVS/LVPW 0.83     FS 30.3 %      EDV(Teich) 65.6 ml      ESV(Teich) 27.4 ml      EF(Teich) 58.3 %      EDV(cubed) 59.0 ml      ESV(cubed) 20.0 ml      EF(cubed) 66.1 %      LV mass(C)d 114.7 grams      LV mass(C)dI 58.4 grams/m^2      SV(Teich) 38.2 ml      SI(Teich) 19.5 ml/m^2      SV(cubed) 39.0 ml      SI(cubed) 19.8 ml/m^2      Ao root diam 3.1 cm      Ao root area 7.6 cm^2      ACS 1.7 cm      LVOT diam 1.9 cm      LVOT area 2.9 cm^2      EDV(MOD-sp4) 40.2 ml      ESV(MOD-sp4) 18.3 ml      EF(MOD-sp4) 54.4 %      SV(MOD-sp4) 21.9 ml      SI(MOD-sp4) 11.1 ml/m^2      Ao root area (BSA corrected) 1.6     LV Morgan Vol (BSA corrected) 20.5 ml/m^2      LV Sys Vol (BSA corrected) 9.3 ml/m^2      Aortic R-R 0.82 sec      Aortic HR 73.3 BPM      MV E max simran 99.7 cm/sec      MV A max simran 108.0 cm/sec      MV E/A 0.92     MV V2 max 97.6 cm/sec      MV max PG 3.8 mmHg      MV V2 mean 65.6 cm/sec      MV mean PG 1.9 mmHg      MV V2 VTI 34.7 cm      MVA(VTI) 2.9 cm^2      MV dec slope 338.2 cm/sec^2      MV dec time 0.29 sec      Ao pk simran 170.3 cm/sec      Ao max PG 11.6 mmHg      Ao max PG (full) 4.1 mmHg      Ao V2 mean 119.3 cm/sec      Ao mean PG 6.2 mmHg      Ao mean PG (full) 2.1 mmHg      Ao V2 VTI 42.2 cm      EILEEN(I,A) 2.4 cm^2      EILEEN(I,D) 2.4 cm^2      EILEEN(V,A) 2.4 cm^2      EILEEN(V,D) 2.4 cm^2      LV V1 max PG 7.5 mmHg      LV V1 mean PG 4.0 mmHg      LV V1 max 137.2 cm/sec      LV V1 mean 93.4 cm/sec      LV V1 VTI 34.5 cm      CO(Ao) 23.4 l/min      CI(Ao) 11.9 l/min/m^2      SV(Ao) 319.4 ml      SI(Ao) 162.6 ml/m^2      CO(LVOT) 7.4 l/min      CI(LVOT) 3.8 l/min/m^2      SV(LVOT) 101.1 ml      SI(LVOT) 51.4 ml/m^2      PA V2 max 128.9 cm/sec      PA max PG 6.7 mmHg      PA max PG (full) 1.8 mmHg      RV V1 max  PG 4.9 mmHg      RV V1 mean PG 2.5 mmHg      RV V1 max 110.1 cm/sec      RV V1 mean 75.4 cm/sec      RV V1 VTI 24.7 cm      TR max zay 233.2 cm/sec      RVSP(TR) 24.9 mmHg      RAP systole 3.0 mmHg      Pulm Sys Zay 70.9 cm/sec      Pulm Morgan Zay 45.6 cm/sec      Pulm S/D 1.6     Pulm A Revs Dur 0.11 sec      Pulm A Revs Zay 25.0 cm/sec       CV ECHO SCOTTIE - BZI_BMI 34.7 kilograms/m^2       CV ECHO SCOTTIE - BSA(HAYCOCK) 2.1 m^2       CV ECHO SCOTTIE - BZI_METRIC_WEIGHT 91.6 kg       CV ECHO SCOTTIE - BZI_METRIC_HEIGHT 162.6 cm      EF(MOD-bp) 54.0 %      LA dimension(2D) 3.8 cm     Narrative:      · Estimated right ventricular systolic pressure from tricuspid   regurgitation is normal (<35 mmHg).  · Calculated left ventricular EF = 54% Estimated left ventricular EF was   in agreement with the calculated left ventricular EF.  · Left ventricular diastolic function was normal.     Essentially unremarkable study  Normal LV and RV size and function  Normal atrial sizes  No significant valvular regurgitation or stenosis seen  Valve leaflets are thin and pliable with no mobile echodensities  No Doppler evidence for ASD or PFO although no bubble study performed in   the present study  Normal myocardial thickness no LVOT obstruction no intracavitary   obstruction  No masses or effusion seen  Normal diastolic function by criteria  No pulmonary hypertension seen as measured noninvasively  Normal right left-sided pressures estimated noninvasively  EF 60%      ECG 12 Lead [[id number]] Collected: 06/06/21 1631     Updated: 06/08/21 1609     QT Interval 363 ms     Narrative:      HEART RATE= 82  bpm  RR Interval= 728  ms  MI Interval= 181  ms  P Horizontal Axis= 5  deg  P Front Axis= 36  deg  QRSD Interval= 79  ms  QT Interval= 363  ms  QRS Axis= 57  deg  T Wave Axis= 77  deg  - NORMAL ECG -  Sinus rhythm  When compared with ECG of 27-May-2021 17:07:24,  No significant change  Electronically Signed By: Tanner Kelly (ANKUR) 08-Jun-2021  16:08:36  Date and Time of Study: 2021-06-06 16:31:05    ECG 12 Lead [[id number]] Collected: 06/10/21 1147     Updated: 06/10/21 1148     QT Interval 405 ms     Narrative:      HEART RATE= 68  bpm  RR Interval= 884  ms  TX Interval= 188  ms  P Horizontal Axis= 7  deg  P Front Axis= 33  deg  QRSD Interval= 80  ms  QT Interval= 405  ms  QRS Axis= 44  deg  T Wave Axis= 62  deg  - NORMAL ECG -  Sinus rhythm  When compared with ECG of 06-Jun-2021 16:31:05,  No significant change  Electronically Signed By:   Date and Time of Study: 2021-06-10 11:47:43          Results for orders placed during the hospital encounter of 06/06/21    Duplex Venous Lower Extremity - Bilateral CAR    Interpretation Summary  · Acute left lower extremity superficial thrombophlebitis noted in the greater saphenous (above knee), 1 cm from the saphenofemoral junction.  · All other veins appeared normal bilaterally.      Results for orders placed during the hospital encounter of 06/06/21    ADULT TRANSTHORACIC ECHO COMPLETE W/ CONT IF NECESSARY PER PROTOCOL    Interpretation Summary  · Estimated right ventricular systolic pressure from tricuspid regurgitation is normal (<35 mmHg).  · Calculated left ventricular EF = 54% Estimated left ventricular EF was in agreement with the calculated left ventricular EF.  · Left ventricular diastolic function was normal.    Essentially unremarkable study  Normal LV and RV size and function  Normal atrial sizes  No significant valvular regurgitation or stenosis seen  Valve leaflets are thin and pliable with no mobile echodensities  No Doppler evidence for ASD or PFO although no bubble study performed in the present study  Normal myocardial thickness no LVOT obstruction no intracavitary obstruction  No masses or effusion seen  Normal diastolic function by criteria  No pulmonary hypertension seen as measured noninvasively  Normal right left-sided pressures estimated noninvasively  EF 60%      CT Abdomen Pelvis With  Contrast    Result Date: 6/6/2021  1. There is fluid in the colon which can be seen with diarrhea and clinical correlation is recommended. 2. There is mild bilateral hydronephrosis with no ureteral stone. This could possibly be due to bladder distention and clinical correlation is recommended. 3. There is a partly included fluid filled structure measuring at least 5.5 cm in the subcutaneous fat of the anterior medial right thigh that is of uncertain etiology. 4. Appendix is not identifiable. No secondary signs of appendicitis. 5. Patient has had an aortobifemoral graft. 6. Additional findings as reported above.  Electronically Signed By-Argelia Falcon MD On:6/6/2021 8:05 PM This report was finalized on [id number] by  Argelia Falcon MD.    XR Chest 1 View    Result Date: 6/6/2021   1. Stable exam, with no evidence of active disease.  Electronically Signed By-Argelia Falcon MD On:6/6/2021 5:23 PM This report was finalized on [id number] by  Argelia Falcon MD.    CT Chest Pulmonary Embolism    Result Date: 6/6/2021  1. This study is positive for pulmonary emboli. There is thrombus in the left main pulmonary artery extending into left upper lobe vessels. Technically, I think this is likely a saddle embolus, with a thin wispy clot extending from the clot in the left main pulmonary artery across the bifurcation and into the right main pulmonary artery. There is also likely thrombus in a segmental right upper lobe artery. 2. Scattered groundglass opacities in the lungs, nonspecific. 3. Please see today's CT abdomen dictation for description of abdominal findings.  Electronically Signed By-Argelia Falcon MD On:6/6/2021 7:58 PM This report was finalized on [id number] by  Argelia Falcon MD.    XR Abdomen KUB    Result Date: 6/8/2021   1. Large stool burden in the ascending colon. Mild to moderate stool burden in the transverse and descending colon. 2. The bowel gas pattern has a grossly nonobstructive appearance.   Electronically Signed By-Mary Kate Craft MD On:6/8/2021 9:45 AM This report was finalized on [id number] by  Mary Kate Craft MD.          Xrays, labs reviewed personally by physician.    Medication Review:   I have reviewed the patient's current medication list      Scheduled Meds  apixaban, 10 mg, Oral, BID   Followed by  [START ON 6/16/2021] apixaban, 5 mg, Oral, BID  ARIPiprazole, 2 mg, Oral, Daily  cholecalciferol, 2,000 Units, Oral, Daily  insulin glargine, 46 Units, Subcutaneous, Daily With Breakfast  insulin lispro, 0-24 Units, Subcutaneous, 4x Daily With Meals & Nightly  lidocaine, 1 patch, Transdermal, Q24H  methadone, 10 mg, Oral, Daily  methadone, 15 mg, Oral, Nightly  polyethylene glycol, 17 g, Oral, Daily  pramipexole, 1 mg, Oral, Nightly  rosuvastatin, 10 mg, Oral, Daily  senna, 3 tablet, Oral, BID  sodium chloride, 10 mL, Intravenous, Q12H        Meds Infusions       Meds PRN  •  acetaminophen **OR** acetaminophen  •  aluminum-magnesium hydroxide-simethicone  •  dextrose  •  dextrose  •  glucagon (human recombinant)  •  HYDROcodone-acetaminophen  •  insulin lispro **AND** insulin lispro  •  ipratropium-albuterol  •  LORazepam  •  ondansetron **OR** ondansetron  •  sodium chloride  •  [COMPLETED] Insert peripheral IV **AND** sodium chloride  •  sodium chloride        Assessment/Plan   Assessment/Plan     Active Hospital Problems    Diagnosis  POA   • **Acute pulmonary embolism without acute cor pulmonale (CMS/HCC) [I26.99]  Yes   • Anxiety [F41.9]  Yes   • Fibromyalgia [M79.7]  Yes   • Acute kidney injury (CMS/HCC) [N17.9]  Unknown   • Tremors of nervous system [R25.1]  Unknown   • Restless leg syndrome [G25.81]  Unknown   • Type 2 diabetes mellitus with diabetic peripheral angiopathy without gangrene, with long-term current use of insulin (CMS/HCC) [E11.51, Z79.4]  Not Applicable   • Drug-induced constipation [K59.03]  Yes   • Benign essential hypertension [I10]  Yes   • Hyperlipidemia [E78.5]  Yes    • Peripheral vascular disease (CMS/HCC) [I73.9]  Yes   • Uncontrolled type 2 diabetes mellitus (CMS/HCC) [E11.65]  Yes   • PERLA (obstructive sleep apnea) [G47.33]  Yes      Resolved Hospital Problems   No resolved problems to display.       MEDICAL DECISION MAKING COMPLEXITY BY PROBLEM:     Chest pain-in context with history of previous thromboembolic event and recent surgery patient high risk for new thromboembolic event.  Patient found to have significant clot burden with signs of questionable saddle embolus, has since resolved  -Heparin discontinued and started Eliquis cleared by neurosurgery  -Can follow-up outpatient with hematology for hypercoagulability work-up  -Echo showing no signs of right heart strain  -Pulmonology consulted patient stable to be transferred out of ICU  -Wean oxygen as tolerated  -Negative BNP, negative troponins good prognostic sign    Dizziness-patient reported episode of feeling lightheaded no recent room spinning sensation concerning giving questionable saddle embolus  -Orthostatics negative  -EKG and troponin unremarkable  -Monitor overnight  -Patient with self-reported history of significant anxiety there may also be underlying component     Thromboembolic event-patient reports previously noted, appears it may have been provoked  -Consider hematology follow-up outpatient for hyper coag  -Patient will need to be anticoagulated for lifetime     Anemia-uncertain chronicity at this time but no obvious signs of bleeding, stable  -Daily CBC     Type 2 diabetes-patient has been hyperglycemic in the hospital though this may be due to significant amount of sympathetic nervous system activation  -Sliding scale  -Home long-acting  -Diabetic diet     Renal insufficiency-patient's baseline normal, nephrology consulted from the ICU, now back to normal  -DC IV fluids   -Daily creatinine     Peripheral vascular disease-patient with previous history of vascular surgery and bypass  -Continue statin  and antiplatelets     Hypertension/hyperlipidemia/fibromyalgia/opioid dependency/anxiety/chronic constipation/restless leg syndrome-chronic in nature  -Resume home medication as clinically appropriate     Obesity-BMI of 34  -Lifestyle modifications    VTE Prophylaxis -   Mechanical Order History:     None      Pharmalogical Order History:      Ordered     Dose Route Frequency Stop    06/06/21 2147  heparin bolus from bag 5,000 Units      5,000 Units IV Once 06/06/21 2201    06/06/21 2147  heparin 60877 units/250 mL (100 units/mL) in 0.45 % NaCl infusion  9.97 mL/hr      10.9 Units/kg/hr IV Titrated --    06/06/21 2147  heparin bolus from bag 2,500 Units      2,500 Units IV Every 6 Hours PRN --    06/06/21 2147  heparin bolus from bag 5,000 Units      5,000 Units IV Every 6 Hours PRN 06/08/21 2143                  Code Status -   Code Status and Medical Interventions:   Ordered at: 06/07/21 0045     Code Status:    CPR     Medical Interventions (Level of Support Prior to Arrest):    Full       This patient has been examined wearing appropriate Personal Protective Equipment and discussed with hospital infection control department. 06/10/21        Discharge Planning  Home        Electronically signed by Volodymyr Burleson MD, 06/10/21, 14:38 EDT.  [facility] Hospitalist Team

## 2021-06-10 NOTE — PROGRESS NOTES
PROGRESS NOTE      Patient Name: Genny Soni  : 1950  MRN: [id number]  Primary Care Physician: Aida Lowry APRN  Date of admission: 2021    Patient Care Team:  Aida Lowry APRN as PCP - General (Nurse Practitioner)        Subjective   Subjective:     Patient reports feeling better   Denies any issues at time of assessment  Review of systems:  All other review of system unremarkable      Allergies:    Allergies   Allergen Reactions   • Ambien  [Zolpidem] Confusion   • Codeine Itching   • Sulfa Antibiotics Nausea And Vomiting     Makes her nauseated       Objective   Exam:     Vital Signs  Temp:  [97.9 °F (36.6 °C)-98.6 °F (37 °C)] 98.5 °F (36.9 °C)  Heart Rate:  [70-79] 70  Resp:  [15-17] 15  BP: (123-128)/(43-79) 128/79  SpO2:  [95 %-99 %] 97 %  on   ;   Device (Oxygen Therapy): room air  Body mass index is 32.99 kg/m².    General: Elderly white  female in no acute distress.    Head:      Normocephalic and atraumatic.    Eyes:      PERRL/EOM intact, conjunctiva and sclera clear with out nystagmus.    Neck:      No masses, thyromegaly,  trachea central with normal respiratory effort   Lungs:    Clear bilaterally to auscultation.    Heart:      Regular rate and rhythm, no murmur no gallop  Abd:        Soft, nontender, not distended, bowel sounds positive, no shifting dullness   Pulses:   Pulses palpable  Extr:        No cyanosis or clubbing--+1 edema.    Neuro:    No focal deficits.   alert oriented x3  Skin:       Intact without lesions or rashes.    Psych:    Alert and cooperative; normal mood and affect; .      Results Review:  I have personally reviewed most recent Data :  CBC    Results from last 7 days   Lab Units 06/10/21  0236 21  0255 21  0327 21  0429 21  1729   WBC 10*3/mm3 6.20 6.40 6.90 6.90 7.40   HEMOGLOBIN g/dL 10.7* 10.4* 10.2* 10.7* 11.2*   PLATELETS 10*3/mm3 203 199 187 193 222     CMP   Results from last 7 days   Lab Units 21  9764  06/08/21  0327 06/07/21  1014 06/07/21  0429 06/06/21  1729   SODIUM mmol/L 140 138 139 140 136   POTASSIUM mmol/L 4.4 4.8 4.8 4.8 5.1   CHLORIDE mmol/L 103 102 102 103 96*   CO2 mmol/L 29.0 30.0* 30.0* 29.0 32.0*   BUN mg/dL 16 27* 29* 31* 42*   CREATININE mg/dL 0.78 0.99 0.95 0.96 1.50*   GLUCOSE mg/dL 127* 135* 261* 126* 280*   ALBUMIN g/dL  --   --  3.60  --  3.90   BILIRUBIN mg/dL  --   --  0.3  --  0.4   ALK PHOS U/L  --   --  111  --  127*   AST (SGOT) U/L  --   --  27  --  18   ALT (SGPT) U/L  --   --  22  --  20     ABG      No radiology results for the last day    Results for orders placed during the hospital encounter of 06/06/21    ADULT TRANSTHORACIC ECHO COMPLETE W/ CONT IF NECESSARY PER PROTOCOL    Interpretation Summary  · Estimated right ventricular systolic pressure from tricuspid regurgitation is normal (<35 mmHg).  · Calculated left ventricular EF = 54% Estimated left ventricular EF was in agreement with the calculated left ventricular EF.  · Left ventricular diastolic function was normal.    Essentially unremarkable study  Normal LV and RV size and function  Normal atrial sizes  No significant valvular regurgitation or stenosis seen  Valve leaflets are thin and pliable with no mobile echodensities  No Doppler evidence for ASD or PFO although no bubble study performed in the present study  Normal myocardial thickness no LVOT obstruction no intracavitary obstruction  No masses or effusion seen  Normal diastolic function by criteria  No pulmonary hypertension seen as measured noninvasively  Normal right left-sided pressures estimated noninvasively  EF 60%    Scheduled Meds:apixaban, 10 mg, Oral, BID   Followed by  [START ON 6/16/2021] apixaban, 5 mg, Oral, BID  ARIPiprazole, 2 mg, Oral, Daily  cholecalciferol, 2,000 Units, Oral, Daily  insulin glargine, 46 Units, Subcutaneous, Daily With Breakfast  insulin lispro, 0-24 Units, Subcutaneous, 4x Daily With Meals & Nightly  lidocaine, 1 patch,  Transdermal, Q24H  methadone, 10 mg, Oral, Daily  methadone, 15 mg, Oral, Nightly  polyethylene glycol, 17 g, Oral, Daily  pramipexole, 1 mg, Oral, Nightly  rosuvastatin, 10 mg, Oral, Daily  senna, 3 tablet, Oral, BID  sodium chloride, 10 mL, Intravenous, Q12H      Continuous Infusions:   PRN Meds:•  acetaminophen **OR** acetaminophen  •  aluminum-magnesium hydroxide-simethicone  •  dextrose  •  dextrose  •  glucagon (human recombinant)  •  HYDROcodone-acetaminophen  •  insulin lispro **AND** insulin lispro  •  ipratropium-albuterol  •  LORazepam  •  ondansetron **OR** ondansetron  •  sodium chloride  •  [COMPLETED] Insert peripheral IV **AND** sodium chloride  •  sodium chloride    Assessment/Plan   Assessment and Plan:         Acute pulmonary embolism without acute cor pulmonale (CMS/HCC)    Type 2 diabetes mellitus with diabetic peripheral angiopathy without gangrene, with long-term current use of insulin (CMS/HCC)    Anxiety    Benign essential hypertension    Drug-induced constipation    Fibromyalgia    Hyperlipidemia    PERLA (obstructive sleep apnea)    Peripheral vascular disease (CMS/HCC)    Restless leg syndrome    Uncontrolled type 2 diabetes mellitus (CMS/HCC)    Acute kidney injury (CMS/HCC)    Tremors of nervous system    ASSESSMENT:      · DARRYL in a patient with no significant chronic kidney disease likely related to some hemodynamic instability in the presence of pulmonary embolism.  Repeat labs are pending there is a possibility of renal functions getting worse.  Also contributing factor some contrast-induced nephropathy  · Acute pulmonary embolism  · Essential hypertension  · History of diabetes mellitus which is not well controlled  · Restless leg syndrome  · History of fibromyalgia  · Normal echocardiogram        Plan:      · Patient DARRYL seems to be multifactorial may be mild ATN contrast nephropathy, volume status hemodynamic instability which is already getting better at this time.  · Patient  volume status is acceptable no need for diuretics or extra IV fluid at this time.    · Hemodynamics are stable.    · Renal function better   · Control blood pressure aggressively at this time blood pressure is stable  · Hold ACE inhibitors at this time but okay to be started before discharge.  · Hold diuretics as to me patient is not volume overloaded edema is secondary to DVT likely      [facility] kidney consultant  [phone]      Patient is seen and examined by me.  I discussed the patient in detail with the nurse practitioner.  I agree with her evaluation and plan.  I am doing the Epley changes in the note according to my assessment.

## 2021-06-11 VITALS
TEMPERATURE: 97 F | OXYGEN SATURATION: 100 % | HEART RATE: 74 BPM | HEIGHT: 64 IN | DIASTOLIC BLOOD PRESSURE: 56 MMHG | BODY MASS INDEX: 32.81 KG/M2 | WEIGHT: 192.2 LBS | RESPIRATION RATE: 18 BRPM | SYSTOLIC BLOOD PRESSURE: 146 MMHG

## 2021-06-11 LAB
ANION GAP SERPL CALCULATED.3IONS-SCNC: 8 MMOL/L (ref 5–15)
APTT PPP: 27.8 SECONDS (ref 61–76.5)
BASOPHILS # BLD AUTO: 0 10*3/MM3 (ref 0–0.2)
BASOPHILS NFR BLD AUTO: 0.5 % (ref 0–1.5)
BUN SERPL-MCNC: 16 MG/DL (ref 8–23)
BUN/CREAT SERPL: 21.3 (ref 7–25)
CALCIUM SPEC-SCNC: 9.2 MG/DL (ref 8.6–10.5)
CHLORIDE SERPL-SCNC: 106 MMOL/L (ref 98–107)
CO2 SERPL-SCNC: 29 MMOL/L (ref 22–29)
CREAT SERPL-MCNC: 0.75 MG/DL (ref 0.57–1)
DEPRECATED RDW RBC AUTO: 46.8 FL (ref 37–54)
EOSINOPHIL # BLD AUTO: 0.2 10*3/MM3 (ref 0–0.4)
EOSINOPHIL NFR BLD AUTO: 2.4 % (ref 0.3–6.2)
ERYTHROCYTE [DISTWIDTH] IN BLOOD BY AUTOMATED COUNT: 14.2 % (ref 12.3–15.4)
GFR SERPL CREATININE-BSD FRML MDRD: 76 ML/MIN/1.73
GLUCOSE BLDC GLUCOMTR-MCNC: 150 MG/DL (ref 70–105)
GLUCOSE SERPL-MCNC: 159 MG/DL (ref 65–99)
HCT VFR BLD AUTO: 32 % (ref 34–46.6)
HGB BLD-MCNC: 10.9 G/DL (ref 12–15.9)
LYMPHOCYTES # BLD AUTO: 1.9 10*3/MM3 (ref 0.7–3.1)
LYMPHOCYTES NFR BLD AUTO: 30.1 % (ref 19.6–45.3)
MAGNESIUM SERPL-MCNC: 1.9 MG/DL (ref 1.6–2.4)
MCH RBC QN AUTO: 32.4 PG (ref 26.6–33)
MCHC RBC AUTO-ENTMCNC: 34.2 G/DL (ref 31.5–35.7)
MCV RBC AUTO: 94.9 FL (ref 79–97)
MONOCYTES # BLD AUTO: 0.4 10*3/MM3 (ref 0.1–0.9)
MONOCYTES NFR BLD AUTO: 5.9 % (ref 5–12)
NEUTROPHILS NFR BLD AUTO: 3.9 10*3/MM3 (ref 1.7–7)
NEUTROPHILS NFR BLD AUTO: 61.1 % (ref 42.7–76)
NRBC BLD AUTO-RTO: 0.1 /100 WBC (ref 0–0.2)
NT-PROBNP SERPL-MCNC: 107.8 PG/ML (ref 0–900)
PHOSPHATE SERPL-MCNC: 3.6 MG/DL (ref 2.5–4.5)
PLATELET # BLD AUTO: 196 10*3/MM3 (ref 140–450)
PMV BLD AUTO: 6.8 FL (ref 6–12)
POTASSIUM SERPL-SCNC: 4.8 MMOL/L (ref 3.5–5.2)
QT INTERVAL: 405 MS
RBC # BLD AUTO: 3.37 10*6/MM3 (ref 3.77–5.28)
SODIUM SERPL-SCNC: 143 MMOL/L (ref 136–145)
WBC # BLD AUTO: 6.4 10*3/MM3 (ref 3.4–10.8)

## 2021-06-11 PROCEDURE — 82962 GLUCOSE BLOOD TEST: CPT

## 2021-06-11 PROCEDURE — 63710000001 INSULIN LISPRO (HUMAN) PER 5 UNITS: Performed by: INTERNAL MEDICINE

## 2021-06-11 PROCEDURE — 83880 ASSAY OF NATRIURETIC PEPTIDE: CPT | Performed by: INTERNAL MEDICINE

## 2021-06-11 PROCEDURE — 80048 BASIC METABOLIC PNL TOTAL CA: CPT | Performed by: FAMILY MEDICINE

## 2021-06-11 PROCEDURE — 99217 PR OBSERVATION CARE DISCHARGE MANAGEMENT: CPT | Performed by: FAMILY MEDICINE

## 2021-06-11 PROCEDURE — 83735 ASSAY OF MAGNESIUM: CPT | Performed by: NURSE PRACTITIONER

## 2021-06-11 PROCEDURE — 85730 THROMBOPLASTIN TIME PARTIAL: CPT | Performed by: EMERGENCY MEDICINE

## 2021-06-11 PROCEDURE — 63710000001 INSULIN GLARGINE PER 5 UNITS: Performed by: NURSE PRACTITIONER

## 2021-06-11 PROCEDURE — 84100 ASSAY OF PHOSPHORUS: CPT | Performed by: NURSE PRACTITIONER

## 2021-06-11 PROCEDURE — G0378 HOSPITAL OBSERVATION PER HR: HCPCS

## 2021-06-11 PROCEDURE — 36415 COLL VENOUS BLD VENIPUNCTURE: CPT | Performed by: EMERGENCY MEDICINE

## 2021-06-11 PROCEDURE — 85025 COMPLETE CBC W/AUTO DIFF WBC: CPT | Performed by: NURSE PRACTITIONER

## 2021-06-11 PROCEDURE — 94618 PULMONARY STRESS TESTING: CPT

## 2021-06-11 RX ADMIN — LIDOCAINE 1 PATCH: 50 PATCH TOPICAL at 09:30

## 2021-06-11 RX ADMIN — SENNOSIDES 3 TABLET: 8.6 TABLET, FILM COATED ORAL at 09:27

## 2021-06-11 RX ADMIN — Medication 2000 UNITS: at 09:26

## 2021-06-11 RX ADMIN — HYDROCODONE BITARTRATE AND ACETAMINOPHEN 1 TABLET: 10; 325 TABLET ORAL at 00:46

## 2021-06-11 RX ADMIN — POLYETHYLENE GLYCOL 3350 17 G: 17 POWDER, FOR SOLUTION ORAL at 09:28

## 2021-06-11 RX ADMIN — APIXABAN 10 MG: 5 TABLET, FILM COATED ORAL at 09:27

## 2021-06-11 RX ADMIN — Medication 10 ML: at 09:36

## 2021-06-11 RX ADMIN — INSULIN LISPRO 4 UNITS: 100 INJECTION, SOLUTION INTRAVENOUS; SUBCUTANEOUS at 09:28

## 2021-06-11 RX ADMIN — METHADONE HYDROCHLORIDE 10 MG: 10 TABLET ORAL at 09:27

## 2021-06-11 RX ADMIN — ROSUVASTATIN CALCIUM 10 MG: 10 TABLET, FILM COATED ORAL at 09:27

## 2021-06-11 RX ADMIN — INSULIN GLARGINE 46 UNITS: 100 INJECTION, SOLUTION SUBCUTANEOUS at 09:27

## 2021-06-11 RX ADMIN — ARIPIPRAZOLE 2 MG: 2 TABLET ORAL at 09:27

## 2021-06-11 NOTE — PROGRESS NOTES
"PULMONARY CRITICAL CARE Progress  NOTE      PATIENT IDENTIFICATION:  Name: Genny Soni  MRN: [id number]  :  1950     Age: 71 y.o.  Sex: female    DATE OF Note:  2021   Referring Physician: Larry Smiley MD                  Subjective:   Less leg swelling     no SOB no chest pain,   no change in bowel habit, no dysuria,  no new  skin rash or itching.      Objective:  tMax 24 hrs: Temp (24hrs), Av °F (36.7 °C), Min:97 °F (36.1 °C), Max:98.7 °F (37.1 °C)      Vitals Ranges:   Temp:  [97 °F (36.1 °C)-98.7 °F (37.1 °C)] 97 °F (36.1 °C)  Heart Rate:  [67-91] 74  Resp:  [17-18] 18  BP: (129-153)/(53-94) 146/56    Intake and Output Last 3 Shifts:   No intake/output data recorded.    Exam:  /56 (BP Location: Left arm, Patient Position: Lying)   Pulse 74   Temp 97 °F (36.1 °C) (Oral)   Resp 18   Ht 162.6 cm (64\")   Wt 87.2 kg (192 lb 3.2 oz)   SpO2 100%   BMI 32.99 kg/m²     General Appearance:   Alert awake no distress eating breakfast  HEENT:  Normocephalic, without obvious abnormality, Conjunctiva/corneas clear,.  Normal external ear canals, Nares normal, no drainage     Neck:  Supple, symmetrical, trachea midline. No JVD.  Lungs /Chest wall:   Bilateral basal rhonchi, respirations unlabored symmetrical wall movement.     Heart:  Regular rate and rhythm, systolic murmur PMI left sternal border  Abdomen: Soft, non-tender, no masses, no organomegaly.    Extremities: + edema no clubbing or Cyanosis        Medications:    Current Facility-Administered Medications:   •  acetaminophen (TYLENOL) tablet 650 mg, 650 mg, Oral, Q4H PRN **OR** acetaminophen (TYLENOL) suppository 650 mg, 650 mg, Rectal, Q4H PRN, Lilliana Louie APRN  •  aluminum-magnesium hydroxide-simethicone (MAALOX MAX) 400-400-40 MG/5ML suspension 15 mL, 15 mL, Oral, Q6H PRN, Lilliana Louie APRN  •  apixaban (ELIQUIS) tablet 10 mg, 10 mg, Oral, BID, 10 mg at 21 0927 **FOLLOWED BY** [START ON 2021] apixaban " (ELIQUIS) tablet 5 mg, 5 mg, Oral, BID, Volodymyr Burleson MD  •  ARIPiprazole (ABILIFY) tablet 2 mg, 2 mg, Oral, Daily, Lilliana Louie APRN, 2 mg at 06/11/21 0927  •  cholecalciferol (VITAMIN D3) tablet 2,000 Units, 2,000 Units, Oral, Daily, Lilliana Louie APRN, 2,000 Units at 06/11/21 0926  •  dextrose (D50W) 25 g/ 50mL Intravenous Solution 25 g, 25 g, Intravenous, Q15 Min PRN, Abelardo Shaw MD  •  dextrose (GLUTOSE) oral gel 15 g, 15 g, Oral, Q15 Min PRN, Abelardo Shaw MD  •  glucagon (human recombinant) (GLUCAGEN DIAGNOSTIC) injection 1 mg, 1 mg, Subcutaneous, Q15 Min PRN, Abelardo Shaw MD  •  HYDROcodone-acetaminophen (NORCO)  MG per tablet 1 tablet, 1 tablet, Oral, Q4H PRN, Lilliana Louie APRN, 1 tablet at 06/11/21 0046  •  insulin glargine (LANTUS, SEMGLEE) injection 46 Units, 46 Units, Subcutaneous, Daily With Breakfast, Lilliana Louie APRN, 46 Units at 06/11/21 0927  •  insulin lispro (ADMELOG) injection 0-24 Units, 0-24 Units, Subcutaneous, 4x Daily With Meals & Nightly, 4 Units at 06/11/21 0928 **AND** insulin lispro (ADMELOG) injection 0-24 Units, 0-24 Units, Subcutaneous, PRN, Abelardo Shaw MD  •  ipratropium-albuterol (DUO-NEB) nebulizer solution 3 mL, 3 mL, Nebulization, Q2H PRN, Lilliana Louie APRN  •  lidocaine (LIDODERM) 5 % 1 patch, 1 patch, Transdermal, Q24H, Abelardo Shaw MD, 1 patch at 06/11/21 0930  •  LORazepam (ATIVAN) tablet 0.5 mg, 0.5 mg, Oral, Q6H PRN, Volodymyr Burleson MD  •  methadone (DOLOPHINE) tablet 10 mg, 10 mg, Oral, Daily, Lilliana Louie APRN, 10 mg at 06/11/21 0927  •  methadone (DOLOPHINE) tablet 15 mg, 15 mg, Oral, Nightly, Lilliana Louie APRN, 15 mg at 06/10/21 2031  •  ondansetron (ZOFRAN) tablet 4 mg, 4 mg, Oral, Q6H PRN **OR** ondansetron (ZOFRAN) injection 4 mg, 4 mg, Intravenous, Q6H PRN, Lilliana Louie APRN, 4 mg at 06/10/21 0733  •  polyethylene glycol (MIRALAX) packet 17 g,  17 g, Oral, Daily, Lilliana Louie APRN, 17 g at 06/11/21 0928  •  pramipexole (MIRAPEX) tablet 1 mg, 1 mg, Oral, Nightly, Lilliana Louie APRN, 1 mg at 06/10/21 2031  •  rosuvastatin (CRESTOR) tablet 10 mg, 10 mg, Oral, Daily, Lilliana Louie APRN, 10 mg at 06/11/21 0927  •  senna (SENOKOT) tablet 3 tablet, 3 tablet, Oral, BID, Lilliana Louie APRN, 3 tablet at 06/11/21 0927  •  sodium chloride 0.9 % flush 10 mL, 10 mL, Intravenous, PRN, Tanner Kelly MD  •  [COMPLETED] Insert peripheral IV, , , Once **AND** sodium chloride 0.9 % flush 10 mL, 10 mL, Intravenous, PRN, Tanner Kelly MD  •  sodium chloride 0.9 % flush 10 mL, 10 mL, Intravenous, Q12H, Lilliana Louie APRN, 10 mL at 06/11/21 0936  •  sodium chloride 0.9 % flush 10 mL, 10 mL, Intravenous, PRN, Lilliana Louie APRN    Data Review:  All labs (24hrs):   Recent Results (from the past 24 hour(s))   Basic Metabolic Panel    Collection Time: 06/10/21 11:40 AM    Specimen: Blood   Result Value Ref Range    Glucose 195 (H) 65 - 99 mg/dL    BUN 14 8 - 23 mg/dL    Creatinine 0.70 0.57 - 1.00 mg/dL    Sodium 139 136 - 145 mmol/L    Potassium 4.5 3.5 - 5.2 mmol/L    Chloride 104 98 - 107 mmol/L    CO2 26.0 22.0 - 29.0 mmol/L    Calcium 9.4 8.6 - 10.5 mg/dL    eGFR Non African Amer 82 >60 mL/min/1.73    BUN/Creatinine Ratio 20.0 7.0 - 25.0    Anion Gap 9.0 5.0 - 15.0 mmol/L   Troponin    Collection Time: 06/10/21 11:40 AM    Specimen: Blood   Result Value Ref Range    Troponin T <0.010 0.000 - 0.030 ng/mL   ECG 12 Lead    Collection Time: 06/10/21 11:47 AM   Result Value Ref Range    QT Interval 405 ms   POC Glucose Once    Collection Time: 06/10/21 12:00 PM    Specimen: Blood   Result Value Ref Range    Glucose 184 (H) 70 - 105 mg/dL   POC Glucose Once    Collection Time: 06/10/21  4:35 PM    Specimen: Blood   Result Value Ref Range    Glucose 114 (H) 70 - 105 mg/dL   POC Glucose Once    Collection Time:  06/10/21  7:18 PM    Specimen: Blood   Result Value Ref Range    Glucose 245 (H) 70 - 105 mg/dL   aPTT    Collection Time: 06/11/21  2:20 AM    Specimen: Blood   Result Value Ref Range    PTT 27.8 (L) 61.0 - 76.5 seconds   Magnesium    Collection Time: 06/11/21  2:20 AM    Specimen: Blood   Result Value Ref Range    Magnesium 1.9 1.6 - 2.4 mg/dL   Phosphorus    Collection Time: 06/11/21  2:20 AM    Specimen: Blood   Result Value Ref Range    Phosphorus 3.6 2.5 - 4.5 mg/dL   BNP    Collection Time: 06/11/21  2:20 AM    Specimen: Blood   Result Value Ref Range    proBNP 107.8 0.0 - 900.0 pg/mL   Basic Metabolic Panel    Collection Time: 06/11/21  2:20 AM    Specimen: Blood   Result Value Ref Range    Glucose 159 (H) 65 - 99 mg/dL    BUN 16 8 - 23 mg/dL    Creatinine 0.75 0.57 - 1.00 mg/dL    Sodium 143 136 - 145 mmol/L    Potassium 4.8 3.5 - 5.2 mmol/L    Chloride 106 98 - 107 mmol/L    CO2 29.0 22.0 - 29.0 mmol/L    Calcium 9.2 8.6 - 10.5 mg/dL    eGFR Non African Amer 76 >60 mL/min/1.73    BUN/Creatinine Ratio 21.3 7.0 - 25.0    Anion Gap 8.0 5.0 - 15.0 mmol/L   CBC Auto Differential    Collection Time: 06/11/21  2:20 AM    Specimen: Blood   Result Value Ref Range    WBC 6.40 3.40 - 10.80 10*3/mm3    RBC 3.37 (L) 3.77 - 5.28 10*6/mm3    Hemoglobin 10.9 (L) 12.0 - 15.9 g/dL    Hematocrit 32.0 (L) 34.0 - 46.6 %    MCV 94.9 79.0 - 97.0 fL    MCH 32.4 26.6 - 33.0 pg    MCHC 34.2 31.5 - 35.7 g/dL    RDW 14.2 12.3 - 15.4 %    RDW-SD 46.8 37.0 - 54.0 fl    MPV 6.8 6.0 - 12.0 fL    Platelets 196 140 - 450 10*3/mm3    Neutrophil % 61.1 42.7 - 76.0 %    Lymphocyte % 30.1 19.6 - 45.3 %    Monocyte % 5.9 5.0 - 12.0 %    Eosinophil % 2.4 0.3 - 6.2 %    Basophil % 0.5 0.0 - 1.5 %    Neutrophils, Absolute 3.90 1.70 - 7.00 10*3/mm3    Lymphocytes, Absolute 1.90 0.70 - 3.10 10*3/mm3    Monocytes, Absolute 0.40 0.10 - 0.90 10*3/mm3    Eosinophils, Absolute 0.20 0.00 - 0.40 10*3/mm3    Basophils, Absolute 0.00 0.00 - 0.20 10*3/mm3     nRBC 0.1 0.0 - 0.2 /100 WBC   POC Glucose Once    Collection Time: 06/11/21  7:10 AM    Specimen: Blood   Result Value Ref Range    Glucose 150 (H) 70 - 105 mg/dL        Imaging:  XR Abdomen KUB  Narrative: DATE OF EXAM:  6/8/2021 9:08 AM     PROCEDURE:  XR ABDOMEN KUB-     INDICATIONS:  abdominal distention; I26.99-Other pulmonary embolism without acute cor  pulmonale; R60.0-Localized edema; Z98.1-Arthrodesis status     COMPARISON:  CT abdomen 6/6/2021     TECHNIQUE:   Single radiographic view of the abdomen was obtained.        FINDINGS:  There is a nonspecific but nonobstructive appearing bowel gas pattern.  There is mild generalized gaseous distention of the colon. There is a  large stool burden within the ascending colon, and mild to moderate  stool burden in the transverse and descending colon. No abnormally  dilated small bowel loops are identified.     There are L5-S1 posterior spinal fusion changes. A sacral stimulator  device is in place. Right iliac vascular stent is noted. Surgical clips  are seen along midline of the mid to lower abdomen, and within the  bilateral inguinal regions. There is moderate degenerative narrowing of  the bilateral hip joint spaces..     There is mild asymmetric elevation right hemidiaphragm. Lungs are clear.  Heart size is normal. No suspicious osseous abnormalities.      Impression:    1. Large stool burden in the ascending colon. Mild to moderate stool  burden in the transverse and descending colon.  2. The bowel gas pattern has a grossly nonobstructive appearance.     Electronically Signed By-Mary Kate Craft MD On:6/8/2021 9:45 AM  This report was finalized on [id number] by  Mary Kate Craft MD.       ASSESSMENT:    Acute pulmonary embolism without acute cor pulmonale (CMS/HCC)    Type 2 diabetes mellitus with diabetic peripheral angiopathy without gangrene, with long-term current use of insulin (CMS/HCC)    Anxiety    Benign essential hypertension    Drug-induced  constipation    Fibromyalgia    Hyperlipidemia    PERLA (obstructive sleep apnea)    Peripheral vascular disease (CMS/HCC)    Restless leg syndrome    Uncontrolled type 2 diabetes mellitus (CMS/HCC)    Acute kidney injury (CMS/HCC)    Tremors of nervous system     PLAN:  Anti coagulations  F/u as out pt   Bronchodilator  Inhaled corticosteroids  Electrolytes/ glycemic control  DVT and GI prophylaxis.    Total Critical care time in direct medical management (   ) minutes  Abelardo Shaw MD. D, ABSM.     6/11/2021  10:41 EDT

## 2021-06-11 NOTE — DISCHARGE SUMMARY
Date of Admission: 6/6/2021    Date of Discharge:  6/11/2021    Length of stay:  LOS: 4 days     Discharge Diagnosis:     Pulmonary embolism    Presenting Problem List:    Chest pain-in context with history of previous thromboembolic event and recent surgery patient high risk for new thromboembolic event.  Patient found to have significant clot burden with signs of questionable saddle embolus, chest pain has since resolved  -Heparin discontinued and started Eliquis cleared by neurosurgery  -Can follow-up outpatient with hematology for hypercoagulability work-up after discussion with PCP  -Echo showing no signs of right heart strain  -Pulmonology consulted patient stable to be transferred out of ICU  -Wean oxygen as tolerated  -Negative BNP, negative troponins good prognostic sign  -Will be on lifelong anticoagulation     Dizziness-patient reported episode of feeling lightheaded no recent room spinning sensation concerning giving questionable saddle embolus, resolved by discharge  -Orthostatics negative  -EKG and troponin unremarkable  -Patient with self-reported history of significant anxiety there may also be underlying component     Thromboembolic event-patient reports previously noted, appears it may have been provoked  -Consider hematology follow-up outpatient for hyper coag  -Patient will need to be anticoagulated for lifetime     Anemia-uncertain chronicity at this time but no obvious signs of bleeding, stable  -Daily CBC     Type 2 diabetes-patient has been hyperglycemic in the hospital though this may be due to significant amount of sympathetic nervous system activation  -Resume home regimen     Renal insufficiency-patient's baseline normal, nephrology consulted from the ICU, now back to normal  -Cleared by nephrology for discharge  -Can hold Bumex at this time discussed reintroducing as needed with PCP     Peripheral vascular disease-patient with previous history of vascular surgery and bypass  -Continue  statin      Hypertension/hyperlipidemia/fibromyalgia/opioid dependency/anxiety/chronic constipation/restless leg syndrome-chronic in nature  -Resume home medication   -Discontinue NSAIDs if possible given being started on anticoagulation     Obesity-BMI of 34  -Lifestyle modifications    HPI/Hospital Course:  Ms. Soni is a 71 y.o. female with past medical history of diabetes mellitus type 2, hyperlipidemia, arthritis, chronic low back pain on methadone, and recent lumbar surgery 10 days ago presented to [facility] ED 6/6/2021 with complaints of shortness of breath associated with chest pain for approximately 3 days. She also noted bilateral lower extremity swelling. 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 She was not discharged on oral anticoagulation and has not been on anticoagulation since that time. Her mother had a history of blood clots.      In the ED CT PE protocol was positive for pulmonary emboli.  Thrombus in left main pulmonary artery extending into the left upper lobe vessels, possible saddle embolus with clot possibly extending into the left main pulmonary artery across bifurcation into the right main pulmonary artery.  There is also concern for thrombus in segmental right upper lobe artery.  Scattered groundglass opacities also found in lungs.  Covid swab was negative.  Patient was started on heparin drip and admitted to the ICU for further treatment of pulmonary emboli.     6/7/2021 nephrology consulted for DARRYL.  Downgrade orders out of ICU, Hospitalist group consulted for medical management     6/8/2021: Patient notably more sluggish today.  Patient denies any chest pain breathing comfortably at rest.  Discussion of transition to oral anticoagulation if cleared by neurosurgery, patient would like to be on DOAC, does not want Coumadin at all.  Some concern had of drainage from spinal surgery  site.     6/9/2021: Patient's spinal surgery site monitored by neurosurgery team no signs of infection at this time.  Patient cleared to start DOAC, Eliquis ordered.  Patient worked with physical therapy.    6/10/2021:Patient tolerated Eliquis.  Walking oximetry indicating patient not needing home oxygen.  However patient reports overnight started having episodes of feeling lightheaded.  Orthostatics repeated and are negative.  Ordered EKG showing no signs of cardiac ischemia.  Patient noted to have transient hypoxia on room air when getting up from being seated going into the mid 80s for a few seconds then rebounding.  Given significant burden of clot would elect to continue monitoring patient overnight and possibly consider repeating 6-minute walk.    6/11/2021: Patient's dizziness has resolved.  Repeating 6-minute walk.  No chest pain or shortness of breath and tolerating Eliquis.  Follow-up organized with PCP and patient has previously scheduled follow-up with neurosurgeon.  Hold home NSAIDs due to being started on Eliquis.  Patient able to be discharged home in good condition with strict return precautions given.    Procedures Performed         Consults:   Consults     Date and Time Order Name Status Description    6/7/2021  9:22 AM Inpatient Hospitalist Consult      6/7/2021  1:41 AM Inpatient Nephrology Consult      6/6/2021  8:35 PM Neurosurgery (on-call MD unless specified) Completed     6/6/2021  8:33 PM IP General Consult (Use specialty-specific consult if known) Completed     6/6/2021  8:12 PM Intensivist (on-call MD unless specified) Completed           Pertinent Test Results:     Lab Results (last 24 hours)     Procedure Component Value Units Date/Time    POC Glucose Once [[id number]]  (Abnormal) Collected: 06/11/21 0710    Specimen: Blood Updated: 06/11/21 0711     Glucose 150 mg/dL      Comment: Serial Number: [id number]:  [id number]       Basic Metabolic Panel [[id number]]  (Abnormal) Collected:  06/11/21 0220    Specimen: Blood Updated: 06/11/21 0410     Glucose 159 mg/dL      BUN 16 mg/dL      Creatinine 0.75 mg/dL      Sodium 143 mmol/L      Potassium 4.8 mmol/L      Chloride 106 mmol/L      CO2 29.0 mmol/L      Calcium 9.2 mg/dL      eGFR Non African Amer 76 mL/min/1.73      BUN/Creatinine Ratio 21.3     Anion Gap 8.0 mmol/L     Narrative:      GFR Normal >60  Chronic Kidney Disease <60  Kidney Failure <15      Phosphorus [[id number]]  (Normal) Collected: 06/11/21 0220    Specimen: Blood Updated: 06/11/21 0410     Phosphorus 3.6 mg/dL     Magnesium [[id number]]  (Normal) Collected: 06/11/21 0220    Specimen: Blood Updated: 06/11/21 0410     Magnesium 1.9 mg/dL     BNP [[id number]]  (Normal) Collected: 06/11/21 0220    Specimen: Blood Updated: 06/11/21 0405     proBNP 107.8 pg/mL     Narrative:      Among patients with dyspnea, NT-proBNP is highly sensitive for the detection of acute congestive heart failure. In addition NT-proBNP of <300 pg/ml effectively rules out acute congestive heart failure with 99% negative predictive value.    Results may be falsely decreased if patient taking Biotin.      aPTT [[id number]]  (Abnormal) Collected: 06/11/21 0220    Specimen: Blood Updated: 06/11/21 0351     PTT 27.8 seconds     CBC & Differential [[id number]]  (Abnormal) Collected: 06/11/21 0220    Specimen: Blood Updated: 06/11/21 0341    Narrative:      The following orders were created for panel order CBC & Differential.  Procedure                               Abnormality         Status                     ---------                               -----------         ------                     CBC Auto Differential[[id number]]        Abnormal            Final result                 Please view results for these tests on the individual orders.    CBC Auto Differential [[id number]]  (Abnormal) Collected: 06/11/21 0220    Specimen: Blood Updated: 06/11/21 0341     WBC 6.40 10*3/mm3      RBC 3.37 10*6/mm3      Hemoglobin  10.9 g/dL      Hematocrit 32.0 %      MCV 94.9 fL      MCH 32.4 pg      MCHC 34.2 g/dL      RDW 14.2 %      RDW-SD 46.8 fl      MPV 6.8 fL      Platelets 196 10*3/mm3      Neutrophil % 61.1 %      Lymphocyte % 30.1 %      Monocyte % 5.9 %      Eosinophil % 2.4 %      Basophil % 0.5 %      Neutrophils, Absolute 3.90 10*3/mm3      Lymphocytes, Absolute 1.90 10*3/mm3      Monocytes, Absolute 0.40 10*3/mm3      Eosinophils, Absolute 0.20 10*3/mm3      Basophils, Absolute 0.00 10*3/mm3      nRBC 0.1 /100 WBC     POC Glucose Once [[id number]]  (Abnormal) Collected: 06/10/21 1918    Specimen: Blood Updated: 06/10/21 1919     Glucose 245 mg/dL      Comment: Serial Number: [id number]:  [id number]       POC Glucose Once [[id number]]  (Abnormal) Collected: 06/10/21 1635    Specimen: Blood Updated: 06/10/21 1636     Glucose 114 mg/dL      Comment: Serial Number: [id number]:  [id number]             Microbiology Results Abnormal     None        No radiology results for the last day    Results for orders placed during the hospital encounter of 06/06/21    ADULT TRANSTHORACIC ECHO COMPLETE W/ CONT IF NECESSARY PER PROTOCOL    Interpretation Summary  · Estimated right ventricular systolic pressure from tricuspid regurgitation is normal (<35 mmHg).  · Calculated left ventricular EF = 54% Estimated left ventricular EF was in agreement with the calculated left ventricular EF.  · Left ventricular diastolic function was normal.    Essentially unremarkable study  Normal LV and RV size and function  Normal atrial sizes  No significant valvular regurgitation or stenosis seen  Valve leaflets are thin and pliable with no mobile echodensities  No Doppler evidence for ASD or PFO although no bubble study performed in the present study  Normal myocardial thickness no LVOT obstruction no intracavitary obstruction  No masses or effusion seen  Normal diastolic function by criteria  No pulmonary hypertension seen as measured  noninvasively  Normal right left-sided pressures estimated noninvasively  EF 60%        Condition on Discharge:  Good    Vital Signs  Temp:  [97 °F (36.1 °C)-98.7 °F (37.1 °C)] 97 °F (36.1 °C)  Heart Rate:  [67-91] 74  Resp:  [17-18] 18  BP: (129-153)/(53-94) 146/56    Physical Exam:  General: Obese elderly female lying in bed breathing comfortably room air no acute distress  HEENT: NC/AT, EOMI, mucosa moist  Heart: Regular, rate controlled  Chest: Normal work of breathing, moving air well no wheezing or crackles  Abdominal: Soft. NT/ND.   Musculoskeletal: Normal ROM.  Significant bilateral lower extremity edema with erythema.  Neurological: AAOx3, no focal deficits  Skin: Skin is warm and dry.  Back surgery dressing applied  Psychiatric: Pleasant and conversational    Discharge Disposition  Home-Health Care c [6]    Discharge Medications     Discharge Medications      New Medications      Instructions Start Date   Apixaban Starter Pack tablet therapy pack   5 mg, Oral, Take As Directed         Continue These Medications      Instructions Start Date   ARIPiprazole 2 MG tablet  Commonly known as: ABILIFY   2 mg, Oral, Daily      glipizide 10 MG 24 hr tablet  Commonly known as: GLUCOTROL XL   10 mg, Oral, Daily      hydroCHLOROthiazide 25 MG tablet  Commonly known as: HYDRODIURIL   25 mg, Oral, Daily      HYDROcodone-acetaminophen  MG per tablet  Commonly known as: NORCO   0.5-1 tablets, Oral, 3 Times Daily PRN      lisinopril 10 MG tablet  Commonly known as: PRINIVIL,ZESTRIL   10 mg, Oral, Daily      methadone 5 MG tablet  Commonly known as: DOLOPHINE   10 mg, Oral, Every Morning      methadone 5 MG tablet  Commonly known as: DOLOPHINE   15 mg, Oral, Nightly      MiraLax 17 g packet  Generic drug: polyethylene glycol   17 g, Oral, Daily PRN      potassium chloride 10 MEQ CR capsule  Commonly known as: MICRO-K   20 mEq, Oral, Daily      pramipexole 1 MG tablet  Commonly known as: MIRAPEX   1 mg, Oral,  Nightly      rosuvastatin 10 MG tablet  Commonly known as: CRESTOR   10 mg, Oral, Daily      senna 8.6 MG tablet  Commonly known as: SENOKOT   3 tablets, Oral, 2 Times Daily      Toujeo SoloStar 300 UNIT/ML solution pen-injector injection  Generic drug: Insulin Glargine (1 Unit Dial)   60 Units, Subcutaneous, Every Morning      Trulicity 1.5 MG/0.5ML solution pen-injector  Generic drug: Dulaglutide   1.5 mg, Subcutaneous, Every 7 Days, Thursday      vitamin D 1.25 MG (80008 UT) capsule capsule  Commonly known as: ERGOCALCIFEROL   50,000 Units, Oral, Weekly, Thursdays          Stop These Medications    bumetanide 1 MG tablet  Commonly known as: BUMEX     ketorolac 10 MG tablet  Commonly known as: TORADOL            Discharge Diet:   Diet Instructions     Diet: Consistent Carbohydrate      Discharge Diet: Consistent Carbohydrate          Activity at Discharge:   Activity Instructions     Activity as Tolerated            Follow-up Appointments  Future Appointments   Date Time Provider Department Center   6/23/2021  2:45 PM [facility]     Additional Instructions for the Follow-ups that You Need to Schedule     Discharge Follow-up with PCP   As directed       Currently Documented PCP:    Aida Lowry APRN    PCP Phone Number:    [phone]     Follow Up Details: Please follow-up with your primary care doctor within a week               Test Results Pending at Discharge       Risk for Readmission (LACE) Score: 9 (6/11/2021  6:01 AM)        Volodymyr Burleson MD  06/11/21  10:39 EDT      Time: Discharge 25 min total time spent with face-to-face history/exam, writing all prescriptions, preparing medication reconciliation, and documenting discharge data including chart review of the full admission, care co-ordination with patient, family, , and , etc., and follow-up appointments with PCP and specialists as recommended.

## 2021-06-11 NOTE — PLAN OF CARE
Goal Outcome Evaluation:  Plan of Care Reviewed With: patient        Progress: improving  Outcome Summary: pt rested comfortably throughout shift, pt complained of pain, prn meds given, see mar, meds effective, pt possible d/c 6/11, requiring 6 min walk test, pt tolerating po eliquis, vss, will continue to monitor.

## 2021-06-11 NOTE — PROGRESS NOTES
PROGRESS NOTE      Patient Name: Genny Soni  : 1950  MRN: [id number]  Primary Care Physician: Aida Lowry APRN  Date of admission: 2021    Patient Care Team:  Aida Lowry APRN as PCP - General (Nurse Practitioner)        Subjective   Subjective:     Patient reports feeling better   Denies any issues at time of assessment  Review of systems:  All other review of system unremarkable      Allergies:    Allergies   Allergen Reactions   • Ambien  [Zolpidem] Confusion   • Codeine Itching   • Sulfa Antibiotics Nausea And Vomiting     Makes her nauseated       Objective   Exam:     Vital Signs  Temp:  [97 °F (36.1 °C)-98.7 °F (37.1 °C)] 97 °F (36.1 °C)  Heart Rate:  [74-78] 74  Resp:  [18] 18  BP: (146-147)/(56-60) 146/56  SpO2:  [97 %-100 %] 100 %  on   ;   Device (Oxygen Therapy): room air  Body mass index is 32.99 kg/m².    General: Elderly white  female in no acute distress.    Head:      Normocephalic and atraumatic.    Eyes:      PERRL/EOM intact, conjunctiva and sclera clear with out nystagmus.    Neck:      No masses, thyromegaly,  trachea central with normal respiratory effort   Lungs:    Clear bilaterally to auscultation.    Heart:      Regular rate and rhythm, no murmur no gallop  Abd:        Soft, nontender, not distended, bowel sounds positive, no shifting dullness   Pulses:   Pulses palpable  Extr:        No cyanosis or clubbing--+1 edema.    Neuro:    No focal deficits.   alert oriented x3  Skin:       Intact without lesions or rashes.    Psych:    Alert and cooperative; normal mood and affect; .      Results Review:  I have personally reviewed most recent Data :  CBC    Results from last 7 days   Lab Units 21  0220 06/10/21  0236 21  0255 21  0327 21  0429 21  1729   WBC 10*3/mm3 6.40 6.20 6.40 6.90 6.90 7.40   HEMOGLOBIN g/dL 10.9* 10.7* 10.4* 10.2* 10.7* 11.2*   PLATELETS 10*3/mm3 196 203 199 187 193 222     CMP   Results from last 7 days    Lab Units 06/11/21  0220 06/10/21  1140 06/09/21  0255 06/08/21  0327 06/07/21  1014 06/07/21  0429 06/06/21  1729   SODIUM mmol/L 143 139 140 138 139 140 136   POTASSIUM mmol/L 4.8 4.5 4.4 4.8 4.8 4.8 5.1   CHLORIDE mmol/L 106 104 103 102 102 103 96*   CO2 mmol/L 29.0 26.0 29.0 30.0* 30.0* 29.0 32.0*   BUN mg/dL 16 14 16 27* 29* 31* 42*   CREATININE mg/dL 0.75 0.70 0.78 0.99 0.95 0.96 1.50*   GLUCOSE mg/dL 159* 195* 127* 135* 261* 126* 280*   ALBUMIN g/dL  --   --   --   --  3.60  --  3.90   BILIRUBIN mg/dL  --   --   --   --  0.3  --  0.4   ALK PHOS U/L  --   --   --   --  111  --  127*   AST (SGOT) U/L  --   --   --   --  27  --  18   ALT (SGPT) U/L  --   --   --   --  22  --  20     ABG      No radiology results for the last day    Results for orders placed during the hospital encounter of 06/06/21    ADULT TRANSTHORACIC ECHO COMPLETE W/ CONT IF NECESSARY PER PROTOCOL    Interpretation Summary  · Estimated right ventricular systolic pressure from tricuspid regurgitation is normal (<35 mmHg).  · Calculated left ventricular EF = 54% Estimated left ventricular EF was in agreement with the calculated left ventricular EF.  · Left ventricular diastolic function was normal.    Essentially unremarkable study  Normal LV and RV size and function  Normal atrial sizes  No significant valvular regurgitation or stenosis seen  Valve leaflets are thin and pliable with no mobile echodensities  No Doppler evidence for ASD or PFO although no bubble study performed in the present study  Normal myocardial thickness no LVOT obstruction no intracavitary obstruction  No masses or effusion seen  Normal diastolic function by criteria  No pulmonary hypertension seen as measured noninvasively  Normal right left-sided pressures estimated noninvasively  EF 60%    Scheduled Meds:  Continuous Infusions:No current facility-administered medications for this encounter.    PRN Meds:    Assessment/Plan   Assessment and Plan:         Acute  pulmonary embolism without acute cor pulmonale (CMS/HCC)    Type 2 diabetes mellitus with diabetic peripheral angiopathy without gangrene, with long-term current use of insulin (CMS/HCC)    Anxiety    Benign essential hypertension    Drug-induced constipation    Fibromyalgia    Hyperlipidemia    PERLA (obstructive sleep apnea)    Peripheral vascular disease (CMS/HCC)    Restless leg syndrome    Uncontrolled type 2 diabetes mellitus (CMS/HCC)    Acute kidney injury (CMS/HCC)    Tremors of nervous system    ASSESSMENT:      · DARRYL in a patient with no significant chronic kidney disease likely related to some hemodynamic instability in the presence of pulmonary embolism.  Repeat labs are pending there is a possibility of renal functions getting worse.  Also contributing factor some contrast-induced nephropathy  · Acute pulmonary embolism  · Essential hypertension  · History of diabetes mellitus which is not well controlled  · Restless leg syndrome  · History of fibromyalgia  · Normal echocardiogram        Plan:      · Patient DARRYL seems to be multifactorial may be mild ATN contrast nephropathy, volume status hemodynamic instability which is already getting better at this time.  · Patient volume status is acceptable no need for diuretics or extra IV fluid at this time.    · Okay to be discharged at this time   · Renal clinic follow-up only if needed   · control blood pressure aggressively at this time blood pressure is stable  · Hold ACE inhibitors at this time but okay to be started before discharge.  · Hold diuretics as to me patient is not volume overloaded edema is secondary to DVT likely      [facility] kidney consultant  [phone]      Patient is seen and examined by me.  I discussed the patient in detail with the nurse practitioner.  I agree with her evaluation and plan.  I am doing the Epley changes in the note according to my assessment.

## 2021-06-11 NOTE — CASE MANAGEMENT/SOCIAL WORK
Discharge Planning Assessment   [facility]     Patient Name: Genny Soni  MRN: [id number]  Today's Date: 6/11/2021    Admit Date: 6/6/2021          Plan    Patient/Family in Agreement with Plan  yes    Plan Comments  spoke to patient in room with ppe(mask and goggles) staying 6 feet away and less than 15 mins; she is tested for possible oxygen need and continues to not meet critieria for need; to follow with pcp after discharge for possible follow-up for possible nite time oxygen       Carol naegele rn  Case management  Office number [phone]  Cell phone [phone]

## 2021-06-12 ENCOUNTER — READMISSION MANAGEMENT (OUTPATIENT)
Dept: CALL CENTER | Facility: HOSPITAL | Age: 71
End: 2021-06-12

## 2021-06-12 NOTE — OUTREACH NOTE
Prep Survey      Responses   [facility] patient discharged from?  [facility]   Is LACE score < 7 ?  No   Emergency Room discharge w/ pulse ox?  No   Eligibility  Readm Mgmt   Discharge diagnosis  Pulmonary embolism   Does the patient have one of the following disease processes/diagnoses(primary or secondary)?  Other   Does the patient have Home health ordered?  No   Is there a DME ordered?  No   Prep survey completed?  Yes          Cassidy Regan RN

## 2021-06-14 NOTE — CASE MANAGEMENT/SOCIAL WORK
Discharge Planning Assessment   [facility]     Patient Name: Genny Soni  MRN: [id number]  Today's Date: 6/14/2021    Admit Date: 6/6/2021          Plan    Final Discharge Disposition Code  01 - home or self-care    Final Note  return home       Carol naegele rn  Case management  Office number [phone]  Cell phone [phone]

## 2021-06-15 ENCOUNTER — READMISSION MANAGEMENT (OUTPATIENT)
Dept: CALL CENTER | Facility: HOSPITAL | Age: 71
End: 2021-06-15

## 2021-06-15 NOTE — OUTREACH NOTE
Medical Week 1 Survey      Responses   [facility] patient discharged from?  [facility]   Does the patient have one of the following disease processes/diagnoses(primary or secondary)?  Other   Week 1 attempt successful?  No   Unsuccessful attempts  Attempt 1          Maryam Baer LPN

## 2021-06-23 ENCOUNTER — OFFICE VISIT (OUTPATIENT)
Dept: NEUROSURGERY | Facility: CLINIC | Age: 71
End: 2021-06-23

## 2021-06-23 VITALS
WEIGHT: 195 LBS | BODY MASS INDEX: 33.29 KG/M2 | DIASTOLIC BLOOD PRESSURE: 63 MMHG | HEIGHT: 64 IN | HEART RATE: 85 BPM | RESPIRATION RATE: 18 BRPM | SYSTOLIC BLOOD PRESSURE: 119 MMHG

## 2021-06-23 DIAGNOSIS — I26.92 ACUTE SADDLE PULMONARY EMBOLISM, UNSPECIFIED WHETHER ACUTE COR PULMONALE PRESENT (HCC): ICD-10-CM

## 2021-06-23 DIAGNOSIS — Q76.2 CONGENITAL SPONDYLOLISTHESIS: Primary | ICD-10-CM

## 2021-06-23 DIAGNOSIS — M54.16 LEFT LUMBAR RADICULOPATHY: ICD-10-CM

## 2021-06-23 PROCEDURE — 99024 POSTOP FOLLOW-UP VISIT: CPT | Performed by: SPECIALIST

## 2021-06-23 NOTE — PROGRESS NOTES
Subjective   History of Present Illness: Genny Soni is a 71 y.o. female who was seen in follow-up and L5-S1 is Smith procedure with fusion.  She did very well with this and went home and actually walked a mile the following day.  She then began to develop some problems with shortness of breath and eventually went to the hospital was found to have a saddle embolus.  This was treated and she has been doing well but is very tired and does not have the energy to continue her walking as she went down.    History Of Present Illness:    The following portions of the patient's history were reviewed and updated as appropriate: allergies, current medications, past family history, past medical history, past social history, past surgical history, and problem list.    Past Medical History:   Diagnosis Date   • Arthritis    • Benign essential hypertension 7/12/2018   • Diabetes mellitus (CMS/HCC)    • Elevated cholesterol    • H/O blood clots    • History of transfusion    • Hyperlipidemia    • Low back pain    • Sleep apnea         Past Surgical History:   Procedure Laterality Date   • BACK SURGERY     • FEMORAL DISTAL BYPASS     • HYSTERECTOMY     • JOINT REPLACEMENT     • LUMBAR LAMINECTOMY WITH FUSION N/A 5/28/2021    Procedure: Lumbar 5 Smith procedure.  Lumbar 5 6 and sacral 1 transpedicular Solera screws and rods.  Posterior fusion with autologous bone.;  Surgeon: Gagan Aguilar MD;  Location: [facility] MAIN OR;  Service: Neurosurgery;  Laterality: N/A;          Current Outpatient Medications:   •  Apixaban Starter Pack tablet therapy pack, Take two 5 mg tablets by mouth every 12 hours for 7 days. Followed by one 5 mg tablet every 12 hours. (Dispense starter pack if available), Disp: 74 tablet, Rfl: 0  •  ARIPiprazole (ABILIFY) 2 MG tablet, Take 2 mg by mouth Daily., Disp: , Rfl:   •  glipizide (GLUCOTROL XL) 10 MG 24 hr tablet, Take 10 mg by mouth Daily., Disp: , Rfl:   •  HYDROcodone-acetaminophen (NORCO)  MG per  tablet, Take 0.5-1 tablets by mouth 3 (Three) Times a Day As Needed., Disp: , Rfl:   •  Insulin Glargine, 1 Unit Dial, (Toujeo SoloStar) 300 UNIT/ML solution pen-injector injection, Inject 60 Units under the skin into the appropriate area as directed Every Morning., Disp: , Rfl:   •  lisinopril (PRINIVIL,ZESTRIL) 10 MG tablet, Take 10 mg by mouth Daily., Disp: , Rfl:   •  methadone (DOLOPHINE) 5 MG tablet, Take 10 mg by mouth Every Morning., Disp: , Rfl:   •  polyethylene glycol (MiraLax) 17 g packet, Take 17 g by mouth Daily As Needed., Disp: , Rfl:   •  potassium chloride (MICRO-K) 10 MEQ CR capsule, Take 20 mEq by mouth Daily., Disp: , Rfl:   •  pramipexole (MIRAPEX) 1 MG tablet, Take 1 mg by mouth Every Night., Disp: , Rfl:   •  rosuvastatin (CRESTOR) 10 MG tablet, Take 10 mg by mouth Daily., Disp: , Rfl:   •  senna (senna) 8.6 MG tablet, Take 3 tablets by mouth 2 (Two) Times a Day., Disp: , Rfl:   •  Trulicity 1.5 MG/0.5ML solution pen-injector, Inject 1.5 mg under the skin into the appropriate area as directed Every 7 (Seven) Days. Thursday, Disp: , Rfl:   •  vitamin D (ERGOCALCIFEROL) 1.25 MG (35586 UT) capsule capsule, Take 50,000 Units by mouth 1 (One) Time Per Week. Thursdays, Disp: , Rfl:   •  hydroCHLOROthiazide (HYDRODIURIL) 25 MG tablet, Take 25 mg by mouth Daily., Disp: , Rfl:   •  methadone (DOLOPHINE) 5 MG tablet, Take 15 mg by mouth Every Night., Disp: , Rfl:      Social History     Socioeconomic History   • Marital status:      Spouse name: Not on file   • Number of children: Not on file   • Years of education: Not on file   • Highest education level: Not on file   Tobacco Use   • Smoking status: Former Smoker   • Smokeless tobacco: Never Used   Vaping Use   • Vaping Use: Never used   Substance and Sexual Activity   • Alcohol use: Not Currently   • Drug use: Never   • Sexual activity: Defer        History reviewed. No pertinent family history.     Review of Systems she does have a lot of  "fatiguing.  She gets short of breath at times.  She has developed edema in the lower extremities which limits her activity somewhat.    Objective     Vitals:    06/23/21 1452   BP: 119/63   BP Location: Right arm   Patient Position: Sitting   Cuff Size: Large Adult   Pulse: 85   Resp: 18   Weight: 88.5 kg (195 lb)   Height: 162.6 cm (64\")     Body mass index is 33.47 kg/m².      Physical Exam  Neurologic Exam  Her incision is intact.  The motor strength is 5/5.  She is areflexic in the legs.  She has 2-3+ edema in both lower extremities.      Assessment/Plan   Independent Review of Radiographic Studies:      I personally reviewed the images from the following studies.    None    Medical Decision Making:      Her recovery is being hampered by the pulmonary embolus.  At this point in time I think it is best to let her recover more from that before we start a physical therapy program.  She will return in a month for follow-up.  Diagnoses and all orders for this visit:    1. Congenital spondylolisthesis (Primary)    2. Left lumbar radiculopathy    3. Acute saddle pulmonary embolism, unspecified whether acute cor pulmonale present (CMS/HCC)      Return in about 1 month (around 7/23/2021) for Recheck.         "

## 2021-06-29 ENCOUNTER — READMISSION MANAGEMENT (OUTPATIENT)
Dept: CALL CENTER | Facility: HOSPITAL | Age: 71
End: 2021-06-29

## 2021-06-29 NOTE — OUTREACH NOTE
Medical Week 3 Survey      Responses   [facility] [place] patient discharged from?  [facility]   Does the patient have one of the following disease processes/diagnoses(primary or secondary)?  Other   Week 3 attempt successful?  Yes   Call start time  1604   Call end time  1608   Discharge diagnosis  Pulmonary embolism   Is the patient taking all medications as directed (includes completed medication regime)?  Yes   Does the patient have a primary care provider?   Yes   Has the patient kept scheduled appointments due by today?  Yes   Comments  States she has been very busy with appts. She has appts with her PCP and will have a crown done tomorrow.  Then will follow up with Dr. Aguilar the end of July   Psychosocial issues?  No   What is the patient's perception of their health status since discharge?  Improving   Additional teach back comments  States she is doing better.   Week 3 Call Completed?  Yes   Wrap up additional comments  Has several appts coming up.  Denies needs or quetions at this time          Maryam Baer LPN

## 2021-07-07 ENCOUNTER — READMISSION MANAGEMENT (OUTPATIENT)
Dept: CALL CENTER | Facility: HOSPITAL | Age: 71
End: 2021-07-07

## 2021-07-07 NOTE — OUTREACH NOTE
Medical Week 4 Survey      Responses   [facility] patient discharged from?  [facility]   Does the patient have one of the following disease processes/diagnoses(primary or secondary)?  Other   Week 4 attempt successful?  No          Maryam Baer LPN

## 2021-07-28 ENCOUNTER — OFFICE VISIT (OUTPATIENT)
Dept: NEUROSURGERY | Facility: CLINIC | Age: 71
End: 2021-07-28

## 2021-07-28 VITALS
DIASTOLIC BLOOD PRESSURE: 55 MMHG | HEIGHT: 64 IN | HEART RATE: 114 BPM | SYSTOLIC BLOOD PRESSURE: 126 MMHG | WEIGHT: 189 LBS | BODY MASS INDEX: 32.27 KG/M2

## 2021-07-28 DIAGNOSIS — I26.92 ACUTE SADDLE PULMONARY EMBOLISM, UNSPECIFIED WHETHER ACUTE COR PULMONALE PRESENT (HCC): ICD-10-CM

## 2021-07-28 DIAGNOSIS — M54.16 LEFT LUMBAR RADICULOPATHY: ICD-10-CM

## 2021-07-28 DIAGNOSIS — Q76.2 CONGENITAL SPONDYLOLISTHESIS: Primary | ICD-10-CM

## 2021-07-28 PROCEDURE — 99024 POSTOP FOLLOW-UP VISIT: CPT | Performed by: SPECIALIST

## 2021-07-28 RX ORDER — METOLAZONE 2.5 MG/1
2.5 TABLET ORAL DAILY
COMMUNITY
Start: 2021-06-22

## 2021-07-28 RX ORDER — LIDOCAINE 50 MG/G
PATCH TOPICAL
COMMUNITY
Start: 2021-06-22

## 2021-07-28 RX ORDER — WARFARIN SODIUM 5 MG/1
TABLET ORAL
COMMUNITY
Start: 2021-07-25

## 2021-07-28 RX ORDER — BUMETANIDE 1 MG/1
2 TABLET ORAL 2 TIMES DAILY
COMMUNITY
Start: 2021-06-16

## 2021-07-28 RX ORDER — WARFARIN SODIUM 1 MG/1
TABLET ORAL
COMMUNITY
Start: 2021-07-09

## 2021-07-28 NOTE — PROGRESS NOTES
Subjective   History of Present Illness: Genny Soni is a 71 y.o. female follow-up from an L5-S1 Smith procedure and fusion.  History Of Present Illness:  Genny is doing better now.  She did exceptionally well following surgery but had a pulmonary embolus and this knocked her back while on.  She has gone back down to starting exercising in the water pool and is wanting to increase her activity somewhat.  She no longer has any back or leg pain although she does have some difficulty with the right leg that was an old injury in the past.      The following portions of the patient's history were reviewed and updated as appropriate: allergies, current medications, past family history, past medical history, past social history, past surgical history, and problem list.    Past Medical History:   Diagnosis Date   • Arthritis    • Benign essential hypertension 7/12/2018   • Diabetes mellitus (CMS/HCC)    • Elevated cholesterol    • H/O blood clots    • History of transfusion    • Hyperlipidemia    • Low back pain    • Sleep apnea         Past Surgical History:   Procedure Laterality Date   • BACK SURGERY     • FEMORAL DISTAL BYPASS     • HYSTERECTOMY     • JOINT REPLACEMENT     • LUMBAR LAMINECTOMY WITH FUSION N/A 5/28/2021    Procedure: Lumbar 5 Smith procedure.  Lumbar 5 6 and sacral 1 transpedicular Solera screws and rods.  Posterior fusion with autologous bone.;  Surgeon: Gagan Aguilar MD;  Location: [facility] MAIN OR;  Service: Neurosurgery;  Laterality: N/A;          Current Outpatient Medications:   •  Apixaban Starter Pack tablet therapy pack, Take two 5 mg tablets by mouth every 12 hours for 7 days. Followed by one 5 mg tablet every 12 hours. (Dispense starter pack if available), Disp: 74 tablet, Rfl: 0  •  ARIPiprazole (ABILIFY) 2 MG tablet, Take 2 mg by mouth Daily., Disp: , Rfl:   •  bumetanide (BUMEX) 1 MG tablet, Take 2 mg by mouth 2 (Two) Times a Day., Disp: , Rfl:   •  glipizide (GLUCOTROL XL) 10 MG 24 hr  tablet, Take 10 mg by mouth Daily., Disp: , Rfl:   •  HYDROcodone-acetaminophen (NORCO)  MG per tablet, Take 0.5-1 tablets by mouth 3 (Three) Times a Day As Needed., Disp: , Rfl:   •  Insulin Glargine, 1 Unit Dial, (Toujeo SoloStar) 300 UNIT/ML solution pen-injector injection, Inject 60 Units under the skin into the appropriate area as directed Every Morning., Disp: , Rfl:   •  lidocaine (LIDODERM) 5 %, APPLY 1-3 PATCHES ONTO THE SKIN DAILY FOR MAX AMOUNT OF 12 HOURS AS DIRECTED, Disp: , Rfl:   •  lisinopril (PRINIVIL,ZESTRIL) 10 MG tablet, Take 10 mg by mouth Daily., Disp: , Rfl:   •  methadone (DOLOPHINE) 5 MG tablet, Take 10 mg by mouth Every Morning., Disp: , Rfl:   •  metOLazone (ZAROXOLYN) 2.5 MG tablet, Take 2.5 mg by mouth Daily., Disp: , Rfl:   •  polyethylene glycol (MiraLax) 17 g packet, Take 17 g by mouth Daily As Needed., Disp: , Rfl:   •  potassium chloride (MICRO-K) 10 MEQ CR capsule, Take 20 mEq by mouth Daily., Disp: , Rfl:   •  pramipexole (MIRAPEX) 1 MG tablet, Take 1 mg by mouth Every Night., Disp: , Rfl:   •  rosuvastatin (CRESTOR) 10 MG tablet, Take 10 mg by mouth Daily., Disp: , Rfl:   •  senna (senna) 8.6 MG tablet, Take 3 tablets by mouth 2 (Two) Times a Day., Disp: , Rfl:   •  Trulicity 1.5 MG/0.5ML solution pen-injector, Inject 1.5 mg under the skin into the appropriate area as directed Every 7 (Seven) Days. Thursday, Disp: , Rfl:   •  vitamin D (ERGOCALCIFEROL) 1.25 MG (99918 UT) capsule capsule, Take 50,000 Units by mouth 1 (One) Time Per Week. Thursdays, Disp: , Rfl:   •  warfarin (COUMADIN) 1 MG tablet, TAKE 1/2 TABLET BY MOUTH DAILY OR AS DIRECTED, Disp: , Rfl:   •  warfarin (COUMADIN) 5 MG tablet, , Disp: , Rfl:      Social History     Socioeconomic History   • Marital status:      Spouse name: Not on file   • Number of children: Not on file   • Years of education: Not on file   • Highest education level: Not on file   Tobacco Use   • Smoking status: Former Smoker   •  "Smokeless tobacco: Never Used   Vaping Use   • Vaping Use: Never used   Substance and Sexual Activity   • Alcohol use: Not Currently   • Drug use: Never   • Sexual activity: Defer        History reviewed. No pertinent family history.     Review of Systems  Otherwise noncontributory  Objective     Vitals:    07/28/21 1454   BP: 126/55   BP Location: Left arm   Patient Position: Sitting   Cuff Size: Adult   Pulse: 114   Weight: 85.7 kg (189 lb)   Height: 162.6 cm (64\")     Body mass index is 32.44 kg/m².      Physical Exam  Neurologic Exam  She is alert and oriented all modalities.  Her motor strength is 5/5 in the lower extremities.  She is still areflexic in the legs.  Her gait however has improved.  Is still slightly wide-based but otherwise normal.  Her incision is well-healed.      Assessment/Plan   Independent Review of Radiographic Studies:      I personally reviewed the images from the following studies.    None    Medical Decision Making:      She is doing very well at this time and up encouraged her to continue her activities appears to be released from the office will call us if she has any other problems or questions.  Diagnoses and all orders for this visit:    1. Congenital spondylolisthesis (Primary)    2. Left lumbar radiculopathy    3. Acute saddle pulmonary embolism, unspecified whether acute cor pulmonale present (CMS/HCC)      Return if symptoms worsen or fail to improve.         "

## 2021-11-06 NOTE — PROGRESS NOTES
[facility] Daily Progress Note      Hospitalist Team  LOS 3 days      Patient Care Team:  Aida Lowry APRN as PCP - General (Nurse Practitioner)    Patient Location: 368/1      Subjective   Subjective     Chief Complaint / Subjective  Chief Complaint   Patient presents with   • Chest Pain     Patient's spinal surgery site monitored by neurosurgery team no signs of infection at this time.  Patient cleared to start DOAC, Eliquis ordered.  Patient worked with physical therapy.    Brief Synopsis of Hospital Course/HPI    Ms. Soni is a 71 y.o. female with past medical history of diabetes mellitus type 2, hyperlipidemia, arthritis, chronic low back pain on methadone, and recent lumbar surgery 10 days ago presented to [facility] ED 6/6/2021 with complaints of shortness of breath associated with chest pain for approximately 3 days. She also noted bilateral lower extremity swelling. 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 She was not discharged on oral anticoagulation and has not been on anticoagulation since that time. Her mother had a history of blood clots.      In the ED CT PE protocol was positive for pulmonary emboli.  Thrombus in left main pulmonary artery extending into the left upper lobe vessels, possible saddle embolus with clot possibly extending into the left main pulmonary artery across bifurcation into the right main pulmonary artery.  There is also concern for thrombus in segmental right upper lobe artery.  Scattered groundglass opacities also found in lungs.  Covid swab was negative.  Patient was started on heparin drip and admitted to the ICU for further treatment of pulmonary emboli.     6/7/2021 nephrology consulted for DARRYL.  Downgrade orders out of ICU, Hospitalist group consulted for medical management    6/8/2021: Patient notably more sluggish  PHYSICAL THERAPY TREATMENT NOTE - INPATIENT     Room Number: 150/501-E       Presenting Problem:  bimalleolar fracture of R ankle- requiring R ankle open fracture irrigation and debridement with placement of external fixator on 10/25/21    Problem List  Pr "today.  Patient denies any chest pain breathing comfortably at rest.  Discussion of transition to oral anticoagulation if cleared by neurosurgery, patient would like to be on DOAC, does not want Coumadin at all.  Some concern had of drainage from spinal surgery site.    Date::          Review of Systems   Constitutional: Positive for malaise/fatigue. Negative for chills and fever.   HENT: Negative for hoarse voice and stridor.    Eyes: Negative for double vision and photophobia.   Cardiovascular: Negative for chest pain and paroxysmal nocturnal dyspnea.   Respiratory: Negative for cough and shortness of breath.    Skin: Positive for color change. Negative for rash.   Gastrointestinal: Negative for nausea and vomiting.   Genitourinary: Negative for dysuria and flank pain.   Neurological: Positive for weakness. Negative for dizziness.   Psychiatric/Behavioral: Negative for altered mental status. The patient is not nervous/anxious.          Objective   Objective      Vital Signs  Temp:  [97.4 °F (36.3 °C)-98.4 °F (36.9 °C)] 98.3 °F (36.8 °C)  Heart Rate:  [69-81] 69  Resp:  [16-18] 18  BP: (115-136)/(31-81) 121/74  Oxygen Therapy  SpO2: 100 %  Device (Oxygen Therapy): nasal cannula  Flow (L/min): 4  Oxygen Concentration (%): 4  Flowsheet Rows      First Filed Value   Admission Height  162.6 cm (64\") Documented at 06/06/2021 1603   Admission Weight  91.5 kg (201 lb 12.8 oz) Documented at 06/06/2021 1603        Intake & Output (last 3 days)       06/06 0701 - 06/07 0700 06/07 0701 - 06/08 0700 06/08 0701 - 06/09 0700 06/09 0701 - 06/10 0700    P.O.  1830 460 360    I.V. (mL/kg)  824 (9) 609 (6.6)     Total Intake(mL/kg)  2654 (29) 1069 (11.6) 360 (3.9)    Urine (mL/kg/hr) 650 4700 (2.1) 800 (0.4)     Stool  0 0     Total Output 650 4700 800     Net -650 -2046 +269 +360            Urine Unmeasured Occurrence   2 x 2 x    Stool Unmeasured Occurrence  0 x 1 x         Lines, Drains & Airways    Active LDAs     Name:   " support /elevation of right LE  during transfer training. Second person assisting pt at mod to max level for sit to stand with RW from EOB.  Pt completed sit to stand x 2 rest in between trials with RW able to maintain non WB on right LE with full therapy Placement date:   Placement time:   Site:   Days:    Peripheral IV 06/06/21 1655 Left;Lateral Antecubital   06/06/21    1655    Antecubital   1    Peripheral IV 06/07/21 1400 Left;Posterior Forearm   06/07/21    1400    Forearm   1                  Physical Exam:    Physical Exam  General: Obese elderly female lying in bed breathing comfortably on nasal cannula at 4 L no acute distress  HEENT: NC/AT, EOMI, mucosa moist  Heart: Regular, rate controlled  Chest: Normal work of breathing, moving air well no wheezing or crackles  Abdominal: Soft. NT/ND.   Musculoskeletal: Normal ROM.  Significant bilateral lower extremity edema with erythema.  Neurological: AAOx3, no focal deficits  Skin: Skin is warm and dry.  Back surgery dressing applied  Psychiatric: Pleasant and conversational       Wounds (last 24 hours)      LDA Wound     Row Name 06/09/21 0856 06/08/21 2000 06/08/21 1808       Wound 05/28/21 0814 lower lumbar spine Incision    Wound - Properties Group Placement Date: 05/28/21  - Placement Time: 0814  - Present on Hospital Admission: N  - Orientation: lower  - Location: lumbar spine  -MH Primary Wound Type: Incision  -MH Additional Comments: DRESSING- EXOFIN, COVADERM  -    Dressing Appearance  dry;intact  -AK  --  dried drainage  -AK    Closure  Adhesive bandage  -AK  EBEN  -KB  Adhesive bandage  -AK    Base  dressing in place, unable to visualize  -AK  --  pink  -AK    Periwound  --  --  intact  -AK    Drainage Characteristics/Odor  --  --  serosanguineous  -AK    Drainage Amount  --  --  moderate  -AK    Dressing Care  --  --  dressing changed  -AK    Retired Wound - Properties Group Date first assessed: 05/28/21  - Time first assessed: 0814  - Present on Hospital Admission: N  - Location: lumbar spine  -MH Primary Wound Type: Incision  -MH Additional Comments: DRESSING- EXOFIN, COVADERM  -MH    Row Name 06/08/21 1802             Wound 05/28/21 0814 lower lumbar spine Incision    Wound -  WALK  Oxygen Therapy  SPO2% on Room Air at Rest: 100  SPO2% Ambulation on Room Air: 99    AM-PAC '6-Clicks' [address]  How much difficulty does the patient currently have. ..   Patient Difficulty: Turning over in bed (including ad Goal #4 Stair goal TBD if/when appropriate    Goal #4   Current Status N/A   Goal #5 Patient to demonstrate independence with home activity/exercise instructions provided to patient in preparation for discharge.    Goal #5   Current Status      Properties Group Placement Date: 05/28/21  - Placement Time: 0814 -MH Present on Hospital Admission: N  -MH Orientation: lower  -MH Location: lumbar spine  -MH Primary Wound Type: Incision  -MH Additional Comments: DRESSING- EXOFIN, COVADERM  -MH    Dressing Appearance  dried drainage;dry;intact  -MA      Closure  Adhesive bandage  -MA      Base  pink  -MA      Periwound  intact  -MA      Drainage Characteristics/Odor  serosanguineous  -MA      Drainage Amount  small  -MA      Retired Wound - Properties Group Date first assessed: 05/28/21  - Time first assessed: 0814  - Present on Hospital Admission: N  -MH Location: lumbar spine  -MH Primary Wound Type: Incision  -MH Additional Comments: DRESSING- EXOFIN, COVADERM  -MH      User Key  (r) = Recorded By, (t) = Taken By, (c) = Cosigned By    Initials Name Provider Type    Sophia Conn, RN Registered Nurse    Alice Mcclendon RN Registered Nurse    Brenda Maharaj RN Registered Nurse    Klever Day RN Registered Nurse          Procedures:              Results Review:     I reviewed the patient's new clinical results.      Lab Results (last 24 hours)     Procedure Component Value Units Date/Time    POC Glucose Once [[id number]]  (Abnormal) Collected: 06/09/21 1117    Specimen: Blood Updated: 06/09/21 1118     Glucose 196 mg/dL      Comment: Serial Number: [id number]:  [id number]       aPTT [[id number]]  (Abnormal) Collected: 06/09/21 0801    Specimen: Blood Updated: 06/09/21 0821     PTT 51.9 seconds     POC Glucose Once [[id number]]  (Abnormal) Collected: 06/09/21 0733    Specimen: Blood Updated: 06/09/21 0734     Glucose 142 mg/dL      Comment: Serial Number: [id number]:  [id number]       Basic Metabolic Panel [[id number]]  (Abnormal) Collected: 06/09/21 0255    Specimen: Blood Updated: 06/09/21 0454     Glucose 127 mg/dL      BUN 16 mg/dL      Creatinine 0.78 mg/dL      Sodium 140 mmol/L      Potassium 4.4 mmol/L      Chloride 103  mmol/L      CO2 29.0 mmol/L      Calcium 9.5 mg/dL      eGFR Non African Amer 72 mL/min/1.73      BUN/Creatinine Ratio 20.5     Anion Gap 8.0 mmol/L     Narrative:      GFR Normal >60  Chronic Kidney Disease <60  Kidney Failure <15      Phosphorus [[id number]]  (Normal) Collected: 06/09/21 0255    Specimen: Blood Updated: 06/09/21 0454     Phosphorus 3.5 mg/dL     Magnesium [[id number]]  (Normal) Collected: 06/09/21 0255    Specimen: Blood Updated: 06/09/21 0454     Magnesium 2.2 mg/dL     aPTT [[id number]]  (Abnormal) Collected: 06/09/21 0255    Specimen: Blood Updated: 06/09/21 0443     PTT 45.8 seconds     CBC & Differential [[id number]]  (Abnormal) Collected: 06/09/21 0255    Specimen: Blood Updated: 06/09/21 0432    Narrative:      The following orders were created for panel order CBC & Differential.  Procedure                               Abnormality         Status                     ---------                               -----------         ------                     CBC Auto Differential[[id number]]        Abnormal            Final result                 Please view results for these tests on the individual orders.    CBC Auto Differential [[id number]]  (Abnormal) Collected: 06/09/21 0255    Specimen: Blood Updated: 06/09/21 0432     WBC 6.40 10*3/mm3      RBC 3.25 10*6/mm3      Hemoglobin 10.4 g/dL      Hematocrit 30.5 %      MCV 93.9 fL      MCH 32.1 pg      MCHC 34.2 g/dL      RDW 14.1 %      RDW-SD 46.4 fl      MPV 6.7 fL      Platelets 199 10*3/mm3      Neutrophil % 55.6 %      Lymphocyte % 35.6 %      Monocyte % 6.1 %      Eosinophil % 2.4 %      Basophil % 0.3 %      Neutrophils, Absolute 3.50 10*3/mm3      Lymphocytes, Absolute 2.30 10*3/mm3      Monocytes, Absolute 0.40 10*3/mm3      Eosinophils, Absolute 0.20 10*3/mm3      Basophils, Absolute 0.00 10*3/mm3      nRBC 0.1 /100 WBC     POC Glucose Once [[id number]]  (Abnormal) Collected: 06/09/21 0043    Specimen: Blood Updated: 06/09/21 0044     Glucose  139 mg/dL      Comment: Serial Number: [id number]:  [id number]       POC Glucose Once [[id number]]  (Abnormal) Collected: 06/08/21 2314    Specimen: Blood Updated: 06/08/21 2315     Glucose 158 mg/dL      Comment: Serial Number: [id number]:  [id number]       POC Glucose Once [[id number]]  (Abnormal) Collected: 06/1948    Specimen: Blood Updated: 06/08/21 1949     Glucose 192 mg/dL      Comment: Serial Number: [id number]:  [id number]       C-reactive Protein [[id number]]  (Abnormal) Collected: 06/08/21 1811    Specimen: Blood Updated: 06/08/21 1853     C-Reactive Protein 0.80 mg/dL     Sedimentation Rate [[id number]]  (Normal) Collected: 06/08/21 1811    Specimen: Blood Updated: 06/08/21 1831     Sed Rate 25 mm/hr     POC Glucose Once [[id number]]  (Abnormal) Collected: 06/08/21 1702    Specimen: Blood Updated: 06/08/21 1705     Glucose 203 mg/dL      Comment: Serial Number: [id number]:  [id number]           No results found for: HGBA1C  Results from last 7 days   Lab Units 06/07/21  0429 06/06/21  1729   INR  0.96 <0.93*           No results found for: LIPASE  Lab Results   Component Value Date    CHLPL 156 08/31/2020    TRIG 281 (H) 08/31/2020    HDL 40 (L) 08/31/2020    LDL 60 08/31/2020       No results found for: INTRAOP, PREDX, FINALDX, COMDX    Microbiology Results (last 10 days)     ** No results found for the last 240 hours. **          ECG/EMG Results (most recent)     Procedure Component Value Units Date/Time    ADULT TRANSTHORACIC ECHO COMPLETE W/ CONT IF NECESSARY PER PROTOCOL [[id number]] Collected: 06/07/21 0742     Updated: 06/07/21 0948     BSA 2.0 m^2      RVIDd 2.6 cm      IVSd 0.87 cm      LVIDd 3.9 cm      LVIDs 2.7 cm      LVPWd 1.0 cm      IVS/LVPW 0.83     FS 30.3 %      EDV(Teich) 65.6 ml      ESV(Teich) 27.4 ml      EF(Teich) 58.3 %      EDV(cubed) 59.0 ml      ESV(cubed) 20.0 ml      EF(cubed) 66.1 %      LV mass(C)d 114.7 grams      LV mass(C)dI 58.4 grams/m^2       SV(Teich) 38.2 ml      SI(Teich) 19.5 ml/m^2      SV(cubed) 39.0 ml      SI(cubed) 19.8 ml/m^2      Ao root diam 3.1 cm      Ao root area 7.6 cm^2      ACS 1.7 cm      LVOT diam 1.9 cm      LVOT area 2.9 cm^2      EDV(MOD-sp4) 40.2 ml      ESV(MOD-sp4) 18.3 ml      EF(MOD-sp4) 54.4 %      SV(MOD-sp4) 21.9 ml      SI(MOD-sp4) 11.1 ml/m^2      Ao root area (BSA corrected) 1.6     LV Morgan Vol (BSA corrected) 20.5 ml/m^2      LV Sys Vol (BSA corrected) 9.3 ml/m^2      Aortic R-R 0.82 sec      Aortic HR 73.3 BPM      MV E max zay 99.7 cm/sec      MV A max zay 108.0 cm/sec      MV E/A 0.92     MV V2 max 97.6 cm/sec      MV max PG 3.8 mmHg      MV V2 mean 65.6 cm/sec      MV mean PG 1.9 mmHg      MV V2 VTI 34.7 cm      MVA(VTI) 2.9 cm^2      MV dec slope 338.2 cm/sec^2      MV dec time 0.29 sec      Ao pk zay 170.3 cm/sec      Ao max PG 11.6 mmHg      Ao max PG (full) 4.1 mmHg      Ao V2 mean 119.3 cm/sec      Ao mean PG 6.2 mmHg      Ao mean PG (full) 2.1 mmHg      Ao V2 VTI 42.2 cm      EILEEN(I,A) 2.4 cm^2      EILEEN(I,D) 2.4 cm^2      EILEEN(V,A) 2.4 cm^2      EILEEN(V,D) 2.4 cm^2      LV V1 max PG 7.5 mmHg      LV V1 mean PG 4.0 mmHg      LV V1 max 137.2 cm/sec      LV V1 mean 93.4 cm/sec      LV V1 VTI 34.5 cm      CO(Ao) 23.4 l/min      CI(Ao) 11.9 l/min/m^2      SV(Ao) 319.4 ml      SI(Ao) 162.6 ml/m^2      CO(LVOT) 7.4 l/min      CI(LVOT) 3.8 l/min/m^2      SV(LVOT) 101.1 ml      SI(LVOT) 51.4 ml/m^2      PA V2 max 128.9 cm/sec      PA max PG 6.7 mmHg      PA max PG (full) 1.8 mmHg      RV V1 max PG 4.9 mmHg      RV V1 mean PG 2.5 mmHg      RV V1 max 110.1 cm/sec      RV V1 mean 75.4 cm/sec      RV V1 VTI 24.7 cm      TR max zay 233.2 cm/sec      RVSP(TR) 24.9 mmHg      RAP systole 3.0 mmHg      Pulm Sys Zay 70.9 cm/sec      Pulm Morgan Zay 45.6 cm/sec      Pulm S/D 1.6     Pulm A Revs Dur 0.11 sec      Pulm A Revs Zay 25.0 cm/sec       CV ECHO SCOTTIE - BZI_BMI 34.7 kilograms/m^2       CV ECHO SCOTTIE - BSA(HAYCOCK) 2.1 m^2        CV ECHO SCOTTIE - BZI_METRIC_WEIGHT 91.6 kg       CV ECHO SCOTTIE - BZI_METRIC_HEIGHT 162.6 cm      EF(MOD-bp) 54.0 %      LA dimension(2D) 3.8 cm     Narrative:      · Estimated right ventricular systolic pressure from tricuspid   regurgitation is normal (<35 mmHg).  · Calculated left ventricular EF = 54% Estimated left ventricular EF was   in agreement with the calculated left ventricular EF.  · Left ventricular diastolic function was normal.     Essentially unremarkable study  Normal LV and RV size and function  Normal atrial sizes  No significant valvular regurgitation or stenosis seen  Valve leaflets are thin and pliable with no mobile echodensities  No Doppler evidence for ASD or PFO although no bubble study performed in   the present study  Normal myocardial thickness no LVOT obstruction no intracavitary   obstruction  No masses or effusion seen  Normal diastolic function by criteria  No pulmonary hypertension seen as measured noninvasively  Normal right left-sided pressures estimated noninvasively  EF 60%      ECG 12 Lead [[id number]] Collected: 06/06/21 1631     Updated: 06/08/21 1609     QT Interval 363 ms     Narrative:      HEART RATE= 82  bpm  RR Interval= 728  ms  UT Interval= 181  ms  P Horizontal Axis= 5  deg  P Front Axis= 36  deg  QRSD Interval= 79  ms  QT Interval= 363  ms  QRS Axis= 57  deg  T Wave Axis= 77  deg  - NORMAL ECG -  Sinus rhythm  When compared with ECG of 27-May-2021 17:07:24,  No significant change  Electronically Signed By: Tanner Kelly ([place]) 08-Jun-2021 16:08:36  Date and Time of Study: 2021-06-06 16:31:05          Results for orders placed during the hospital encounter of 06/06/21    Duplex Venous Lower Extremity - Bilateral CAR    Interpretation Summary  · Acute left lower extremity superficial thrombophlebitis noted in the greater saphenous (above knee), 1 cm from the saphenofemoral junction.  · All other veins appeared normal bilaterally.      Results for orders placed during  the hospital encounter of 06/06/21    ADULT TRANSTHORACIC ECHO COMPLETE W/ CONT IF NECESSARY PER PROTOCOL    Interpretation Summary  · Estimated right ventricular systolic pressure from tricuspid regurgitation is normal (<35 mmHg).  · Calculated left ventricular EF = 54% Estimated left ventricular EF was in agreement with the calculated left ventricular EF.  · Left ventricular diastolic function was normal.    Essentially unremarkable study  Normal LV and RV size and function  Normal atrial sizes  No significant valvular regurgitation or stenosis seen  Valve leaflets are thin and pliable with no mobile echodensities  No Doppler evidence for ASD or PFO although no bubble study performed in the present study  Normal myocardial thickness no LVOT obstruction no intracavitary obstruction  No masses or effusion seen  Normal diastolic function by criteria  No pulmonary hypertension seen as measured noninvasively  Normal right left-sided pressures estimated noninvasively  EF 60%      CT Abdomen Pelvis With Contrast    Result Date: 6/6/2021  1. There is fluid in the colon which can be seen with diarrhea and clinical correlation is recommended. 2. There is mild bilateral hydronephrosis with no ureteral stone. This could possibly be due to bladder distention and clinical correlation is recommended. 3. There is a partly included fluid filled structure measuring at least 5.5 cm in the subcutaneous fat of the anterior medial right thigh that is of uncertain etiology. 4. Appendix is not identifiable. No secondary signs of appendicitis. 5. Patient has had an aortobifemoral graft. 6. Additional findings as reported above.  Electronically Signed By-Argelia Falcon MD On:6/6/2021 8:05 PM This report was finalized on [id number] by  Argelia Falcon MD.    XR Chest 1 View    Result Date: 6/6/2021   1. Stable exam, with no evidence of active disease.  Electronically Signed By-Argelia Falcon MD On:6/6/2021 5:23 PM This report was finalized  on [id number] by  Argelia Falcon MD.    CT Chest Pulmonary Embolism    Result Date: 6/6/2021  1. This study is positive for pulmonary emboli. There is thrombus in the left main pulmonary artery extending into left upper lobe vessels. Technically, I think this is likely a saddle embolus, with a thin wispy clot extending from the clot in the left main pulmonary artery across the bifurcation and into the right main pulmonary artery. There is also likely thrombus in a segmental right upper lobe artery. 2. Scattered groundglass opacities in the lungs, nonspecific. 3. Please see today's CT abdomen dictation for description of abdominal findings.  Electronically Signed By-Argelia Falcon MD On:6/6/2021 7:58 PM This report was finalized on [id number] by  Argelia Falcon MD.    XR Abdomen KUB    Result Date: 6/8/2021   1. Large stool burden in the ascending colon. Mild to moderate stool burden in the transverse and descending colon. 2. The bowel gas pattern has a grossly nonobstructive appearance.  Electronically Signed By-Mary Kate Craft MD On:6/8/2021 9:45 AM This report was finalized on [id number] by  Mary Kate Craft MD.          Xrays, labs reviewed personally by physician.    Medication Review:   I have reviewed the patient's current medication list      Scheduled Meds  apixaban, 10 mg, Oral, BID   Followed by  [START ON 6/16/2021] apixaban, 5 mg, Oral, BID  ARIPiprazole, 2 mg, Oral, Daily  cholecalciferol, 2,000 Units, Oral, Daily  insulin glargine, 46 Units, Subcutaneous, Daily With Breakfast  insulin lispro, 0-24 Units, Subcutaneous, 4x Daily With Meals & Nightly  lidocaine, 1 patch, Transdermal, Q24H  methadone, 10 mg, Oral, Daily  methadone, 15 mg, Oral, Nightly  polyethylene glycol, 17 g, Oral, Daily  pramipexole, 1 mg, Oral, Nightly  rosuvastatin, 10 mg, Oral, Daily  senna, 3 tablet, Oral, BID  sodium chloride, 10 mL, Intravenous, Q12H        Meds Infusions       Meds PRN  •  acetaminophen **OR**  acetaminophen  •  aluminum-magnesium hydroxide-simethicone  •  dextrose  •  dextrose  •  glucagon (human recombinant)  •  HYDROcodone-acetaminophen  •  insulin lispro **AND** insulin lispro  •  ipratropium-albuterol  •  LORazepam  •  ondansetron **OR** ondansetron  •  sodium chloride  •  [COMPLETED] Insert peripheral IV **AND** sodium chloride  •  sodium chloride        Assessment/Plan   Assessment/Plan     Active Hospital Problems    Diagnosis  POA   • **Acute pulmonary embolism without acute cor pulmonale (CMS/HCC) [I26.99]  Yes   • Anxiety [F41.9]  Yes   • Fibromyalgia [M79.7]  Yes   • Acute kidney injury (CMS/HCC) [N17.9]  Unknown   • Tremors of nervous system [R25.1]  Unknown   • Restless leg syndrome [G25.81]  Unknown   • Type 2 diabetes mellitus with diabetic peripheral angiopathy without gangrene, with long-term current use of insulin (CMS/HCC) [E11.51, Z79.4]  Not Applicable   • Drug-induced constipation [K59.03]  Yes   • Benign essential hypertension [I10]  Yes   • Hyperlipidemia [E78.5]  Yes   • Peripheral vascular disease (CMS/HCC) [I73.9]  Yes   • Uncontrolled type 2 diabetes mellitus (CMS/HCC) [E11.65]  Yes   • PERLA (obstructive sleep apnea) [G47.33]  Yes      Resolved Hospital Problems   No resolved problems to display.       MEDICAL DECISION MAKING COMPLEXITY BY PROBLEM:     Chest pain-in context with history of previous thromboembolic event and recent surgery patient high risk for new thromboembolic event.  Patient found to have significant clot burden with signs of questionable saddle embolus  -Heparin discontinued starting Eliquis today cleared by neurosurgery  -Can follow-up outpatient with hematology for hypercoagulability work-up  -Echo showing no signs of right heart strain  -Pulmonology consulted patient stable to be transferred out of ICU  -Wean oxygen as tolerated  -Negative BNP, negative troponins good prognostic sign  -Follow-up lower extremity Dopplers  -After evaluation by PT possible  discharge in the next 24     Thromboembolic event-patient reports previously noted, appears it may have been provoked  -Consider hematology follow-up outpatient for hyper coag  -Patient will need to be anticoagulated for lifetime     Anemia-uncertain chronicity at this time but no obvious signs of bleeding, stable  -Daily CBC     Type 2 diabetes-patient has been hyperglycemic in the hospital though this may be due to significant amount of sympathetic nervous system activation  -Sliding scale  -Home long-acting  -Diabetic diet     Renal insufficiency-patient's baseline normal, nephrology consulted from the ICU, now back to normal  -DC IV fluids   -Daily creatinine     Peripheral vascular disease-patient with previous history of vascular surgery and bypass  -Continue statin and antiplatelets     Hypertension/hyperlipidemia/fibromyalgia/opioid dependency/anxiety/chronic constipation/restless leg syndrome-chronic in nature  -Resume home medication as clinically appropriate     Obesity-BMI of 34  -Lifestyle modifications    VTE Prophylaxis -   Mechanical Order History:     None      Pharmalogical Order History:      Ordered     Dose Route Frequency Stop    06/06/21 2147  heparin bolus from bag 5,000 Units      5,000 Units IV Once 06/06/21 2201 06/06/21 2147  heparin 79142 units/250 mL (100 units/mL) in 0.45 % NaCl infusion  9.97 mL/hr      10.9 Units/kg/hr IV Titrated --    06/06/21 2147  heparin bolus from bag 2,500 Units      2,500 Units IV Every 6 Hours PRN --    06/06/21 2147  heparin bolus from bag 5,000 Units      5,000 Units IV Every 6 Hours PRN 06/08/21 2143                  Code Status -   Code Status and Medical Interventions:   Ordered at: 06/07/21 0045     Code Status:    CPR     Medical Interventions (Level of Support Prior to Arrest):    Full       This patient has been examined wearing appropriate Personal Protective Equipment and discussed with hospital infection control department.  06/09/21        Discharge Planning  pending PT evaluation        Electronically signed by Volodymyr Burleson MD, 06/09/21, 12:01 EDT.  [facility] Hospitalist Team

## 2022-05-10 NOTE — ED NOTES
Tech transport requested to CT 2     Yennifer Martínez, RegSched Rep  08/21/20 4481     Tolerated clear liquid diet.Report to .Discharge order noted.Written discharge instructions provided and reviewed.Verb understanding.Discharged home in stable cond.with  in attendance.

## 2022-05-27 ENCOUNTER — APPOINTMENT (OUTPATIENT)
Dept: GENERAL RADIOLOGY | Facility: HOSPITAL | Age: 72
End: 2022-05-27

## 2022-05-27 ENCOUNTER — HOSPITAL ENCOUNTER (EMERGENCY)
Facility: HOSPITAL | Age: 72
Discharge: HOME | End: 2022-05-27
Attending: EMERGENCY MEDICINE | Admitting: EMERGENCY MEDICINE

## 2022-05-27 VITALS
HEART RATE: 83 BPM | RESPIRATION RATE: 18 BRPM | OXYGEN SATURATION: 93 % | DIASTOLIC BLOOD PRESSURE: 52 MMHG | SYSTOLIC BLOOD PRESSURE: 141 MMHG | TEMPERATURE: 98 F | HEIGHT: 65 IN | WEIGHT: 191.8 LBS | BODY MASS INDEX: 31.96 KG/M2

## 2022-05-27 DIAGNOSIS — M25.551 RIGHT HIP PAIN: Primary | ICD-10-CM

## 2022-05-27 DIAGNOSIS — R60.0 BILATERAL LEG EDEMA: ICD-10-CM

## 2022-05-27 LAB
ANION GAP SERPL CALCULATED.3IONS-SCNC: 11 MMOL/L (ref 5–15)
BASOPHILS # BLD AUTO: 0 10*3/MM3 (ref 0–0.2)
BASOPHILS NFR BLD AUTO: 0.4 % (ref 0–1.5)
BUN SERPL-MCNC: 30 MG/DL (ref 8–23)
BUN/CREAT SERPL: 38.5 (ref 7–25)
CALCIUM SPEC-SCNC: 8.7 MG/DL (ref 8.6–10.5)
CHLORIDE SERPL-SCNC: 102 MMOL/L (ref 98–107)
CO2 SERPL-SCNC: 26 MMOL/L (ref 22–29)
CREAT SERPL-MCNC: 0.78 MG/DL (ref 0.57–1)
DEPRECATED RDW RBC AUTO: 47.7 FL (ref 37–54)
EGFRCR SERPLBLD CKD-EPI 2021: 79.8 ML/MIN/1.73
EOSINOPHIL # BLD AUTO: 0.1 10*3/MM3 (ref 0–0.4)
EOSINOPHIL NFR BLD AUTO: 2.1 % (ref 0.3–6.2)
ERYTHROCYTE [DISTWIDTH] IN BLOOD BY AUTOMATED COUNT: 14.4 % (ref 12.3–15.4)
GLUCOSE SERPL-MCNC: 206 MG/DL (ref 65–99)
HCT VFR BLD AUTO: 39.8 % (ref 34–46.6)
HGB BLD-MCNC: 13.1 G/DL (ref 12–15.9)
INR PPP: 2.18 (ref 2–3)
LYMPHOCYTES # BLD AUTO: 1.9 10*3/MM3 (ref 0.7–3.1)
LYMPHOCYTES NFR BLD AUTO: 30.7 % (ref 19.6–45.3)
MCH RBC QN AUTO: 31.2 PG (ref 26.6–33)
MCHC RBC AUTO-ENTMCNC: 32.9 G/DL (ref 31.5–35.7)
MCV RBC AUTO: 94.8 FL (ref 79–97)
MONOCYTES # BLD AUTO: 0.4 10*3/MM3 (ref 0.1–0.9)
MONOCYTES NFR BLD AUTO: 6.7 % (ref 5–12)
NEUTROPHILS NFR BLD AUTO: 3.7 10*3/MM3 (ref 1.7–7)
NEUTROPHILS NFR BLD AUTO: 60.1 % (ref 42.7–76)
NRBC BLD AUTO-RTO: 0.1 /100 WBC (ref 0–0.2)
NT-PROBNP SERPL-MCNC: 68 PG/ML (ref 0–900)
PLATELET # BLD AUTO: 178 10*3/MM3 (ref 140–450)
PMV BLD AUTO: 7.1 FL (ref 6–12)
POTASSIUM SERPL-SCNC: 4.2 MMOL/L (ref 3.5–5.2)
PROTHROMBIN TIME: 21.5 SECONDS (ref 19.4–28.5)
RBC # BLD AUTO: 4.2 10*6/MM3 (ref 3.77–5.28)
SODIUM SERPL-SCNC: 139 MMOL/L (ref 136–145)
WBC NRBC COR # BLD: 6.1 10*3/MM3 (ref 3.4–10.8)

## 2022-05-27 PROCEDURE — 0 MEPERIDINE PER 100 MG: Performed by: EMERGENCY MEDICINE

## 2022-05-27 PROCEDURE — 83880 ASSAY OF NATRIURETIC PEPTIDE: CPT | Performed by: EMERGENCY MEDICINE

## 2022-05-27 PROCEDURE — 85610 PROTHROMBIN TIME: CPT | Performed by: EMERGENCY MEDICINE

## 2022-05-27 PROCEDURE — 85025 COMPLETE CBC W/AUTO DIFF WBC: CPT | Performed by: EMERGENCY MEDICINE

## 2022-05-27 PROCEDURE — 73502 X-RAY EXAM HIP UNI 2-3 VIEWS: CPT

## 2022-05-27 PROCEDURE — 96372 THER/PROPH/DIAG INJ SC/IM: CPT

## 2022-05-27 PROCEDURE — 99283 EMERGENCY DEPT VISIT LOW MDM: CPT

## 2022-05-27 PROCEDURE — 80048 BASIC METABOLIC PNL TOTAL CA: CPT | Performed by: EMERGENCY MEDICINE

## 2022-05-27 RX ORDER — NAPROXEN 375 MG/1
375 TABLET ORAL 2 TIMES DAILY PRN
Qty: 14 TABLET | Refills: 0 | Status: SHIPPED | OUTPATIENT
Start: 2022-05-27

## 2022-05-27 RX ORDER — MEPERIDINE HYDROCHLORIDE 50 MG/ML
75 INJECTION INTRAMUSCULAR; INTRAVENOUS; SUBCUTANEOUS ONCE
Status: COMPLETED | OUTPATIENT
Start: 2022-05-27 | End: 2022-05-27

## 2022-05-27 RX ORDER — SODIUM CHLORIDE 0.9 % (FLUSH) 0.9 %
10 SYRINGE (ML) INJECTION AS NEEDED
Status: DISCONTINUED | OUTPATIENT
Start: 2022-05-27 | End: 2022-05-27 | Stop reason: HOSPADM

## 2022-05-27 RX ADMIN — MEPERIDINE HYDROCHLORIDE 75 MG: 50 INJECTION INTRAMUSCULAR; INTRAVENOUS; SUBCUTANEOUS at 05:09

## 2022-05-27 NOTE — ED PROVIDER NOTES
Subjective   Patient is a 72-year-old female complaining of right hip pain that developed over the past several days.  States pain is moderate and constant.  She also complains of chronic swelling to her leg but worse recently.  She denies recent trauma or other complaint          Review of Systems  Negative for headache earache sore throat cough fever chest pain shortness of breath abdominal pain vomiting diarrhea dysuria achiness weight loss recent trauma numbness tingling weakness or other complaint.  Past Medical History:   Diagnosis Date   • Arthritis    • Benign essential hypertension 7/12/2018   • Diabetes mellitus (CMS/HCC)    • Elevated cholesterol    • H/O blood clots    • History of transfusion    • Hyperlipidemia    • Low back pain    • Sleep apnea        Allergies   Allergen Reactions   • Ambien  [Zolpidem] Confusion   • Codeine Itching   • Sulfa Antibiotics Nausea And Vomiting     Makes her nauseated       Past Surgical History:   Procedure Laterality Date   • BACK SURGERY     • FEMORAL DISTAL BYPASS     • HYSTERECTOMY     • JOINT REPLACEMENT     • LUMBAR LAMINECTOMY WITH FUSION N/A 5/28/2021    Procedure: Lumbar 5 Smith procedure.  Lumbar 5 6 and sacral 1 transpedicular Solera screws and rods.  Posterior fusion with autologous bone.;  Surgeon: Gagan Aguilar MD;  Location: [facility] MAIN OR;  Service: Neurosurgery;  Laterality: N/A;       No family history on file.    Social History     Socioeconomic History   • Marital status:    Tobacco Use   • Smoking status: Former Smoker   • Smokeless tobacco: Never Used   Vaping Use   • Vaping Use: Never used   Substance and Sexual Activity   • Alcohol use: Not Currently   • Drug use: Never   • Sexual activity: Defer           Objective   Physical Exam  HEENT exam shows TMs to be clear.  Oropharynx comers but sclera is nonicteric.  Neck has no adenopathy JVD or bruits.  Lungs are clear.  Heart has regular rate rhythm without murmur rub or gallop.  Chest is  nontender.  Abdomen is soft nontender.  Extremity exam shows bipedal edema to her mid calf region.  She has 2+ pulses and is neurovascular intact.  Patient also has pain on palpation of her right hip.  She has full range of motion of her right leg with mild discomfort.  Procedures           ED Course      Results for orders placed or performed during the hospital encounter of 05/27/22   Basic Metabolic Panel    Specimen: Blood   Result Value Ref Range    Glucose 206 (H) 65 - 99 mg/dL    BUN 30 (H) 8 - 23 mg/dL    Creatinine 0.78 0.57 - 1.00 mg/dL    Sodium 139 136 - 145 mmol/L    Potassium 4.2 3.5 - 5.2 mmol/L    Chloride 102 98 - 107 mmol/L    CO2 26.0 22.0 - 29.0 mmol/L    Calcium 8.7 8.6 - 10.5 mg/dL    BUN/Creatinine Ratio 38.5 (H) 7.0 - 25.0    Anion Gap 11.0 5.0 - 15.0 mmol/L    eGFR 79.8 >60.0 mL/min/1.73   Protime-INR    Specimen: Blood   Result Value Ref Range    Protime 21.5 19.4 - 28.5 Seconds    INR 2.18 2.00 - 3.00   BNP    Specimen: Blood   Result Value Ref Range    proBNP 68.0 0.0 - 900.0 pg/mL   CBC Auto Differential    Specimen: Blood   Result Value Ref Range    WBC 6.10 3.40 - 10.80 10*3/mm3    RBC 4.20 3.77 - 5.28 10*6/mm3    Hemoglobin 13.1 12.0 - 15.9 g/dL    Hematocrit 39.8 34.0 - 46.6 %    MCV 94.8 79.0 - 97.0 fL    MCH 31.2 26.6 - 33.0 pg    MCHC 32.9 31.5 - 35.7 g/dL    RDW 14.4 12.3 - 15.4 %    RDW-SD 47.7 37.0 - 54.0 fl    MPV 7.1 6.0 - 12.0 fL    Platelets 178 140 - 450 10*3/mm3    Neutrophil % 60.1 42.7 - 76.0 %    Lymphocyte % 30.7 19.6 - 45.3 %    Monocyte % 6.7 5.0 - 12.0 %    Eosinophil % 2.1 0.3 - 6.2 %    Basophil % 0.4 0.0 - 1.5 %    Neutrophils, Absolute 3.70 1.70 - 7.00 10*3/mm3    Lymphocytes, Absolute 1.90 0.70 - 3.10 10*3/mm3    Monocytes, Absolute 0.40 0.10 - 0.90 10*3/mm3    Eosinophils, Absolute 0.10 0.00 - 0.40 10*3/mm3    Basophils, Absolute 0.00 0.00 - 0.20 10*3/mm3    nRBC 0.1 0.0 - 0.2 /100 WBC     No radiology results for the last day                                              MDM  Number of Diagnoses or Management Options  Diagnosis management comments: My x-ray interpretation of her right hip shows degenerative joint disease with no acute bony injury.  Patient has therapeutic INR.  She has no evidence of congestive failure with normal BNP.  She has findings consistent with bipedal edema which is dependent.  She will be discharged.  She will elevate her legs.  She will be placed on Naprosyn for her hip.  She will be given Demerol IM as well.  She will follow with MD for recheck.       Amount and/or Complexity of Data Reviewed  Clinical lab tests: reviewed    Risk of Complications, Morbidity, and/or Mortality  Presenting problems: high  Diagnostic procedures: high  Management options: high        Final diagnoses:   Right hip pain   Bilateral leg edema       ED Disposition  ED Disposition     ED Disposition   Discharge    Condition   Stable    Comment   --             No follow-up provider specified.       Medication List      New Prescriptions    naproxen 375 MG tablet  Commonly known as: NAPROSYN  Take 1 tablet by mouth 2 (Two) Times a Day As Needed for Moderate Pain .           Where to Get Your Medications      Information about where to get these medications is not yet available    Ask your nurse or doctor about these medications  · naproxen 375 MG tablet          Klever Gary MD  05/27/22 6006

## 2023-08-10 ENCOUNTER — LAB (OUTPATIENT)
Dept: LAB | Facility: HOSPITAL | Age: 73
End: 2023-08-10
Payer: MEDICARE

## 2023-08-10 ENCOUNTER — TRANSCRIBE ORDERS (OUTPATIENT)
Dept: LAB | Facility: HOSPITAL | Age: 73
End: 2023-08-10
Payer: MEDICARE

## 2023-08-10 DIAGNOSIS — S32.020A COMPRESSION FRACTURE OF L2, CLOSED, INITIAL ENCOUNTER: ICD-10-CM

## 2023-08-10 DIAGNOSIS — S32.020A COMPRESSION FRACTURE OF L2, CLOSED, INITIAL ENCOUNTER: Primary | ICD-10-CM

## 2023-08-10 LAB
25(OH)D3 SERPL-MCNC: 53 NG/ML (ref 30–100)
ALBUMIN SERPL-MCNC: 4.3 G/DL (ref 3.5–5.2)
ALBUMIN UR-MCNC: 5.9 MG/DL
ALBUMIN/GLOB SERPL: 2 G/DL
ALP SERPL-CCNC: 120 U/L (ref 39–117)
ALT SERPL W P-5'-P-CCNC: 19 U/L (ref 1–33)
ANION GAP SERPL CALCULATED.3IONS-SCNC: 10 MMOL/L (ref 5–15)
APTT PPP: 26.3 SECONDS (ref 24–31)
AST SERPL-CCNC: 20 U/L (ref 1–32)
BASOPHILS # BLD AUTO: 0 10*3/MM3 (ref 0–0.2)
BASOPHILS NFR BLD AUTO: 0.2 % (ref 0–1.5)
BILIRUB SERPL-MCNC: 0.3 MG/DL (ref 0–1.2)
BUN SERPL-MCNC: 25 MG/DL (ref 8–23)
BUN/CREAT SERPL: 35.7 (ref 7–25)
CALCIUM SPEC-SCNC: 9.8 MG/DL (ref 8.6–10.5)
CHLORIDE SERPL-SCNC: 103 MMOL/L (ref 98–107)
CO2 SERPL-SCNC: 28 MMOL/L (ref 22–29)
CREAT SERPL-MCNC: 0.7 MG/DL (ref 0.57–1)
CREAT UR-MCNC: 118.4 MG/DL
CRP SERPL-MCNC: <0.3 MG/DL (ref 0–0.5)
DEPRECATED RDW RBC AUTO: 57.8 FL (ref 37–54)
EGFRCR SERPLBLD CKD-EPI 2021: 90.3 ML/MIN/1.73
EOSINOPHIL # BLD AUTO: 0.1 10*3/MM3 (ref 0–0.4)
EOSINOPHIL NFR BLD AUTO: 2.2 % (ref 0.3–6.2)
ERYTHROCYTE [DISTWIDTH] IN BLOOD BY AUTOMATED COUNT: 18.5 % (ref 12.3–15.4)
ERYTHROCYTE [SEDIMENTATION RATE] IN BLOOD: 8 MM/HR (ref 0–30)
FERRITIN SERPL-MCNC: 25.5 NG/ML (ref 13–150)
GLOBULIN UR ELPH-MCNC: 2.2 GM/DL
GLUCOSE SERPL-MCNC: 104 MG/DL (ref 65–99)
HBA1C MFR BLD: 6.3 % (ref 4.8–5.6)
HCT VFR BLD AUTO: 39.1 % (ref 34–46.6)
HGB BLD-MCNC: 12.6 G/DL (ref 12–15.9)
INR PPP: 0.95 (ref 0.93–1.1)
IRON 24H UR-MRATE: 79 MCG/DL (ref 37–145)
IRON SATN MFR SERPL: 16 % (ref 20–50)
LYMPHOCYTES # BLD AUTO: 1.6 10*3/MM3 (ref 0.7–3.1)
LYMPHOCYTES NFR BLD AUTO: 29.4 % (ref 19.6–45.3)
MCH RBC QN AUTO: 28.7 PG (ref 26.6–33)
MCHC RBC AUTO-ENTMCNC: 32.1 G/DL (ref 31.5–35.7)
MCV RBC AUTO: 89.4 FL (ref 79–97)
MICROALBUMIN/CREAT UR: 49.8 MG/G
MONOCYTES # BLD AUTO: 0.3 10*3/MM3 (ref 0.1–0.9)
MONOCYTES NFR BLD AUTO: 6.2 % (ref 5–12)
NEUTROPHILS NFR BLD AUTO: 3.5 10*3/MM3 (ref 1.7–7)
NEUTROPHILS NFR BLD AUTO: 62 % (ref 42.7–76)
NRBC BLD AUTO-RTO: 0.1 /100 WBC (ref 0–0.2)
PLATELET # BLD AUTO: 155 10*3/MM3 (ref 140–450)
PMV BLD AUTO: 7.4 FL (ref 6–12)
POTASSIUM SERPL-SCNC: 5.1 MMOL/L (ref 3.5–5.2)
PROT SERPL-MCNC: 6.5 G/DL (ref 6–8.5)
PROTHROMBIN TIME: 10.2 SECONDS (ref 9.6–11.7)
RBC # BLD AUTO: 4.38 10*6/MM3 (ref 3.77–5.28)
SODIUM SERPL-SCNC: 141 MMOL/L (ref 136–145)
TIBC SERPL-MCNC: 493 MCG/DL (ref 298–536)
TRANSFERRIN SERPL-MCNC: 331 MG/DL (ref 200–360)
WBC NRBC COR # BLD: 5.6 10*3/MM3 (ref 3.4–10.8)

## 2023-08-10 PROCEDURE — 85652 RBC SED RATE AUTOMATED: CPT

## 2023-08-10 PROCEDURE — 82043 UR ALBUMIN QUANTITATIVE: CPT

## 2023-08-10 PROCEDURE — 83036 HEMOGLOBIN GLYCOSYLATED A1C: CPT

## 2023-08-10 PROCEDURE — 83540 ASSAY OF IRON: CPT

## 2023-08-10 PROCEDURE — 82306 VITAMIN D 25 HYDROXY: CPT

## 2023-08-10 PROCEDURE — 84466 ASSAY OF TRANSFERRIN: CPT

## 2023-08-10 PROCEDURE — 36415 COLL VENOUS BLD VENIPUNCTURE: CPT

## 2023-08-10 PROCEDURE — 85730 THROMBOPLASTIN TIME PARTIAL: CPT

## 2023-08-10 PROCEDURE — 80053 COMPREHEN METABOLIC PANEL: CPT

## 2023-08-10 PROCEDURE — 86140 C-REACTIVE PROTEIN: CPT

## 2023-08-10 PROCEDURE — 82570 ASSAY OF URINE CREATININE: CPT

## 2023-08-10 PROCEDURE — 82728 ASSAY OF FERRITIN: CPT

## 2023-08-10 PROCEDURE — 82985 ASSAY OF GLYCATED PROTEIN: CPT

## 2023-08-10 PROCEDURE — 85610 PROTHROMBIN TIME: CPT

## 2023-08-10 PROCEDURE — 85025 COMPLETE CBC W/AUTO DIFF WBC: CPT

## 2023-08-11 LAB — FRUCTOSAMINE SERPL-SCNC: 230 UMOL/L (ref 0–285)

## 2023-10-05 ENCOUNTER — APPOINTMENT (OUTPATIENT)
Dept: GENERAL RADIOLOGY | Facility: HOSPITAL | Age: 73
End: 2023-10-05
Payer: MEDICARE

## 2023-10-05 ENCOUNTER — HOSPITAL ENCOUNTER (OUTPATIENT)
Facility: HOSPITAL | Age: 73
Setting detail: OBSERVATION
LOS: 1 days | Discharge: HOME HEALTH | End: 2023-10-08
Attending: EMERGENCY MEDICINE | Admitting: INTERNAL MEDICINE
Payer: MEDICARE

## 2023-10-05 ENCOUNTER — APPOINTMENT (OUTPATIENT)
Dept: CARDIOLOGY | Facility: HOSPITAL | Age: 73
End: 2023-10-05
Payer: MEDICARE

## 2023-10-05 DIAGNOSIS — R52 PAIN MANAGEMENT: ICD-10-CM

## 2023-10-05 DIAGNOSIS — Z98.890 HISTORY OF LUMBAR SURGERY: ICD-10-CM

## 2023-10-05 DIAGNOSIS — L03.115 CELLULITIS OF RIGHT LOWER EXTREMITY: Primary | ICD-10-CM

## 2023-10-05 DIAGNOSIS — M79.89 SWELLING OF LEFT LOWER EXTREMITY: ICD-10-CM

## 2023-10-05 DIAGNOSIS — M79.89 SWELLING OF RIGHT LOWER EXTREMITY: ICD-10-CM

## 2023-10-05 PROBLEM — J18.9 PNA (PNEUMONIA): Status: ACTIVE | Noted: 2023-10-05

## 2023-10-05 PROBLEM — L03.312 CELLULITIS OF BACK: Status: ACTIVE | Noted: 2023-10-05

## 2023-10-05 PROBLEM — T81.49XA POSTOPERATIVE CELLULITIS OF SURGICAL WOUND: Status: ACTIVE | Noted: 2023-10-05

## 2023-10-05 PROBLEM — L03.119 CELLULITIS OF LOWER EXTREMITY: Status: ACTIVE | Noted: 2023-10-05

## 2023-10-05 PROBLEM — L03.116 CELLULITIS OF LEFT LOWER EXTREMITY: Status: ACTIVE | Noted: 2023-10-05

## 2023-10-05 LAB
ALBUMIN SERPL-MCNC: 3.9 G/DL (ref 3.5–5.2)
ALBUMIN/GLOB SERPL: 2 G/DL
ALP SERPL-CCNC: 198 U/L (ref 39–117)
ALT SERPL W P-5'-P-CCNC: 11 U/L (ref 1–33)
ANION GAP SERPL CALCULATED.3IONS-SCNC: 7 MMOL/L (ref 5–15)
APTT PPP: 25.8 SECONDS (ref 61–76.5)
AST SERPL-CCNC: 13 U/L (ref 1–32)
BASOPHILS # BLD AUTO: 0 10*3/MM3 (ref 0–0.2)
BASOPHILS NFR BLD AUTO: 0.5 % (ref 0–1.5)
BH CV LOWER VASCULAR LEFT COMMON FEMORAL AUGMENT: NORMAL
BH CV LOWER VASCULAR LEFT COMMON FEMORAL COMPETENT: NORMAL
BH CV LOWER VASCULAR LEFT COMMON FEMORAL COMPRESS: NORMAL
BH CV LOWER VASCULAR LEFT COMMON FEMORAL PHASIC: NORMAL
BH CV LOWER VASCULAR LEFT COMMON FEMORAL SPONT: NORMAL
BH CV LOWER VASCULAR LEFT DISTAL FEMORAL COMPRESS: NORMAL
BH CV LOWER VASCULAR LEFT GASTRONEMIUS COMPRESS: NORMAL
BH CV LOWER VASCULAR LEFT GREATER SAPH AK COMPRESS: NORMAL
BH CV LOWER VASCULAR LEFT GREATER SAPH BK COMPRESS: NORMAL
BH CV LOWER VASCULAR LEFT LESSER SAPH COMPRESS: NORMAL
BH CV LOWER VASCULAR LEFT MID FEMORAL AUGMENT: NORMAL
BH CV LOWER VASCULAR LEFT MID FEMORAL COMPETENT: NORMAL
BH CV LOWER VASCULAR LEFT MID FEMORAL COMPRESS: NORMAL
BH CV LOWER VASCULAR LEFT MID FEMORAL PHASIC: NORMAL
BH CV LOWER VASCULAR LEFT MID FEMORAL SPONT: NORMAL
BH CV LOWER VASCULAR LEFT PERONEAL COMPRESS: NORMAL
BH CV LOWER VASCULAR LEFT POPLITEAL AUGMENT: NORMAL
BH CV LOWER VASCULAR LEFT POPLITEAL COMPETENT: NORMAL
BH CV LOWER VASCULAR LEFT POPLITEAL COMPRESS: NORMAL
BH CV LOWER VASCULAR LEFT POPLITEAL PHASIC: NORMAL
BH CV LOWER VASCULAR LEFT POPLITEAL SPONT: NORMAL
BH CV LOWER VASCULAR LEFT POSTERIOR TIBIAL COMPRESS: NORMAL
BH CV LOWER VASCULAR LEFT PROXIMAL FEMORAL COMPRESS: NORMAL
BH CV LOWER VASCULAR LEFT SAPHENOFEMORAL JUNCTION COMPRESS: NORMAL
BH CV LOWER VASCULAR RIGHT COMMON FEMORAL AUGMENT: NORMAL
BH CV LOWER VASCULAR RIGHT COMMON FEMORAL COMPETENT: NORMAL
BH CV LOWER VASCULAR RIGHT COMMON FEMORAL COMPRESS: NORMAL
BH CV LOWER VASCULAR RIGHT COMMON FEMORAL PHASIC: NORMAL
BH CV LOWER VASCULAR RIGHT COMMON FEMORAL SPONT: NORMAL
BH CV LOWER VASCULAR RIGHT DISTAL FEMORAL COMPRESS: NORMAL
BH CV LOWER VASCULAR RIGHT GASTRONEMIUS COMPRESS: NORMAL
BH CV LOWER VASCULAR RIGHT GREATER SAPH AK COMPRESS: NORMAL
BH CV LOWER VASCULAR RIGHT GREATER SAPH BK COMPRESS: NORMAL
BH CV LOWER VASCULAR RIGHT MID FEMORAL AUGMENT: NORMAL
BH CV LOWER VASCULAR RIGHT MID FEMORAL COMPETENT: NORMAL
BH CV LOWER VASCULAR RIGHT MID FEMORAL COMPRESS: NORMAL
BH CV LOWER VASCULAR RIGHT MID FEMORAL PHASIC: NORMAL
BH CV LOWER VASCULAR RIGHT MID FEMORAL SPONT: NORMAL
BH CV LOWER VASCULAR RIGHT POPLITEAL AUGMENT: NORMAL
BH CV LOWER VASCULAR RIGHT POPLITEAL COMPETENT: NORMAL
BH CV LOWER VASCULAR RIGHT POPLITEAL COMPRESS: NORMAL
BH CV LOWER VASCULAR RIGHT POPLITEAL PHASIC: NORMAL
BH CV LOWER VASCULAR RIGHT POPLITEAL SPONT: NORMAL
BH CV LOWER VASCULAR RIGHT POSTERIOR TIBIAL COMPRESS: NORMAL
BH CV LOWER VASCULAR RIGHT PROXIMAL FEMORAL COMPRESS: NORMAL
BH CV LOWER VASCULAR RIGHT SAPHENOFEMORAL JUNCTION COMPRESS: NORMAL
BH CV VAS PRELIMINARY FINDINGS SCRIPTING: 1
BILIRUB SERPL-MCNC: 0.2 MG/DL (ref 0–1.2)
BUN SERPL-MCNC: 27 MG/DL (ref 8–23)
BUN/CREAT SERPL: 30.7 (ref 7–25)
CALCIUM SPEC-SCNC: 8.6 MG/DL (ref 8.6–10.5)
CHLORIDE SERPL-SCNC: 98 MMOL/L (ref 98–107)
CK SERPL-CCNC: 74 U/L (ref 20–180)
CO2 SERPL-SCNC: 35 MMOL/L (ref 22–29)
CREAT SERPL-MCNC: 0.88 MG/DL (ref 0.57–1)
DEPRECATED RDW RBC AUTO: 56 FL (ref 37–54)
EGFRCR SERPLBLD CKD-EPI 2021: 68.6 ML/MIN/1.73
EOSINOPHIL # BLD AUTO: 0.1 10*3/MM3 (ref 0–0.4)
EOSINOPHIL NFR BLD AUTO: 1.6 % (ref 0.3–6.2)
ERYTHROCYTE [DISTWIDTH] IN BLOOD BY AUTOMATED COUNT: 17.1 % (ref 12.3–15.4)
GLOBULIN UR ELPH-MCNC: 2 GM/DL
GLUCOSE SERPL-MCNC: 297 MG/DL (ref 65–99)
HCT VFR BLD AUTO: 37.8 % (ref 34–46.6)
HGB BLD-MCNC: 12.4 G/DL (ref 12–15.9)
INR PPP: 0.96 (ref 0.93–1.1)
LYMPHOCYTES # BLD AUTO: 1.4 10*3/MM3 (ref 0.7–3.1)
LYMPHOCYTES NFR BLD AUTO: 26.4 % (ref 19.6–45.3)
MCH RBC QN AUTO: 30.9 PG (ref 26.6–33)
MCHC RBC AUTO-ENTMCNC: 32.9 G/DL (ref 31.5–35.7)
MCV RBC AUTO: 93.9 FL (ref 79–97)
MONOCYTES # BLD AUTO: 0.3 10*3/MM3 (ref 0.1–0.9)
MONOCYTES NFR BLD AUTO: 5.9 % (ref 5–12)
MRSA DNA SPEC QL NAA+PROBE: NORMAL
NEUTROPHILS NFR BLD AUTO: 3.4 10*3/MM3 (ref 1.7–7)
NEUTROPHILS NFR BLD AUTO: 65.6 % (ref 42.7–76)
NRBC BLD AUTO-RTO: 0 /100 WBC (ref 0–0.2)
PLATELET # BLD AUTO: 162 10*3/MM3 (ref 140–450)
PMV BLD AUTO: 8.2 FL (ref 6–12)
POTASSIUM SERPL-SCNC: 3.2 MMOL/L (ref 3.5–5.2)
PROT SERPL-MCNC: 5.9 G/DL (ref 6–8.5)
PROTHROMBIN TIME: 10.5 SECONDS (ref 9.6–11.7)
RBC # BLD AUTO: 4.02 10*6/MM3 (ref 3.77–5.28)
SODIUM SERPL-SCNC: 140 MMOL/L (ref 136–145)
WBC NRBC COR # BLD: 5.2 10*3/MM3 (ref 3.4–10.8)

## 2023-10-05 PROCEDURE — 36415 COLL VENOUS BLD VENIPUNCTURE: CPT | Performed by: STUDENT IN AN ORGANIZED HEALTH CARE EDUCATION/TRAINING PROGRAM

## 2023-10-05 PROCEDURE — 93970 EXTREMITY STUDY: CPT

## 2023-10-05 PROCEDURE — 96376 TX/PRO/DX INJ SAME DRUG ADON: CPT

## 2023-10-05 PROCEDURE — 96375 TX/PRO/DX INJ NEW DRUG ADDON: CPT

## 2023-10-05 PROCEDURE — 85025 COMPLETE CBC W/AUTO DIFF WBC: CPT | Performed by: STUDENT IN AN ORGANIZED HEALTH CARE EDUCATION/TRAINING PROGRAM

## 2023-10-05 PROCEDURE — 63710000001 INSULIN GLARGINE PER 5 UNITS: Performed by: STUDENT IN AN ORGANIZED HEALTH CARE EDUCATION/TRAINING PROGRAM

## 2023-10-05 PROCEDURE — 85730 THROMBOPLASTIN TIME PARTIAL: CPT | Performed by: EMERGENCY MEDICINE

## 2023-10-05 PROCEDURE — 85025 COMPLETE CBC W/AUTO DIFF WBC: CPT | Performed by: EMERGENCY MEDICINE

## 2023-10-05 PROCEDURE — 87641 MR-STAPH DNA AMP PROBE: CPT | Performed by: STUDENT IN AN ORGANIZED HEALTH CARE EDUCATION/TRAINING PROGRAM

## 2023-10-05 PROCEDURE — 25010000002 ENOXAPARIN PER 10 MG: Performed by: STUDENT IN AN ORGANIZED HEALTH CARE EDUCATION/TRAINING PROGRAM

## 2023-10-05 PROCEDURE — 85610 PROTHROMBIN TIME: CPT | Performed by: EMERGENCY MEDICINE

## 2023-10-05 PROCEDURE — 99284 EMERGENCY DEPT VISIT MOD MDM: CPT

## 2023-10-05 PROCEDURE — 96374 THER/PROPH/DIAG INJ IV PUSH: CPT

## 2023-10-05 PROCEDURE — 96372 THER/PROPH/DIAG INJ SC/IM: CPT

## 2023-10-05 PROCEDURE — 25010000002 HYDROMORPHONE 1 MG/ML SOLUTION: Performed by: STUDENT IN AN ORGANIZED HEALTH CARE EDUCATION/TRAINING PROGRAM

## 2023-10-05 PROCEDURE — 25010000002 ONDANSETRON PER 1 MG: Performed by: STUDENT IN AN ORGANIZED HEALTH CARE EDUCATION/TRAINING PROGRAM

## 2023-10-05 PROCEDURE — 25010000002 ONDANSETRON PER 1 MG: Performed by: EMERGENCY MEDICINE

## 2023-10-05 PROCEDURE — 87040 BLOOD CULTURE FOR BACTERIA: CPT | Performed by: STUDENT IN AN ORGANIZED HEALTH CARE EDUCATION/TRAINING PROGRAM

## 2023-10-05 PROCEDURE — 82550 ASSAY OF CK (CPK): CPT | Performed by: STUDENT IN AN ORGANIZED HEALTH CARE EDUCATION/TRAINING PROGRAM

## 2023-10-05 PROCEDURE — 25010000002 HYDROMORPHONE 1 MG/ML SOLUTION: Performed by: EMERGENCY MEDICINE

## 2023-10-05 PROCEDURE — 71046 X-RAY EXAM CHEST 2 VIEWS: CPT

## 2023-10-05 PROCEDURE — 80053 COMPREHEN METABOLIC PANEL: CPT | Performed by: STUDENT IN AN ORGANIZED HEALTH CARE EDUCATION/TRAINING PROGRAM

## 2023-10-05 RX ORDER — PRAMIPEXOLE DIHYDROCHLORIDE 1 MG/1
1 TABLET ORAL 2 TIMES DAILY
Status: DISCONTINUED | OUTPATIENT
Start: 2023-10-05 | End: 2023-10-08 | Stop reason: HOSPADM

## 2023-10-05 RX ORDER — SODIUM CHLORIDE 0.9 % (FLUSH) 0.9 %
10 SYRINGE (ML) INJECTION AS NEEDED
Status: DISCONTINUED | OUTPATIENT
Start: 2023-10-05 | End: 2023-10-08 | Stop reason: HOSPADM

## 2023-10-05 RX ORDER — METOLAZONE 2.5 MG/1
2.5 TABLET ORAL DAILY
Status: DISCONTINUED | OUTPATIENT
Start: 2023-10-06 | End: 2023-10-08 | Stop reason: HOSPADM

## 2023-10-05 RX ORDER — ONDANSETRON 4 MG/1
4 TABLET, FILM COATED ORAL EVERY 6 HOURS PRN
Status: DISCONTINUED | OUTPATIENT
Start: 2023-10-05 | End: 2023-10-08 | Stop reason: HOSPADM

## 2023-10-05 RX ORDER — CHOLECALCIFEROL (VITAMIN D3) 125 MCG
5 CAPSULE ORAL NIGHTLY PRN
Status: DISCONTINUED | OUTPATIENT
Start: 2023-10-05 | End: 2023-10-08 | Stop reason: HOSPADM

## 2023-10-05 RX ORDER — ACETAMINOPHEN 325 MG/1
650 TABLET ORAL EVERY 4 HOURS PRN
Status: DISCONTINUED | OUTPATIENT
Start: 2023-10-05 | End: 2023-10-08 | Stop reason: HOSPADM

## 2023-10-05 RX ORDER — NITROGLYCERIN 0.4 MG/1
0.4 TABLET SUBLINGUAL
Status: DISCONTINUED | OUTPATIENT
Start: 2023-10-05 | End: 2023-10-08 | Stop reason: HOSPADM

## 2023-10-05 RX ORDER — POLYETHYLENE GLYCOL 3350 17 G/17G
17 POWDER, FOR SOLUTION ORAL DAILY PRN
Status: DISCONTINUED | OUTPATIENT
Start: 2023-10-05 | End: 2023-10-08 | Stop reason: HOSPADM

## 2023-10-05 RX ORDER — PRIMIDONE 50 MG/1
25 TABLET ORAL DAILY
Status: DISCONTINUED | OUTPATIENT
Start: 2023-10-06 | End: 2023-10-07

## 2023-10-05 RX ORDER — SODIUM CHLORIDE 9 MG/ML
40 INJECTION, SOLUTION INTRAVENOUS AS NEEDED
Status: DISCONTINUED | OUTPATIENT
Start: 2023-10-05 | End: 2023-10-08 | Stop reason: HOSPADM

## 2023-10-05 RX ORDER — ALENDRONATE SODIUM 70 MG/1
70 TABLET ORAL
COMMUNITY

## 2023-10-05 RX ORDER — ONDANSETRON 4 MG/1
4 TABLET, FILM COATED ORAL EVERY 6 HOURS PRN
COMMUNITY

## 2023-10-05 RX ORDER — LUBIPROSTONE 24 UG/1
24 CAPSULE ORAL 2 TIMES DAILY WITH MEALS
Status: DISCONTINUED | OUTPATIENT
Start: 2023-10-06 | End: 2023-10-08 | Stop reason: HOSPADM

## 2023-10-05 RX ORDER — ROSUVASTATIN CALCIUM 10 MG/1
10 TABLET, COATED ORAL DAILY
Status: DISCONTINUED | OUTPATIENT
Start: 2023-10-05 | End: 2023-10-08 | Stop reason: HOSPADM

## 2023-10-05 RX ORDER — LOSARTAN POTASSIUM 25 MG/1
25 TABLET ORAL DAILY
COMMUNITY

## 2023-10-05 RX ORDER — POTASSIUM CHLORIDE 1.5 G/1.58G
40 POWDER, FOR SOLUTION ORAL EVERY 4 HOURS
Status: COMPLETED | OUTPATIENT
Start: 2023-10-05 | End: 2023-10-06

## 2023-10-05 RX ORDER — AMOXICILLIN 250 MG
2 CAPSULE ORAL 2 TIMES DAILY
Status: DISCONTINUED | OUTPATIENT
Start: 2023-10-05 | End: 2023-10-08 | Stop reason: HOSPADM

## 2023-10-05 RX ORDER — ONDANSETRON 2 MG/ML
4 INJECTION INTRAMUSCULAR; INTRAVENOUS ONCE
Status: COMPLETED | OUTPATIENT
Start: 2023-10-05 | End: 2023-10-05

## 2023-10-05 RX ORDER — SODIUM CHLORIDE 0.9 % (FLUSH) 0.9 %
10 SYRINGE (ML) INJECTION EVERY 12 HOURS SCHEDULED
Status: DISCONTINUED | OUTPATIENT
Start: 2023-10-05 | End: 2023-10-08 | Stop reason: HOSPADM

## 2023-10-05 RX ORDER — BUMETANIDE 0.25 MG/ML
2 INJECTION INTRAMUSCULAR; INTRAVENOUS ONCE
Status: COMPLETED | OUTPATIENT
Start: 2023-10-05 | End: 2023-10-05

## 2023-10-05 RX ORDER — BISACODYL 10 MG
10 SUPPOSITORY, RECTAL RECTAL DAILY PRN
Status: DISCONTINUED | OUTPATIENT
Start: 2023-10-05 | End: 2023-10-08 | Stop reason: HOSPADM

## 2023-10-05 RX ORDER — PRIMIDONE 50 MG/1
25 TABLET ORAL DAILY
COMMUNITY

## 2023-10-05 RX ORDER — OXYCODONE HYDROCHLORIDE AND ACETAMINOPHEN 5; 325 MG/1; MG/1
1 TABLET ORAL EVERY 6 HOURS PRN
Status: ON HOLD | COMMUNITY
End: 2023-10-08 | Stop reason: SDUPTHER

## 2023-10-05 RX ORDER — BISACODYL 5 MG/1
5 TABLET, DELAYED RELEASE ORAL DAILY PRN
Status: DISCONTINUED | OUTPATIENT
Start: 2023-10-05 | End: 2023-10-08 | Stop reason: HOSPADM

## 2023-10-05 RX ORDER — ESCITALOPRAM OXALATE 10 MG/1
20 TABLET ORAL DAILY
COMMUNITY

## 2023-10-05 RX ORDER — METHOCARBAMOL 500 MG/1
500 TABLET, FILM COATED ORAL 4 TIMES DAILY
COMMUNITY

## 2023-10-05 RX ORDER — SEMAGLUTIDE 1.34 MG/ML
0.5 INJECTION, SOLUTION SUBCUTANEOUS WEEKLY
COMMUNITY

## 2023-10-05 RX ORDER — LOSARTAN POTASSIUM 25 MG/1
25 TABLET ORAL DAILY
Status: DISCONTINUED | OUTPATIENT
Start: 2023-10-06 | End: 2023-10-08 | Stop reason: HOSPADM

## 2023-10-05 RX ORDER — ONDANSETRON 2 MG/ML
4 INJECTION INTRAMUSCULAR; INTRAVENOUS EVERY 6 HOURS PRN
Status: DISCONTINUED | OUTPATIENT
Start: 2023-10-05 | End: 2023-10-08 | Stop reason: HOSPADM

## 2023-10-05 RX ORDER — LUBIPROSTONE 24 UG/1
24 CAPSULE ORAL 2 TIMES DAILY WITH MEALS
COMMUNITY

## 2023-10-05 RX ORDER — ESCITALOPRAM OXALATE 10 MG/1
20 TABLET ORAL DAILY
Status: DISCONTINUED | OUTPATIENT
Start: 2023-10-06 | End: 2023-10-08 | Stop reason: HOSPADM

## 2023-10-05 RX ORDER — GLIPIZIDE 5 MG/1
5 TABLET ORAL
COMMUNITY

## 2023-10-05 RX ORDER — OXYCODONE HYDROCHLORIDE 5 MG/1
5 TABLET ORAL EVERY 4 HOURS PRN
Status: DISCONTINUED | OUTPATIENT
Start: 2023-10-05 | End: 2023-10-07

## 2023-10-05 RX ORDER — FUROSEMIDE 40 MG/1
40 TABLET ORAL DAILY
Status: DISCONTINUED | OUTPATIENT
Start: 2023-10-06 | End: 2023-10-08 | Stop reason: HOSPADM

## 2023-10-05 RX ORDER — METHOCARBAMOL 500 MG/1
500 TABLET, FILM COATED ORAL 4 TIMES DAILY
Status: DISCONTINUED | OUTPATIENT
Start: 2023-10-05 | End: 2023-10-08 | Stop reason: HOSPADM

## 2023-10-05 RX ORDER — FUROSEMIDE 40 MG/1
40 TABLET ORAL DAILY
COMMUNITY

## 2023-10-05 RX ORDER — VANCOMYCIN/0.9 % SOD CHLORIDE 1.5G/250ML
1500 PLASTIC BAG, INJECTION (ML) INTRAVENOUS ONCE
Status: DISCONTINUED | OUTPATIENT
Start: 2023-10-05 | End: 2023-10-06 | Stop reason: SDUPTHER

## 2023-10-05 RX ORDER — METFORMIN HYDROCHLORIDE 500 MG/1
500 TABLET, EXTENDED RELEASE ORAL
COMMUNITY

## 2023-10-05 RX ORDER — ENOXAPARIN SODIUM 100 MG/ML
40 INJECTION SUBCUTANEOUS EVERY 12 HOURS
Status: DISCONTINUED | OUTPATIENT
Start: 2023-10-05 | End: 2023-10-08 | Stop reason: HOSPADM

## 2023-10-05 RX ORDER — OXYCODONE HYDROCHLORIDE 5 MG/1
5 TABLET ORAL EVERY 4 HOURS PRN
Status: DISCONTINUED | OUTPATIENT
Start: 2023-10-05 | End: 2023-10-05 | Stop reason: SDUPTHER

## 2023-10-05 RX ADMIN — SENNOSIDES AND DOCUSATE SODIUM 2 TABLET: 50; 8.6 TABLET ORAL at 21:13

## 2023-10-05 RX ADMIN — OXYCODONE HYDROCHLORIDE 5 MG: 5 TABLET ORAL at 21:13

## 2023-10-05 RX ADMIN — ONDANSETRON 4 MG: 2 INJECTION INTRAMUSCULAR; INTRAVENOUS at 14:38

## 2023-10-05 RX ADMIN — HYDROMORPHONE HYDROCHLORIDE 1 MG: 1 INJECTION, SOLUTION INTRAMUSCULAR; INTRAVENOUS; SUBCUTANEOUS at 22:38

## 2023-10-05 RX ADMIN — Medication 10 ML: at 21:18

## 2023-10-05 RX ADMIN — ONDANSETRON 4 MG: 2 INJECTION INTRAMUSCULAR; INTRAVENOUS at 22:43

## 2023-10-05 RX ADMIN — PRAMIPEXOLE DIHYDROCHLORIDE 1 MG: 1 TABLET ORAL at 21:13

## 2023-10-05 RX ADMIN — ROSUVASTATIN 10 MG: 10 TABLET, FILM COATED ORAL at 21:13

## 2023-10-05 RX ADMIN — HYDROMORPHONE HYDROCHLORIDE 0.5 MG: 1 INJECTION, SOLUTION INTRAMUSCULAR; INTRAVENOUS; SUBCUTANEOUS at 14:39

## 2023-10-05 RX ADMIN — METHOCARBAMOL 500 MG: 500 TABLET ORAL at 21:13

## 2023-10-05 RX ADMIN — INSULIN GLARGINE 30 UNITS: 100 INJECTION, SOLUTION SUBCUTANEOUS at 21:12

## 2023-10-05 RX ADMIN — BUMETANIDE 2 MG: 0.25 INJECTION INTRAMUSCULAR; INTRAVENOUS at 16:53

## 2023-10-05 RX ADMIN — POTASSIUM CHLORIDE 40 MEQ: 1.5 POWDER, FOR SOLUTION ORAL at 22:38

## 2023-10-05 RX ADMIN — HYDROMORPHONE HYDROCHLORIDE 1 MG: 1 INJECTION, SOLUTION INTRAMUSCULAR; INTRAVENOUS; SUBCUTANEOUS at 17:26

## 2023-10-05 RX ADMIN — ENOXAPARIN SODIUM 40 MG: 40 INJECTION, SOLUTION SUBCUTANEOUS at 21:12

## 2023-10-05 NOTE — ED PROVIDER NOTES
Subjective   History of Present Illness  73-year-old male reports increasing swelling and pain to her lower extremities after lumbar spinal surgery in [facility].  The patient went to see her primary care provider in [place].  He sent her to be admitted to the hospital but the patient came to [place].  The patient reports she has had subjective fever as well as chills.  She reports her legs are swollen more than normal.  She reports the redness heat and a prickly pain to the right leg.  She reports that she has had lower extremity clots on 2 occasions and has a Connor filter.  She states she was told by hematologist that the clots were secondary to previous surgery and not due to underlying clotting disorder.  The patient states that she has had no chest pain cough or hemoptysis.  She reports she has been short of breath which she relates to deconditioning after surgery  Patient reports subjective fever and chills in the last 24 hours  Review of Systems   Constitutional:  Positive for chills.   Respiratory:  Positive for shortness of breath.    Cardiovascular:  Positive for leg swelling.   Hematological:  Bruises/bleeds easily.   All other systems reviewed and are negative.    Past Medical History:   Diagnosis Date    Arthritis     Benign essential hypertension 7/12/2018    Diabetes mellitus     Elevated cholesterol     H/O blood clots     History of transfusion     Hyperlipidemia     Low back pain     Sleep apnea        Allergies   Allergen Reactions    Ambien  [Zolpidem] Confusion    Codeine Itching    Sulfa Antibiotics Nausea And Vomiting     Makes her nauseated       Past Surgical History:   Procedure Laterality Date    BACK SURGERY      FEMORAL DISTAL BYPASS      HYSTERECTOMY      JOINT REPLACEMENT      LUMBAR LAMINECTOMY WITH FUSION N/A 5/28/2021    Procedure: Lumbar 5 Smith procedure.  Lumbar 5 6 and sacral 1 transpedicular Solera screws and rods.  Posterior fusion with autologous bone.;   Surgeon: Gagan Aguilar MD;  Location: [facility] MAIN OR;  Service: Neurosurgery;  Laterality: N/A;       History reviewed. No pertinent family history.    Social History     Socioeconomic History    Marital status:    Tobacco Use    Smoking status: Former    Smokeless tobacco: Never   Vaping Use    Vaping Use: Never used   Substance and Sexual Activity    Alcohol use: Not Currently    Drug use: Never    Sexual activity: Defer     No unusual food water travel or activity other than immobilization after spinal surgery      Objective   Physical Exam  Alert Daryn Coma Scale 15   HEENT: Pupils equal and reactive to light. Conjunctivae are not injected. Normal tympanic membranes. Oropharynx and nares are normal.   Neck: Supple. Midline trachea. No JVD. No goiter.   Chest: Clear and equal breath sounds bilaterally, regular rate and rhythm without murmur or rub.   Abdomen: Positive bowel sounds, nontender, nondistended. No rebound or peritoneal signs. No CVA tenderness.   Back: The wound is somewhat puffy around the incision however there is notes of induration or erythema.  There is no lymphangitic streaking.  There are no palpable subcutaneous masses.  The patient has a positive straight leg raising test and states she has had this in the past after successful surgery  Extremities no clubbing. cyanosis patient has swelling and 4+ pitting edema in the lower extremities or stasis dermatitis changes noted superimposed in the right leg has evidence of erythema there is no weeping or discharge.  The patient has tenderness in that area and some calf discomfort the Homans test is negative on that side.  The patient does have some lymphangitic streaking. Motor sensory exam is normal. The full range of motion is intact   Skin: Warm and dry, no rashes or petechia.   Lymphatic: No regional lymphadenopathy.   Procedures           ED Course      Labs Reviewed   APTT - Abnormal; Notable for the following components:        Result Value    PTT 25.8 (*)     All other components within normal limits   CBC WITH AUTO DIFFERENTIAL - Abnormal; Notable for the following components:    RDW 17.1 (*)     RDW-SD 56.0 (*)     All other components within normal limits   PROTIME-INR - Normal   BLOOD CULTURE   BLOOD CULTURE   CK   COMPREHENSIVE METABOLIC PANEL   CBC AND DIFFERENTIAL    Narrative:     The following orders were created for panel order CBC & Differential.  Procedure                               Abnormality         Status                     ---------                               -----------         ------                     CBC Auto Differential[[id number]]        Abnormal            Final result                 Please view results for these tests on the individual orders.     Medications   cefepime 2 gm IVPB in 100 ml NS (MBP) (has no administration in time range)   Pharmacy to Dose Cefepime (has no administration in time range)   escitalopram (LEXAPRO) tablet 20 mg (has no administration in time range)   furosemide (LASIX) tablet 40 mg (has no administration in time range)   insulin glargine (LANTUS, SEMGLEE) injection 30 Units (has no administration in time range)   losartan (COZAAR) tablet 25 mg (has no administration in time range)   lubiprostone (AMITIZA) capsule 24 mcg (has no administration in time range)   methocarbamol (ROBAXIN) tablet 500 mg (has no administration in time range)   metOLazone (ZAROXOLYN) tablet 2.5 mg (has no administration in time range)   pramipexole (MIRAPEX) tablet 1 mg (has no administration in time range)   primidone (MYSOLINE) tablet 25 mg (has no administration in time range)   rosuvastatin (CRESTOR) tablet 10 mg (has no administration in time range)   sodium chloride 0.9 % flush 10 mL (has no administration in time range)   sodium chloride 0.9 % flush 10 mL (has no administration in time range)   sodium chloride 0.9 % infusion 40 mL (has no administration in time range)   sennosides-docusate  (PERICOLACE) 8.6-50 MG per tablet 2 tablet (has no administration in time range)     And   polyethylene glycol (MIRALAX) packet 17 g (has no administration in time range)     And   bisacodyl (DULCOLAX) EC tablet 5 mg (has no administration in time range)     And   bisacodyl (DULCOLAX) suppository 10 mg (has no administration in time range)   Enoxaparin Sodium (LOVENOX) syringe 40 mg (has no administration in time range)   nitroglycerin (NITROSTAT) SL tablet 0.4 mg (has no administration in time range)   Potassium Replacement - Follow Nurse / BPA Driven Protocol (has no administration in time range)   Magnesium Standard Dose Replacement - Follow Nurse / BPA Driven Protocol (has no administration in time range)   Phosphorus Replacement - Follow Nurse / BPA Driven Protocol (has no administration in time range)   Calcium Replacement - Follow Nurse / BPA Driven Protocol (has no administration in time range)   acetaminophen (TYLENOL) tablet 650 mg (has no administration in time range)   oxyCODONE (ROXICODONE) immediate release tablet 5 mg (has no administration in time range)   melatonin tablet 5 mg (has no administration in time range)   HYDROmorphone (DILAUDID) injection 1 mg (1 mg Intravenous Given 10/5/23 1726)   ondansetron (ZOFRAN) tablet 4 mg (has no administration in time range)     Or   ondansetron (ZOFRAN) injection 4 mg (has no administration in time range)   vancomycin IVPB 1500 mg in 0.9% NaCl (Premix) 500 mL (has no administration in time range)   Pharmacy to dose vancomycin (has no administration in time range)   ondansetron (ZOFRAN) injection 4 mg (4 mg Intravenous Given 10/5/23 1438)   HYDROmorphone (DILAUDID) injection 0.5 mg (0.5 mg Intravenous Given 10/5/23 1439)   bumetanide (BUMEX) injection 2 mg (2 mg Intravenous Given 10/5/23 1653)     XR Chest 2 View    Result Date: 10/5/2023  Impression: Evidence of mild interval increase in interstitial prominence with ill-defined perihilar infiltrates in the  lung bases. The findings may indicate developing mild pulmonary edema. Atypical/viral infection or multifocal pneumonia could also be considered. Electronically Signed: Long Waddell MD  10/5/2023 3:19 PM EDT  Workstation ID: [id number]                                        Medical Decision Making  The patient will be admitted the patient be placed on IV cefepime and vancomycin according to the hospitalist.  The patient was agreeable to this plan of treatment    Problems Addressed:  Cellulitis of right lower extremity: complicated acute illness or injury  History of lumbar surgery: complicated acute illness or injury  Swelling of left lower extremity: complicated acute illness or injury  Swelling of right lower extremity: complicated acute illness or injury    Amount and/or Complexity of Data Reviewed  Independent Historian: spouse  Labs: ordered. Decision-making details documented in ED Course.  Radiology: ordered and independent interpretation performed.    Risk  Prescription drug management.  Decision regarding hospitalization.        Final diagnoses:   Cellulitis of right lower extremity   Swelling of left lower extremity   Swelling of right lower extremity   History of lumbar surgery       ED Disposition  ED Disposition       ED Disposition   Decision to Admit    Condition   --    Comment   Level of Care: Med/Surg [1]   Diagnosis: Postoperative cellulitis of surgical wound [319497]   Certification: I Certify That Inpatient Hospital Services Are Medically Necessary For Greater Than 2 Midnights                 No follow-up provider specified.       Medication List        ASK your doctor about these medications      glipizide 5 MG tablet  Commonly known as: GLUCOTROL  Ask about: Which instructions should I use?                 Alpesh Rios MD  10/05/23 9595

## 2023-10-05 NOTE — PLAN OF CARE
Goal Outcome Evaluation:      Patient in ED hold at this time, resting, care plan ongoing

## 2023-10-06 ENCOUNTER — APPOINTMENT (OUTPATIENT)
Dept: CT IMAGING | Facility: HOSPITAL | Age: 73
End: 2023-10-06
Payer: MEDICARE

## 2023-10-06 PROBLEM — T81.49XA POSTOPERATIVE WOUND INFECTION: Status: ACTIVE | Noted: 2023-10-06

## 2023-10-06 LAB
ALBUMIN SERPL-MCNC: 3.7 G/DL (ref 3.5–5.2)
ALBUMIN/GLOB SERPL: 1.9 G/DL
ALP SERPL-CCNC: 183 U/L (ref 39–117)
ALT SERPL W P-5'-P-CCNC: 12 U/L (ref 1–33)
ANION GAP SERPL CALCULATED.3IONS-SCNC: 9 MMOL/L (ref 5–15)
AST SERPL-CCNC: 13 U/L (ref 1–32)
BASOPHILS # BLD AUTO: 0 10*3/MM3 (ref 0–0.2)
BASOPHILS # BLD AUTO: 0 10*3/MM3 (ref 0–0.2)
BASOPHILS NFR BLD AUTO: 0.3 % (ref 0–1.5)
BASOPHILS NFR BLD AUTO: 0.4 % (ref 0–1.5)
BILIRUB SERPL-MCNC: 0.2 MG/DL (ref 0–1.2)
BUN SERPL-MCNC: 26 MG/DL (ref 8–23)
BUN/CREAT SERPL: 25.7 (ref 7–25)
CALCIUM SPEC-SCNC: 8.4 MG/DL (ref 8.6–10.5)
CHLORIDE SERPL-SCNC: 99 MMOL/L (ref 98–107)
CO2 SERPL-SCNC: 34 MMOL/L (ref 22–29)
CREAT SERPL-MCNC: 1.01 MG/DL (ref 0.57–1)
DEPRECATED RDW RBC AUTO: 52.1 FL (ref 37–54)
DEPRECATED RDW RBC AUTO: 56 FL (ref 37–54)
EGFRCR SERPLBLD CKD-EPI 2021: 58.2 ML/MIN/1.73
EOSINOPHIL # BLD AUTO: 0.1 10*3/MM3 (ref 0–0.4)
EOSINOPHIL # BLD AUTO: 0.1 10*3/MM3 (ref 0–0.4)
EOSINOPHIL NFR BLD AUTO: 1.6 % (ref 0.3–6.2)
EOSINOPHIL NFR BLD AUTO: 2 % (ref 0.3–6.2)
ERYTHROCYTE [DISTWIDTH] IN BLOOD BY AUTOMATED COUNT: 16.1 % (ref 12.3–15.4)
ERYTHROCYTE [DISTWIDTH] IN BLOOD BY AUTOMATED COUNT: 16.3 % (ref 12.3–15.4)
GLOBULIN UR ELPH-MCNC: 1.9 GM/DL
GLUCOSE BLDC GLUCOMTR-MCNC: 124 MG/DL (ref 70–105)
GLUCOSE BLDC GLUCOMTR-MCNC: 128 MG/DL (ref 70–105)
GLUCOSE BLDC GLUCOMTR-MCNC: 142 MG/DL (ref 70–105)
GLUCOSE BLDC GLUCOMTR-MCNC: 148 MG/DL (ref 70–105)
GLUCOSE SERPL-MCNC: 225 MG/DL (ref 65–99)
HCT VFR BLD AUTO: 34.3 % (ref 34–46.6)
HCT VFR BLD AUTO: 35.1 % (ref 34–46.6)
HGB BLD-MCNC: 11.2 G/DL (ref 12–15.9)
HGB BLD-MCNC: 11.2 G/DL (ref 12–15.9)
LYMPHOCYTES # BLD AUTO: 1.4 10*3/MM3 (ref 0.7–3.1)
LYMPHOCYTES # BLD AUTO: 1.6 10*3/MM3 (ref 0.7–3.1)
LYMPHOCYTES NFR BLD AUTO: 21.2 % (ref 19.6–45.3)
LYMPHOCYTES NFR BLD AUTO: 29 % (ref 19.6–45.3)
MCH RBC QN AUTO: 29.8 PG (ref 26.6–33)
MCH RBC QN AUTO: 30.3 PG (ref 26.6–33)
MCHC RBC AUTO-ENTMCNC: 32.1 G/DL (ref 31.5–35.7)
MCHC RBC AUTO-ENTMCNC: 32.7 G/DL (ref 31.5–35.7)
MCV RBC AUTO: 92.5 FL (ref 79–97)
MCV RBC AUTO: 92.8 FL (ref 79–97)
MONOCYTES # BLD AUTO: 0.4 10*3/MM3 (ref 0.1–0.9)
MONOCYTES # BLD AUTO: 0.4 10*3/MM3 (ref 0.1–0.9)
MONOCYTES NFR BLD AUTO: 5.5 % (ref 5–12)
MONOCYTES NFR BLD AUTO: 7.5 % (ref 5–12)
NEUTROPHILS NFR BLD AUTO: 3.4 10*3/MM3 (ref 1.7–7)
NEUTROPHILS NFR BLD AUTO: 4.7 10*3/MM3 (ref 1.7–7)
NEUTROPHILS NFR BLD AUTO: 61.1 % (ref 42.7–76)
NEUTROPHILS NFR BLD AUTO: 71.4 % (ref 42.7–76)
NRBC BLD AUTO-RTO: 0.1 /100 WBC (ref 0–0.2)
NRBC BLD AUTO-RTO: 0.1 /100 WBC (ref 0–0.2)
PLATELET # BLD AUTO: 152 10*3/MM3 (ref 140–450)
PLATELET # BLD AUTO: 161 10*3/MM3 (ref 140–450)
PMV BLD AUTO: 7.5 FL (ref 6–12)
PMV BLD AUTO: 8 FL (ref 6–12)
POTASSIUM SERPL-SCNC: 3.2 MMOL/L (ref 3.5–5.2)
POTASSIUM SERPL-SCNC: 4.1 MMOL/L (ref 3.5–5.2)
PROT SERPL-MCNC: 5.6 G/DL (ref 6–8.5)
RBC # BLD AUTO: 3.71 10*6/MM3 (ref 3.77–5.28)
RBC # BLD AUTO: 3.78 10*6/MM3 (ref 3.77–5.28)
SODIUM SERPL-SCNC: 142 MMOL/L (ref 136–145)
WBC NRBC COR # BLD: 5.5 10*3/MM3 (ref 3.4–10.8)
WBC NRBC COR # BLD: 6.5 10*3/MM3 (ref 3.4–10.8)
WHOLE BLOOD HOLD SPECIMEN: NORMAL

## 2023-10-06 PROCEDURE — 96372 THER/PROPH/DIAG INJ SC/IM: CPT

## 2023-10-06 PROCEDURE — 25010000002 CEFEPIME PER 500 MG: Performed by: STUDENT IN AN ORGANIZED HEALTH CARE EDUCATION/TRAINING PROGRAM

## 2023-10-06 PROCEDURE — 25010000002 ENOXAPARIN PER 10 MG: Performed by: STUDENT IN AN ORGANIZED HEALTH CARE EDUCATION/TRAINING PROGRAM

## 2023-10-06 PROCEDURE — 85025 COMPLETE CBC W/AUTO DIFF WBC: CPT | Performed by: INTERNAL MEDICINE

## 2023-10-06 PROCEDURE — 72131 CT LUMBAR SPINE W/O DYE: CPT

## 2023-10-06 PROCEDURE — 97162 PT EVAL MOD COMPLEX 30 MIN: CPT

## 2023-10-06 PROCEDURE — 83735 ASSAY OF MAGNESIUM: CPT | Performed by: INTERNAL MEDICINE

## 2023-10-06 PROCEDURE — G0378 HOSPITAL OBSERVATION PER HR: HCPCS

## 2023-10-06 PROCEDURE — 25010000002 HYDROMORPHONE 1 MG/ML SOLUTION: Performed by: STUDENT IN AN ORGANIZED HEALTH CARE EDUCATION/TRAINING PROGRAM

## 2023-10-06 PROCEDURE — 84132 ASSAY OF SERUM POTASSIUM: CPT | Performed by: HOSPITALIST

## 2023-10-06 PROCEDURE — 80053 COMPREHEN METABOLIC PANEL: CPT | Performed by: STUDENT IN AN ORGANIZED HEALTH CARE EDUCATION/TRAINING PROGRAM

## 2023-10-06 PROCEDURE — 84100 ASSAY OF PHOSPHORUS: CPT | Performed by: INTERNAL MEDICINE

## 2023-10-06 PROCEDURE — 97166 OT EVAL MOD COMPLEX 45 MIN: CPT

## 2023-10-06 PROCEDURE — 25810000003 SODIUM CHLORIDE 0.9 % SOLUTION: Performed by: INTERNAL MEDICINE

## 2023-10-06 PROCEDURE — 63710000001 INSULIN GLARGINE PER 5 UNITS: Performed by: INTERNAL MEDICINE

## 2023-10-06 PROCEDURE — 96376 TX/PRO/DX INJ SAME DRUG ADON: CPT

## 2023-10-06 PROCEDURE — 25010000002 ONDANSETRON PER 1 MG: Performed by: STUDENT IN AN ORGANIZED HEALTH CARE EDUCATION/TRAINING PROGRAM

## 2023-10-06 PROCEDURE — 25010000002 VANCOMYCIN 10 G RECONSTITUTED SOLUTION: Performed by: INTERNAL MEDICINE

## 2023-10-06 PROCEDURE — 63710000001 INSULIN LISPRO (HUMAN) PER 5 UNITS: Performed by: INTERNAL MEDICINE

## 2023-10-06 PROCEDURE — 82948 REAGENT STRIP/BLOOD GLUCOSE: CPT

## 2023-10-06 RX ORDER — DOXYCYCLINE 100 MG/1
100 CAPSULE ORAL EVERY 12 HOURS SCHEDULED
Status: DISCONTINUED | OUTPATIENT
Start: 2023-10-06 | End: 2023-10-08 | Stop reason: HOSPADM

## 2023-10-06 RX ORDER — IBUPROFEN 600 MG/1
1 TABLET ORAL
Status: DISCONTINUED | OUTPATIENT
Start: 2023-10-06 | End: 2023-10-08 | Stop reason: HOSPADM

## 2023-10-06 RX ORDER — INSULIN LISPRO 100 [IU]/ML
2-7 INJECTION, SOLUTION INTRAVENOUS; SUBCUTANEOUS
Status: DISCONTINUED | OUTPATIENT
Start: 2023-10-06 | End: 2023-10-08 | Stop reason: HOSPADM

## 2023-10-06 RX ORDER — INSULIN LISPRO 100 [IU]/ML
5 INJECTION, SOLUTION INTRAVENOUS; SUBCUTANEOUS
Status: DISCONTINUED | OUTPATIENT
Start: 2023-10-06 | End: 2023-10-07

## 2023-10-06 RX ORDER — VANCOMYCIN/0.9 % SOD CHLORIDE 1.5G/250ML
1500 PLASTIC BAG, INJECTION (ML) INTRAVENOUS ONCE
Status: COMPLETED | OUTPATIENT
Start: 2023-10-06 | End: 2023-10-06

## 2023-10-06 RX ORDER — NICOTINE POLACRILEX 4 MG
15 LOZENGE BUCCAL
Status: DISCONTINUED | OUTPATIENT
Start: 2023-10-06 | End: 2023-10-08 | Stop reason: HOSPADM

## 2023-10-06 RX ORDER — DEXTROSE MONOHYDRATE 25 G/50ML
25 INJECTION, SOLUTION INTRAVENOUS
Status: DISCONTINUED | OUTPATIENT
Start: 2023-10-06 | End: 2023-10-08 | Stop reason: HOSPADM

## 2023-10-06 RX ADMIN — HYDROMORPHONE HYDROCHLORIDE 1 MG: 1 INJECTION, SOLUTION INTRAMUSCULAR; INTRAVENOUS; SUBCUTANEOUS at 04:26

## 2023-10-06 RX ADMIN — SENNOSIDES AND DOCUSATE SODIUM 2 TABLET: 50; 8.6 TABLET ORAL at 20:46

## 2023-10-06 RX ADMIN — HYDROMORPHONE HYDROCHLORIDE 1 MG: 1 INJECTION, SOLUTION INTRAMUSCULAR; INTRAVENOUS; SUBCUTANEOUS at 15:50

## 2023-10-06 RX ADMIN — FUROSEMIDE 40 MG: 40 TABLET ORAL at 09:17

## 2023-10-06 RX ADMIN — HYDROMORPHONE HYDROCHLORIDE 1 MG: 1 INJECTION, SOLUTION INTRAMUSCULAR; INTRAVENOUS; SUBCUTANEOUS at 12:22

## 2023-10-06 RX ADMIN — OXYCODONE HYDROCHLORIDE 5 MG: 5 TABLET ORAL at 20:46

## 2023-10-06 RX ADMIN — METOLAZONE 2.5 MG: 2.5 TABLET ORAL at 09:17

## 2023-10-06 RX ADMIN — INSULIN GLARGINE 35 UNITS: 100 INJECTION, SOLUTION SUBCUTANEOUS at 09:17

## 2023-10-06 RX ADMIN — ENOXAPARIN SODIUM 40 MG: 40 INJECTION, SOLUTION SUBCUTANEOUS at 09:17

## 2023-10-06 RX ADMIN — ENOXAPARIN SODIUM 40 MG: 40 INJECTION, SOLUTION SUBCUTANEOUS at 20:46

## 2023-10-06 RX ADMIN — ESCITALOPRAM OXALATE 20 MG: 10 TABLET ORAL at 09:17

## 2023-10-06 RX ADMIN — POTASSIUM CHLORIDE 40 MEQ: 1.5 POWDER, FOR SOLUTION ORAL at 04:16

## 2023-10-06 RX ADMIN — METHOCARBAMOL 500 MG: 500 TABLET ORAL at 20:46

## 2023-10-06 RX ADMIN — HYDROMORPHONE HYDROCHLORIDE 1 MG: 1 INJECTION, SOLUTION INTRAMUSCULAR; INTRAVENOUS; SUBCUTANEOUS at 19:29

## 2023-10-06 RX ADMIN — ONDANSETRON 4 MG: 2 INJECTION INTRAMUSCULAR; INTRAVENOUS at 19:29

## 2023-10-06 RX ADMIN — ROSUVASTATIN 10 MG: 10 TABLET, FILM COATED ORAL at 10:49

## 2023-10-06 RX ADMIN — DOXYCYCLINE 100 MG: 100 CAPSULE ORAL at 20:46

## 2023-10-06 RX ADMIN — Medication 10 ML: at 20:56

## 2023-10-06 RX ADMIN — METHOCARBAMOL 500 MG: 500 TABLET ORAL at 09:17

## 2023-10-06 RX ADMIN — INSULIN LISPRO 5 UNITS: 100 INJECTION, SOLUTION INTRAVENOUS; SUBCUTANEOUS at 12:27

## 2023-10-06 RX ADMIN — LOSARTAN POTASSIUM 25 MG: 25 TABLET, FILM COATED ORAL at 09:17

## 2023-10-06 RX ADMIN — MICONAZOLE NITRATE: 20 POWDER TOPICAL at 20:47

## 2023-10-06 RX ADMIN — MICONAZOLE NITRATE: 20 POWDER TOPICAL at 10:49

## 2023-10-06 RX ADMIN — METHOCARBAMOL 500 MG: 500 TABLET ORAL at 12:22

## 2023-10-06 RX ADMIN — INSULIN LISPRO 5 UNITS: 100 INJECTION, SOLUTION INTRAVENOUS; SUBCUTANEOUS at 09:17

## 2023-10-06 RX ADMIN — PRAMIPEXOLE DIHYDROCHLORIDE 1 MG: 1 TABLET ORAL at 10:50

## 2023-10-06 RX ADMIN — PRAMIPEXOLE DIHYDROCHLORIDE 1 MG: 1 TABLET ORAL at 20:46

## 2023-10-06 RX ADMIN — METHOCARBAMOL 500 MG: 500 TABLET ORAL at 18:07

## 2023-10-06 RX ADMIN — BISACODYL 5 MG: 5 TABLET, COATED ORAL at 18:11

## 2023-10-06 RX ADMIN — INSULIN LISPRO 5 UNITS: 100 INJECTION, SOLUTION INTRAVENOUS; SUBCUTANEOUS at 18:07

## 2023-10-06 RX ADMIN — SENNOSIDES AND DOCUSATE SODIUM 2 TABLET: 50; 8.6 TABLET ORAL at 10:49

## 2023-10-06 RX ADMIN — LUBIPROSTONE 24 MCG: 24 CAPSULE, GELATIN COATED ORAL at 18:07

## 2023-10-06 RX ADMIN — HYDROMORPHONE HYDROCHLORIDE 1 MG: 1 INJECTION, SOLUTION INTRAMUSCULAR; INTRAVENOUS; SUBCUTANEOUS at 23:23

## 2023-10-06 RX ADMIN — LUBIPROSTONE 24 MCG: 24 CAPSULE, GELATIN COATED ORAL at 09:17

## 2023-10-06 RX ADMIN — INSULIN GLARGINE 35 UNITS: 100 INJECTION, SOLUTION SUBCUTANEOUS at 20:56

## 2023-10-06 RX ADMIN — HYDROMORPHONE HYDROCHLORIDE 1 MG: 1 INJECTION, SOLUTION INTRAMUSCULAR; INTRAVENOUS; SUBCUTANEOUS at 09:24

## 2023-10-06 RX ADMIN — HYDROMORPHONE HYDROCHLORIDE 1 MG: 1 INJECTION, SOLUTION INTRAMUSCULAR; INTRAVENOUS; SUBCUTANEOUS at 01:28

## 2023-10-06 RX ADMIN — VANCOMYCIN HYDROCHLORIDE 1500 MG: 10 INJECTION, POWDER, LYOPHILIZED, FOR SOLUTION INTRAVENOUS at 10:50

## 2023-10-06 RX ADMIN — CEFEPIME 1000 MG: 1 INJECTION, POWDER, FOR SOLUTION INTRAMUSCULAR; INTRAVENOUS at 05:04

## 2023-10-06 NOTE — H&P
[facility] Medicine Services  History & Physical    Patient Name: Genny Soni  : 1950  MRN: [id number]  Primary Care Physician:  Bonilla Rubio MD  Date of admission: 10/5/2023  Date and Time of Service: 10/5/2023 at 1440    Subjective      Chief Complaint: Back and leg pain    History of Present Illness: Genny Soni is a 73 y.o. female with a CMH of HTN, HLD, Prior back surgery, prior DVT/PE who presented to [facility] on 10/5/2023 with back and leg pain.    Patient states about 2 weeks ago her back started hurting and now for the last 3 days that her right leg has started swelling and hurting as well.  Due to her history of DVT's patient decided to present to the ED.  In the ED patient had a LE doppler performed which did not show a clot.  CXR indicated that she might have a PNA or pulmonary edema.  Abx were started.  Had surgery on her back 2023 this year.      Review of Systems   Constitutional:  Negative for chills and fever.   HENT:  Negative for congestion and rhinorrhea.    Eyes:  Negative for visual disturbance.   Respiratory:  Negative for shortness of breath.    Cardiovascular:  Positive for leg swelling. Negative for chest pain.   Gastrointestinal:  Negative for abdominal pain, diarrhea, nausea and vomiting.   Endocrine: Negative for polyuria.   Genitourinary:  Negative for difficulty urinating and dyspareunia.   Musculoskeletal:  Positive for back pain. Negative for myalgias.   Skin:  Negative for rash and wound.   Neurological:  Negative for weakness, numbness and headaches.   Psychiatric/Behavioral:  Negative for agitation, behavioral problems and confusion.      Personal History     Past Medical History:   Diagnosis Date    Arthritis     Benign essential hypertension 2018    Diabetes mellitus     Elevated cholesterol     H/O blood clots     History of transfusion     Hyperlipidemia     Low back pain     Sleep apnea        Past Surgical History:   Procedure  Laterality Date    BACK SURGERY      FEMORAL DISTAL BYPASS      HYSTERECTOMY      JOINT REPLACEMENT      LUMBAR LAMINECTOMY WITH FUSION N/A 5/28/2021    Procedure: Lumbar 5 Smith procedure.  Lumbar 5 6 and sacral 1 transpedicular Solera screws and rods.  Posterior fusion with autologous bone.;  Surgeon: Gagan Aguilar MD;  Location: [facility] MAIN OR;  Service: Neurosurgery;  Laterality: N/A;       Family History: family history is not on file. Otherwise pertinent FHx was reviewed and not pertinent to current issue.    Social History:  reports that she has quit smoking. She has never used smokeless tobacco. She reports that she does not currently use alcohol. She reports that she does not use drugs.    Home Medications:  Prior to Admission Medications       Prescriptions Last Dose Informant Patient Reported? Taking?    escitalopram (LEXAPRO) 10 MG tablet 10/5/2023  Yes Yes    Take 2 tablets by mouth Daily.    furosemide (LASIX) 40 MG tablet 10/5/2023  Yes Yes    Take 1 tablet by mouth Daily.    glipizide (GLUCOTROL) 5 MG tablet 10/5/2023  Yes Yes    Take 1 tablet by mouth 2 (Two) Times a Day Before Meals.    losartan (COZAAR) 25 MG tablet 10/5/2023  Yes Yes    Take 1 tablet by mouth Daily.    lubiprostone (AMITIZA) 24 MCG capsule 10/5/2023  Yes Yes    Take 1 capsule by mouth 2 (Two) Times a Day With Meals.    metFORMIN ER (GLUCOPHAGE-XR) 500 MG 24 hr tablet 10/5/2023  Yes Yes    Take 1 tablet by mouth Daily With Breakfast.    methocarbamol (ROBAXIN) 500 MG tablet 10/5/2023  Yes Yes    Take 1 tablet by mouth 4 (Four) Times a Day.    metOLazone (ZAROXOLYN) 2.5 MG tablet 10/5/2023  Yes Yes    Take 1 tablet by mouth Daily.    ondansetron (ZOFRAN) 4 MG tablet 10/5/2023  Yes Yes    Take 1 tablet by mouth Every 6 (Six) Hours As Needed for Nausea or Vomiting.    oxyCODONE-acetaminophen (PERCOCET) 5-325 MG per tablet 10/5/2023  Yes Yes    Take 1 tablet by mouth Every 6 (Six) Hours As Needed.    potassium chloride (MICRO-K) 10  MEQ CR capsule 10/5/2023  Yes Yes    Take 2 capsules by mouth 2 (Two) Times a Day.    pramipexole (MIRAPEX) 1 MG tablet 10/5/2023  Yes Yes    Take 1 tablet by mouth 2 (Two) Times a Day.    primidone (MYSOLINE) 50 MG tablet 10/5/2023  Yes Yes    Take 0.5 tablets by mouth Daily.    rosuvastatin (CRESTOR) 10 MG tablet 10/4/2023  Yes Yes    Take 1 tablet by mouth Daily.    alendronate (FOSAMAX) 70 MG tablet Unknown  Yes No    Take 1 tablet by mouth Every 7 (Seven) Days. Tues    Insulin Glargine, 1 Unit Dial, (TOUJEO) 300 UNIT/ML solution pen-injector injection Unknown  Yes No    Inject 60 Units under the skin into the appropriate area as directed Every Morning.    polyethylene glycol (MIRALAX) 17 g packet Unknown Self Yes No    Take 17 g by mouth Daily As Needed.    Semaglutide,0.25 or 0.5MG/DOS, (Ozempic, 0.25 or 0.5 MG/DOSE,) 2 MG/1.5ML solution pen-injector Unknown  Yes No    Inject 0.5 mg under the skin into the appropriate area as directed 1 (One) Time Per Week. Thur    vitamin D (ERGOCALCIFEROL) 1.25 MG (70975 UT) capsule capsule Unknown  Yes No    Take 1 capsule by mouth 1 (One) Time Per Week. Thursdays              Allergies:  Allergies   Allergen Reactions    Ambien  [Zolpidem] Confusion    Codeine Itching    Sulfa Antibiotics Nausea And Vomiting     Makes her nauseated       Objective      Vitals:   Temp:  [98 øF (36.7 øC)-98.4 øF (36.9 øC)] 98.4 øF (36.9 øC)  Heart Rate:  [] 78  Resp:  [16-18] 16  BP: (125-155)/(52-69) 141/69  Body mass index is 42.75 kg/mý.  Physical Exam  Constitutional:       General: She is not in acute distress.  HENT:      Head: Normocephalic and atraumatic.   Cardiovascular:      Rate and Rhythm: Normal rate and regular rhythm.      Heart sounds: Normal heart sounds.   Pulmonary:      Effort: Pulmonary effort is normal. No respiratory distress.      Breath sounds: Normal breath sounds.   Abdominal:      Palpations: Abdomen is soft.      Tenderness: There is no abdominal  tenderness.   Musculoskeletal:      Right lower leg: Edema present.      Left lower leg: No edema.      Comments: Patient has significant swelling and erythema of RLE vs LLE.   Skin:     General: Skin is warm and dry.      Findings: Erythema and lesion present.      Comments: Patient appears to have area of streaking and induration surrounding surgical scar.   Neurological:      Mental Status: She is alert.      Comments: Moving all extremities   Psychiatric:         Mood and Affect: Mood normal.         Behavior: Behavior normal.       Diagnostic Data:  Lab Results (last 24 hours)       Procedure Component Value Units Date/Time    Comprehensive Metabolic Panel [[id number]]  (Abnormal) Collected: 10/05/23 1936    Specimen: Blood Updated: 10/05/23 2010     Glucose 297 mg/dL      BUN 27 mg/dL      Creatinine 0.88 mg/dL      Sodium 140 mmol/L      Potassium 3.2 mmol/L      Chloride 98 mmol/L      CO2 35.0 mmol/L      Calcium 8.6 mg/dL      Total Protein 5.9 g/dL      Albumin 3.9 g/dL      ALT (SGPT) 11 U/L      AST (SGOT) 13 U/L      Alkaline Phosphatase 198 U/L      Total Bilirubin 0.2 mg/dL      Globulin 2.0 gm/dL      A/G Ratio 2.0 g/dL      BUN/Creatinine Ratio 30.7     Anion Gap 7.0 mmol/L      eGFR 68.6 mL/min/1.73     Narrative:      GFR Normal >60  Chronic Kidney Disease <60  Kidney Failure <15    The GFR formula is only valid for adults with stable renal function between ages 18 and 70.    CK [[id number]]  (Normal) Collected: 10/05/23 1936    Specimen: Blood Updated: 10/05/23 2010     Creatine Kinase 74 U/L     Blood Culture - Blood, Hand, Right [[id number]] Collected: 10/05/23 1936    Specimen: Blood from Hand, Right Updated: 10/05/23 1940    Blood Culture - Blood, Arm, Right [[id number]] Collected: 10/05/23 1936    Specimen: Blood from Arm, Right Updated: 10/05/23 1940    Protime-INR [[id number]]  (Normal) Collected: 10/05/23 1437    Specimen: Blood Updated: 10/05/23 1501     Protime 10.5 Seconds      INR 0.96     aPTT [[id number]]  (Abnormal) Collected: 10/05/23 1437    Specimen: Blood Updated: 10/05/23 1501     PTT 25.8 seconds     CBC & Differential [[id number]]  (Abnormal) Collected: 10/05/23 1437    Specimen: Blood Updated: 10/05/23 1450    Narrative:      The following orders were created for panel order CBC & Differential.  Procedure                               Abnormality         Status                     ---------                               -----------         ------                     CBC Auto Differential[[id number]]        Abnormal            Final result                 Please view results for these tests on the individual orders.    CBC Auto Differential [[id number]]  (Abnormal) Collected: 10/05/23 1437    Specimen: Blood Updated: 10/05/23 1450     WBC 5.20 10*3/mm3      RBC 4.02 10*6/mm3      Hemoglobin 12.4 g/dL      Hematocrit 37.8 %      MCV 93.9 fL      MCH 30.9 pg      MCHC 32.9 g/dL      RDW 17.1 %      RDW-SD 56.0 fl      MPV 8.2 fL      Platelets 162 10*3/mm3      Neutrophil % 65.6 %      Lymphocyte % 26.4 %      Monocyte % 5.9 %      Eosinophil % 1.6 %      Basophil % 0.5 %      Neutrophils, Absolute 3.40 10*3/mm3      Lymphocytes, Absolute 1.40 10*3/mm3      Monocytes, Absolute 0.30 10*3/mm3      Eosinophils, Absolute 0.10 10*3/mm3      Basophils, Absolute 0.00 10*3/mm3      nRBC 0.0 /100 WBC              Imaging Results (Last 24 Hours)       Procedure Component Value Units Date/Time    XR Chest 2 View [[id number]] Collected: 10/05/23 1517     Updated: 10/05/23 1521    Narrative:      XR CHEST 2 VW    Date of Exam: 10/5/2023 3:08 PM EDT    Indication: r/o plumonary edema    Comparison: 6/6/2021, 5/27/2021    Findings:  There may be mild increase in interstitial prominence bilaterally with associated perihilar ill-defined airspace infiltrates in the lung bases. The findings may indicate worsening pulmonary edema. The cardiac silhouette and mediastinum are stable. No   acute osseous abnormalities are  identified.      Impression:      Impression:  Evidence of mild interval increase in interstitial prominence with ill-defined perihilar infiltrates in the lung bases. The findings may indicate developing mild pulmonary edema. Atypical/viral infection or multifocal pneumonia could also be considered.      Electronically Signed: Long Waddell MD    10/5/2023 3:19 PM EDT    Workstation ID: [id number]              Assessment & Plan        This is a 73 y.o. female with:    Active and Resolved Problems  Active Hospital Problems    Diagnosis  POA    **Postoperative cellulitis of surgical wound [T81.49XA]  Yes    Cellulitis of back [L03.312]  Yes    Cellulitis of lower extremity [L03.119]  Yes    PNA (pneumonia) [J18.9]  Yes    Type 2 diabetes mellitus with diabetic peripheral angiopathy without gangrene, with long-term current use of insulin [E11.51, Z79.4]  Not Applicable    Deep venous thrombosis of lower extremity [I82.409]  Yes    Hyperlipidemia [E78.5]  Yes      Resolved Hospital Problems   No resolved problems to display.       #Cellulitis Back  #Cellulitis LE  #Surgical Site Infection  Surgical wound seems to be appropriately healed, but patient seems to be very immobile.  It is possible that skin integrity could have been compromised from pressure and normal skin glen could have infiltrated the wound.  Same skin glen likely causing cellulitis of lower extremity.  Patient is also an uncontrolled diabetic which furthers complicates the picture.  Will be starting abx broadly.  - Vanc/Cefepime  - Infectious disease c/s, appreciate recs    #PNA  Does not seem to be the main issue, but seems like an incidental finding as patient does not endorse any symptoms and physical exam did not correlate with a focal process.  - Vanc/Cefepime as above will cover infection    #DMT2  Appears to be uncontrolled at baseline.  Uses a home insulin regimen along with oral meds.  - Holding oral meds  - Continue home insulin regimen  -  SSI    #History of DVTs/PE  Patient has an IVC filter.  No anticoagulants found on med rec.  Negative duplex.  - Will not be requiring tx dose lovenox     DVT prophylaxis:  Medical DVT prophylaxis orders are present.    The patient desires to be as follows:    CODE STATUS:    Level Of Support Discussed With: Patient  Code Status (Patient has no pulse and is not breathing): CPR (Attempt to Resuscitate)  Medical Interventions (Patient has pulse or is breathing): Full Support      She designates Brian Montesinos who can be contacted at [phone] to make medical decisions on her behalf if She is incapable of doing so. Patient declared said statement while fully alert and oriented on 10/5/2023 during the course of this H&P.    Admission Status:  I believe this patient meets inpatient status.    Expected Length of Stay: 3 nights    PDMP reviewed and consistent with patient reported medications.    I discussed the patient's findings and my recommendations with patient and family.      Signature:       Jordy Reynoso M.D.    This document has been electronically signed by Jordy Reynoso MD on October 5, 2023 22:06 EDT   [facility], operated by [facility] Hospitalist Team

## 2023-10-06 NOTE — PLAN OF CARE
Goal Outcome Evaluation:  Plan of Care Reviewed With: patient, sibling           Outcome Evaluation: 72 yo female adm 10/5/23 due to postop cellulitis of spine, as well as cellulitis in BLE's. Also dx w/ pna. Dopplers of LE's (-) for dvt.  PMH: multiple spine sx's, fibromyalgia, PE, among other comorbidities. Pt underwent spine sx in both July and August of 2023. Went to rehab following most recent spine sx, but has been home getting home health since then. Lives alone. Uses rw for household mobility. Is independent for ADL's at baseline. Sleeps in a bed at home. Pt still under spine precautions from her most recent sx. Strength is generally 4-/5, but pain is a limiting factor. Pt required mod assist to come to sit, stand, and amb 8 ft w/ rw. Pain was primary factor limiting independence for gait as well. Once pain is controlled, expect pt will be able to d/c home w/ home health. Will follow.

## 2023-10-06 NOTE — CONSULTS
Infectious Diseases Consult Note    Referring Provider: Adrienne Cristobal MD    Reason for Consultation: Surgical site infection of the spine    Patient Care Team:  Bonilla Rubio MD as PCP - General (Family Medicine)    Chief complaint bilateral leg pain swelling and redness.  Back pain    Subjective     The patient has been afebrile for the last 24 hours.  The patient is on room air, hemodynamically stable, and is tolerating antimicrobial therapy.    History of present illness:      This is a 73-year-old female presents to the hospital on 10/5/2023 with worsening lower back pain along with bilateral lower leg swelling, redness and pain.  Patient states that she recently had 2 back surgeries completed in 2/28/2023 and then another surgery on  8/18/2023.  These surgeries were performed at [facility] and she states that she has had screws, rods and plates implanted.  Patient denies any issues with her surgical incisions and her only complaint is worsening back and leg pain due to the surgery.  Patient states recently she has noticed worsening bilateral lower leg swelling and recently they have become red, hot and painful.  Patient states there has been some improvement since admission and the antibiotics and the compression wraps.  Patient denies fever, chills, diaphoresis, shortness of breath, cough, GI symptoms, or any urinary symptoms.    Review of Systems   Review of Systems   Constitutional:  Positive for fatigue.   HENT: Negative.     Eyes: Negative.    Respiratory: Negative.     Cardiovascular:  Positive for leg swelling.   Gastrointestinal: Negative.    Endocrine: Negative.    Genitourinary: Negative.    Musculoskeletal:  Positive for back pain.   Skin:  Positive for color change.   Neurological: Negative.    Psychiatric/Behavioral: Negative.     All other systems reviewed and are negative.    Medications  Medications Prior to Admission   Medication Sig Dispense Refill Last Dose    escitalopram (LEXAPRO)  10 MG tablet Take 2 tablets by mouth Daily.   10/5/2023    furosemide (LASIX) 40 MG tablet Take 1 tablet by mouth Daily.   10/5/2023    glipizide (GLUCOTROL) 5 MG tablet Take 1 tablet by mouth 2 (Two) Times a Day Before Meals.   10/5/2023    losartan (COZAAR) 25 MG tablet Take 1 tablet by mouth Daily.   10/5/2023    lubiprostone (AMITIZA) 24 MCG capsule Take 1 capsule by mouth 2 (Two) Times a Day With Meals.   10/5/2023    metFORMIN ER (GLUCOPHAGE-XR) 500 MG 24 hr tablet Take 1 tablet by mouth Daily With Breakfast.   10/5/2023    methocarbamol (ROBAXIN) 500 MG tablet Take 1 tablet by mouth 4 (Four) Times a Day.   10/5/2023    metOLazone (ZAROXOLYN) 2.5 MG tablet Take 1 tablet by mouth Daily.   10/5/2023    ondansetron (ZOFRAN) 4 MG tablet Take 1 tablet by mouth Every 6 (Six) Hours As Needed for Nausea or Vomiting.   10/5/2023    oxyCODONE-acetaminophen (PERCOCET) 5-325 MG per tablet Take 1 tablet by mouth Every 6 (Six) Hours As Needed.   10/5/2023    potassium chloride (MICRO-K) 10 MEQ CR capsule Take 2 capsules by mouth 2 (Two) Times a Day.   10/5/2023    pramipexole (MIRAPEX) 1 MG tablet Take 1 tablet by mouth 2 (Two) Times a Day.   10/5/2023    primidone (MYSOLINE) 50 MG tablet Take 0.5 tablets by mouth Daily.   10/5/2023    rosuvastatin (CRESTOR) 10 MG tablet Take 1 tablet by mouth Daily.   10/4/2023    alendronate (FOSAMAX) 70 MG tablet Take 1 tablet by mouth Every 7 (Seven) Days. Tues   Unknown    Insulin Glargine, 1 Unit Dial, (TOUJEO) 300 UNIT/ML solution pen-injector injection Inject 60 Units under the skin into the appropriate area as directed Every Morning.   Unknown    polyethylene glycol (MIRALAX) 17 g packet Take 17 g by mouth Daily As Needed.   Unknown    Semaglutide,0.25 or 0.5MG/DOS, (Ozempic, 0.25 or 0.5 MG/DOSE,) 2 MG/1.5ML solution pen-injector Inject 0.5 mg under the skin into the appropriate area as directed 1 (One) Time Per Week. Thur   Unknown    vitamin D (ERGOCALCIFEROL) 1.25 MG (63420 UT)  capsule capsule Take 1 capsule by mouth 1 (One) Time Per Week. Thursdays   Unknown       History  Past Medical History:   Diagnosis Date    Arthritis     Benign essential hypertension 7/12/2018    Diabetes mellitus     Elevated cholesterol     H/O blood clots     History of transfusion     Hyperlipidemia     Low back pain     Sleep apnea      Past Surgical History:   Procedure Laterality Date    BACK SURGERY      FEMORAL DISTAL BYPASS      HYSTERECTOMY      JOINT REPLACEMENT      LUMBAR LAMINECTOMY WITH FUSION N/A 5/28/2021    Procedure: Lumbar 5 Smith procedure.  Lumbar 5 6 and sacral 1 transpedicular Solera screws and rods.  Posterior fusion with autologous bone.;  Surgeon: Gagan Aguilar MD;  Location: [facility] MAIN OR;  Service: Neurosurgery;  Laterality: N/A;       Family History  History reviewed. No pertinent family history.    Social History   reports that she has quit smoking. She has never used smokeless tobacco. She reports that she does not currently use alcohol. She reports that she does not use drugs.    Allergies  Ambien  [zolpidem], Codeine, and Sulfa antibiotics    Objective     Vital Signs   Vital Signs (last 24 hours)         10/05 0700  10/06 0659 10/06 0700  10/06 1451   Most Recent      Temp (øF) 97.4 -  98.4      98.4     98.4 (36.9) 10/06 1151    Heart Rate 77 -  101    86 -  89     86 10/06 1151    Resp 15 -  18    16 -  21     21 10/06 1151    /62 -  155/52    122/34 -  123/43     122/34 10/06 1151    SpO2 (%) 91 -  98    90 -  91     90 10/06 1151            Physical Exam:  Physical Exam  Vitals and nursing note reviewed.   Constitutional:       General: She is not in acute distress.     Appearance: Normal appearance. She is well-developed and normal weight. She is not diaphoretic.   HENT:      Head: Normocephalic and atraumatic.   Eyes:      General: No scleral icterus.     Extraocular Movements: Extraocular movements intact.      Conjunctiva/sclera: Conjunctivae normal.       Pupils: Pupils are equal, round, and reactive to light.   Cardiovascular:      Rate and Rhythm: Normal rate and regular rhythm.      Heart sounds: Normal heart sounds, S1 normal and S2 normal. No murmur heard.  Pulmonary:      Effort: Pulmonary effort is normal. No respiratory distress.      Breath sounds: Normal breath sounds. No stridor. No wheezing or rales.   Chest:      Chest wall: No tenderness.   Abdominal:      General: Bowel sounds are normal. There is no distension.      Palpations: Abdomen is soft. There is no mass.      Tenderness: There is no abdominal tenderness. There is no guarding.   Musculoskeletal:         General: No swelling, tenderness or deformity. Normal range of motion.      Cervical back: Neck supple.   Skin:     General: Skin is warm and dry.      Coloration: Skin is not pale.      Findings: Erythema present. No bruising or rash.      Comments: Patient has healed spinal surgical incision wounds with no signs of active infection     Bilateral lower leg swelling with mild erythema, edema and warmth-no current drainage or open wounds   Neurological:      General: No focal deficit present.      Mental Status: She is alert and oriented to person, place, and time. Mental status is at baseline.      Cranial Nerves: No cranial nerve deficit.   Psychiatric:         Mood and Affect: Mood normal.       Microbiology  Microbiology Results (last 10 days)       Procedure Component Value - Date/Time    MRSA Screen, PCR (Inpatient) - Swab, Nares [[id number]]  (Normal) Collected: 10/05/23 2203    Lab Status: Final result Specimen: Swab from Nares Updated: 10/05/23 2320     MRSA PCR No MRSA Detected    Narrative:      The negative predictive value of this diagnostic test is high and should only be used to consider de-escalating anti-MRSA therapy. A positive result may indicate colonization with MRSA and must be correlated clinically.            Laboratory  Results from last 7 days   Lab Units 10/05/23  5941    WBC 10*3/mm3 5.50   HEMOGLOBIN g/dL 11.2*   HEMATOCRIT % 35.1   PLATELETS 10*3/mm3 152     Results from last 7 days   Lab Units 10/06/23  0709 10/05/23  2246   SODIUM mmol/L  --  142   POTASSIUM mmol/L 4.1 3.2*   CHLORIDE mmol/L  --  99   CO2 mmol/L  --  34.0*   BUN mg/dL  --  26*   CREATININE mg/dL  --  1.01*   GLUCOSE mg/dL  --  225*   CALCIUM mg/dL  --  8.4*     Results from last 7 days   Lab Units 10/06/23  0709 10/05/23  2246   SODIUM mmol/L  --  142   POTASSIUM mmol/L 4.1 3.2*   CHLORIDE mmol/L  --  99   CO2 mmol/L  --  34.0*   BUN mg/dL  --  26*   CREATININE mg/dL  --  1.01*   GLUCOSE mg/dL  --  225*   CALCIUM mg/dL  --  8.4*     Results from last 7 days   Lab Units 10/05/23  1936   CK TOTAL U/L 74               Radiology  Imaging Results (Last 72 Hours)       Procedure Component Value Units Date/Time    XR Chest 2 View [[id number]] Collected: 10/05/23 1517     Updated: 10/05/23 1521    Narrative:      XR CHEST 2 VW    Date of Exam: 10/5/2023 3:08 PM EDT    Indication: r/o plumonary edema    Comparison: 6/6/2021, 5/27/2021    Findings:  There may be mild increase in interstitial prominence bilaterally with associated perihilar ill-defined airspace infiltrates in the lung bases. The findings may indicate worsening pulmonary edema. The cardiac silhouette and mediastinum are stable. No   acute osseous abnormalities are identified.      Impression:      Impression:  Evidence of mild interval increase in interstitial prominence with ill-defined perihilar infiltrates in the lung bases. The findings may indicate developing mild pulmonary edema. Atypical/viral infection or multifocal pneumonia could also be considered.      Electronically Signed: Long Waddell MD    10/5/2023 3:19 PM EDT    Workstation ID: [id number]            Cardiology      Results Review:  I have reviewed all clinical data, test, lab, and imaging results.       Schedule Meds  cefepime, 1,000 mg, Intravenous, Q12H  cefepime, 2,000 mg,  Intravenous, Once  enoxaparin, 40 mg, Subcutaneous, Q12H  escitalopram, 20 mg, Oral, Daily  furosemide, 40 mg, Oral, Daily  insulin glargine, 35 Units, Subcutaneous, Q12H  insulin lispro, 2-7 Units, Subcutaneous, 4x Daily AC & at Bedtime  insulin lispro, 5 Units, Subcutaneous, TID With Meals  losartan, 25 mg, Oral, Daily  lubiprostone, 24 mcg, Oral, BID With Meals  methocarbamol, 500 mg, Oral, 4x Daily  metOLazone, 2.5 mg, Oral, Daily  miconazole, , Topical, Q12H  pramipexole, 1 mg, Oral, BID  primidone, 25 mg, Oral, Daily  rosuvastatin, 10 mg, Oral, Daily  senna-docusate sodium, 2 tablet, Oral, BID  sodium chloride, 10 mL, Intravenous, Q12H  vancomycin, 15 mg/kg (Adjusted), Intravenous, Q12H        Infusion Meds  Pharmacy to Dose Cefepime,   Pharmacy to dose vancomycin,         PRN Meds    acetaminophen    senna-docusate sodium **AND** polyethylene glycol **AND** bisacodyl **AND** bisacodyl    Calcium Replacement - Follow Nurse / BPA Driven Protocol    dextrose    dextrose    glucagon (human recombinant)    HYDROmorphone    Magnesium Standard Dose Replacement - Follow Nurse / BPA Driven Protocol    melatonin    nitroglycerin    ondansetron **OR** ondansetron    oxyCODONE    Pharmacy to Dose Cefepime    Pharmacy to dose vancomycin    Phosphorus Replacement - Follow Nurse / BPA Driven Protocol    Potassium Replacement - Follow Nurse / BPA Driven Protocol    sodium chloride    sodium chloride      Assessment & Plan       Assessment    Bilateral lower leg cellulitis with mild erythema, edema and warmth.  Dopplers were negative for DVT.  The picture is consistent with toxin producing pathogen such as beta-hemolytic Streptococcus or Staphylococcus aureus    Consulted for spinal surgical site infection however patient's spinal incisions are healed with no active signs of action.  She is complaining of sever lower back pain.    Multiple back surgeries with most recent surgeries being on 2/28/2023 and 8/18/2023.  Surgeries  were completed [facility] per patient.  She does state that there is hardware in place.    Type 2 diabetes     History of DVT and PE      Plan    Discontinue IV vancomycin and IV cefepime  Start p.o. doxycycline 100 mg twice daily for 10 days  Wound care following-continue compression dressings and keep legs elevated above heart level is much as possible  Consult neurosurgery for back pain  CT of the lumbar spine with and without contrast  Waiting on blood cultures  Continue supportive care   A.m. labs with inflammatory marker  Case discussed with pharmacy    Saira Cooley, CHANTALE  10/06/23  14:51 EDT    Note is dictated utilizing voice recognition software/Dragon

## 2023-10-06 NOTE — DISCHARGE PLACEMENT REQUEST
"Flakitoarabella Levy RICCI (73 y.o. Female)       Date of Birth   1950    Social Security Number       Address   [address] [place] [address]    Home Phone   [phone]    MRN   [id number]       Jainism   Lutheran    Marital Status                               Admission Date   10/5/23    Admission Type   Emergency    Admitting Provider   Adrienne Cristobal MD    Attending Provider   Adrienne Cristobal MD    Department, Room/Bed   [facility] SURGICAL INPATIENT, 4109/1       Discharge Date       Discharge Disposition       Discharge Destination                                 Attending Provider: Adrienne Cristobal MD    Allergies: Ambien  [Zolpidem], Codeine, Sulfa Antibiotics    Isolation: None   Infection: None   Code Status: CPR    Ht: 152.4 cm (60\")   Wt: 99.3 kg (218 lb 14.7 oz)    Admission Cmt: None   Principal Problem: Postoperative cellulitis of surgical wound [T81.49XA]                   Active Insurance as of 10/5/2023       Primary Coverage       Payor Plan Insurance Group Employer/Plan Group    ANTHEM MEDICARE REPLACEMENT ANTHEM MEDICARE ADVANTAGE [id number]       Payor Plan Address Payor Plan Phone Number Payor Plan Fax Number Effective Dates    [address] [phone]  4/1/2022 - None Entered    [place] [phone]         Subscriber Name Subscriber Birth Date Member ID       FLAKITOARABELLALEVY RICCI 1950 [id number]                     Emergency Contacts        (Rel.) Home Phone Work Phone Mobile Phone    ABIOLAJOHN RICCI (Son) [phone] -- [phone]    Chris Montesinos (Grandchild) -- -- [phone]    AISHA CEDENO (Relative) [phone] -- --              Insurance Information                  ANTHEM MEDICARE REPLACEMENT/ANTHEM MEDICARE ADVANTAGE Phone: [phone]    Subscriber: Levy Soni Subscriber#: [id number]    Group#: [id number] Precert#: --          "

## 2023-10-06 NOTE — THERAPY EVALUATION
Patient Name: Genny Soni  : 1950    MRN: [id number]                              Today's Date: 10/6/2023       Admit Date: 10/5/2023    Visit Dx:     ICD-10-CM ICD-9-CM   1. Cellulitis of right lower extremity  L03.115 682.6   2. Swelling of left lower extremity  M79.89 729.81   3. Swelling of right lower extremity  M79.89 729.81   4. 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 with intermittent claudication, bilateral legs    Deep venous thrombosis of lower extremity    Dizziness    Drug-induced constipation    Edema of lower extremity    Essential tremor    Exertional dyspnea    Fibromyalgia    Former smoker    Generalized anxiety disorder    H/O total knee replacement    Hypokalemia    Injury of hand    Hyperlipidemia    PERLA (obstructive sleep apnea)    Peripheral vascular disease    Persistent insomnia    Piriformis syndrome    Pain management    Polyarthropathy    Restless leg syndrome    Strain of muscle of right groin region    Type II diabetes mellitus with HbA1C goal to be determined    Uncontrolled type 2 diabetes mellitus    Vitamin D deficiency    Pulmonary embolism    Acute kidney injury    Tremors of nervous system    Left lumbar radiculopathy    Acute saddle pulmonary embolism    Cellulitis of back    Cellulitis of lower extremity    Postoperative cellulitis of surgical wound    PNA (pneumonia)    Postoperative wound infection     Past Medical History:   Diagnosis Date    Arthritis     Benign essential hypertension 2018    Diabetes mellitus     Elevated  cholesterol     H/O blood clots     History of transfusion     Hyperlipidemia     Low back pain     Sleep apnea      Past Surgical History:   Procedure Laterality Date    BACK SURGERY      FEMORAL DISTAL BYPASS      HYSTERECTOMY      JOINT REPLACEMENT      LUMBAR LAMINECTOMY WITH FUSION N/A 5/28/2021    Procedure: Lumbar 5 Smith procedure.  Lumbar 5 6 and sacral 1 transpedicular Solera screws and rods.  Posterior fusion with autologous bone.;  Surgeon: Gagan Aguilar MD;  Location: [facility] MAIN OR;  Service: Neurosurgery;  Laterality: N/A;      General Information       Row Name 10/06/23 1531          Physical Therapy Time and Intention    Document Type evaluation  -CM     Mode of Treatment physical therapy  -CM       Row Name 10/06/23 1531          General Information    Patient Profile Reviewed yes  -CM     Prior Level of Function independent:;all household mobility;gait;ADL's  has been using rw since spine sx's in July and Aug of this year  -CM     Existing Precautions/Restrictions spinal  -CM       Row Name 10/06/23 1531          Living Environment    People in Home alone  -CM       Row Name 10/06/23 1531          Home Main Entrance    Number of Stairs, Main Entrance none  -CM       Row Name 10/06/23 1531          Stairs Within Home, Primary    Number of Stairs, Within Home, Primary none  -CM       Row Name 10/06/23 1531          Cognition    Orientation Status (Cognition) oriented x 4  -CM       Row Name 10/06/23 1531          Safety Issues, Functional Mobility    Impairments Affecting Function (Mobility) balance;pain;endurance/activity tolerance  -CM               User Key  (r) = Recorded By, (t) = Taken By, (c) = Cosigned By      Initials Name Provider Type    CM Keesha Villasenor, PT Physical Therapist                   Mobility       Row Name 10/06/23 1532          Bed Mobility    Bed Mobility bed mobility (all) activities  -CM     All Activities, [place] (Bed Mobility) moderate assist (50% patient  effort);2 person assist  -CM     Comment, (Bed Mobility) log rolling for spine precautions; encouraged pt to exhale w/ activity to prevent increased intraabdominal pressure  -CM       Row Name 10/06/23 1535          Transfers    Comment, (Transfers) pain is primary reason that assistance was required; pt gave good effort toward movement but cried out in pain  -CM       Row Name 10/06/23 1532          Bed-Chair Transfer    Bed-Chair Rew (Transfers) moderate assist (50% patient effort);1 person assist  -CM     Assistive Device (Bed-Chair Transfers) walker, front-wheeled  -CM       Row Name 10/06/23 1532          Sit-Stand Transfer    Sit-Stand Rew (Transfers) moderate assist (50% patient effort);1 person assist  -CM     Assistive Device (Sit-Stand Transfers) walker, front-wheeled  -CM       Row Name 10/06/23 1534 10/06/23 1532       Gait/Stairs (Locomotion)    Rew Level (Gait) -- minimum assist (75% patient effort);moderate assist (50% patient effort)  -CM    Assistive Device (Gait) -- walker, front-wheeled  -CM    Distance in Feet (Gait) 8 ft; antalgic gait, w/ decreased stance on LE. Pt reports severe LLE pain in gluteus susanne, up spine, and some radiating into her foot. Short step length, wide base of support.  -CM 8 ft; antalgic gait, w/ decreased stance on RLE. Pt reports severe RLE pain in gluteus susanne, up spine, and some radiating into her foot. Short step length, wide base of support.  -CM              User Key  (r) = Recorded By, (t) = Taken By, (c) = Cosigned By      Initials Name Provider Type    Keesha Zhou PT Physical Therapist                   Obj/Interventions       Row Name 10/06/23 1534          Range of Motion Comprehensive    General Range of Motion bilateral lower extremity ROM WFL  -CM       Row Name 10/06/23 1535 10/06/23 1534       Strength Comprehensive (MMT)    General Manual Muscle Testing (MMT) Assessment -- lower extremity strength deficits  identified  -CM    Comment, General Manual Muscle Testing (MMT) Assessment LLE 4-/5 at hip. Other BLE mm groups 4/5. Pain is primary limiting factor for strength.  -CM LLE4-/5 at hip,  -CM      Row Name 10/06/23 1536          Balance    Balance Assessment sitting dynamic balance  -CM     Static Sitting Balance standby assist  -CM     Dynamic Sitting Balance contact guard  2* pain  -CM     Position, Sitting Balance unsupported;sitting edge of bed  -CM     Static Standing Balance contact guard  -CM     Dynamic Standing Balance minimal assist;moderate assist  -CM     Position/Device Used, Standing Balance walker, front-wheeled;supported  -CM     Comment, Balance balance affected by pain in LEs in standing  -CM       Row Name 10/06/23 1537          Sensory Assessment (Somatosensory)    Sensory Assessment (Somatosensory) sensation intact  -CM               User Key  (r) = Recorded By, (t) = Taken By, (c) = Cosigned By      Initials Name Provider Type    CM Keesha Villasenor, PT Physical Therapist                   Goals/Plan       Row Name 10/06/23 1543          Bed Mobility Goal 1 (PT)    Activity/Assistive Device (Bed Mobility Goal 1, PT) bed mobility activities, all  -CM     [place] Level/Cues Needed (Bed Mobility Goal 1, PT) modified independence  -CM     Time Frame (Bed Mobility Goal 1, PT) 2 weeks  -CM       Row Name 10/06/23 1543          Transfer Goal 1 (PT)    Activity/Assistive Device (Transfer Goal 1, PT) transfers, all;walker, rolling  -CM     [place] Level/Cues Needed (Transfer Goal 1, PT) modified independence  -CM     Time Frame (Transfer Goal 1, PT) 2 weeks  -CM       Row Name 10/06/23 1540          Gait Training Goal 1 (PT)    Activity/Assistive Device (Gait Training Goal 1, PT) gait (walking locomotion);walker, rolling  -CM     [place] Level (Gait Training Goal 1, PT) modified independence  -CM     Distance (Gait Training Goal 1, PT) 150 ft  -CM     Time Frame (Gait Training Goal 1, PT) 2  weeks  -       Row Name 10/06/23 1543          Therapy Assessment/Plan (PT)    Planned Therapy Interventions (PT) balance training;bed mobility training;gait training;home exercise program;postural re-education;patient/family education;ROM (range of motion);strengthening;transfer training  -               User Key  (r) = Recorded By, (t) = Taken By, (c) = Cosigned By      Initials Name Provider Type     Keesha Villasenor, PT Physical Therapist                   Clinical Impression       Row Name 10/06/23 1537          Pain    Pretreatment Pain Rating 8/10  -CM     Posttreatment Pain Rating 8/10  -CM     Pain Location lower  -CM     Pain Location - back  -CM     Pre/Posttreatment Pain Comment primarily points to L lateral lumbar spine (~L5) for main focus of pain  -CM     Pain Intervention(s) Medication (See MAR);Emotional support;Repositioned;Ambulation/increased activity  -       Row Name 10/06/23 1537          Plan of Care Review    Plan of Care Reviewed With patient;sibling  -     Outcome Evaluation 74 yo female adm 10/5/23 due to postop cellulitis of spine, as well as cellulitis in BLE's. Also dx w/ pna. Dopplers of LE's (-) for dvt.  PMH: multiple spine sx's, fibromyalgia, PE, among other comorbidities. Pt underwent spine sx in both July and August of 2023. Went to rehab following most recent spine sx, but has been home getting home health since then. Lives alone. Uses rw for household mobility. Is independent for ADL's at baseline. Sleeps in a bed at home. Pt still under spine precautions from her most recent sx. Strength is generally 4-/5, but pain is a limiting factor. Pt required mod assist to come to sit, stand, and amb 8 ft w/ rw. Pain was primary factor limiting independence for gait as well. Once pain is controlled, expect pt will be able to d/c home w/ home health. Will follow.  -       Row Name 10/06/23 1542          Therapy Assessment/Plan (PT)    Rehab Potential (PT) good, to achieve  stated therapy goals  -CM     Criteria for Skilled Interventions Met (PT) yes;meets criteria;skilled treatment is necessary  -CM     Therapy Frequency (PT) 5 times/wk  -CM     Predicted Duration of Therapy Intervention (PT) until d/c  -CM       Row Name 10/06/23 1542          Vital Signs    O2 Delivery Pre Treatment room air  -CM     O2 Delivery Intra Treatment room air  -CM     O2 Delivery Post Treatment room air  -CM     Recovery Time VSS  -CM       Row Name 10/06/23 1542          Positioning and Restraints    Pre-Treatment Position in bed  -CM     Post Treatment Position chair  -CM     In Chair notified nsg;exit alarm on;sitting;call light within reach;encouraged to call for assist;legs elevated  -CM               User Key  (r) = Recorded By, (t) = Taken By, (c) = Cosigned By      Initials Name Provider Type    Keesha Zhou, PT Physical Therapist                   Outcome Measures       Row Name 10/06/23 1544          How much help from another person do you currently need...    Turning from your back to your side while in flat bed without using bedrails? 2  -CM     Moving from lying on back to sitting on the side of a flat bed without bedrails? 2  -CM     Moving to and from a bed to a chair (including a wheelchair)? 2  -CM     Standing up from a chair using your arms (e.g., wheelchair, bedside chair)? 2  -CM     Climbing 3-5 steps with a railing? 1  -CM     To walk in hospital room? 2  -CM     AM-PAC 6 Clicks Score (PT) 11  -CM     Highest level of mobility 4 --> Transferred to chair/commode  -CM       Row Name 10/06/23 1409          Functional Assessment    Outcome Measure Options AM-PAC 6 Clicks Daily Activity (OT)  -SR (r) PS (t) SR (c)               User Key  (r) = Recorded By, (t) = Taken By, (c) = Cosigned By      Initials Name Provider Type    SR Deann Cobb, OT Occupational Therapist    Keesha Zhou, PT Physical Therapist    Kelly Garcia, OT Student OT Student                                  Physical Therapy Education       Title: PT OT SLP Therapies (In Progress)       Topic: Physical Therapy (Done)       Point: Mobility training (Done)       Learning Progress Summary             Patient Acceptance, E,TB, VU by CM at 10/6/2023 1544   Other Acceptance, E,TB, VU by CM at 10/6/2023 1544                         Point: Precautions (Done)       Learning Progress Summary             Patient Acceptance, E,TB, VU by CM at 10/6/2023 1544   Other Acceptance, E,TB, VU by CM at 10/6/2023 1544                                         User Key       Initials Effective Dates Name Provider Type Discipline    RISHABH 06/16/21 -  Keesha Villasenor, PT Physical Therapist PT                  PT Recommendation and Plan  Planned Therapy Interventions (PT): balance training, bed mobility training, gait training, home exercise program, postural re-education, patient/family education, ROM (range of motion), strengthening, transfer training  Plan of Care Reviewed With: patient, sibling  Outcome Evaluation: 72 yo female adm 10/5/23 due to postop cellulitis of spine, as well as cellulitis in BLE's. Also dx w/ pna. Dopplers of LE's (-) for dvt.  PMH: multiple spine sx's, fibromyalgia, PE, among other comorbidities. Pt underwent spine sx in both July and August of 2023. Went to rehab following most recent spine sx, but has been home getting home health since then. Lives alone. Uses rw for household mobility. Is independent for ADL's at baseline. Sleeps in a bed at home. Pt still under spine precautions from her most recent sx. Strength is generally 4-/5, but pain is a limiting factor. Pt required mod assist to come to sit, stand, and amb 8 ft w/ rw. Pain was primary factor limiting independence for gait as well. Once pain is controlled, expect pt will be able to d/c home w/ home health. Will follow.     Time Calculation:   PT Evaluation Complexity  History, PT Evaluation Complexity: 3 or more personal factors and/or  comorbidities  Examination of Body Systems (PT Eval Complexity): total of 4 or more elements  Clinical Presentation (PT Evaluation Complexity): evolving  Clinical Decision Making (PT Evaluation Complexity): moderate complexity  Overall Complexity (PT Evaluation Complexity): moderate complexity     PT Charges       Row Name 10/06/23 1545             Time Calculation    Start Time 1321  -CM      Stop Time 1345  -CM      Time Calculation (min) 24 min  -CM      PT Received On 10/06/23  -CM      PT - Next Appointment 10/08/23  -CM      PT Goal Re-Cert Due Date 10/20/23  -CM         Time Calculation- PT    Total Timed Code Minutes- PT 0 minute(s)  -CM                User Key  (r) = Recorded By, (t) = Taken By, (c) = Cosigned By      Initials Name Provider Type    Keesha Zhou, PT Physical Therapist                  Therapy Charges for Today       Code Description Service Date Service Provider Modifiers Qty    [id number] HC PT EVAL MOD COMPLEXITY 4 10/6/2023 Keesha Villasenor, PT GP 1            PT G-Codes  Outcome Measure Options: AM-PAC 6 Clicks Daily Activity (OT)  AM-PAC 6 Clicks Score (PT): 11  AM-PAC 6 Clicks Score (OT): 16  PT Discharge Summary  Anticipated Discharge Disposition (PT): home with home health    Keesha Villasenor, PT  10/6/2023

## 2023-10-06 NOTE — PROGRESS NOTES
"Pharmacy Antimicrobial Dosing Service    Subjective:  Genny Soni is a 73 y.o.female admitted with lower extremeity swelling after lumbar spinal surgery. Pharmacy has been consulted to dose Vancomycin and Cefepime for possible SSTI.    PMH: recent lumbar spinal surgery      Assessment/Plan    1. Day #1 Vancomycin: Goal -600 mcg*h/mL. Vancomycin 1500mg(22 mg/kg adjBW) x 1 dose followed by vancomycin 1000mg(15 mg/kg adjBW) IV q12h. Will check peak and trough prior to fourth dose.     2. Day #1 Cefepime: 1g IV q12h for CrCl 58 mL/min.    Will continue to monitor drug levels, renal function, culture and sensitivities, and patient clinical status.       Objective:  Relevant clinical data and objective history reviewed:  152.4 cm (60\")   99.3 kg (218 lb 14.7 oz)   Ideal body weight: 45.5 kg (100 lb 4.9 oz)  Adjusted ideal body weight: 67 kg (147 lb 12 oz)  Body mass index is 42.75 kg/mý.        Results from last 7 days   Lab Units 10/05/23  1936   CREATININE mg/dL 0.88     Estimated Creatinine Clearance: 58.4 mL/min (by C-G formula based on SCr of 0.88 mg/dL).  No intake/output data recorded.    Results from last 7 days   Lab Units 10/05/23  1437   WBC 10*3/mm3 5.20     Temperature    10/05/23 1327 10/05/23 1840 10/05/23 2053   Temp: 98 øF (36.7 øC) 98 øF (36.7 øC) 98.4 øF (36.9 øC)     Baseline culture/source/susceptibility:  Microbiology Results (last 10 days)       ** No results found for the last 240 hours. **            Deepa John, [facility]  10/05/23 20:55 EDT   "

## 2023-10-06 NOTE — NURSING NOTE
73-year-old female presents to the hospital with back and lower extremity pain.  Patient approximately 6 weeks ago had back surgery.  States that she is been healing well but is has still continues with pain.  Patient has bilateral lower extremity cellulitis.  Patient states that her legs look much better this morning since being on antibiotics.    Patient states that she continues to be more swollen than her baseline.  It is difficult to palpate a pedal pulse.  However her legs are warm and pink and have good cap refill.  Patient does have a moderate amount of edema noted to bilateral lower extremities.  The right lower extremity holding more edema than the left.  Patient has some mild warmth and erythema noted bilaterally.  She has some firm intact blisters.  Nothing is open.  Nothing is weeping.  Legs were cleansed and skin repair cream applied wrapped with a Curlex to protect the skin and then applied a 4 inch and a 6 inch Ace wrap to each leg wrapping from the base of the toes to just below the knee.    Patient's back incision is closed healed.  No erythema is noted.  No opening is noted.

## 2023-10-06 NOTE — PLAN OF CARE
Goal Outcome Evaluation:  Plan of Care Reviewed With: patient, sibling           Outcome Evaluation: Genny Soni is a 73 y.o. female with a CMH of HTN, HLD, Prior back surgery, prior DVT/PE who presented to [facility] on 10/5/2023 with back and leg pain.  At baseline pt independent with ADLs and mobilitywith use of RW. She lives alone with no ANDREE. Pt oriented x 4 this date. Bed mobility Mod A x 2 using log roll technique due to spinal precautions. Sit<>stand Mod A Functional Mobility Mod A with RW. UE shoulder and  strength deficit noted. Pt demoed good sitting balance. Pt below functional baseline OT will follow to maintain strength while in hospital setting. OT recommends SNF following discharge if pain improves then pt may be appropriate for Home with home health.      Anticipated Discharge Disposition (OT): skilled nursing facility

## 2023-10-06 NOTE — THERAPY EVALUATION
Patient Name: Genny Soni  : 1950    MRN: [id number]                              Today's Date: 10/6/2023       Admit Date: 10/5/2023    Visit Dx:     ICD-10-CM ICD-9-CM   1. Cellulitis of right lower extremity  L03.115 682.6   2. Swelling of left lower extremity  M79.89 729.81   3. Swelling of right lower extremity  M79.89 729.81   4. 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 with intermittent claudication, bilateral legs    Deep venous thrombosis of lower extremity    Dizziness    Drug-induced constipation    Edema of lower extremity    Essential tremor    Exertional dyspnea    Fibromyalgia    Former smoker    Generalized anxiety disorder    H/O total knee replacement    Hypokalemia    Injury of hand    Hyperlipidemia    PERLA (obstructive sleep apnea)    Peripheral vascular disease    Persistent insomnia    Piriformis syndrome    Pain management    Polyarthropathy    Restless leg syndrome    Strain of muscle of right groin region    Type II diabetes mellitus with HbA1C goal to be determined    Uncontrolled type 2 diabetes mellitus    Vitamin D deficiency    Pulmonary embolism    Acute kidney injury    Tremors of nervous system    Left lumbar radiculopathy    Acute saddle pulmonary embolism    Cellulitis of back    Cellulitis of lower extremity    Postoperative cellulitis of surgical wound    PNA (pneumonia)    Postoperative wound infection     Past Medical History:   Diagnosis Date    Arthritis     Benign essential hypertension 2018    Diabetes mellitus     Elevated  cholesterol     H/O blood clots     History of transfusion     Hyperlipidemia     Low back pain     Sleep apnea      Past Surgical History:   Procedure Laterality Date    BACK SURGERY      FEMORAL DISTAL BYPASS      HYSTERECTOMY      JOINT REPLACEMENT      LUMBAR LAMINECTOMY WITH FUSION N/A 5/28/2021    Procedure: Lumbar 5 Smith procedure.  Lumbar 5 6 and sacral 1 transpedicular Solera screws and rods.  Posterior fusion with autologous bone.;  Surgeon: Gagan Aguilar MD;  Location: [facility] OR;  Service: Neurosurgery;  Laterality: N/A;      General Information       Row Name 10/06/23 [facility]          OT Time and Intention    Document Type evaluation  -SR (r) PS (t) SR (c)     Mode of Treatment occupational therapy  -SR (r) PS (t) SR (c)       Row Name 10/06/23 1352          General Information    Prior Level of Function independent:;all household mobility;ADL's  -SR (r) PS (t) SR (c)       Row Name 10/06/23 [facility]          Occupational Profile    Reason for Services/Referral (Occupational Profile) Genny Soni is a 73 y.o. female with a CMH of HTN, HLD, Prior back surgery, prior DVT/PE who presented to [facility] on 10/5/2023 with back and leg pain.  At baseline pt independent with ADLs and mobilitywith use of RW. She lives alone with no ANDREE.  -SR (r) PS (t) SR (c)       Row Name 10/06/23 [facility]          Living Environment    People in Home alone  -SR (r) PS (t) SR (c)       Row Name 10/06/23 [facility]          Home Main Entrance    Number of Stairs, Main Entrance none  -SR (r) PS (t) SR (c)       Row Name 10/06/23 [facility]          Cognition    Orientation Status (Cognition) oriented x 4  -SR (r) PS (t) SR (c)       Row Name 10/06/23 [facility]          Safety Issues, Functional Mobility    Impairments Affecting Function (Mobility) balance;pain  -SR (r) PS (t) SR (c)               User Key  (r) = Recorded By, (t) = Taken By, (c) = Cosigned By      Initials Name Provider Type    SR Deann Cobb, OT Occupational  Therapist    Kelly Garcia, OT Student OT Student                     Mobility/ADL's       Row Name 10/06/23 [facility]          Bed Mobility    Bed Mobility bed mobility (all) activities  -SR (r) PS (t) SR (c)     All Activities, [place] (Bed Mobility) moderate assist (50% patient effort);2 person assist  -SR (r) PS (t) SR (c)     Comment, (Bed Mobility) log roll technique due to spinal precautions  -SR (r) PS (t) SR (c)       Row Name 10/06/23 [facility]          Transfers    Transfers bed-chair transfer;sit-stand transfer  -SR (r) PS (t) SR (c)       Row Name 10/06/23 [facility]          Bed-Chair Transfer    Bed-Chair [place] (Transfers) moderate assist (50% patient effort)  -SR (r) PS (t) SR (c)     Assistive Device (Bed-Chair Transfers) walker, front-wheeled  -SR (r) PS (t) SR (c)       Row Name 10/06/23 [facility]          Sit-Stand Transfer    Sit-Stand [place] (Transfers) moderate assist (50% patient effort)  -SR (r) PS (t) SR (c)     Assistive Device (Sit-Stand Transfers) walker, front-wheeled  -SR (r) PS (t) SR (c)       Row Name 10/06/23 [facility]          Functional Mobility    Functional Mobility- Ind. Level moderate assist (50% patient effort)  -SR (r) PS (t) SR (c)     Functional Mobility- Device walker, front-wheeled  -SR (r) PS (t) SR (c)       Row Name 10/06/23 [facility]          Activities of Daily Living    BADL Assessment/Intervention lower body dressing  -SR (r) PS (t) SR (c)       Row Name 10/06/23 [facility]          Lower Body Dressing Assessment/Training    [place] Level (Lower Body Dressing) don;socks;dependent (less than 25% patient effort);other (see comments)  limited due to spinal precautions  -SR (r) PS (t) SR (c)     Position (Lower Body Dressing) edge of bed sitting  -SR (r) PS (t) SR (c)               User Key  (r) = Recorded By, (t) = Taken By, (c) = Cosigned By      Initials Name Provider Type    SR Deann Cobb, OT Occupational Therapist    Kelly Garcia, OT Student OT Student                    Obj/Interventions       Row Name 10/06/23 1357          Vision Assessment/Intervention    Visual Impairment/Limitations WFL  -SR (r) PS (t) SR (c)       Row Name 10/06/23 1356          Range of Motion Comprehensive    General Range of Motion no range of motion deficits identified  -SR (r) PS (t) SR (c)       Row Name 10/06/23 1352          Strength Comprehensive (MMT)    General Manual Muscle Testing (MMT) Assessment upper extremity strength deficits identified  -SR (r) PS (t) SR (c)     Comment, General Manual Muscle Testing (MMT) Assessment BUE shoulder 4/5 L  strength deficit due to arthritis.  -SR (r) PS (t) SR (c)       Row Name 10/06/23 1357          Balance    Balance Assessment sitting static balance;sit to stand dynamic balance;standing static balance;standing dynamic balance  -SR (r) PS (t) SR (c)     Static Sitting Balance standby assist  -SR (r) PS (t) SR (c)     Position, Sitting Balance sitting edge of bed  -SR (r) PS (t) SR (c)     Sit to Stand Dynamic Balance moderate assist  -SR (r) PS (t) SR (c)     Static Standing Balance contact guard  -SR (r) PS (t) SR (c)     Dynamic Standing Balance moderate assist  -SR (r) PS (t) SR (c)     Position/Device Used, Standing Balance walker, front-wheeled  -SR (r) PS (t) SR (c)               User Key  (r) = Recorded By, (t) = Taken By, (c) = Cosigned By      Initials Name Provider Type    SR Deann Cobb, OT Occupational Therapist    Kelly Garcia, RE Student OT Student                   Goals/Plan       Row Name 10/06/23 1402          Bathing Goal 1 (OT)    Activity/Device (Bathing Goal 1, OT) bathing skills, all  -SR (r) PS (t) SR (c)     [place] Level/Cues Needed (Bathing Goal 1, OT) standby assist  -SR (r) PS (t) SR (c)     Time Frame (Bathing Goal 1, OT) 2 weeks  -SR (r) PS (t) SR (c)       Row Name 10/06/23 1404          Dressing Goal 1 (OT)    Activity/Device (Dressing Goal 1, OT) dressing skills, all  -SR (r) PS (t)  SR (c)     [place]/Cues Needed (Dressing Goal 1, OT) standby assist  -SR (r) PS (t) SR (c)       Row Name 10/06/23 3082          Toileting Goal 1 (OT)    Activity/Device (Toileting Goal 1, OT) toileting skills, all  -SR (r) PS (t) SR (c)     [place] Level/Cues Needed (Toileting Goal 1, OT) standby assist  -SR (r) PS (t) SR (c)     Time Frame (Toileting Goal 1, OT) 2 weeks  -SR (r) PS (t) SR (c)               User Key  (r) = Recorded By, (t) = Taken By, (c) = Cosigned By      Initials Name Provider Type    SR Deann Cobb, OT Occupational Therapist    PS Kelly España, OT Student OT Student                   Clinical Impression       Row Name 10/06/23 5349          Pain Assessment    Pretreatment Pain Rating 8/10  -SR (r) PS (t) SR (c)     Posttreatment Pain Rating 8/10  -SR (r) PS (t) SR (c)     Pain Location - back  -SR (r) PS (t) SR (c)       Row Name 10/06/23 5958          Plan of Care Review    Plan of Care Reviewed With patient;sibling  -SR (r) PS (t) SR (c)     Outcome Evaluation Genny Soni is a 73 y.o. female with a CMH of HTN, HLD, Prior back surgery, prior DVT/PE who presented to [facility] on 10/5/2023 with back and leg pain.  At baseline pt independent with ADLs and mobilitywith use of RW. She lives alone with no ANDREE. Pt oriented x 4 this date. Bed mobility Mod A x 2 using log roll technique due to spinal precautions. Sit<>stand Mod A Functional Mobility Mod A with RW. UE shoulder and  strength deficit noted. Pt demoed good sitting balance. Pt below functional baseline OT will follow to maintain strength while in hospital setting. OT recommends SNF following discharge if pain improves then pt may be appropriate for Home with home health.  -SR (r) PS (t) SR (c)       Row Name 10/06/23 3163          Therapy Assessment/Plan (OT)    Rehab Potential (OT) good, to achieve stated therapy goals  -SR (r) PS (t) SR (c)     Criteria for Skilled Therapeutic Interventions Met (OT)  yes;skilled treatment is necessary  -SR (r) PS (t) SR (c)     Therapy Frequency (OT) 3 times/wk  -SR (r) PS (t) SR (c)       Row Name 10/06/23 4253          Therapy Plan Review/Discharge Plan (OT)    Anticipated Discharge Disposition (OT) skilled nursing facility  -SR (r) PS (t) SR (c)       Row Name 10/06/23 7986          Vital Signs    Pre Systolic BP Rehab 121  -SR (r) PS (t) SR (c)     Pre Treatment Diastolic BP 47  -SR (r) PS (t) SR (c)     Post Systolic BP Rehab 127  -SR (r) PS (t) SR (c)     Post Treatment Diastolic BP 44  -SR (r) PS (t) SR (c)     O2 Delivery Pre Treatment room air  -SR (r) PS (t) SR (c)     O2 Delivery Intra Treatment room air  -SR (r) PS (t) SR (c)     O2 Delivery Post Treatment room air  -SR (r) PS (t) SR (c)     Pre Patient Position Supine  -SR (r) PS (t) SR (c)     Intra Patient Position Standing  -SR (r) PS (t) SR (c)     Post Patient Position Sitting  -SR (r) PS (t) SR (c)       Row Name 10/06/23 2924          Positioning and Restraints    Pre-Treatment Position in bed  -SR (r) PS (t) SR (c)     Post Treatment Position chair  -SR (r) PS (t) SR (c)     In Chair call light within reach;encouraged to call for assist;exit alarm on;with family/caregiver;reclined  -SR (r) PS (t) SR (c)               User Key  (r) = Recorded By, (t) = Taken By, (c) = Cosigned By      Initials Name Provider Type    SR Deann Cobb, OT Occupational Therapist    Kelly Garcia, RE Student OT Student                   Outcome Measures       Row Name 10/06/23 3675          How much help from another is currently needed...    Putting on and taking off regular lower body clothing? 2  -SR (r) PS (t) SR (c)     Bathing (including washing, rinsing, and drying) 2  -SR (r) PS (t) SR (c)     Toileting (which includes using toilet bed pan or urinal) 2  -SR (r) PS (t) SR (c)     Putting on and taking off regular upper body clothing 3  -SR (r) PS (t) SR (c)     Taking care of personal grooming (such as brushing  teeth) 3  -SR (r) PS (t) SR (c)     Eating meals 4  -SR (r) PS (t) SR (c)     AM-PAC 6 Clicks Score (OT) 16  -SR (r) PS (t)       Row Name 10/06/23 1409          Functional Assessment    Outcome Measure Options AM-PAC 6 Clicks Daily Activity (OT)  -SR (r) PS (t) SR (c)               User Key  (r) = Recorded By, (t) = Taken By, (c) = Cosigned By      Initials Name Provider Type    SR Deann Cobb OT Occupational Therapist    PS Kelly España, OT Student OT Student                    Occupational Therapy Education       Title: PT OT SLP Therapies (In Progress)       Topic: Occupational Therapy (In Progress)       Point: ADL training (Done)       Description:   Instruct learner(s) on proper safety adaptation and remediation techniques during self care or transfers.   Instruct in proper use of assistive devices.                  Learning Progress Summary             Patient Acceptance, E, VU by PS at 10/6/2023 1412                         Point: Home exercise program (Not Started)       Description:   Instruct learner(s) on appropriate technique for monitoring, assisting and/or progressing therapeutic exercises/activities.                  Learner Progress:  Not documented in this visit.              Point: Precautions (Done)       Description:   Instruct learner(s) on prescribed precautions during self-care and functional transfers.                  Learning Progress Summary             Patient Acceptance, E, VU by PS at 10/6/2023 1412                         Point: Body mechanics (Done)       Description:   Instruct learner(s) on proper positioning and spine alignment during self-care, functional mobility activities and/or exercises.                  Learning Progress Summary             Patient Acceptance, E, VU by PS at 10/6/2023 1412                                         User Key       Initials Effective Dates Name Provider Type Discipline    PS 08/16/23 -  Kelly España, OT Student OT Student OT                   OT Recommendation and Plan  Therapy Frequency (OT): 3 times/wk  Plan of Care Review  Plan of Care Reviewed With: patient, sibling  Outcome Evaluation: Genny Soni is a 73 y.o. female with a CMH of HTN, HLD, Prior back surgery, prior DVT/PE who presented to [facility] on 10/5/2023 with back and leg pain.  At baseline pt independent with ADLs and mobilitywith use of RW. She lives alone with no ANDREE. Pt oriented x 4 this date. Bed mobility Mod A x 2 using log roll technique due to spinal precautions. Sit<>stand Mod A Functional Mobility Mod A with RW. UE shoulder and  strength deficit noted. Pt demoed good sitting balance. Pt below functional baseline OT will follow to maintain strength while in hospital setting. OT recommends SNF following discharge if pain improves then pt may be appropriate for Home with home health.     Time Calculation:         Time Calculation- OT       Row Name 10/06/23 1350             Time Calculation- OT    OT Start Time 1321  -SR (r) PS (t) SR (c)      OT Stop Time 1345  -SR (r) PS (t) SR (c)      OT Time Calculation (min) 24 min  -SR (r) PS (t)      Total Timed Code Minutes- OT 0 minute(s)  -SR (r) PS (t) SR (c)      OT Received On 10/06/23  -SR (r) PS (t) SR (c)      OT - Next Appointment 10/09/23  -SR (r) PS (t) SR (c)      OT Goal Re-Cert Due Date 10/20/23  -SR (r) PS (t) SR (c)                User Key  (r) = Recorded By, (t) = Taken By, (c) = Cosigned By      Initials Name Provider Type    SR Deann Cobb, OT Occupational Therapist    PS Kelly España, OT Student OT Student                  Therapy Charges for Today       Code Description Service Date Service Provider Modifiers Qty    [id number] HC OT EVAL MOD COMPLEXITY 4 10/6/2023 Kelly España OT Student GO 1                 RE [place]  10/6/2023

## 2023-10-06 NOTE — PLAN OF CARE
Goal Outcome Evaluation:      Patient painful most of day, treated per MAR. BLE wrapped with kerlix and ace. Patient able to make needs known. Care plan ongoing

## 2023-10-06 NOTE — CASE MANAGEMENT/SOCIAL WORK
Discharge Planning Assessment   [place]     Patient Name: Genny Soni  MRN: [id number]  Today's Date: 10/6/2023    Admit Date: 10/5/2023    Plan: DC Plan: Return home with [facility], current, will need resume order. IV antibiotics.   Discharge Needs Assessment       Row Name 10/06/23 1736       Living Environment    People in Home alone    Unique Family Situation She has lived alone x 2 yrs.    Current Living Arrangements condominium    Potentially Unsafe Housing Conditions none    Primary Care Provided by self;child(wes)    Provides Primary Care For no one    Family Caregiver if Needed child(wes), adult    Family Caregiver Names Brian Montesinos, son, states is POA. Pt has 2 sons.    Quality of Family Relationships unable to assess    Able to Return to Prior Arrangements yes       Resource/Environmental Concerns    Resource/Environmental Concerns none    Transportation Concerns none       Transition Planning    Patient/Family Anticipates Transition to home with help/services    Patient/Family Anticipated Services at Transition home health care    Transportation Anticipated family or friend will provide       Discharge Needs Assessment    Readmission Within the Last 30 Days no previous admission in last 30 days    Current Outpatient/Agency/Support Group homecare agency    Equipment Currently Used at Home walker, rolling;shower chair;glucometer    Concerns Comments Pt states primary problem is her pain.    Anticipated Changes Related to Illness none    Discharge Facility/Level of Care Needs home with home health    Patient's Choice of Community Agency(s) [facility] current    Discharge Coordination/Progress DC Plan: Return home with [facility], current, will need resume order.                   Discharge Plan       Row Name 10/06/23 1993       Plan    Plan DC Plan: Return home with [facility], current, will need resume order. IV antibiotics.    Plan Comments ID consult, IV antibiotics, wound care  nurse, pt states pain is her primary problem. Cellulitis to both lower extremities and jhon surgical wound. IVc filter. Confirms insurance nd PCP. Pharmacy [address] and [facility].                  Continued Care and Services - Admitted Since 10/5/2023       Home Medical Care       Service Provider Request Status Selected Services Address Phone Fax Patient Preferred    [facility] [place] Pending - Request Sent N/A [id number] [address] [phone] --                     Demographic Summary       Row Name 10/06/23 1734       General Information    Admission Type inpatient    Arrived From emergency department    Required Notices Provided Important Message from Medicare    Referral Source admission list    Reason for Consult discharge planning    Preferred Language English    General Information Comments Spoke to pt in room.                   Functional Status       Row Name 10/06/23 1735       Functional Status    Usual Activity Tolerance moderate    Current Activity Tolerance fair       Functional Status, IADL    Medications independent    Meal Preparation independent    Housekeeping assistive equipment and person    Laundry assistive equipment and person    Shopping assistive equipment and person       Mental Status    General Appearance WDL WDL       Mental Status Summary    Recent Changes in Mental Status/Cognitive Functioning no changes                Met with patient in room.      Maintained distance greater than six feet and spent less than 15 minutes in the room.                Julio Cesar Smith RN

## 2023-10-06 NOTE — PROGRESS NOTES
Hospitalist Progress Note   Genny Soni : 1950 MRN:[id number] LOS:1     Principal Problem: Postoperative cellulitis of surgical wound     Reason for follow up: All the medical problems listed below    Summary     Genny Soni is a 73 y.o. female with a CMH of HTN, HLD, Prior back surgery, prior DVT/PE who presented to [facility] on 10/5/2023 with back and leg pain.     Patient states about 2 weeks ago her back started hurting and now for the last 3 days that her right leg has started swelling and hurting as well.  Due to her history of DVT's patient decided to present to the ED.  In the ED patient had a LE doppler performed which did not show a clot.  CXR indicated that she might have a PNA or pulmonary edema.  Abx were started.  Had surgery on her back 2023 this year.    Assessment / Plan     # LE swelling 2/2 worsened cellulitis   -Patient is discharged from rehab center after she got the back surgery 6 weeks ago.  Patient started having lower extremity swelling for the past 4 weeks and has been worsening started developing bullae.  No open wounds reported.    -LE doppler performed which did not show a clot.  -Blood culture to follow-up   -MRSA negative   -Patient is started on cefepime and vancomycin for cellulitis.    -Infectious diseases consulted. Wound care is on board.     #CAP  - CXR indicated that she might have a PNA or pulmonary edema.   - no resp s/s reported   - Vanc/Cefepime as above will cover infection     #DMT2  - Holding oral meds  - Continue home insulin regimen  - SSI     #History of DVTs/PE s/p IVC filter, not on AC  -Negative duplex.  -Will not be requiring tx dose lovenox       Disposition: TBD     Subjective / Review of systems     Review of Systems   Feeling better   LE swelling coming down     Objective / Physical Exam   Vital signs:  Temp: 98.4 øF (36.9 øC)  BP: (!) 122/34  Heart Rate: 86  Resp: 21  SpO2: 90 %  Weight: 99.3 kg (218 lb 14.7 oz)    Admission Weight:  Weight: 99.3 kg (218 lb 14.7 oz)  Current Weight: Weight: 99.3 kg (218 lb 14.7 oz)    Input/Output in last 24 hours:    Intake/Output Summary (Last 24 hours) at 10/6/2023 1247  Last data filed at 10/6/2023 1050  Gross per 24 hour   Intake 389.76 ml   Output 2000 ml   Net -1610.24 ml      Physical Exam   Physical Exam  HENT:      Head: Normocephalic and atraumatic.      Nose: Nose normal.   Eyes:      Extraocular Movements: Extraocular movements intact.      Conjunctiva/sclera: Conjunctivae normal.      Pupils: Pupils are equal, round, and reactive to light.   Cardiovascular:      Rate and Rhythm: normal       Pulses: Normal pulses.      Heart sounds: Normal heart sounds.   Pulmonary:      Effort: normal      Breath sounds: normal   Abdominal:      General: Abdomen is flat. Bowel sounds are normal.      Palpations: Abdomen is soft.   Musculoskeletal:         Both LE with ACE wrap   Back surgical site seems to be fine and there is no discharges       Mood and Affect: Mood normal.        Radiology and Labs     Results from last 7 days   Lab Units 10/05/23  2246 10/05/23  1437   WBC 10*3/mm3 5.50 5.20   HEMATOCRIT % 35.1 37.8   PLATELETS 10*3/mm3 152 162      Results from last 7 days   Lab Units 10/06/23  0709 10/05/23  2246 10/05/23  1936   SODIUM mmol/L  --  142 140   POTASSIUM mmol/L 4.1 3.2* 3.2*   CHLORIDE mmol/L  --  99 98   CO2 mmol/L  --  34.0* 35.0*   BUN mg/dL  --  26* 27*   CREATININE mg/dL  --  1.01* 0.88      Current medications   Scheduled Meds: cefepime, 1,000 mg, Intravenous, Q12H  cefepime, 2,000 mg, Intravenous, Once  enoxaparin, 40 mg, Subcutaneous, Q12H  escitalopram, 20 mg, Oral, Daily  furosemide, 40 mg, Oral, Daily  insulin glargine, 35 Units, Subcutaneous, Q12H  insulin lispro, 2-7 Units, Subcutaneous, 4x Daily AC & at Bedtime  insulin lispro, 5 Units, Subcutaneous, TID With Meals  losartan, 25 mg, Oral, Daily  lubiprostone, 24 mcg, Oral, BID With Meals  methocarbamol, 500 mg, Oral, 4x  Daily  metOLazone, 2.5 mg, Oral, Daily  miconazole, , Topical, Q12H  pramipexole, 1 mg, Oral, BID  primidone, 25 mg, Oral, Daily  rosuvastatin, 10 mg, Oral, Daily  senna-docusate sodium, 2 tablet, Oral, BID  sodium chloride, 10 mL, Intravenous, Q12H  vancomycin, 15 mg/kg (Adjusted), Intravenous, Q12H      Continuous Infusions: Pharmacy to Dose Cefepime,   Pharmacy to dose vancomycin,         Reviewed all other data in the last 24 hours, including but not limited to vitals, labs, microbiology, imaging and pertinent notes from other providers.     Adrienne Cristobal MD   [facility]  10/06/23   12:47 EDT

## 2023-10-06 NOTE — PLAN OF CARE
Goal Outcome Evaluation:      Pt is alert and orientated, makes needs known. Pt pain is treated, see MAR.  Frequent weight shifts encouraged. Plan of care is on going. Pt in bed with alarms active, call light at side.

## 2023-10-07 PROBLEM — J18.9 PNEUMONIA, UNSPECIFIED ORGANISM: Status: ACTIVE | Noted: 2023-10-07

## 2023-10-07 LAB
ALBUMIN SERPL-MCNC: 3.4 G/DL (ref 3.5–5.2)
ALBUMIN/GLOB SERPL: 1.4 G/DL
ALP SERPL-CCNC: 169 U/L (ref 39–117)
ALT SERPL W P-5'-P-CCNC: 12 U/L (ref 1–33)
ANION GAP SERPL CALCULATED.3IONS-SCNC: 8 MMOL/L (ref 5–15)
AST SERPL-CCNC: 13 U/L (ref 1–32)
BILIRUB SERPL-MCNC: 0.3 MG/DL (ref 0–1.2)
BUN SERPL-MCNC: 19 MG/DL (ref 8–23)
BUN/CREAT SERPL: 21.1 (ref 7–25)
CALCIUM SPEC-SCNC: 9.1 MG/DL (ref 8.6–10.5)
CHLORIDE SERPL-SCNC: 100 MMOL/L (ref 98–107)
CO2 SERPL-SCNC: 34 MMOL/L (ref 22–29)
CREAT SERPL-MCNC: 0.9 MG/DL (ref 0.57–1)
EGFRCR SERPLBLD CKD-EPI 2021: 66.8 ML/MIN/1.73
GLOBULIN UR ELPH-MCNC: 2.4 GM/DL
GLUCOSE BLDC GLUCOMTR-MCNC: 163 MG/DL (ref 70–105)
GLUCOSE BLDC GLUCOMTR-MCNC: 181 MG/DL (ref 70–105)
GLUCOSE BLDC GLUCOMTR-MCNC: 188 MG/DL (ref 70–105)
GLUCOSE BLDC GLUCOMTR-MCNC: 243 MG/DL (ref 70–105)
GLUCOSE SERPL-MCNC: 200 MG/DL (ref 65–99)
MAGNESIUM SERPL-MCNC: 1.9 MG/DL (ref 1.6–2.4)
PHOSPHATE SERPL-MCNC: 3.3 MG/DL (ref 2.5–4.5)
POTASSIUM SERPL-SCNC: 3.4 MMOL/L (ref 3.5–5.2)
POTASSIUM SERPL-SCNC: 4.1 MMOL/L (ref 3.5–5.2)
PROT SERPL-MCNC: 5.8 G/DL (ref 6–8.5)
SODIUM SERPL-SCNC: 142 MMOL/L (ref 136–145)

## 2023-10-07 PROCEDURE — 85025 COMPLETE CBC W/AUTO DIFF WBC: CPT | Performed by: INTERNAL MEDICINE

## 2023-10-07 PROCEDURE — 63710000001 INSULIN GLARGINE PER 5 UNITS: Performed by: INTERNAL MEDICINE

## 2023-10-07 PROCEDURE — 25010000002 HYDROMORPHONE 1 MG/ML SOLUTION: Performed by: STUDENT IN AN ORGANIZED HEALTH CARE EDUCATION/TRAINING PROGRAM

## 2023-10-07 PROCEDURE — 83735 ASSAY OF MAGNESIUM: CPT | Performed by: INTERNAL MEDICINE

## 2023-10-07 PROCEDURE — 84132 ASSAY OF SERUM POTASSIUM: CPT | Performed by: INTERNAL MEDICINE

## 2023-10-07 PROCEDURE — 82948 REAGENT STRIP/BLOOD GLUCOSE: CPT

## 2023-10-07 PROCEDURE — 84100 ASSAY OF PHOSPHORUS: CPT | Performed by: INTERNAL MEDICINE

## 2023-10-07 PROCEDURE — G0378 HOSPITAL OBSERVATION PER HR: HCPCS

## 2023-10-07 PROCEDURE — 96376 TX/PRO/DX INJ SAME DRUG ADON: CPT

## 2023-10-07 PROCEDURE — 63710000001 INSULIN LISPRO (HUMAN) PER 5 UNITS: Performed by: INTERNAL MEDICINE

## 2023-10-07 PROCEDURE — 25010000002 ENOXAPARIN PER 10 MG: Performed by: STUDENT IN AN ORGANIZED HEALTH CARE EDUCATION/TRAINING PROGRAM

## 2023-10-07 PROCEDURE — 80053 COMPREHEN METABOLIC PANEL: CPT | Performed by: STUDENT IN AN ORGANIZED HEALTH CARE EDUCATION/TRAINING PROGRAM

## 2023-10-07 PROCEDURE — 96372 THER/PROPH/DIAG INJ SC/IM: CPT

## 2023-10-07 RX ORDER — OXYCODONE HCL 10 MG/1
10 TABLET, FILM COATED, EXTENDED RELEASE ORAL EVERY 12 HOURS SCHEDULED
Status: DISCONTINUED | OUTPATIENT
Start: 2023-10-07 | End: 2023-10-08 | Stop reason: HOSPADM

## 2023-10-07 RX ORDER — PRIMIDONE 50 MG/1
25 TABLET ORAL NIGHTLY
Status: DISCONTINUED | OUTPATIENT
Start: 2023-10-08 | End: 2023-10-08 | Stop reason: HOSPADM

## 2023-10-07 RX ORDER — OXYCODONE HYDROCHLORIDE 5 MG/1
5 TABLET ORAL EVERY 4 HOURS PRN
Status: DISCONTINUED | OUTPATIENT
Start: 2023-10-07 | End: 2023-10-08 | Stop reason: HOSPADM

## 2023-10-07 RX ORDER — INSULIN LISPRO 100 [IU]/ML
7 INJECTION, SOLUTION INTRAVENOUS; SUBCUTANEOUS
Status: DISCONTINUED | OUTPATIENT
Start: 2023-10-07 | End: 2023-10-08

## 2023-10-07 RX ORDER — POTASSIUM CHLORIDE 20 MEQ/1
40 TABLET, EXTENDED RELEASE ORAL EVERY 4 HOURS
Status: COMPLETED | OUTPATIENT
Start: 2023-10-07 | End: 2023-10-07

## 2023-10-07 RX ORDER — DOXYCYCLINE 100 MG/1
100 CAPSULE ORAL EVERY 12 HOURS SCHEDULED
Qty: 18 CAPSULE | Refills: 0 | Status: CANCELLED | OUTPATIENT
Start: 2023-10-07 | End: 2023-10-16

## 2023-10-07 RX ORDER — DOXYCYCLINE 100 MG/1
100 CAPSULE ORAL EVERY 12 HOURS SCHEDULED
Qty: 18 CAPSULE | Refills: 0 | Status: SHIPPED | OUTPATIENT
Start: 2023-10-07 | End: 2023-10-16

## 2023-10-07 RX ADMIN — ENOXAPARIN SODIUM 40 MG: 40 INJECTION, SOLUTION SUBCUTANEOUS at 21:23

## 2023-10-07 RX ADMIN — HYDROMORPHONE HYDROCHLORIDE 1 MG: 1 INJECTION, SOLUTION INTRAMUSCULAR; INTRAVENOUS; SUBCUTANEOUS at 09:32

## 2023-10-07 RX ADMIN — MICONAZOLE NITRATE: 20 POWDER TOPICAL at 21:24

## 2023-10-07 RX ADMIN — INSULIN GLARGINE 35 UNITS: 100 INJECTION, SOLUTION SUBCUTANEOUS at 08:19

## 2023-10-07 RX ADMIN — ROSUVASTATIN 10 MG: 10 TABLET, FILM COATED ORAL at 08:21

## 2023-10-07 RX ADMIN — OXYCODONE HYDROCHLORIDE 5 MG: 5 TABLET ORAL at 08:28

## 2023-10-07 RX ADMIN — ESCITALOPRAM OXALATE 20 MG: 10 TABLET ORAL at 08:19

## 2023-10-07 RX ADMIN — INSULIN LISPRO 2 UNITS: 100 INJECTION, SOLUTION INTRAVENOUS; SUBCUTANEOUS at 12:46

## 2023-10-07 RX ADMIN — LOSARTAN POTASSIUM 25 MG: 25 TABLET, FILM COATED ORAL at 08:21

## 2023-10-07 RX ADMIN — Medication 10 ML: at 21:23

## 2023-10-07 RX ADMIN — INSULIN LISPRO 7 UNITS: 100 INJECTION, SOLUTION INTRAVENOUS; SUBCUTANEOUS at 08:28

## 2023-10-07 RX ADMIN — POTASSIUM CHLORIDE 40 MEQ: 1500 TABLET, EXTENDED RELEASE ORAL at 01:45

## 2023-10-07 RX ADMIN — INSULIN GLARGINE 35 UNITS: 100 INJECTION, SOLUTION SUBCUTANEOUS at 21:23

## 2023-10-07 RX ADMIN — METHOCARBAMOL 500 MG: 500 TABLET ORAL at 08:20

## 2023-10-07 RX ADMIN — SENNOSIDES AND DOCUSATE SODIUM 2 TABLET: 50; 8.6 TABLET ORAL at 08:21

## 2023-10-07 RX ADMIN — Medication 10 ML: at 08:23

## 2023-10-07 RX ADMIN — INSULIN LISPRO 2 UNITS: 100 INJECTION, SOLUTION INTRAVENOUS; SUBCUTANEOUS at 21:23

## 2023-10-07 RX ADMIN — FUROSEMIDE 40 MG: 40 TABLET ORAL at 08:21

## 2023-10-07 RX ADMIN — DOXYCYCLINE 100 MG: 100 CAPSULE ORAL at 21:23

## 2023-10-07 RX ADMIN — INSULIN LISPRO 2 UNITS: 100 INJECTION, SOLUTION INTRAVENOUS; SUBCUTANEOUS at 17:56

## 2023-10-07 RX ADMIN — METOLAZONE 2.5 MG: 2.5 TABLET ORAL at 08:20

## 2023-10-07 RX ADMIN — INSULIN LISPRO 7 UNITS: 100 INJECTION, SOLUTION INTRAVENOUS; SUBCUTANEOUS at 17:56

## 2023-10-07 RX ADMIN — OXYCODONE HYDROCHLORIDE 10 MG: 10 TABLET, FILM COATED, EXTENDED RELEASE ORAL at 12:46

## 2023-10-07 RX ADMIN — METHOCARBAMOL 500 MG: 500 TABLET ORAL at 17:56

## 2023-10-07 RX ADMIN — ENOXAPARIN SODIUM 40 MG: 40 INJECTION, SOLUTION SUBCUTANEOUS at 08:19

## 2023-10-07 RX ADMIN — DOXYCYCLINE 100 MG: 100 CAPSULE ORAL at 08:19

## 2023-10-07 RX ADMIN — METHOCARBAMOL 500 MG: 500 TABLET ORAL at 12:46

## 2023-10-07 RX ADMIN — MICONAZOLE NITRATE: 20 POWDER TOPICAL at 08:23

## 2023-10-07 RX ADMIN — INSULIN LISPRO 7 UNITS: 100 INJECTION, SOLUTION INTRAVENOUS; SUBCUTANEOUS at 12:48

## 2023-10-07 RX ADMIN — POTASSIUM CHLORIDE 40 MEQ: 1500 TABLET, EXTENDED RELEASE ORAL at 05:12

## 2023-10-07 RX ADMIN — PRAMIPEXOLE DIHYDROCHLORIDE 1 MG: 1 TABLET ORAL at 21:23

## 2023-10-07 RX ADMIN — PRAMIPEXOLE DIHYDROCHLORIDE 1 MG: 1 TABLET ORAL at 08:21

## 2023-10-07 RX ADMIN — OXYCODONE HYDROCHLORIDE 5 MG: 5 TABLET ORAL at 16:33

## 2023-10-07 RX ADMIN — METHOCARBAMOL 500 MG: 500 TABLET ORAL at 21:23

## 2023-10-07 RX ADMIN — LUBIPROSTONE 24 MCG: 24 CAPSULE, GELATIN COATED ORAL at 08:19

## 2023-10-07 RX ADMIN — HYDROMORPHONE HYDROCHLORIDE 1 MG: 1 INJECTION, SOLUTION INTRAMUSCULAR; INTRAVENOUS; SUBCUTANEOUS at 05:11

## 2023-10-07 RX ADMIN — OXYCODONE HYDROCHLORIDE 10 MG: 10 TABLET, FILM COATED, EXTENDED RELEASE ORAL at 21:23

## 2023-10-07 NOTE — PROGRESS NOTES
Hospitalist Progress Note   Genny Soni : 1950 MRN:[id number] LOS:1     Principal Problem: Postoperative cellulitis of surgical wound     Reason for follow up: All the medical problems listed below    Summary     Genny Soni is a 73 y.o. female with a CMH of HTN, HLD, Prior back surgery, prior DVT/PE who presented to [facility] on 10/5/2023 with back and leg pain.     Patient states about 2 weeks ago her back started hurting and now for the last 3 days that her right leg has started swelling and hurting as well.  Due to her history of DVT's patient decided to present to the ED.  In the ED patient had a LE doppler performed which did not show a clot.  CXR indicated that she might have a PNA or pulmonary edema.  Abx were started.  Had surgery on her back 2023 this year.    Assessment / Plan     # LE swelling 2/2 worsened cellulitis   -Patient is discharged from rehab center after she got the back surgery 6 weeks ago.  Patient started having lower extremity swelling for the past 4 weeks and has been worsening started developing bullae.  No open wounds reported.    -LE doppler performed which did not show a clot.  -Blood culture NGTD so far   -MRSA negative   -Patient is started on cefepime and doxycycline for cellulitis.    -Infectious diseases consulted. Wound care is on board.     #CAP  - CXR indicated that she might have a PNA or pulmonary edema.   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  -Will not be requiring tx dose lovenox       Disposition: TBD     Subjective / Review of systems     Review of Systems   Feeling better   LE swelling coming down     Objective / Physical Exam   Vital signs:  Temp: 98 øF (36.7 øC)  BP: 132/46  Heart Rate: 71  Resp: 17  SpO2: 92 %  Weight: 99.3 kg (218 lb 14.7 oz)    Admission Weight: Weight: 99.3  kg (218 lb 14.7 oz)  Current Weight: Weight: 99.3 kg (218 lb 14.7 oz)    Input/Output in last 24 hours:    Intake/Output Summary (Last 24 hours) at 10/7/2023 1038  Last data filed at 10/7/2023 0828  Gross per 24 hour   Intake 1200 ml   Output 2400 ml   Net -1200 ml      Physical Exam   Physical Exam  HENT:      Head: Normocephalic and atraumatic.      Nose: Nose normal.   Eyes:      Extraocular Movements: Extraocular movements intact.      Conjunctiva/sclera: Conjunctivae normal.      Pupils: Pupils are equal, round, and reactive to light.   Cardiovascular:      Rate and Rhythm: normal       Pulses: Normal pulses.      Heart sounds: Normal heart sounds.   Pulmonary:      Effort: normal      Breath sounds: normal   Abdominal:      General: Abdomen is flat. Bowel sounds are normal.      Palpations: Abdomen is soft.   Musculoskeletal:         Both LE with ACE wrap   Back surgical site seems to be fine and there is no discharges       Mood and Affect: Mood normal.        Radiology and Labs     Results from last 7 days   Lab Units 10/06/23  2257 10/05/23  2246 10/05/23  1437   WBC 10*3/mm3 6.50 5.50 5.20   HEMATOCRIT % 34.3 35.1 37.8   PLATELETS 10*3/mm3 161 152 162      Results from last 7 days   Lab Units 10/06/23  2257 10/06/23  0709 10/05/23  2246 10/05/23  1936   SODIUM mmol/L 142  --  142 140   POTASSIUM mmol/L 3.4* 4.1 3.2* 3.2*   CHLORIDE mmol/L 100  --  99 98   CO2 mmol/L 34.0*  --  34.0* 35.0*   BUN mg/dL 19  --  26* 27*   CREATININE mg/dL 0.90  --  1.01* 0.88      Current medications   Scheduled Meds: cefepime, 2,000 mg, Intravenous, Once  doxycycline, 100 mg, Oral, Q12H  enoxaparin, 40 mg, Subcutaneous, Q12H  escitalopram, 20 mg, Oral, Daily  furosemide, 40 mg, Oral, Daily  insulin glargine, 35 Units, Subcutaneous, Q12H  insulin lispro, 2-7 Units, Subcutaneous, 4x Daily AC & at Bedtime  insulin lispro, 7 Units, Subcutaneous, TID With Meals  losartan, 25 mg, Oral, Daily  lubiprostone, 24 mcg, Oral, BID With  Meals  methocarbamol, 500 mg, Oral, 4x Daily  metOLazone, 2.5 mg, Oral, Daily  miconazole, , Topical, Q12H  oxyCODONE, 10 mg, Oral, Q12H  pramipexole, 1 mg, Oral, BID  [START ON 10/8/2023] primidone, 25 mg, Oral, Nightly  rosuvastatin, 10 mg, Oral, Daily  senna-docusate sodium, 2 tablet, Oral, BID  sodium chloride, 10 mL, Intravenous, Q12H      Continuous Infusions:        Reviewed all other data in the last 24 hours, including but not limited to vitals, labs, microbiology, imaging and pertinent notes from other providers.     Adrienne Cristobal MD   [facility] Medicine  10/07/23   10:38 EDT

## 2023-10-07 NOTE — PLAN OF CARE
Goal Outcome Evaluation:  Plan of Care Reviewed With: patient           Outcome Evaluation: Pt. able to make needs known. Up in chair and ambulated Ax2 with walker in room. Personal items and call light within reach. Plan of care ongoing.

## 2023-10-07 NOTE — PROGRESS NOTES
Infectious Diseases Progress Note      LOS: 1 day   Patient Care Team:  Bonilla Rubio MD as PCP - General (Family Medicine)    Chief Complaint: Lower back pain  Subjective       The patient had no fever during the last 20 hours.  She remained hemodynamically stable.  Denied having any new complaints.  Review of Systems:   Review of Systems   Constitutional: Negative.    HENT: Negative.     Eyes: Negative.    Respiratory: Negative.     Cardiovascular: Negative.  Positive for leg swelling.   Gastrointestinal: Negative.    Genitourinary: Negative.    Musculoskeletal: Negative.  Positive for back pain.   Skin: Negative.    Neurological: Negative.    Hematological: Negative.    Psychiatric/Behavioral: Negative.          Objective     Vital Signs  Temp:  [98 øF (36.7 øC)-98.3 øF (36.8 øC)] 98 øF (36.7 øC)  Heart Rate:  [71-93] 71  Resp:  [14-23] 23  BP: (116-132)/(32-56) 129/49    Physical Exam:  Physical Exam  Vitals and nursing note reviewed.   Constitutional:       Appearance: She is well-developed.   HENT:      Head: Normocephalic and atraumatic.   Eyes:      Pupils: Pupils are equal, round, and reactive to light.   Cardiovascular:      Rate and Rhythm: Normal rate and regular rhythm.      Heart sounds: Normal heart sounds.   Pulmonary:      Effort: Pulmonary effort is normal. No respiratory distress.      Breath sounds: Normal breath sounds. No wheezing or rales.   Abdominal:      General: Bowel sounds are normal. There is no distension.      Palpations: Abdomen is soft. There is no mass.      Tenderness: There is no abdominal tenderness. There is no guarding or rebound.   Musculoskeletal:         General: No deformity. Normal range of motion.      Cervical back: Normal range of motion and neck supple.      Right lower leg: Edema present.      Left lower leg: Edema present.      Comments: Subtle erythema of both legs with no streak   Skin:     General: Skin is warm.      Findings: No erythema or rash.       Comments: Scar from previous spine surgery with no signs of infection   Neurological:      Mental Status: She is alert and oriented to person, place, and time.      Cranial Nerves: No cranial nerve deficit.          Results Review:    I have reviewed all clinical data, test, lab, and imaging results.     Radiology  CT Lumbar Spine Without Contrast    Result Date: 10/6/2023  CT LUMBAR SPINE WO CONTRAST Date of Exam: 10/6/2023 7:35 PM EDT Indication: Severe lower back pain with recent surgery. Comparison: MRI lumbar spine from April 19, 2021 Technique: Axial CT images were obtained of the lumbar spine without contrast administration.  Sagittal and coronal reconstructions were performed.  Automated exposure control and iterative reconstruction methods were used. Findings: The alignment is anatomic. There are changes from posterior fusion involving vertical rods with paired pedicle screws at T11, T12, L1, L3, L4, L5, and S1. There are tracks from prior screw placement bilaterally at L2. The hardware appears intact. There is a moderate superior endplate compression deformity at L2 with some associated sclerosis, new from prior MRI. There are mild to moderate discogenic changes throughout. Within the visualized abdomen is an IVC filter in place. There are also post-surgical changes along the abdominal aorta and iliac arteries. There is some nonspecific fluid within the mid posterior paravertebral soft tissues. L1-2: Mild bilateral facet arthropathy. No spinal canal stenosis. No neural foraminal stenosis. L2-3: Mild disc bulge. Mild bilateral facet arthropathy. No spinal canal stenosis. Mild bilateral neural foraminal stenosis. L3-4: Laminectomy. Mild disc bulge. Mild bilateral facet arthropathy. No spinal canal stenosis. Mild left neural foraminal stenosis. L4-5: Laminectomy. Mild bilateral facet arthropathy. No spinal canal stenosis. Moderate bilateral neural foraminal stenosis. L5-S1: Laminectomy. Mild bilateral facet  arthropathy. No spinal canal stenosis. Mild right and mild to moderate left neural foraminal stenosis.     Impression: 1.Post-surgical changes as described above. Hardware appears intact. 2.Moderate superior endplate compression deformity at L2 with some associated sclerosis, new from prior MRI from 2021. This could be acute or subacute. 3.Mild to moderate multilevel degenerative changes of lumbar spine as described above. Electronically Signed: Jose Juan Graff MD  10/6/2023 8:15 PM EDT  Workstation ID: [id number]     Cardiology    Laboratory    Results from last 7 days   Lab Units 10/06/23  2257 10/05/23  2246 10/05/23  1437   WBC 10*3/mm3 6.50 5.50 5.20   HEMOGLOBIN g/dL 11.2* 11.2* 12.4   HEMATOCRIT % 34.3 35.1 37.8   PLATELETS 10*3/mm3 161 152 162     Results from last 7 days   Lab Units 10/07/23  1159 10/06/23  2257 10/06/23  0709 10/05/23  2246 10/05/23  1936   SODIUM mmol/L  --  142  --  142 140   POTASSIUM mmol/L 4.1 3.4* 4.1 3.2* 3.2*   CHLORIDE mmol/L  --  100  --  99 98   CO2 mmol/L  --  34.0*  --  34.0* 35.0*   BUN mg/dL  --  19  --  26* 27*   CREATININE mg/dL  --  0.90  --  1.01* 0.88   GLUCOSE mg/dL  --  200*  --  225* 297*   ALBUMIN g/dL  --  3.4*  --  3.7 3.9   BILIRUBIN mg/dL  --  0.3  --  0.2 0.2   ALK PHOS U/L  --  169*  --  183* 198*   AST (SGOT) U/L  --  13  --  13 13   ALT (SGPT) U/L  --  12  --  12 11   CALCIUM mg/dL  --  9.1  --  8.4* 8.6     Results from last 7 days   Lab Units 10/05/23  1936   CK TOTAL U/L 74             Microbiology   Microbiology Results (last 10 days)       Procedure Component Value - Date/Time    MRSA Screen, PCR (Inpatient) - Swab, Nares [[id number]]  (Normal) Collected: 10/05/23 2203    Lab Status: Final result Specimen: Swab from Nares Updated: 10/05/23 2320     MRSA PCR No MRSA Detected    Narrative:      The negative predictive value of this diagnostic test is high and should only be used to consider de-escalating anti-MRSA therapy. A positive result may indicate  colonization with MRSA and must be correlated clinically.    Blood Culture - Blood, Arm, Right [[id number]]  (Normal) Collected: 10/05/23 1936    Lab Status: Preliminary result Specimen: Blood from Arm, Right Updated: 10/06/23 1945     Blood Culture No growth at 24 hours    Narrative:      Less than seven (7) mL's of blood was collected.  Insufficient quantity may yield false negative results.    Blood Culture - Blood, Hand, Right [[id number]]  (Normal) Collected: 10/05/23 1936    Lab Status: Preliminary result Specimen: Blood from Hand, Right Updated: 10/06/23 1945     Blood Culture No growth at 24 hours    Narrative:      Less than seven (7) mL's of blood was collected.  Insufficient quantity may yield false negative results.            Medication Review:       Schedule Meds  doxycycline, 100 mg, Oral, Q12H  enoxaparin, 40 mg, Subcutaneous, Q12H  escitalopram, 20 mg, Oral, Daily  furosemide, 40 mg, Oral, Daily  insulin glargine, 35 Units, Subcutaneous, Q12H  insulin lispro, 2-7 Units, Subcutaneous, 4x Daily AC & at Bedtime  insulin lispro, 7 Units, Subcutaneous, TID With Meals  losartan, 25 mg, Oral, Daily  lubiprostone, 24 mcg, Oral, BID With Meals  methocarbamol, 500 mg, Oral, 4x Daily  metOLazone, 2.5 mg, Oral, Daily  miconazole, , Topical, Q12H  oxyCODONE, 10 mg, Oral, Q12H  pramipexole, 1 mg, Oral, BID  [START ON 10/8/2023] primidone, 25 mg, Oral, Nightly  rosuvastatin, 10 mg, Oral, Daily  senna-docusate sodium, 2 tablet, Oral, BID  sodium chloride, 10 mL, Intravenous, Q12H        Infusion Meds       PRN Meds    acetaminophen    senna-docusate sodium **AND** polyethylene glycol **AND** bisacodyl **AND** bisacodyl    Calcium Replacement - Follow Nurse / BPA Driven Protocol    dextrose    dextrose    glucagon (human recombinant)    HYDROmorphone    Magnesium Standard Dose Replacement - Follow Nurse / BPA Driven Protocol    melatonin    nitroglycerin    ondansetron **OR** ondansetron    Phosphorus Replacement -  Follow Nurse / BPA Driven Protocol    Potassium Replacement - Follow Nurse / BPA Driven Protocol    sodium chloride    sodium chloride        Assessment & Plan       Antimicrobial Therapy   1.  P.o. doxycycline        2.        3.        4.        5.          Assessment     Bilateral lower extremities edema with subtle cellulitis changes with no significant streak     Lower back pain.  Patient is s/p multiple spine surgeries and most recently was in August 2023.  Surgical site showed no obvious signs of infection and CT scan of the lumbar spine did not show obvious signs of infection.  Patient has no clinical signs of infection     Multiple back surgeries with most recent surgeries being on 2/28/2023 and 8/18/2023.  Surgeries were completed [facility] per patient.  She does state that there is hardware in place.     Type 2 diabetes      History of DVT and PE        Plan     Continue p.o. doxycycline 100 mg twice daily for total of 10 days  Okay to discharge home from infectious disease point  Recommend that patient follows with her spine surgeon after discharge secondary to persistent lower back pain      Ana Will MD  10/07/23  14:10 EDT    Note is dictated utilizing voice recognition software/Dragon

## 2023-10-08 VITALS
DIASTOLIC BLOOD PRESSURE: 49 MMHG | TEMPERATURE: 97.2 F | HEART RATE: 83 BPM | BODY MASS INDEX: 42.98 KG/M2 | WEIGHT: 218.92 LBS | OXYGEN SATURATION: 94 % | RESPIRATION RATE: 17 BRPM | SYSTOLIC BLOOD PRESSURE: 129 MMHG | HEIGHT: 60 IN

## 2023-10-08 LAB
ALBUMIN SERPL-MCNC: 3.6 G/DL (ref 3.5–5.2)
ALBUMIN/GLOB SERPL: 1.6 G/DL
ALP SERPL-CCNC: 171 U/L (ref 39–117)
ALT SERPL W P-5'-P-CCNC: 13 U/L (ref 1–33)
ANION GAP SERPL CALCULATED.3IONS-SCNC: 8 MMOL/L (ref 5–15)
AST SERPL-CCNC: 15 U/L (ref 1–32)
BASOPHILS # BLD AUTO: 0 10*3/MM3 (ref 0–0.2)
BASOPHILS NFR BLD AUTO: 0.2 % (ref 0–1.5)
BILIRUB SERPL-MCNC: 0.2 MG/DL (ref 0–1.2)
BUN SERPL-MCNC: 17 MG/DL (ref 8–23)
BUN/CREAT SERPL: 23.9 (ref 7–25)
CALCIUM SPEC-SCNC: 9.3 MG/DL (ref 8.6–10.5)
CHLORIDE SERPL-SCNC: 98 MMOL/L (ref 98–107)
CO2 SERPL-SCNC: 34 MMOL/L (ref 22–29)
CREAT SERPL-MCNC: 0.71 MG/DL (ref 0.57–1)
DEPRECATED RDW RBC AUTO: 52.1 FL (ref 37–54)
EGFRCR SERPLBLD CKD-EPI 2021: 88.8 ML/MIN/1.73
EOSINOPHIL # BLD AUTO: 0.2 10*3/MM3 (ref 0–0.4)
EOSINOPHIL NFR BLD AUTO: 2.7 % (ref 0.3–6.2)
ERYTHROCYTE [DISTWIDTH] IN BLOOD BY AUTOMATED COUNT: 15.9 % (ref 12.3–15.4)
GLOBULIN UR ELPH-MCNC: 2.2 GM/DL
GLUCOSE BLDC GLUCOMTR-MCNC: 136 MG/DL (ref 70–105)
GLUCOSE SERPL-MCNC: 162 MG/DL (ref 65–99)
HCT VFR BLD AUTO: 35.3 % (ref 34–46.6)
HGB BLD-MCNC: 11.5 G/DL (ref 12–15.9)
LYMPHOCYTES # BLD AUTO: 1.7 10*3/MM3 (ref 0.7–3.1)
LYMPHOCYTES NFR BLD AUTO: 28.3 % (ref 19.6–45.3)
MAGNESIUM SERPL-MCNC: 1.7 MG/DL (ref 1.6–2.4)
MCH RBC QN AUTO: 30.1 PG (ref 26.6–33)
MCHC RBC AUTO-ENTMCNC: 32.5 G/DL (ref 31.5–35.7)
MCV RBC AUTO: 92.6 FL (ref 79–97)
MONOCYTES # BLD AUTO: 0.4 10*3/MM3 (ref 0.1–0.9)
MONOCYTES NFR BLD AUTO: 6.3 % (ref 5–12)
NEUTROPHILS NFR BLD AUTO: 3.9 10*3/MM3 (ref 1.7–7)
NEUTROPHILS NFR BLD AUTO: 62.5 % (ref 42.7–76)
NRBC BLD AUTO-RTO: 0.1 /100 WBC (ref 0–0.2)
PHOSPHATE SERPL-MCNC: 3 MG/DL (ref 2.5–4.5)
PLATELET # BLD AUTO: 174 10*3/MM3 (ref 140–450)
PMV BLD AUTO: 7.9 FL (ref 6–12)
POTASSIUM SERPL-SCNC: 3.4 MMOL/L (ref 3.5–5.2)
POTASSIUM SERPL-SCNC: 4.1 MMOL/L (ref 3.5–5.2)
PROT SERPL-MCNC: 5.8 G/DL (ref 6–8.5)
RBC # BLD AUTO: 3.82 10*6/MM3 (ref 3.77–5.28)
SODIUM SERPL-SCNC: 140 MMOL/L (ref 136–145)
WBC NRBC COR # BLD: 6.2 10*3/MM3 (ref 3.4–10.8)

## 2023-10-08 PROCEDURE — 96372 THER/PROPH/DIAG INJ SC/IM: CPT

## 2023-10-08 PROCEDURE — 97112 NEUROMUSCULAR REEDUCATION: CPT

## 2023-10-08 PROCEDURE — 97116 GAIT TRAINING THERAPY: CPT

## 2023-10-08 PROCEDURE — G0378 HOSPITAL OBSERVATION PER HR: HCPCS

## 2023-10-08 PROCEDURE — 84132 ASSAY OF SERUM POTASSIUM: CPT | Performed by: INTERNAL MEDICINE

## 2023-10-08 PROCEDURE — 82948 REAGENT STRIP/BLOOD GLUCOSE: CPT

## 2023-10-08 PROCEDURE — 97530 THERAPEUTIC ACTIVITIES: CPT

## 2023-10-08 PROCEDURE — 97110 THERAPEUTIC EXERCISES: CPT

## 2023-10-08 PROCEDURE — 63710000001 INSULIN GLARGINE PER 5 UNITS: Performed by: INTERNAL MEDICINE

## 2023-10-08 PROCEDURE — 63710000001 INSULIN LISPRO (HUMAN) PER 5 UNITS: Performed by: INTERNAL MEDICINE

## 2023-10-08 PROCEDURE — 25010000002 ENOXAPARIN PER 10 MG: Performed by: STUDENT IN AN ORGANIZED HEALTH CARE EDUCATION/TRAINING PROGRAM

## 2023-10-08 RX ORDER — OXYCODONE HYDROCHLORIDE AND ACETAMINOPHEN 5; 325 MG/1; MG/1
1 TABLET ORAL EVERY 6 HOURS PRN
Qty: 20 TABLET | Refills: 0 | Status: SHIPPED | OUTPATIENT
Start: 2023-10-08 | End: 2023-10-13

## 2023-10-08 RX ORDER — POTASSIUM CHLORIDE 20 MEQ/1
40 TABLET, EXTENDED RELEASE ORAL EVERY 4 HOURS
Status: COMPLETED | OUTPATIENT
Start: 2023-10-08 | End: 2023-10-08

## 2023-10-08 RX ORDER — INSULIN LISPRO 100 [IU]/ML
9 INJECTION, SOLUTION INTRAVENOUS; SUBCUTANEOUS
Status: DISCONTINUED | OUTPATIENT
Start: 2023-10-08 | End: 2023-10-08 | Stop reason: HOSPADM

## 2023-10-08 RX ORDER — NALOXONE HYDROCHLORIDE 4 MG/.1ML
SPRAY NASAL
Qty: 2 EACH | Refills: 0 | Status: SHIPPED | OUTPATIENT
Start: 2023-10-08

## 2023-10-08 RX ADMIN — FUROSEMIDE 40 MG: 40 TABLET ORAL at 08:32

## 2023-10-08 RX ADMIN — ROSUVASTATIN 10 MG: 10 TABLET, FILM COATED ORAL at 08:32

## 2023-10-08 RX ADMIN — ESCITALOPRAM OXALATE 20 MG: 10 TABLET ORAL at 08:32

## 2023-10-08 RX ADMIN — ENOXAPARIN SODIUM 40 MG: 40 INJECTION, SOLUTION SUBCUTANEOUS at 08:31

## 2023-10-08 RX ADMIN — OXYCODONE HYDROCHLORIDE 5 MG: 5 TABLET ORAL at 02:12

## 2023-10-08 RX ADMIN — METHOCARBAMOL 500 MG: 500 TABLET ORAL at 08:32

## 2023-10-08 RX ADMIN — OXYCODONE HYDROCHLORIDE 5 MG: 5 TABLET ORAL at 06:11

## 2023-10-08 RX ADMIN — SENNOSIDES AND DOCUSATE SODIUM 2 TABLET: 50; 8.6 TABLET ORAL at 08:32

## 2023-10-08 RX ADMIN — INSULIN GLARGINE 35 UNITS: 100 INJECTION, SOLUTION SUBCUTANEOUS at 08:31

## 2023-10-08 RX ADMIN — POTASSIUM CHLORIDE 40 MEQ: 1500 TABLET, EXTENDED RELEASE ORAL at 02:13

## 2023-10-08 RX ADMIN — DOXYCYCLINE 100 MG: 100 CAPSULE ORAL at 08:32

## 2023-10-08 RX ADMIN — Medication 10 ML: at 08:34

## 2023-10-08 RX ADMIN — PRAMIPEXOLE DIHYDROCHLORIDE 1 MG: 1 TABLET ORAL at 08:32

## 2023-10-08 RX ADMIN — METOLAZONE 2.5 MG: 2.5 TABLET ORAL at 08:32

## 2023-10-08 RX ADMIN — LOSARTAN POTASSIUM 25 MG: 25 TABLET, FILM COATED ORAL at 08:32

## 2023-10-08 RX ADMIN — OXYCODONE HYDROCHLORIDE 10 MG: 10 TABLET, FILM COATED, EXTENDED RELEASE ORAL at 10:49

## 2023-10-08 RX ADMIN — MICONAZOLE NITRATE: 20 POWDER TOPICAL at 08:33

## 2023-10-08 RX ADMIN — INSULIN LISPRO 9 UNITS: 100 INJECTION, SOLUTION INTRAVENOUS; SUBCUTANEOUS at 08:31

## 2023-10-08 RX ADMIN — POTASSIUM CHLORIDE 40 MEQ: 1500 TABLET, EXTENDED RELEASE ORAL at 06:10

## 2023-10-08 NOTE — THERAPY TREATMENT NOTE
"Subjective: Pt agreeable to therapeutic plan of care. Pt stated the surgeon told her that her nerves were pissed off from the surgery and cellulitis    Objective:     Bed mobility - Modified-Independent with extra time, HOB slightly up and use of bedrail  Transfers - SBA and with rolling walker  Ambulation - 25 feet SBA and with rolling walker    Therapeutic Exercise - 10 Reps B LE AROM supported sitting / chair    Vitals: WNL    Pain:  still having pain in L glute but was better today than yesterday  Intervention for pain: Repositioned, Increased Activity, and Therapeutic Presence, knows she will do better once she gets home and moves around more    Education: Provided education on the importance of mobility in the acute care setting, Verbal/Tactile Cues, Transfer Training, Gait Training, and Post-Op Precautions    Assessment: Genny Soni presents with functional mobility impairments which indicate the need for skilled intervention. Tolerating session today without incident. Will continue to follow and progress as tolerated.Patient knows spine prec.  Should be ok to dc home with some family assist and HH  to get her back to mobility. Still favoring LLE some due to pain.    Plan/Recommendations:   Low Intensity Therapy recommended post-acute care - This is recommended as therapy feels this patient would require 2-3 visits per week. OP or HH would be the best option depending on patient's home bound status. Consider, if the patient has other  \"skilled\" needs such as wounds, IV antibiotics, etc. Combined with \"low intensity\" could also equate to a SNF. If patient is medically complex, consider LTAC.. Pt requires no DME at discharge.     Pt desires Home with Home Health at discharge. Pt cooperative; agreeable to therapeutic recommendations and plan of care.         Basic Mobility 6-click:  Rollin = Total, A lot = 2, A little = 3; 4 = None  Supine>Sit:   1 = Total, A lot = 2, A little = 3; 4 = None "   Sit>Stand with arms:  1 = Total, A lot = 2, A little = 3; 4 = None  Bed>Chair:   1 = Total, A lot = 2, A little = 3; 4 = None  Ambulate in room:  1 = Total, A lot = 2, A little = 3; 4 = None  3-5 Steps with railin = Total, A lot = 2, A little = 3; 4 = None  Score: 21    Post-Tx Position: Up in Chair, Alarms activated, and Call light and personal items within reach  PPE: gloves

## 2023-10-08 NOTE — CASE MANAGEMENT/SOCIAL WORK
Case Management Discharge Note      Final Note: home with [facility]         Selected Continued Care - Discharged on 10/8/2023 Admission date: 10/5/2023 - Discharge disposition: Home-Health Care [facility]          Home Medical Care Coordination complete.      Service Provider Selected Services Address Phone Fax Patient Preferred    [facility] [address] [place] [phone] --                 Final Discharge Disposition Code: 06 - home with home health care

## 2023-10-08 NOTE — DISCHARGE SUMMARY
[facility] Medicine Services  Discharge Summary    Date of Service: 10/6/23  Patient Name: Genny Soni  : 1950  MRN: [id number]    Date of Admission: 10/5/2023  Discharge Diagnosis:   Date of Discharge:  10/8/23   Primary Care Physician: Bonilla Rubio MD      Presenting Problem:   Postoperative cellulitis of surgical wound [T81.49XA]  Cellulitis of right lower extremity [L03.115]  Swelling of left lower extremity [M79.89]  Swelling of right lower extremity [M79.89]  History of lumbar surgery [Z98.890]  Postoperative wound infection [T81.49XA]    Active and Resolved Hospital Problems:  Active Hospital Problems    Diagnosis POA    **Postoperative cellulitis of surgical wound [T81.49XA] Yes    Pneumonia, unspecified organism [J18.9] Yes    Postoperative wound infection [T81.49XA] Yes    Cellulitis of back [L03.312] Yes    Cellulitis of lower extremity [L03.119] Yes    PNA (pneumonia) [J18.9] Yes    Type 2 diabetes mellitus with diabetic peripheral angiopathy without gangrene, with long-term current use of insulin [E11.51, Z79.4] Not Applicable    Deep venous thrombosis of lower extremity [I82.409] Yes    Hyperlipidemia [E78.5] Yes      Resolved Hospital Problems   No resolved problems to display.         Hospital Course     Hospital Course:  Genny Soni is a 73 y.o. female with a CMH of HTN, HLD, Prior back surgery, prior DVT/PE who presented to [facility] on 10/5/2023 with back and leg pain. Due to her history of DVT's patient decided to present to the ED. Patient is discharged from rehab center after she got the back surgery 6 weeks ago.  Patient started having lower extremity swelling for the past 4 weeks and has been worsening started developing bullae.  No open wounds reported. LE doppler performed which did not show a clot. Blood culture NGTD so far. MRSA negative. CXR indicated that she might have a PNA or pulmonary edema. Patient is started on cefepime and  doxycycline. Infectious diseases consulted. Wound care is on board. She is dc with doxycycline.       DISCHARGE Follow Up Recommendations for labs and diagnostics:   Finish antibiotics as directed       Reasons For Change In Medications and Indications for New Medications:      Day of Discharge     Vital Signs:  Temp:  [97.2 øF (36.2 øC)] 97.2 øF (36.2 øC)  Heart Rate:  [83-90] 83  Resp:  [17] 17  BP: (117-129)/(49-68) 129/49  Flow (L/min):  [3] 3    Physical Exam:  Physical Exam   Physical Exam  HENT:      Head: Normocephalic and atraumatic.      Nose: Nose normal.   Eyes:      Extraocular Movements: Extraocular movements intact.      Conjunctiva/sclera: Conjunctivae normal.      Pupils: Pupils are equal, round, and reactive to light.   Cardiovascular:      Rate and Rhythm: normal       Pulses: Normal pulses.      Heart sounds: Normal heart sounds.   Pulmonary:      Effort: normal      Breath sounds: normal   Abdominal:      General: Abdomen is flat. Bowel sounds are normal.      Palpations: Abdomen is soft.   Musculoskeletal:         Bilateral LE swelling with chronic skin changes +   Back surgery site seems stable            Pertinent  and/or Most Recent Results     LAB RESULTS:      Lab 10/07/23  2327 10/06/23  2257 10/05/23  2246 10/05/23  1437   WBC 6.20 6.50 5.50 5.20   HEMOGLOBIN 11.5* 11.2* 11.2* 12.4   HEMATOCRIT 35.3 34.3 35.1 37.8   PLATELETS 174 161 152 162   NEUTROS ABS 3.90 4.70 3.40 3.40   LYMPHS ABS 1.70 1.40 1.60 1.40   MONOS ABS 0.40 0.40 0.40 0.30   EOS ABS 0.20 0.10 0.10 0.10   MCV 92.6 92.5 92.8 93.9   PROTIME  --   --   --  10.5   APTT  --   --   --  25.8*         Lab 10/08/23  0918 10/07/23  2327 10/07/23  1159 10/06/23  2257 10/06/23  0709 10/05/23  2246 10/05/23  1936   SODIUM  --  140  --  142  --  142 140   POTASSIUM 4.1 3.4* 4.1 3.4* 4.1 3.2* 3.2*   CHLORIDE  --  98  --  100  --  99 98   CO2  --  34.0*  --  34.0*  --  34.0* 35.0*   ANION GAP  --  8.0  --  8.0  --  9.0 7.0   BUN  --  17   --  19  --  26* 27*   CREATININE  --  0.71  --  0.90  --  1.01* 0.88   EGFR  --  88.8  --  66.8  --  58.2* 68.6   GLUCOSE  --  162*  --  200*  --  225* 297*   CALCIUM  --  9.3  --  9.1  --  8.4* 8.6   MAGNESIUM  --  1.7  --  1.9  --   --   --    PHOSPHORUS  --  3.0  --  3.3  --   --   --          Lab 10/07/23  2327 10/06/23  2257 10/05/23  2246 10/05/23  1936   TOTAL PROTEIN 5.8* 5.8* 5.6* 5.9*   ALBUMIN 3.6 3.4* 3.7 3.9   GLOBULIN 2.2 2.4 1.9 2.0   ALT (SGPT) 13 12 12 11   AST (SGOT) 15 13 13 13   BILIRUBIN 0.2 0.3 0.2 0.2   ALK PHOS 171* 169* 183* 198*         Lab 10/05/23  1437   PROTIME 10.5   INR 0.96                 Brief Urine Lab Results  (Last result in the past 365 days)        Color   Clarity   Blood   Leuk Est   Nitrite   Protein   CREAT   Urine HCG        08/10/23 1324             118.4               Microbiology Results (last 10 days)       Procedure Component Value - Date/Time    MRSA Screen, PCR (Inpatient) - Swab, Nares [[id number]]  (Normal) Collected: 10/05/23 2203    Lab Status: Final result Specimen: Swab from Nares Updated: 10/05/23 2320     MRSA PCR No MRSA Detected    Narrative:      The negative predictive value of this diagnostic test is high and should only be used to consider de-escalating anti-MRSA therapy. A positive result may indicate colonization with MRSA and must be correlated clinically.    Blood Culture - Blood, Arm, Right [[id number]]  (Normal) Collected: 10/05/23 1936    Lab Status: Preliminary result Specimen: Blood from Arm, Right Updated: 10/07/23 1946     Blood Culture No growth at 2 days    Narrative:      Less than seven (7) mL's of blood was collected.  Insufficient quantity may yield false negative results.    Blood Culture - Blood, Hand, Right [[id number]]  (Normal) Collected: 10/05/23 1936    Lab Status: Preliminary result Specimen: Blood from Hand, Right Updated: 10/07/23 1946     Blood Culture No growth at 2 days    Narrative:      Less than seven (7) mL's of blood was  collected.  Insufficient quantity may yield false negative results.            CT Lumbar Spine Without Contrast    Result Date: 10/6/2023  Impression: Impression: 1.Post-surgical changes as described above. Hardware appears intact. 2.Moderate superior endplate compression deformity at L2 with some associated sclerosis, new from prior MRI from 2021. This could be acute or subacute. 3.Mild to moderate multilevel degenerative changes of lumbar spine as described above. Electronically Signed: Jose Juan Graff MD  10/6/2023 8:15 PM EDT  Workstation ID: [id number]    XR Chest 2 View    Result Date: 10/5/2023  Impression: Impression: Evidence of mild interval increase in interstitial prominence with ill-defined perihilar infiltrates in the lung bases. The findings may indicate developing mild pulmonary edema. Atypical/viral infection or multifocal pneumonia could also be considered. Electronically Signed: Long Waddell MD  10/5/2023 3:19 PM EDT  Workstation ID: [id number]     Results for orders placed during the hospital encounter of 10/05/23    Duplex Venous Lower Extremity - Bilateral CAR    Interpretation Summary    Normal bilateral lower extremity venous duplex scan.      Results for orders placed during the hospital encounter of 10/05/23    Duplex Venous Lower Extremity - Bilateral CAR    Interpretation Summary    Normal bilateral lower extremity venous duplex scan.      Results for orders placed during the hospital encounter of 06/06/21    ADULT TRANSTHORACIC ECHO COMPLETE W/ CONT IF NECESSARY PER PROTOCOL    Interpretation Summary  ú Estimated right ventricular systolic pressure from tricuspid regurgitation is normal (<35 mmHg).  ú Calculated left ventricular EF = 54% Estimated left ventricular EF was in agreement with the calculated left ventricular EF.  ú Left ventricular diastolic function was normal.    Essentially unremarkable study  Normal LV and RV size and function  Normal atrial sizes  No significant valvular  regurgitation or stenosis seen  Valve leaflets are thin and pliable with no mobile echodensities  No Doppler evidence for ASD or PFO although no bubble study performed in the present study  Normal myocardial thickness no LVOT obstruction no intracavitary obstruction  No masses or effusion seen  Normal diastolic function by criteria  No pulmonary hypertension seen as measured noninvasively  Normal right left-sided pressures estimated noninvasively  EF 60%      Labs Pending at Discharge:  Pending Labs       Order Current Status    Blood Culture - Blood, Arm, Right Preliminary result    Blood Culture - Blood, Hand, Right Preliminary result            Procedures Performed           Consults:   Consults       Date and Time Order Name Status Description    10/7/2023  7:16 AM Inpatient Spine Surgery Consult      10/6/2023  3:07 PM Inpatient Neurosurgery Consult      10/5/2023  5:09 PM Inpatient Infectious Diseases Consult Completed               Discharge Details        Discharge Medications        New Medications        Instructions Start Date   doxycycline 100 MG capsule  Commonly known as: MONODOX   100 mg, Oral, Every 12 Hours Scheduled      miconazole 2 % powder  Commonly known as: MICOTIN   Topical, Every 12 Hours Scheduled      naloxone 4 MG/0.1ML nasal spray  Commonly known as: NARCAN   Call 911. Don't prime. [place] in 1 nostril for overdose. Repeat in 2-3 minutes in other nostril if no or minimal breathing/responsiveness.             Changes to Medications        Instructions Start Date   oxyCODONE-acetaminophen 5-325 MG per tablet  Commonly known as: PERCOCET  What changed: reasons to take this   1 tablet, Oral, Every 6 Hours PRN             Continue These Medications        Instructions Start Date   alendronate 70 MG tablet  Commonly known as: FOSAMAX   70 mg, Oral, Every 7 Days, Tues      escitalopram 10 MG tablet  Commonly known as: LEXAPRO   20 mg, Oral, Daily      furosemide 40 MG tablet  Commonly known as:  LASIX   40 mg, Oral, Daily      glipizide 5 MG tablet  Commonly known as: GLUCOTROL   5 mg, Oral, 2 Times Daily Before Meals      Insulin Glargine (1 Unit Dial) 300 UNIT/ML solution pen-injector injection  Commonly known as: TOUJEO   60 Units, Subcutaneous, Every Morning      losartan 25 MG tablet  Commonly known as: COZAAR   25 mg, Oral, Daily      lubiprostone 24 MCG capsule  Commonly known as: AMITIZA   24 mcg, Oral, 2 Times Daily With Meals      metFORMIN  MG 24 hr tablet  Commonly known as: GLUCOPHAGE-XR   500 mg, Oral, Daily With Breakfast      methocarbamol 500 MG tablet  Commonly known as: ROBAXIN   500 mg, Oral, 4 Times Daily      metOLazone 2.5 MG tablet  Commonly known as: ZAROXOLYN   2.5 mg, Oral, Daily      ondansetron 4 MG tablet  Commonly known as: ZOFRAN   4 mg, Oral, Every 6 Hours PRN      Ozempic (0.25 or 0.5 MG/DOSE) 2 MG/1.5ML solution pen-injector  Generic drug: Semaglutide(0.25 or 0.5MG/DOS)   0.5 mg, Subcutaneous, Weekly, Thur       polyethylene glycol 17 g packet  Commonly known as: MIRALAX   17 g, Oral, Daily PRN      potassium chloride 10 MEQ CR capsule  Commonly known as: MICRO-K   20 mEq, Oral, 2 Times Daily      pramipexole 1 MG tablet  Commonly known as: MIRAPEX   1 mg, Oral, 2 Times Daily      primidone 50 MG tablet  Commonly known as: MYSOLINE   25 mg, Oral, Daily      rosuvastatin 10 MG tablet  Commonly known as: CRESTOR   10 mg, Oral, Daily      vitamin D 1.25 MG (94042 UT) capsule capsule  Commonly known as: ERGOCALCIFEROL   50,000 Units, Oral, Weekly, Thursdays                Allergies   Allergen Reactions    Ambien  [Zolpidem] Confusion    Codeine Itching    Sulfa Antibiotics Nausea And Vomiting     Makes her nauseated         Discharge Disposition:   Home-Health Care [facility]    Diet:  Hospital:  Diet Order   Procedures    Diet: Cardiac Diets, Diabetic Diets; Healthy Heart (2-3 Na+); Consistent Carbohydrate; Texture: Regular Texture (IDDSI 7); Fluid Consistency: Thin (IDDSI 0)          Discharge Activity:   Activity Instructions    Advance as tolerated             CODE STATUS:  Code Status and Medical Interventions:   Ordered at: 10/05/23 1559     Level Of Support Discussed With:    Patient     Code Status (Patient has no pulse and is not breathing):    CPR (Attempt to Resuscitate)     Medical Interventions (Patient has pulse or is breathing):    Full Support         No future appointments.        Time spent on Discharge including face to face service:  35  minutes      Signature: Electronically signed by Adrienne Cristobal MD, 10/08/23, 12:53 EDT.  [facility]

## 2023-10-08 NOTE — PLAN OF CARE
Goal Outcome Evaluation:                        Patient continues abt therapy for cellulitis in BLE, extremities remain wrapped with Kerlix and ace bandages for compression, redness and pain to BLE has improved, no open sores

## 2023-10-09 ENCOUNTER — READMISSION MANAGEMENT (OUTPATIENT)
Dept: CALL CENTER | Facility: HOSPITAL | Age: 73
End: 2023-10-09
Payer: MEDICARE

## 2023-10-09 NOTE — OUTREACH NOTE
Prep Survey      Flowsheet Row Responses   [facility] facility patient discharged from? [facility]   Is LACE score < 7 ? No   Eligibility Readm Mgmt   Discharge diagnosis Postoperative cellulitis of surgical wound   Does the patient have one of the following disease processes/diagnoses(primary or secondary)? Other   Does the patient have Home health ordered? Yes   What is the Home health agency?  [facility]    Is there a DME ordered? No   Comments regarding appointments call for apmt   Prep survey completed? Yes            Cathie LOPEZ - Registered Nurse

## 2023-10-10 LAB
BACTERIA SPEC AEROBE CULT: NORMAL
BACTERIA SPEC AEROBE CULT: NORMAL

## 2023-10-12 ENCOUNTER — READMISSION MANAGEMENT (OUTPATIENT)
Dept: CALL CENTER | Facility: HOSPITAL | Age: 73
End: 2023-10-12
Payer: MEDICARE

## 2023-10-12 NOTE — OUTREACH NOTE
Medical Week 1 Survey      Flowsheet Row Responses   [facility] facility patient discharged from? [facility]   Does the patient have one of the following disease processes/diagnoses(primary or secondary)? Other   Week 1 attempt successful? No   Unsuccessful attempts Attempt 1            Marta LOPEZ - Registered Nurse

## 2023-10-17 ENCOUNTER — READMISSION MANAGEMENT (OUTPATIENT)
Dept: CALL CENTER | Facility: HOSPITAL | Age: 73
End: 2023-10-17
Payer: MEDICARE

## 2023-10-17 NOTE — OUTREACH NOTE
Medical Week 1 Survey      Flowsheet Row Responses   [facility] facility patient discharged from? [facility]   Does the patient have one of the following disease processes/diagnoses(primary or secondary)? Other   Week 1 attempt successful? No   Unsuccessful attempts Attempt 2            Claudia LOPEZ - Registered Nurse

## 2023-10-24 ENCOUNTER — READMISSION MANAGEMENT (OUTPATIENT)
Dept: CALL CENTER | Facility: HOSPITAL | Age: 73
End: 2023-10-24
Payer: MEDICARE

## 2023-10-24 NOTE — OUTREACH NOTE
Medical Week 3 Survey      Flowsheet Row Responses   [facility] facility patient discharged from? [facility]   Does the patient have one of the following disease processes/diagnoses(primary or secondary)? Other   Week 3 attempt successful? No   Unsuccessful attempts Attempt 1            Najma LOPEZ - Licensed Nurse

## 2023-10-26 ENCOUNTER — READMISSION MANAGEMENT (OUTPATIENT)
Dept: CALL CENTER | Facility: HOSPITAL | Age: 73
End: 2023-10-26
Payer: MEDICARE

## 2023-10-26 NOTE — OUTREACH NOTE
Medical Week 3 Survey      Flowsheet Row Responses   [facility] facility patient discharged from? [facility]   Does the patient have one of the following disease processes/diagnoses(primary or secondary)? Other   Week 3 attempt successful? No   Unsuccessful attempts Attempt 2            Najma LOPEZ - Licensed Nurse   Impaired gait

## 2024-03-26 ENCOUNTER — APPOINTMENT (OUTPATIENT)
Dept: CT IMAGING | Facility: HOSPITAL | Age: 74
DRG: 389 | End: 2024-03-26
Payer: MEDICARE

## 2024-03-26 ENCOUNTER — HOSPITAL ENCOUNTER (INPATIENT)
Facility: HOSPITAL | Age: 74
LOS: 2 days | Discharge: HOME | DRG: 389 | End: 2024-03-29
Attending: EMERGENCY MEDICINE | Admitting: STUDENT IN AN ORGANIZED HEALTH CARE EDUCATION/TRAINING PROGRAM
Payer: MEDICARE

## 2024-03-26 DIAGNOSIS — R06.09 EXERTIONAL DYSPNEA: ICD-10-CM

## 2024-03-26 DIAGNOSIS — N39.0 URINARY TRACT INFECTION WITHOUT HEMATURIA, SITE UNSPECIFIED: Primary | ICD-10-CM

## 2024-03-26 DIAGNOSIS — R10.84 GENERALIZED ABDOMINAL PAIN: ICD-10-CM

## 2024-03-26 DIAGNOSIS — R11.2 NAUSEA AND VOMITING, UNSPECIFIED VOMITING TYPE: ICD-10-CM

## 2024-03-26 DIAGNOSIS — K63.9 COLONIC THICKENING: ICD-10-CM

## 2024-03-26 LAB
ALBUMIN SERPL-MCNC: 4.5 G/DL (ref 3.5–5.2)
ALBUMIN/GLOB SERPL: 1.9 G/DL
ALP SERPL-CCNC: 105 U/L (ref 39–117)
ALT SERPL W P-5'-P-CCNC: 31 U/L (ref 1–33)
ANION GAP SERPL CALCULATED.3IONS-SCNC: 10 MMOL/L (ref 5–15)
AST SERPL-CCNC: 33 U/L (ref 1–32)
BACTERIA UR QL AUTO: ABNORMAL /HPF
BASOPHILS # BLD AUTO: 0.02 10*3/MM3 (ref 0–0.2)
BASOPHILS NFR BLD AUTO: 0.3 % (ref 0–1.5)
BILIRUB SERPL-MCNC: 0.4 MG/DL (ref 0–1.2)
BILIRUB UR QL STRIP: NEGATIVE
BUN SERPL-MCNC: 20 MG/DL (ref 8–23)
BUN/CREAT SERPL: 27.8 (ref 7–25)
CALCIUM SPEC-SCNC: 10 MG/DL (ref 8.6–10.5)
CHLORIDE SERPL-SCNC: 105 MMOL/L (ref 98–107)
CLARITY UR: CLEAR
CO2 SERPL-SCNC: 29 MMOL/L (ref 22–29)
COLOR UR: YELLOW
CREAT SERPL-MCNC: 0.72 MG/DL (ref 0.57–1)
DEPRECATED RDW RBC AUTO: 51.5 FL (ref 37–54)
EGFRCR SERPLBLD CKD-EPI 2021: 86.8 ML/MIN/1.73
EOSINOPHIL # BLD AUTO: 0.11 10*3/MM3 (ref 0–0.4)
EOSINOPHIL NFR BLD AUTO: 1.7 % (ref 0.3–6.2)
ERYTHROCYTE [DISTWIDTH] IN BLOOD BY AUTOMATED COUNT: 13.9 % (ref 12.3–15.4)
GLOBULIN UR ELPH-MCNC: 2.4 GM/DL
GLUCOSE BLDC GLUCOMTR-MCNC: 85 MG/DL (ref 70–105)
GLUCOSE SERPL-MCNC: 100 MG/DL (ref 65–99)
GLUCOSE UR STRIP-MCNC: NEGATIVE MG/DL
HCT VFR BLD AUTO: 43.5 % (ref 34–46.6)
HGB BLD-MCNC: 13.5 G/DL (ref 12–15.9)
HGB UR QL STRIP.AUTO: NEGATIVE
HOLD SPECIMEN: NORMAL
HOLD SPECIMEN: NORMAL
HYALINE CASTS UR QL AUTO: ABNORMAL /LPF
IMM GRANULOCYTES # BLD AUTO: 0.03 10*3/MM3 (ref 0–0.05)
IMM GRANULOCYTES NFR BLD AUTO: 0.5 % (ref 0–0.5)
KETONES UR QL STRIP: NEGATIVE
LEUKOCYTE ESTERASE UR QL STRIP.AUTO: ABNORMAL
LIPASE SERPL-CCNC: 19 U/L (ref 13–60)
LYMPHOCYTES # BLD AUTO: 1.52 10*3/MM3 (ref 0.7–3.1)
LYMPHOCYTES NFR BLD AUTO: 23.9 % (ref 19.6–45.3)
MCH RBC QN AUTO: 31.3 PG (ref 26.6–33)
MCHC RBC AUTO-ENTMCNC: 31 G/DL (ref 31.5–35.7)
MCV RBC AUTO: 100.7 FL (ref 79–97)
MONOCYTES # BLD AUTO: 0.4 10*3/MM3 (ref 0.1–0.9)
MONOCYTES NFR BLD AUTO: 6.3 % (ref 5–12)
NEUTROPHILS NFR BLD AUTO: 4.27 10*3/MM3 (ref 1.7–7)
NEUTROPHILS NFR BLD AUTO: 67.3 % (ref 42.7–76)
NITRITE UR QL STRIP: POSITIVE
NRBC BLD AUTO-RTO: 0 /100 WBC (ref 0–0.2)
NT-PROBNP SERPL-MCNC: 38.5 PG/ML (ref 0–1800)
PH UR STRIP.AUTO: 5.5 [PH] (ref 5–8)
PLATELET # BLD AUTO: 159 10*3/MM3 (ref 140–450)
PMV BLD AUTO: 9.4 FL (ref 6–12)
POTASSIUM SERPL-SCNC: 3.9 MMOL/L (ref 3.5–5.2)
PROT SERPL-MCNC: 6.9 G/DL (ref 6–8.5)
PROT UR QL STRIP: NEGATIVE
RBC # BLD AUTO: 4.32 10*6/MM3 (ref 3.77–5.28)
RBC # UR STRIP: ABNORMAL /HPF
REF LAB TEST METHOD: ABNORMAL
SODIUM SERPL-SCNC: 144 MMOL/L (ref 136–145)
SP GR UR STRIP: 1.02 (ref 1–1.03)
SQUAMOUS #/AREA URNS HPF: ABNORMAL /HPF
UROBILINOGEN UR QL STRIP: ABNORMAL
WBC # UR STRIP: ABNORMAL /HPF
WBC NRBC COR # BLD AUTO: 6.35 10*3/MM3 (ref 3.4–10.8)
WHOLE BLOOD HOLD COAG: NORMAL

## 2024-03-26 PROCEDURE — 93010 ELECTROCARDIOGRAM REPORT: CPT | Performed by: INTERNAL MEDICINE

## 2024-03-26 PROCEDURE — 80053 COMPREHEN METABOLIC PANEL: CPT | Performed by: EMERGENCY MEDICINE

## 2024-03-26 PROCEDURE — G0378 HOSPITAL OBSERVATION PER HR: HCPCS

## 2024-03-26 PROCEDURE — 25010000002 MORPHINE PER 10 MG: Performed by: EMERGENCY MEDICINE

## 2024-03-26 PROCEDURE — 63710000001 INSULIN GLARGINE PER 5 UNITS: Performed by: STUDENT IN AN ORGANIZED HEALTH CARE EDUCATION/TRAINING PROGRAM

## 2024-03-26 PROCEDURE — 99285 EMERGENCY DEPT VISIT HI MDM: CPT

## 2024-03-26 PROCEDURE — 83690 ASSAY OF LIPASE: CPT | Performed by: EMERGENCY MEDICINE

## 2024-03-26 PROCEDURE — 93005 ELECTROCARDIOGRAM TRACING: CPT | Performed by: STUDENT IN AN ORGANIZED HEALTH CARE EDUCATION/TRAINING PROGRAM

## 2024-03-26 PROCEDURE — 36415 COLL VENOUS BLD VENIPUNCTURE: CPT

## 2024-03-26 PROCEDURE — P9612 CATHETERIZE FOR URINE SPEC: HCPCS

## 2024-03-26 PROCEDURE — 85025 COMPLETE CBC W/AUTO DIFF WBC: CPT | Performed by: EMERGENCY MEDICINE

## 2024-03-26 PROCEDURE — 82948 REAGENT STRIP/BLOOD GLUCOSE: CPT

## 2024-03-26 PROCEDURE — 87040 BLOOD CULTURE FOR BACTERIA: CPT | Performed by: STUDENT IN AN ORGANIZED HEALTH CARE EDUCATION/TRAINING PROGRAM

## 2024-03-26 PROCEDURE — 74176 CT ABD & PELVIS W/O CONTRAST: CPT

## 2024-03-26 PROCEDURE — 83880 ASSAY OF NATRIURETIC PEPTIDE: CPT | Performed by: STUDENT IN AN ORGANIZED HEALTH CARE EDUCATION/TRAINING PROGRAM

## 2024-03-26 PROCEDURE — 81001 URINALYSIS AUTO W/SCOPE: CPT | Performed by: EMERGENCY MEDICINE

## 2024-03-26 PROCEDURE — 87086 URINE CULTURE/COLONY COUNT: CPT | Performed by: EMERGENCY MEDICINE

## 2024-03-26 PROCEDURE — 25010000002 ONDANSETRON PER 1 MG: Performed by: EMERGENCY MEDICINE

## 2024-03-26 PROCEDURE — 87186 SC STD MICRODIL/AGAR DIL: CPT | Performed by: EMERGENCY MEDICINE

## 2024-03-26 PROCEDURE — 25010000002 CEFTRIAXONE PER 250 MG: Performed by: EMERGENCY MEDICINE

## 2024-03-26 PROCEDURE — 87088 URINE BACTERIA CULTURE: CPT | Performed by: EMERGENCY MEDICINE

## 2024-03-26 PROCEDURE — 25010000002 HYDROMORPHONE 1 MG/ML SOLUTION: Performed by: STUDENT IN AN ORGANIZED HEALTH CARE EDUCATION/TRAINING PROGRAM

## 2024-03-26 RX ORDER — BUMETANIDE 1 MG/1
1 TABLET ORAL 2 TIMES DAILY
COMMUNITY

## 2024-03-26 RX ORDER — ONDANSETRON 2 MG/ML
4 INJECTION INTRAMUSCULAR; INTRAVENOUS ONCE
Status: COMPLETED | OUTPATIENT
Start: 2024-03-26 | End: 2024-03-26

## 2024-03-26 RX ORDER — GABAPENTIN 300 MG/1
300 CAPSULE ORAL 2 TIMES DAILY
Status: DISCONTINUED | OUTPATIENT
Start: 2024-03-26 | End: 2024-03-29 | Stop reason: HOSPADM

## 2024-03-26 RX ORDER — ONDANSETRON 4 MG/1
4 TABLET, ORALLY DISINTEGRATING ORAL EVERY 6 HOURS PRN
Status: DISCONTINUED | OUTPATIENT
Start: 2024-03-26 | End: 2024-03-28 | Stop reason: SDUPTHER

## 2024-03-26 RX ORDER — GLIPIZIDE 10 MG/1
10 TABLET, FILM COATED, EXTENDED RELEASE ORAL DAILY
COMMUNITY

## 2024-03-26 RX ORDER — ONDANSETRON 2 MG/ML
4 INJECTION INTRAMUSCULAR; INTRAVENOUS EVERY 6 HOURS PRN
Status: DISCONTINUED | OUTPATIENT
Start: 2024-03-26 | End: 2024-03-28 | Stop reason: SDUPTHER

## 2024-03-26 RX ORDER — FLUTICASONE PROPIONATE 50 MCG
2 SPRAY, SUSPENSION (ML) NASAL DAILY
COMMUNITY

## 2024-03-26 RX ORDER — SODIUM CHLORIDE 0.9 % (FLUSH) 0.9 %
10 SYRINGE (ML) INJECTION AS NEEDED
Status: DISCONTINUED | OUTPATIENT
Start: 2024-03-26 | End: 2024-03-29 | Stop reason: HOSPADM

## 2024-03-26 RX ORDER — NITROGLYCERIN 0.4 MG/1
0.4 TABLET SUBLINGUAL
Status: DISCONTINUED | OUTPATIENT
Start: 2024-03-26 | End: 2024-03-29 | Stop reason: HOSPADM

## 2024-03-26 RX ORDER — INSULIN LISPRO 100 [IU]/ML
1-200 INJECTION, SOLUTION INTRAVENOUS; SUBCUTANEOUS AS NEEDED
Status: DISCONTINUED | OUTPATIENT
Start: 2024-03-26 | End: 2024-03-29 | Stop reason: HOSPADM

## 2024-03-26 RX ORDER — IBUPROFEN 600 MG/1
1 TABLET ORAL
Status: DISCONTINUED | OUTPATIENT
Start: 2024-03-26 | End: 2024-03-29 | Stop reason: HOSPADM

## 2024-03-26 RX ORDER — DEXTROSE MONOHYDRATE 25 G/50ML
10-50 INJECTION, SOLUTION INTRAVENOUS
Status: DISCONTINUED | OUTPATIENT
Start: 2024-03-26 | End: 2024-03-29 | Stop reason: HOSPADM

## 2024-03-26 RX ORDER — DENOSUMAB 60 MG/ML
60 INJECTION SUBCUTANEOUS
COMMUNITY

## 2024-03-26 RX ORDER — ESCITALOPRAM OXALATE 10 MG/1
20 TABLET ORAL DAILY
Status: DISCONTINUED | OUTPATIENT
Start: 2024-03-26 | End: 2024-03-29 | Stop reason: HOSPADM

## 2024-03-26 RX ORDER — METHOCARBAMOL 750 MG/1
750 TABLET, FILM COATED ORAL 3 TIMES DAILY
Status: DISCONTINUED | OUTPATIENT
Start: 2024-03-26 | End: 2024-03-29 | Stop reason: HOSPADM

## 2024-03-26 RX ORDER — ROSUVASTATIN CALCIUM 10 MG/1
20 TABLET, COATED ORAL NIGHTLY
Status: DISCONTINUED | OUTPATIENT
Start: 2024-03-26 | End: 2024-03-29 | Stop reason: HOSPADM

## 2024-03-26 RX ORDER — SODIUM CHLORIDE 9 MG/ML
40 INJECTION, SOLUTION INTRAVENOUS AS NEEDED
Status: DISCONTINUED | OUTPATIENT
Start: 2024-03-26 | End: 2024-03-29 | Stop reason: HOSPADM

## 2024-03-26 RX ORDER — POTASSIUM CHLORIDE 20 MEQ/1
20 TABLET, EXTENDED RELEASE ORAL 3 TIMES DAILY
COMMUNITY

## 2024-03-26 RX ORDER — INSULIN LISPRO 100 [IU]/ML
1-200 INJECTION, SOLUTION INTRAVENOUS; SUBCUTANEOUS
Status: DISCONTINUED | OUTPATIENT
Start: 2024-03-26 | End: 2024-03-29 | Stop reason: HOSPADM

## 2024-03-26 RX ORDER — PRAMIPEXOLE DIHYDROCHLORIDE 1 MG/1
1 TABLET ORAL EVERY 12 HOURS SCHEDULED
Status: DISCONTINUED | OUTPATIENT
Start: 2024-03-26 | End: 2024-03-29 | Stop reason: HOSPADM

## 2024-03-26 RX ORDER — GABAPENTIN 300 MG/1
300 CAPSULE ORAL 2 TIMES DAILY
COMMUNITY

## 2024-03-26 RX ORDER — NICOTINE POLACRILEX 4 MG
15 LOZENGE BUCCAL
Status: DISCONTINUED | OUTPATIENT
Start: 2024-03-26 | End: 2024-03-29 | Stop reason: HOSPADM

## 2024-03-26 RX ORDER — MORPHINE SULFATE 2 MG/ML
2 INJECTION, SOLUTION INTRAMUSCULAR; INTRAVENOUS ONCE
Status: COMPLETED | OUTPATIENT
Start: 2024-03-26 | End: 2024-03-26

## 2024-03-26 RX ORDER — PANTOPRAZOLE SODIUM 40 MG/10ML
40 INJECTION, POWDER, LYOPHILIZED, FOR SOLUTION INTRAVENOUS
Status: DISCONTINUED | OUTPATIENT
Start: 2024-03-27 | End: 2024-03-29 | Stop reason: HOSPADM

## 2024-03-26 RX ORDER — MONTELUKAST SODIUM 10 MG/1
10 TABLET ORAL NIGHTLY
Status: DISCONTINUED | OUTPATIENT
Start: 2024-03-26 | End: 2024-03-29 | Stop reason: HOSPADM

## 2024-03-26 RX ORDER — OXYCODONE AND ACETAMINOPHEN 10; 325 MG/1; MG/1
1 TABLET ORAL EVERY 8 HOURS PRN
COMMUNITY

## 2024-03-26 RX ORDER — SODIUM CHLORIDE 0.9 % (FLUSH) 0.9 %
10 SYRINGE (ML) INJECTION EVERY 12 HOURS SCHEDULED
Status: DISCONTINUED | OUTPATIENT
Start: 2024-03-26 | End: 2024-03-29 | Stop reason: HOSPADM

## 2024-03-26 RX ORDER — MONTELUKAST SODIUM 10 MG/1
10 TABLET ORAL NIGHTLY
COMMUNITY

## 2024-03-26 RX ORDER — MIDODRINE HYDROCHLORIDE 5 MG/1
10 TABLET ORAL 3 TIMES DAILY PRN
Status: DISCONTINUED | OUTPATIENT
Start: 2024-03-26 | End: 2024-03-29 | Stop reason: HOSPADM

## 2024-03-26 RX ADMIN — METHOCARBAMOL 750 MG: 750 TABLET, FILM COATED ORAL at 21:29

## 2024-03-26 RX ADMIN — ROSUVASTATIN CALCIUM 20 MG: 10 TABLET, FILM COATED ORAL at 21:30

## 2024-03-26 RX ADMIN — MORPHINE SULFATE 2 MG: 2 INJECTION, SOLUTION INTRAMUSCULAR; INTRAVENOUS at 19:05

## 2024-03-26 RX ADMIN — MIDODRINE HYDROCHLORIDE 10 MG: 5 TABLET ORAL at 21:30

## 2024-03-26 RX ADMIN — HYDROMORPHONE HYDROCHLORIDE 0.5 MG: 1 INJECTION, SOLUTION INTRAMUSCULAR; INTRAVENOUS; SUBCUTANEOUS at 21:15

## 2024-03-26 RX ADMIN — INSULIN GLARGINE 25 UNITS: 100 INJECTION, SOLUTION SUBCUTANEOUS at 22:04

## 2024-03-26 RX ADMIN — MONTELUKAST 10 MG: 10 TABLET, FILM COATED ORAL at 21:30

## 2024-03-26 RX ADMIN — Medication 10 ML: at 22:04

## 2024-03-26 RX ADMIN — GABAPENTIN 300 MG: 300 CAPSULE ORAL at 21:30

## 2024-03-26 RX ADMIN — ONDANSETRON 4 MG: 2 INJECTION INTRAMUSCULAR; INTRAVENOUS at 19:19

## 2024-03-26 RX ADMIN — CEFTRIAXONE 2 G: 2 INJECTION, POWDER, FOR SOLUTION INTRAMUSCULAR; INTRAVENOUS at 19:19

## 2024-03-26 RX ADMIN — ESCITALOPRAM OXALATE 20 MG: 10 TABLET ORAL at 21:29

## 2024-03-26 NOTE — ED PROVIDER NOTES
Subjective   History of Present Illness  Patient is a 74 old female complaining of abdominal pain for the past 24 hours.  She was complains of continued swelling in her legs that seems worse recently.  She has developed vomiting over the past 24 hours as well.  She denies cough fever diarrhea or dysuria.      Review of Systems    Past Medical History:   Diagnosis Date    Arthritis     Benign essential hypertension 7/12/2018    Diabetes mellitus     Elevated cholesterol     H/O blood clots     History of transfusion     Hyperlipidemia     Low back pain     Sleep apnea        Allergies   Allergen Reactions    Ambien  [Zolpidem] Confusion    Codeine Itching    Sulfa Antibiotics Nausea And Vomiting     Makes her nauseated       Past Surgical History:   Procedure Laterality Date    BACK SURGERY      FEMORAL DISTAL BYPASS      HYSTERECTOMY      JOINT REPLACEMENT      LUMBAR LAMINECTOMY WITH FUSION N/A 5/28/2021    Procedure: Lumbar 5 Smith procedure.  Lumbar 5 6 and sacral 1 transpedicular Solera screws and rods.  Posterior fusion with autologous bone.;  Surgeon: Gagan Aguilar MD;  Location: [facility] MAIN OR;  Service: Neurosurgery;  Laterality: N/A;       No family history on file.    Social History     Socioeconomic History    Marital status:    Tobacco Use    Smoking status: Former    Smokeless tobacco: Never   Vaping Use    Vaping status: Never Used   Substance and Sexual Activity    Alcohol use: Not Currently    Drug use: Never    Sexual activity: Defer           Objective   Physical Exam  Neck has no adenopathy JVD or bruits.  Lungs are clear.  Heart has regular rate rhythm without murmur rub or gallop.  Chest is nontender.  Abdomen is soft with mild to moderate diffuse tenderness.  Patient has normal bowel sounds without rebound or guarding.  Back has no CVA tenderness.  Procedures           ED Course      Results for orders placed or performed during the hospital encounter of 03/26/24   Comprehensive  Metabolic Panel    Specimen: Blood   Result Value Ref Range    Glucose 100 (H) 65 - 99 mg/dL    BUN 20 8 - 23 mg/dL    Creatinine 0.72 0.57 - 1.00 mg/dL    Sodium 144 136 - 145 mmol/L    Potassium 3.9 3.5 - 5.2 mmol/L    Chloride 105 98 - 107 mmol/L    CO2 29.0 22.0 - 29.0 mmol/L    Calcium 10.0 8.6 - 10.5 mg/dL    Total Protein 6.9 6.0 - 8.5 g/dL    Albumin 4.5 3.5 - 5.2 g/dL    ALT (SGPT) 31 1 - 33 U/L    AST (SGOT) 33 (H) 1 - 32 U/L    Alkaline Phosphatase 105 39 - 117 U/L    Total Bilirubin 0.4 0.0 - 1.2 mg/dL    Globulin 2.4 gm/dL    A/G Ratio 1.9 g/dL    BUN/Creatinine Ratio 27.8 (H) 7.0 - 25.0    Anion Gap 10.0 5.0 - 15.0 mmol/L    eGFR 86.8 >60.0 mL/min/1.73   Lipase    Specimen: Blood   Result Value Ref Range    Lipase 19 13 - 60 U/L   Urinalysis With Microscopic If Indicated (No Culture) - Straight Cath    Specimen: Straight Cath; Urine   Result Value Ref Range    Color, UA Yellow Yellow, Straw    Appearance, UA Clear Clear    pH, UA 5.5 5.0 - 8.0    Specific Gravity, UA 1.017 1.005 - 1.030    Glucose, UA Negative Negative    Ketones, UA Negative Negative    Bilirubin, UA Negative Negative    Blood, UA Negative Negative    Protein, UA Negative Negative    Leuk Esterase, UA Moderate (2+) (A) Negative    Nitrite, UA Positive (A) Negative    Urobilinogen, UA 0.2 E.U./dL 0.2 - 1.0 E.U./dL   CBC Auto Differential    Specimen: Blood   Result Value Ref Range    WBC 6.35 3.40 - 10.80 10*3/mm3    RBC 4.32 3.77 - 5.28 10*6/mm3    Hemoglobin 13.5 12.0 - 15.9 g/dL    Hematocrit 43.5 34.0 - 46.6 %    .7 (H) 79.0 - 97.0 fL    MCH 31.3 26.6 - 33.0 pg    MCHC 31.0 (L) 31.5 - 35.7 g/dL    RDW 13.9 12.3 - 15.4 %    RDW-SD 51.5 37.0 - 54.0 fl    MPV 9.4 6.0 - 12.0 fL    Platelets 159 140 - 450 10*3/mm3    Neutrophil % 67.3 42.7 - 76.0 %    Lymphocyte % 23.9 19.6 - 45.3 %    Monocyte % 6.3 5.0 - 12.0 %    Eosinophil % 1.7 0.3 - 6.2 %    Basophil % 0.3 0.0 - 1.5 %    Immature Grans % 0.5 0.0 - 0.5 %    Neutrophils,  Absolute 4.27 1.70 - 7.00 10*3/mm3    Lymphocytes, Absolute 1.52 0.70 - 3.10 10*3/mm3    Monocytes, Absolute 0.40 0.10 - 0.90 10*3/mm3    Eosinophils, Absolute 0.11 0.00 - 0.40 10*3/mm3    Basophils, Absolute 0.02 0.00 - 0.20 10*3/mm3    Immature Grans, Absolute 0.03 0.00 - 0.05 10*3/mm3    nRBC 0.0 0.0 - 0.2 /100 WBC   Urinalysis, Microscopic Only - Straight Cath    Specimen: Straight Cath; Urine   Result Value Ref Range    RBC, UA None Seen None Seen, 0-2 /HPF    WBC, UA 11-20 (A) None Seen, 0-2 /HPF    Bacteria, UA 2+ (A) None Seen /HPF    Squamous Epithelial Cells, UA 0-2 None Seen, 0-2 /HPF    Hyaline Casts, UA None Seen None Seen /LPF    Methodology Manual Light Microscopy    Green Top (Gel)   Result Value Ref Range    Extra Tube HOLD    Gold Top - SST   Result Value Ref Range    Extra Tube Hold for add-ons.    Light Blue Top   Result Value Ref Range    Extra Tube Hold for add-ons.      CT Abdomen Pelvis Without Contrast    Result Date: 3/26/2024  Impression: Mildly dilated proximal and mid small bowel loops without discrete transition point. Findings suggest ileus. Short-term follow-up abdominal radiographs would be of value. There is irregular wall thickening and luminal narrowing in the proximal ascending colon measuring 3.6 cm in length. Cannot exclude colonic neoplasm. Correlation with colonoscopy is suggested.  IVC filter is present on imaging study. It is recommended that all patients with IVC filters in place have an active management care plan to monitor their IVC filter. If a care plan is not in place, patient should have a non emergent referral to an interventional specialist for establishment of an IVC filter management care plan. Electronically Signed: Madi Dumont MD  3/26/2024 6:47 PM EDT  Workstation ID: [id number]                                          Medical Decision Making  I noted the patient CT scan and pelvis without contrast as dictated by the radiologist above.  BMP shows no renal  insufficiency or electrolyte abnormality patient has no evidence of hepatitis with normal LFTs.  Lipase was normal with no evidence of pancreatitis.  UA shows 2+ bacteria.  Urine culture was obtained.  Patient was given Rocephin IV as well as morphine and Zofran for pain and nausea.  Patient will be admitted for further IV antibiotics and GI consultation.  I did speak to the on-call hospitalist.    Amount and/or Complexity of Data Reviewed  Labs: ordered. Decision-making details documented in ED Course.  Radiology: ordered and independent interpretation performed.    Risk  Prescription drug management.  Parenteral controlled substances.  Decision regarding hospitalization.        Final diagnoses:   Urinary tract infection without hematuria, site unspecified   Generalized abdominal pain   Nausea and vomiting, unspecified vomiting type       ED Disposition  ED Disposition       ED Disposition   Decision to Admit    Condition   --    Comment   --               No follow-up provider specified.       Medication List      No changes were made to your prescriptions during this visit.            Klever Gary MD  03/26/24

## 2024-03-26 NOTE — Clinical Note
Level of Care: Telemetry [5]   Diagnosis: Colonic thickening [[id number]]   Admitting Physician: CRISTINA RODRIGUEZ [[id number]]   Attending Physician: CRISTINA RODRIGUEZ [[id number]]

## 2024-03-27 ENCOUNTER — APPOINTMENT (OUTPATIENT)
Dept: CARDIOLOGY | Facility: HOSPITAL | Age: 74
DRG: 389 | End: 2024-03-27
Payer: MEDICARE

## 2024-03-27 ENCOUNTER — INPATIENT HOSPITAL (OUTPATIENT)
Dept: URBAN - METROPOLITAN AREA HOSPITAL 84 | Facility: HOSPITAL | Age: 74
End: 2024-03-27
Payer: COMMERCIAL

## 2024-03-27 DIAGNOSIS — R74.01 ELEVATION OF LEVELS OF LIVER TRANSAMINASE LEVELS: ICD-10-CM

## 2024-03-27 DIAGNOSIS — R10.11 RIGHT UPPER QUADRANT PAIN: ICD-10-CM

## 2024-03-27 DIAGNOSIS — R93.3 ABNORMAL FINDINGS ON DIAGNOSTIC IMAGING OF OTHER PARTS OF DI: ICD-10-CM

## 2024-03-27 DIAGNOSIS — R68.81 EARLY SATIETY: ICD-10-CM

## 2024-03-27 PROBLEM — K56.7 ILEUS: Status: ACTIVE | Noted: 2024-03-27

## 2024-03-27 LAB
ANION GAP SERPL CALCULATED.3IONS-SCNC: 9 MMOL/L (ref 5–15)
BH CV LOWER VASCULAR LEFT COMMON FEMORAL AUGMENT: NORMAL
BH CV LOWER VASCULAR LEFT COMMON FEMORAL COMPETENT: NORMAL
BH CV LOWER VASCULAR LEFT COMMON FEMORAL COMPRESS: NORMAL
BH CV LOWER VASCULAR LEFT COMMON FEMORAL PHASIC: NORMAL
BH CV LOWER VASCULAR LEFT COMMON FEMORAL SPONT: NORMAL
BH CV LOWER VASCULAR LEFT DISTAL FEMORAL COMPRESS: NORMAL
BH CV LOWER VASCULAR LEFT GASTRONEMIUS COMPRESS: NORMAL
BH CV LOWER VASCULAR LEFT GREATER SAPH AK COMPRESS: NORMAL
BH CV LOWER VASCULAR LEFT GREATER SAPH BK COMPRESS: NORMAL
BH CV LOWER VASCULAR LEFT LESSER SAPH COMPRESS: NORMAL
BH CV LOWER VASCULAR LEFT MID FEMORAL AUGMENT: NORMAL
BH CV LOWER VASCULAR LEFT MID FEMORAL COMPETENT: NORMAL
BH CV LOWER VASCULAR LEFT MID FEMORAL COMPRESS: NORMAL
BH CV LOWER VASCULAR LEFT MID FEMORAL PHASIC: NORMAL
BH CV LOWER VASCULAR LEFT MID FEMORAL SPONT: NORMAL
BH CV LOWER VASCULAR LEFT PERONEAL COMPRESS: NORMAL
BH CV LOWER VASCULAR LEFT POPLITEAL AUGMENT: NORMAL
BH CV LOWER VASCULAR LEFT POPLITEAL COMPETENT: NORMAL
BH CV LOWER VASCULAR LEFT POPLITEAL COMPRESS: NORMAL
BH CV LOWER VASCULAR LEFT POPLITEAL PHASIC: NORMAL
BH CV LOWER VASCULAR LEFT POPLITEAL SPONT: NORMAL
BH CV LOWER VASCULAR LEFT POSTERIOR TIBIAL COMPRESS: NORMAL
BH CV LOWER VASCULAR LEFT PROXIMAL FEMORAL COMPRESS: NORMAL
BH CV LOWER VASCULAR LEFT SAPHENOFEMORAL JUNCTION COMPRESS: NORMAL
BH CV LOWER VASCULAR RIGHT COMMON FEMORAL AUGMENT: NORMAL
BH CV LOWER VASCULAR RIGHT COMMON FEMORAL COMPETENT: NORMAL
BH CV LOWER VASCULAR RIGHT COMMON FEMORAL COMPRESS: NORMAL
BH CV LOWER VASCULAR RIGHT COMMON FEMORAL PHASIC: NORMAL
BH CV LOWER VASCULAR RIGHT COMMON FEMORAL SPONT: NORMAL
BH CV LOWER VASCULAR RIGHT DISTAL FEMORAL COMPRESS: NORMAL
BH CV LOWER VASCULAR RIGHT GASTRONEMIUS COMPRESS: NORMAL
BH CV LOWER VASCULAR RIGHT GREATER SAPH AK COMPRESS: NORMAL
BH CV LOWER VASCULAR RIGHT GREATER SAPH BK COMPRESS: NORMAL
BH CV LOWER VASCULAR RIGHT MID FEMORAL AUGMENT: NORMAL
BH CV LOWER VASCULAR RIGHT MID FEMORAL COMPETENT: NORMAL
BH CV LOWER VASCULAR RIGHT MID FEMORAL COMPRESS: NORMAL
BH CV LOWER VASCULAR RIGHT MID FEMORAL PHASIC: NORMAL
BH CV LOWER VASCULAR RIGHT MID FEMORAL SPONT: NORMAL
BH CV LOWER VASCULAR RIGHT PERONEAL COMPRESS: NORMAL
BH CV LOWER VASCULAR RIGHT POPLITEAL AUGMENT: NORMAL
BH CV LOWER VASCULAR RIGHT POPLITEAL COMPETENT: NORMAL
BH CV LOWER VASCULAR RIGHT POPLITEAL COMPRESS: NORMAL
BH CV LOWER VASCULAR RIGHT POPLITEAL PHASIC: NORMAL
BH CV LOWER VASCULAR RIGHT POPLITEAL SPONT: NORMAL
BH CV LOWER VASCULAR RIGHT POSTERIOR TIBIAL COMPRESS: NORMAL
BH CV LOWER VASCULAR RIGHT PROXIMAL FEMORAL COMPRESS: NORMAL
BH CV LOWER VASCULAR RIGHT SAPHENOFEMORAL JUNCTION COMPRESS: NORMAL
BUN SERPL-MCNC: 20 MG/DL (ref 8–23)
BUN/CREAT SERPL: 27.8 (ref 7–25)
CALCIUM SPEC-SCNC: 8.6 MG/DL (ref 8.6–10.5)
CHLORIDE SERPL-SCNC: 106 MMOL/L (ref 98–107)
CO2 SERPL-SCNC: 27 MMOL/L (ref 22–29)
CREAT SERPL-MCNC: 0.72 MG/DL (ref 0.57–1)
DEPRECATED RDW RBC AUTO: 54 FL (ref 37–54)
EGFRCR SERPLBLD CKD-EPI 2021: 86.8 ML/MIN/1.73
ERYTHROCYTE [DISTWIDTH] IN BLOOD BY AUTOMATED COUNT: 14.2 % (ref 12.3–15.4)
GLUCOSE BLDC GLUCOMTR-MCNC: 105 MG/DL (ref 70–105)
GLUCOSE BLDC GLUCOMTR-MCNC: 111 MG/DL (ref 70–105)
GLUCOSE BLDC GLUCOMTR-MCNC: 76 MG/DL (ref 70–105)
GLUCOSE BLDC GLUCOMTR-MCNC: 86 MG/DL (ref 70–105)
GLUCOSE BLDC GLUCOMTR-MCNC: 88 MG/DL (ref 70–105)
GLUCOSE SERPL-MCNC: 91 MG/DL (ref 65–99)
HCT VFR BLD AUTO: 38.4 % (ref 34–46.6)
HGB BLD-MCNC: 11.6 G/DL (ref 12–15.9)
MCH RBC QN AUTO: 31.3 PG (ref 26.6–33)
MCHC RBC AUTO-ENTMCNC: 30.2 G/DL (ref 31.5–35.7)
MCV RBC AUTO: 103.5 FL (ref 79–97)
PLATELET # BLD AUTO: 116 10*3/MM3 (ref 140–450)
PMV BLD AUTO: 9.5 FL (ref 6–12)
POTASSIUM SERPL-SCNC: 3.9 MMOL/L (ref 3.5–5.2)
RBC # BLD AUTO: 3.71 10*6/MM3 (ref 3.77–5.28)
SODIUM SERPL-SCNC: 142 MMOL/L (ref 136–145)
WBC NRBC COR # BLD AUTO: 4.56 10*3/MM3 (ref 3.4–10.8)

## 2024-03-27 PROCEDURE — 99222 1ST HOSP IP/OBS MODERATE 55: CPT | Performed by: NURSE PRACTITIONER

## 2024-03-27 PROCEDURE — 82948 REAGENT STRIP/BLOOD GLUCOSE: CPT

## 2024-03-27 PROCEDURE — 93970 EXTREMITY STUDY: CPT

## 2024-03-27 PROCEDURE — 85027 COMPLETE CBC AUTOMATED: CPT | Performed by: STUDENT IN AN ORGANIZED HEALTH CARE EDUCATION/TRAINING PROGRAM

## 2024-03-27 PROCEDURE — 25010000002 CEFTRIAXONE PER 250 MG: Performed by: STUDENT IN AN ORGANIZED HEALTH CARE EDUCATION/TRAINING PROGRAM

## 2024-03-27 PROCEDURE — 93970 EXTREMITY STUDY: CPT | Performed by: SURGERY

## 2024-03-27 PROCEDURE — 25010000002 HYDROMORPHONE 1 MG/ML SOLUTION: Performed by: STUDENT IN AN ORGANIZED HEALTH CARE EDUCATION/TRAINING PROGRAM

## 2024-03-27 PROCEDURE — 82948 REAGENT STRIP/BLOOD GLUCOSE: CPT | Performed by: STUDENT IN AN ORGANIZED HEALTH CARE EDUCATION/TRAINING PROGRAM

## 2024-03-27 PROCEDURE — 36415 COLL VENOUS BLD VENIPUNCTURE: CPT | Performed by: STUDENT IN AN ORGANIZED HEALTH CARE EDUCATION/TRAINING PROGRAM

## 2024-03-27 PROCEDURE — 80048 BASIC METABOLIC PNL TOTAL CA: CPT | Performed by: STUDENT IN AN ORGANIZED HEALTH CARE EDUCATION/TRAINING PROGRAM

## 2024-03-27 PROCEDURE — 25010000002 ONDANSETRON PER 1 MG: Performed by: STUDENT IN AN ORGANIZED HEALTH CARE EDUCATION/TRAINING PROGRAM

## 2024-03-27 PROCEDURE — 97162 PT EVAL MOD COMPLEX 30 MIN: CPT | Performed by: PHYSICAL THERAPIST

## 2024-03-27 RX ORDER — BISACODYL 5 MG/1
10 TABLET, DELAYED RELEASE ORAL ONCE
Status: COMPLETED | OUTPATIENT
Start: 2024-03-28 | End: 2024-03-27

## 2024-03-27 RX ORDER — SODIUM, POTASSIUM,MAG SULFATES 17.5-3.13G
1 SOLUTION, RECONSTITUTED, ORAL ORAL EVERY 12 HOURS
Qty: 2 BOTTLE | Refills: 0 | Status: COMPLETED | OUTPATIENT
Start: 2024-03-27 | End: 2024-03-28

## 2024-03-27 RX ADMIN — PRAMIPEXOLE DIHYDROCHLORIDE 1 MG: 1 TABLET ORAL at 00:03

## 2024-03-27 RX ADMIN — HYDROMORPHONE HYDROCHLORIDE 0.5 MG: 1 INJECTION, SOLUTION INTRAMUSCULAR; INTRAVENOUS; SUBCUTANEOUS at 02:46

## 2024-03-27 RX ADMIN — HYDROMORPHONE HYDROCHLORIDE 0.5 MG: 1 INJECTION, SOLUTION INTRAMUSCULAR; INTRAVENOUS; SUBCUTANEOUS at 22:59

## 2024-03-27 RX ADMIN — GABAPENTIN 300 MG: 300 CAPSULE ORAL at 21:02

## 2024-03-27 RX ADMIN — METHOCARBAMOL 750 MG: 750 TABLET, FILM COATED ORAL at 15:41

## 2024-03-27 RX ADMIN — GABAPENTIN 300 MG: 300 CAPSULE ORAL at 09:12

## 2024-03-27 RX ADMIN — SODIUM SULFATE, POTASSIUM SULFATE, MAGNESIUM SULFATE 1 BOTTLE: 17.5; 3.13; 1.6 SOLUTION, CONCENTRATE ORAL at 17:28

## 2024-03-27 RX ADMIN — PRAMIPEXOLE DIHYDROCHLORIDE 1 MG: 1 TABLET ORAL at 21:02

## 2024-03-27 RX ADMIN — METHOCARBAMOL 750 MG: 750 TABLET, FILM COATED ORAL at 09:12

## 2024-03-27 RX ADMIN — HYDROMORPHONE HYDROCHLORIDE 0.5 MG: 1 INJECTION, SOLUTION INTRAMUSCULAR; INTRAVENOUS; SUBCUTANEOUS at 21:02

## 2024-03-27 RX ADMIN — PRAMIPEXOLE DIHYDROCHLORIDE 1 MG: 1 TABLET ORAL at 09:12

## 2024-03-27 RX ADMIN — Medication 10 ML: at 09:12

## 2024-03-27 RX ADMIN — ESCITALOPRAM OXALATE 20 MG: 10 TABLET ORAL at 09:12

## 2024-03-27 RX ADMIN — MONTELUKAST 10 MG: 10 TABLET, FILM COATED ORAL at 21:02

## 2024-03-27 RX ADMIN — HYDROMORPHONE HYDROCHLORIDE 0.5 MG: 1 INJECTION, SOLUTION INTRAMUSCULAR; INTRAVENOUS; SUBCUTANEOUS at 18:56

## 2024-03-27 RX ADMIN — ONDANSETRON 4 MG: 2 INJECTION INTRAMUSCULAR; INTRAVENOUS at 11:57

## 2024-03-27 RX ADMIN — Medication 10 ML: at 21:02

## 2024-03-27 RX ADMIN — ROSUVASTATIN CALCIUM 20 MG: 10 TABLET, FILM COATED ORAL at 21:02

## 2024-03-27 RX ADMIN — ONDANSETRON 4 MG: 2 INJECTION INTRAMUSCULAR; INTRAVENOUS at 02:45

## 2024-03-27 RX ADMIN — METHOCARBAMOL 750 MG: 750 TABLET, FILM COATED ORAL at 21:01

## 2024-03-27 RX ADMIN — HYDROMORPHONE HYDROCHLORIDE 0.5 MG: 1 INJECTION, SOLUTION INTRAMUSCULAR; INTRAVENOUS; SUBCUTANEOUS at 11:52

## 2024-03-27 RX ADMIN — ONDANSETRON 4 MG: 2 INJECTION INTRAMUSCULAR; INTRAVENOUS at 18:56

## 2024-03-27 RX ADMIN — HYDROMORPHONE HYDROCHLORIDE 0.5 MG: 1 INJECTION, SOLUTION INTRAMUSCULAR; INTRAVENOUS; SUBCUTANEOUS at 06:50

## 2024-03-27 RX ADMIN — HYDROMORPHONE HYDROCHLORIDE 0.5 MG: 1 INJECTION, SOLUTION INTRAMUSCULAR; INTRAVENOUS; SUBCUTANEOUS at 00:03

## 2024-03-27 RX ADMIN — PANTOPRAZOLE SODIUM 40 MG: 40 INJECTION, POWDER, FOR SOLUTION INTRAVENOUS at 06:50

## 2024-03-27 RX ADMIN — CEFTRIAXONE 2000 MG: 2 INJECTION, POWDER, FOR SOLUTION INTRAMUSCULAR; INTRAVENOUS at 09:11

## 2024-03-27 RX ADMIN — BISACODYL 10 MG: 5 TABLET, COATED ORAL at 23:00

## 2024-03-27 NOTE — THERAPY EVALUATION
Patient Name: Genny Soni  : 1950    MRN: [id number]                              Today's Date: 3/27/2024       Admit Date: 3/26/2024    Visit Dx:     ICD-10-CM ICD-9-CM   1. Urinary tract infection without hematuria, site unspecified  N39.0 599.0   2. Generalized abdominal pain  R10.84 789.07   3. 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, bilateral legs    Deep venous thrombosis of lower extremity    Dizziness    Drug-induced constipation    Edema of lower extremity    Essential tremor    Exertional dyspnea    Fibromyalgia    Former smoker    Generalized anxiety disorder    H/O total knee replacement    Hypokalemia    Injury of hand    Hyperlipidemia    PERLA (obstructive sleep apnea)    Peripheral vascular disease    Persistent insomnia    Piriformis syndrome    Pain management    Polyarthropathy    Restless leg syndrome    Strain of muscle of right groin region    Type II diabetes mellitus with HbA1C goal to be determined    Uncontrolled type 2 diabetes mellitus    Vitamin D deficiency    Pulmonary embolism    Acute kidney injury    Tremors of nervous system    Left lumbar radiculopathy    Acute saddle pulmonary embolism    Cellulitis of back    Cellulitis of lower extremity    Postoperative cellulitis of surgical wound    PNA (pneumonia)    Postoperative wound infection    Pneumonia, unspecified organism    Colonic thickening    Ileus     Past Medical History:   Diagnosis Date    Arthritis     Benign essential hypertension  7/12/2018    Diabetes mellitus     Elevated cholesterol     H/O blood clots     History of transfusion     Hyperlipidemia     Low back pain     Sleep apnea      Past Surgical History:   Procedure Laterality Date    BACK SURGERY      FEMORAL DISTAL BYPASS      HYSTERECTOMY      JOINT REPLACEMENT      LUMBAR LAMINECTOMY WITH FUSION N/A 5/28/2021    Procedure: Lumbar 5 Smith procedure.  Lumbar 5 6 and sacral 1 transpedicular Solera screws and rods.  Posterior fusion with autologous bone.;  Surgeon: Gagan Aguilar MD;  Location: [facility] MAIN OR;  Service: Neurosurgery;  Laterality: N/A;      General Information       Row Name 03/27/24 1250          Physical Therapy Time and Intention    Document Type evaluation  -AD     Mode of Treatment physical therapy  -AD       Row Name 03/27/24 1250          General Information    Patient Profile Reviewed yes  -AD     Prior Level of Function independent:;bed mobility;ADL's;community mobility  -AD     Existing Precautions/Restrictions no known precautions/restrictions  -AD     Barriers to Rehab none identified  -AD       Row Name 03/27/24 1250          Living Environment    People in Home alone  -AD       Row Name 03/27/24 1250          Home Main Entrance    Number of Stairs, Main Entrance none  -AD       Row Name 03/27/24 1250          Safety Issues, Functional Mobility    Impairments Affecting Function (Mobility) endurance/activity tolerance;pain;strength;range of motion (ROM)  -AD               User Key  (r) = Recorded By, (t) = Taken By, (c) = Cosigned By      Initials Name Provider Type    AD Alaina Sandra PT Physical Therapist                   Mobility       Row Name 03/27/24 1250          Bed Mobility    Bed Mobility bed mobility (all) activities  -AD     All Activities, [place] (Bed Mobility) contact guard  -AD     Assistive Device (Bed Mobility) head of bed elevated  -AD       Row Name 03/27/24 1250          Sit-Stand Transfer    Sit-Stand [place]  (Transfers) contact guard  -AD     Assistive Device (Sit-Stand Transfers) walker, front-wheeled  -AD       Row Name 03/27/24 1250          Gait/Stairs (Locomotion)    [place] (Gait) contact guard  -AD     Assistive Device (Gait) walker, front-wheeled  -AD     Patient was able to Ambulate yes  -AD     Distance in Feet (Gait) 10  -AD               User Key  (r) = Recorded By, (t) = Taken By, (c) = Cosigned By      Initials Name Provider Type    Alaina Frank PT Physical Therapist                   Obj/Interventions       Row Name 03/27/24 1251          Range of Motion Comprehensive    Comment, General Range of Motion 50% dec in hip ROM d/t abdominal pain  -AD       Row Name 03/27/24 1251          Strength Comprehensive (MMT)    Comment, General Manual Muscle Testing (MMT) Assessment 4-/5 grossly BLE  -AD       Row Name 03/27/24 1251          Balance    Balance Assessment sitting static balance  -AD     Static Sitting Balance independent  -AD               User Key  (r) = Recorded By, (t) = Taken By, (c) = Cosigned By      Initials Name Provider Type    Alaina Frank, PT Physical Therapist                   Goals/Plan       Row Name 03/27/24 1253          Bed Mobility Goal 1 (PT)    Activity/Assistive Device (Bed Mobility Goal 1, PT) bed mobility activities, all  -AD     [place]/Cues Needed (Bed Mobility Goal 1, PT) independent  -AD     Time Frame (Bed Mobility Goal 1, PT) long term goal (LTG)  -AD       Row Name 03/27/24 1253          Transfer Goal 1 (PT)    Activity/Assistive Device (Transfer Goal 1, PT) transfers, all  -AD     [place]/Cues Needed (Transfer Goal 1, PT) independent  -AD     Time Frame (Transfer Goal 1, PT) long term goal (LTG)  -AD       Row Name 03/27/24 1253          Gait Training Goal 1 (PT)    Activity/Assistive Device (Gait Training Goal 1, PT) gait (walking locomotion);assistive device use  -AD     [place] (Gait Training Goal 1, PT)  modified independence  -AD     Distance (Gait Training Goal 1, PT) 50  -AD     Time Frame (Gait Training Goal 1, PT) long term goal (LTG)  -AD       Row Name 03/27/24 1253          Therapy Assessment/Plan (PT)    Planned Therapy Interventions (PT) balance training;bed mobility training;gait training;postural re-education;patient/family education;neuromuscular re-education;ROM (range of motion);strengthening;stretching;transfer training  -AD               User Key  (r) = Recorded By, (t) = Taken By, (c) = Cosigned By      Initials Name Provider Type    AD Alaina Sandra, PT Physical Therapist                   Clinical Impression       Row Name 03/27/24 1251          Pain    Pretreatment Pain Rating 8/10  -AD     Posttreatment Pain Rating 8/10  -AD     Pain Location - abdomen  -AD     Pain Intervention(s) Repositioned;Nursing Notified  -AD       Row Name 03/27/24 1251          Plan of Care Review    Plan of Care Reviewed With patient  -AD     Progress no change  -AD     Outcome Evaluation 74 y.o. female with PMHx of HTN, HLD, osteoporosis, h/o PE, DM  presented to [facility] for abdominal pain.  Admitted for colonic thickening, CT findings suggest ileus and she is scheduledfor colonoscopy tomorrow. PLOF is (I) with all ADLs, community mobility, and drives. Uses a RW or a cane regularly, has had 1 fall in the last year. Today she reqiures CGA for bed mobility and transfers, as well as 10 ft of gait with RW in the room d/t pain. Recommend home with HHPT at discharge for improved endurance training and she is below baseline function at this time.  -AD       Row Name 03/27/24 1251          Therapy Assessment/Plan (PT)    Patient/Family Therapy Goals Statement (PT) go home  -AD     Rehab Potential (PT) good, to achieve stated therapy goals  -AD     Criteria for Skilled Interventions Met (PT) yes;skilled treatment is necessary  -AD     Therapy Frequency (PT) 3 times/wk  -AD               User Key  (r) = Recorded By, (t) =  Taken By, (c) = Cosigned By      Initials Name Provider Type    AD Alaina Sandra, VÍCTOR Physical Therapist                   Outcome Measures       Row Name 03/27/24 1254 03/27/24 0724       How much help from another person do you currently need...    Turning from your back to your side while in flat bed without using bedrails? 3  -AD 4  -JL    Moving from lying on back to sitting on the side of a flat bed without bedrails? 3  -AD 4  -JL    Moving to and from a bed to a chair (including a wheelchair)? 3  -AD 4  -JL    Standing up from a chair using your arms (e.g., wheelchair, bedside chair)? 3  -AD 4  -JL    Climbing 3-5 steps with a railing? 2  -AD 4  -JL    To walk in hospital room? 3  -AD 4  -JL    AM-PAC 6 Clicks Score (PT) 17  -AD 24  -JL    Highest Level of Mobility Goal 5 --> Static standing  -AD 8 --> Walked 250 feet or more  -JL      Row Name 03/27/24 1254          Functional Assessment    Outcome Measure Options AM-PAC 6 Clicks Basic Mobility (PT)  -AD               User Key  (r) = Recorded By, (t) = Taken By, (c) = Cosigned By      Initials Name Provider Type    AD Alaina Sandra PT Physical Therapist    Brianna Mcdermott RN Registered Nurse                                 Physical Therapy Education       Title: PT OT SLP Therapies (In Progress)       Topic: Physical Therapy (In Progress)       Point: Mobility training (Done)       Learning Progress Summary             Patient Acceptance, E, VU by AD at 3/27/2024 1254                         Point: Home exercise program (Not Started)       Learner Progress:  Not documented in this visit.              Point: Body mechanics (Not Started)       Learner Progress:  Not documented in this visit.              Point: Precautions (Not Started)       Learner Progress:  Not documented in this visit.                              User Key       Initials Effective Dates Name Provider Type Discipline    AD 07/11/23 -  Alaina Sandra PT Physical Therapist PT                   PT Recommendation and Plan  Planned Therapy Interventions (PT): balance training, bed mobility training, gait training, postural re-education, patient/family education, neuromuscular re-education, ROM (range of motion), strengthening, stretching, transfer training  Plan of Care Reviewed With: patient  Progress: no change  Outcome Evaluation: 74 y.o. female with PMHx of HTN, HLD, osteoporosis, h/o PE, DM  presented to [facility] for abdominal pain.  Admitted for colonic thickening, CT findings suggest ileus and she is scheduledfor colonoscopy tomorrow. PLOF is (I) with all ADLs, community mobility, and drives. Uses a RW or a cane regularly, has had 1 fall in the last year. Today she reqiures CGA for bed mobility and transfers, as well as 10 ft of gait with RW in the room d/t pain. Recommend home with HHPT at discharge for improved endurance training and she is below baseline function at this time.     Time Calculation:   PT Evaluation Complexity  History, PT Evaluation Complexity: 1-2 personal factors and/or comorbidities  Examination of Body Systems (PT Eval Complexity): total of 3 or more elements  Clinical Presentation (PT Evaluation Complexity): evolving  Clinical Decision Making (PT Evaluation Complexity): moderate complexity  Overall Complexity (PT Evaluation Complexity): moderate complexity     PT Charges       Row Name 03/27/24 1249             Time Calculation    Start Time 1100  -AD      Stop Time 1121  -AD      Time Calculation (min) 21 min  -AD      PT Received On 03/27/24  -AD      PT - Next Appointment 03/28/24  -AD      PT Goal Re-Cert Due Date 04/10/24  -AD         Time Calculation- PT    Total Timed Code Minutes- PT 0 minute(s)  -AD                User Key  (r) = Recorded By, (t) = Taken By, (c) = Cosigned By      Initials Name Provider Type    Alaina Frank, PT Physical Therapist                  Therapy Charges for Today       Code Description Service Date Service Provider  Modifiers Qty    90059389522 HC PT EVAL MOD COMPLEXITY 4 3/27/2024 Alaina Sandra, PT GP 1            PT G-Codes  Outcome Measure Options: AM-PAC 6 Clicks Basic Mobility (PT)  AM-PAC 6 Clicks Score (PT): 17  PT Discharge Summary  Anticipated Discharge Disposition (PT): home with home health    Alaina Sandra, PT  3/27/2024

## 2024-03-27 NOTE — H&P
[facility] Medicine Services    Hospitalist History and Physical     Genny Soni : 1950 MRN:[id number] LOS:0 ROOM:      Chief Complaint: abdominal pain    Assessment / Plan     #Abdominal pain  #Ileus  #Colonic wall thickening    - CT a/p shows ileus complicated by wall thickening and luminal narrowing in the proximal ascending colon measuring 3.6cm, neoplasm not excluded    - Pain and distension worsening over past 3 months per patient    - GI consulted for colonoscopy    - currently not vomiting, but if does she may need NG tube    - Dilaudid 0.5mg IV q2h PRN pain, monitor for toxicity    - Zofran PRN    - Last dose Eliquis on 3/26 morning per patient, hold    - NPO after midnight    - CLD    - PPI    - pt/CM    #UTI    - UA shows UTI    - possibly 2/2 overdiuresis    - Rocephin 2g IV daily, monitor for toxicity    - Urine culture    - Blood cultures    #b/l LE edema    - b/l LE edema worsening per patient    - check echo    - suspect lymphedema    - Hold home diuretics due to hypotension and UTI, but if swelling worsens may need to restart    - venous duplex    #DM    - Glucommander protocol    #H/O PE and DVT    - last dose Eliquis this morning    - hold Eliquis for procedure    #Osteoporosis     - Patient receives Prolia Injections    #Lumbar stenosis    -  s/p spinal fusion    #HTN    - hold home Losartan due to hypotension    - Midodrine prn    #HLD    -resume home staitn    Code Status (Patient has no pulse and is not breathing): CPR (Attempt to Resuscitate)  Medical Interventions (Patient has pulse or is breathing): Full Support         DVT prophylaxis:  Medical and mechanical DVT prophylaxis orders are present.         History of Present illness     Genny Soni is a 74 y.o. female with PMHx of HTN, HLD, osteoporosis, h/o PE, DM  presented to [facility] for abdominal pain.  Admits to 10/10 sharp epigastric abdominal pain radiating to the back complicated by distension and LE edema worsening  over the past 24 hours.  States symptoms have been present over 3 months but have recently started to worsen to the point she has had some vomiting and unable to perform ADLs.  Denies diarrhea, melena, hematochezia, hematemesis, dyspnea, chest pain.  Presented to [facility] for evaluation.    ED course: In the ED, vitals 97.9, HR 75, RR 16, /35, 95% RA.  Labs notable for glucose 100, AST 33.  UA shows UTI.  CT a/p shows ileus complicated by wall thickening and luminal narrowing in the proximal ascending colon measuring 3.6cm, neoplasm not excluded    Patient was seen and examined on 03/26/24 at 20:34 EDT .    Subjective / Review of systems     Review of Systems   12 point ROS reviewed and negative except as mentioned above      Past Medical/Surgical/Social/Family History & Allergies     Past Medical History:   Diagnosis Date    Arthritis     Benign essential hypertension 7/12/2018    Diabetes mellitus     Elevated cholesterol     H/O blood clots     History of transfusion     Hyperlipidemia     Low back pain     Sleep apnea       Past Surgical History:   Procedure Laterality Date    BACK SURGERY      FEMORAL DISTAL BYPASS      HYSTERECTOMY      JOINT REPLACEMENT      LUMBAR LAMINECTOMY WITH FUSION N/A 5/28/2021    Procedure: Lumbar 5 Smith procedure.  Lumbar 5 6 and sacral 1 transpedicular Solera screws and rods.  Posterior fusion with autologous bone.;  Surgeon: Gagan Aguilar MD;  Location: [facility] MAIN OR;  Service: Neurosurgery;  Laterality: N/A;      Social History     Socioeconomic History    Marital status:    Tobacco Use    Smoking status: Former    Smokeless tobacco: Never   Vaping Use    Vaping status: Never Used   Substance and Sexual Activity    Alcohol use: Not Currently    Drug use: Never    Sexual activity: Defer      No family history on file.   Allergies   Allergen Reactions    Ambien  [Zolpidem] Confusion    Codeine Itching    Sulfa Antibiotics Nausea And Vomiting     Makes her nauseated       Social Determinants of Health     Tobacco Use: Medium Risk (1/25/2024)    Received from [facility]    Patient History     Smoking Tobacco Use: Former     Smokeless Tobacco Use: Never     Passive Exposure: Not on file   Alcohol Use: Not At Risk (10/5/2023)    AUDIT-C     Frequency of Alcohol Consumption: Never     Average Number of Drinks: Patient does not drink     Frequency of Binge Drinking: Never   Financial Resource Strain: Not on file   Food Insecurity: Not on file   Transportation Needs: Not on file   Physical Activity: Not on file   Stress: Not on file   Social Connections: Unknown (10/7/2023)    Family and Community Support     Help with Day-to-Day Activities: Not on file     Lonely or Isolated: Not on file   Interpersonal Safety: Not At Risk (3/26/2024)    Abuse Screen     Unsafe at Home or Work/School: no     Feels Threatened by Someone?: no     Does Anyone Keep You from Contacting Others or Doint Things Outside the Home?: no     Physical Sign of Abuse Present: no   Depression: Not on file   Housing Stability: Not At Risk (10/6/2023)    Housing Stability     Current Living Arrangements: condominium     Potentially Unsafe Housing Conditions: none   Utilities: Not on file   Health Literacy: Unknown (10/6/2023)    Education     Help with school or training?: Not on file     Preferred Language: English   Employment: Unknown (10/7/2023)    Employment     Do you want help finding or keeping work or a job?: Not on file   Disabilities: Not At Risk (10/5/2023)    Disabilities     Concentrating, Remembering, or Making Decisions Difficulty: no     Doing Errands Independently Difficulty: no        Home Medications     Prior to Admission medications    Medication Sig Start Date End Date Taking? Authorizing Provider   apixaban (ELIQUIS) 5 MG tablet tablet Take 1 tablet by mouth 2 (Two) Times a Day.   Yes Provider, MD Pedro   bumetanide (BUMEX) 1 MG tablet Take 1 tablet by mouth 2 (Two) Times a Day.   Yes  Pedro Vazquez MD   denosumab (Prolia) 60 MG/ML solution prefilled syringe syringe Inject 1 mL under the skin into the appropriate area as directed Every 6 (Six) Months.   Yes Pedro Vazquez MD   escitalopram (LEXAPRO) 20 MG tablet Take 1 tablet by mouth Daily.   Yes Pedro Vazquez MD   fluticasone (FLONASE) 50 MCG/ACT nasal spray 2 sprays into the nostril(s) as directed by provider Daily.   Yes Pedro Vazquez MD   gabapentin (NEURONTIN) 300 MG capsule Take 1 capsule by mouth 2 (Two) Times a Day.   Yes Pedro Vazquez MD   glipizide (GLUCOTROL XL) 10 MG 24 hr tablet Take 1 tablet by mouth Daily.   Yes Pedro Vazquez MD   Insulin Glargine, 1 Unit Dial, (TOUJEO) 300 UNIT/ML solution pen-injector injection Inject 100 Units under the skin into the appropriate area as directed Every Night.   Yes Pedro Vazquez MD   losartan (COZAAR) 25 MG tablet Take 1 tablet by mouth Daily.   Yes Pedro Vazquez MD   methocarbamol (ROBAXIN) 750 MG tablet Take 1 tablet by mouth 3 (Three) Times a Day.   Yes Pedro Vazquez MD   metOLazone (ZAROXOLYN) 2.5 MG tablet Take 1 tablet by mouth Daily As Needed. 6/22/21  Yes Pedro Vazquez MD   montelukast (SINGULAIR) 10 MG tablet Take 1 tablet by mouth Every Night.   Yes Pedro Vazquez MD   naloxone (NARCAN) 4 MG/0.1ML nasal spray Call 911. Don't prime. [place] in 1 nostril for overdose. Repeat in 2-3 minutes in other nostril if no or minimal breathing/responsiveness. 10/8/23  Yes Adrienne Cristobal MD   ondansetron (ZOFRAN) 4 MG tablet Take 1 tablet by mouth Every 6 (Six) Hours As Needed for Nausea or Vomiting.   Yes Pedro Vazquez MD   oxyCODONE-acetaminophen (PERCOCET)  MG per tablet Take 1 tablet by mouth Every 8 (Eight) Hours As Needed for Moderate Pain.   Yes Pedro Vazquez MD   polyethylene glycol (MIRALAX) 17 g packet Take 17 g by mouth Daily As Needed.   Yes Pedro Vazquez MD   potassium chloride  (KLOR-CON M20) 20 MEQ CR tablet Take 1 tablet by mouth 3 (Three) Times a Day.   Yes Pedro Vazquez MD   pramipexole (MIRAPEX) 1 MG tablet Take 1 tablet by mouth 2 (Two) Times a Day. 1/31/18  Yes Pedro Vazquez MD   primidone (MYSOLINE) 50 MG tablet Take 0.5 tablets by mouth Every Night.   Yes Pedro Vazquez MD   rosuvastatin (CRESTOR) 20 MG tablet Take 1 tablet by mouth Every Night. 1/31/18  Yes Pedro Vazquez MD   vitamin D (ERGOCALCIFEROL) 1.25 MG (02299 UT) capsule capsule Take 1 capsule by mouth 1 (One) Time Per Week. Thursdays   Yes Pedro Vazquez MD   potassium chloride ER (K-TAB) 20 MEQ tablet controlled-release ER tablet Take 1 tablet by mouth 3 times a day. 4/15/20 3/26/24 Yes Pedro Vazquez MD   alendronate (FOSAMAX) 70 MG tablet Take 1 tablet by mouth Every 7 (Seven) Days. Tues  3/26/24  Pedro Vazquez MD   furosemide (LASIX) 40 MG tablet Take 1 tablet by mouth Daily.  3/26/24  Pedro Vazquez MD   glipizide (GLUCOTROL) 5 MG tablet Take 1 tablet by mouth 2 (Two) Times a Day Before Meals.  3/26/24  Pedro Vazquze MD   lubiprostone (AMITIZA) 24 MCG capsule Take 1 capsule by mouth 2 (Two) Times a Day With Meals.  3/26/24  Pedro Vazquez MD   metFORMIN ER (GLUCOPHAGE-XR) 500 MG 24 hr tablet Take 1 tablet by mouth Daily With Breakfast.  3/26/24  Pedro Vazquez MD   Semaglutide,0.25 or 0.5MG/DOS, (Ozempic, 0.25 or 0.5 MG/DOSE,) 2 MG/1.5ML solution pen-injector Inject 0.5 mg under the skin into the appropriate area as directed 1 (One) Time Per Week. Thur  3/26/24  Pedro Vazquez MD        Objective / Physical Exam     Vital signs:  Temp: 97.9 °F (36.6 °C)  BP: (!) 126/35  Heart Rate: 75  Resp: 16  SpO2: 95 %  Weight: 98.9 kg (218 lb)    Admission Weight: Weight: 98.9 kg (218 lb)    Physical Exam  Constitutional:       General: She is not in acute distress.     Appearance: She is obese. She is not toxic-appearing.   HENT:       Head: Normocephalic and atraumatic.      Nose: Nose normal. No congestion.      Mouth/Throat:      Pharynx: Oropharynx is clear. No oropharyngeal exudate.   Eyes:      General: No scleral icterus.  Cardiovascular:      Rate and Rhythm: Normal rate and regular rhythm.      Heart sounds: No murmur heard.     No friction rub. No gallop.   Pulmonary:      Effort: No respiratory distress.      Breath sounds: No wheezing or rales.   Abdominal:      General: There is distension.      Tenderness: There is abdominal tenderness. There is no guarding.   Musculoskeletal:         General: Tenderness present. No swelling or deformity.      Cervical back: Normal range of motion. No rigidity.      Right lower leg: Edema present.      Left lower leg: Edema present.   Skin:     Coloration: Skin is not jaundiced.      Findings: No bruising or lesion.   Neurological:      General: No focal deficit present.      Mental Status: She is alert and oriented to person, place, and time.      Motor: Weakness present.            Labs     Results from last 7 days   Lab Units 03/26/24  1620   WBC 10*3/mm3 6.35   HEMOGLOBIN g/dL 13.5   HEMATOCRIT % 43.5   PLATELETS 10*3/mm3 159      Results from last 7 days   Lab Units 03/26/24  1620   ALK PHOS U/L 105   AST (SGOT) U/L 33*   ALT (SGPT) U/L 31           Results from last 7 days   Lab Units 03/26/24  1620   SODIUM mmol/L 144   POTASSIUM mmol/L 3.9   CHLORIDE mmol/L 105   CO2 mmol/L 29.0   BUN mg/dL 20   CREATININE mg/dL 0.72   GLUCOSE mg/dL 100*        Imaging     CT Abdomen Pelvis Without Contrast    Result Date: 3/26/2024  CT ABDOMEN PELVIS WO CONTRAST Date of Exam: 3/26/2024 6:03 PM EDT Indication: Abdominal pain, acute, nonlocalized. Comparison: Contrast-enhanced CT of the abdomen and pelvis performed on June 6, 2021 Technique: Axial CT images were obtained of the abdomen and pelvis without the administration of contrast. Sagittal and coronal reconstructions were performed.  Automated exposure  control and iterative reconstruction methods were used. Findings: Lung Bases: The visualized lung bases and lower mediastinal structures are unremarkable. Peritoneum: No free intraperitoneal air or fluid. Abdominal wall: Unremarkable. Liver: Liver is normal in size and contour. No focal lesions. Biliary/Gallbladder: The gallbladder is normal without evidence of radiopaque gallstones. The biliary tree is nondilated. Pancreas: Pancreas is within normal limits. There is no evidence of pancreatic mass or peripancreatic inflammatory changes. Spleen: Spleen is normal in size and contour. Gastrointestinal/Mesentery: The stomach and duodenum appear within normal limits. Multiple mildly distended proximal and mid small bowel loops are visualized. A discrete transition point is not visualized. No inflammatory changes are seen. The appendix is not visualized. There are no secondary signs of acute appendicitis. There is irregular wall thickening and luminal narrowing in the proximal ascending colon measuring 3.6 cm. No definite evidence of obstruction. Adrenals: Adrenal glands are unremarkable. Kidneys: The kidneys are in anatomic position. No evidence of nephrolithiasis. No evidence of hydronephrosis or significant perinephric fat stranding. Bladder: The urinary bladder is unremarkable. Reproductive organs:  The patient is post hysterectomy. Lymph Nodes: No significant adenopathy is identified. Vasculature: The patient is post aortobifemoral bypass grafting. The aorta is normal in caliber. Scattered atheromatous calcifications are visualized. Infrarenal IVC filter is visualized. Bony Structures:  No acute fracture or aggressive lesions. Extensive thoracolumbar fixation hardware is visualized with multilevel posterior decompression. Patient is post bladder stimulator with the lead terminating in the left hemisacrum.     Impression: Mildly dilated proximal and mid small bowel loops without discrete transition point. Findings  suggest ileus. Short-term follow-up abdominal radiographs would be of value. There is irregular wall thickening and luminal narrowing in the proximal ascending colon measuring 3.6 cm in length. Cannot exclude colonic neoplasm. Correlation with colonoscopy is suggested.  IVC filter is present on imaging study. It is recommended that all patients with IVC filters in place have an active management care plan to monitor their IVC filter. If a care plan is not in place, patient should have a non emergent referral to an interventional specialist for establishment of an IVC filter management care plan. Electronically Signed: Madi Dumont MD  3/26/2024 6:47 PM EDT  Workstation ID: [id number]          Current Medications     Scheduled Meds:  [START ON 3/27/2024] cefTRIAXone (ROCEPHIN) 2,000 mg in sodium chloride 0.9 % 100 mL MBP, 2,000 mg, Intravenous, Q24H  escitalopram, 20 mg, Oral, Daily  gabapentin, 300 mg, Oral, BID  insulin glargine, 1-200 Units, Subcutaneous, Nightly - Glucommander  insulin lispro, 1-200 Units, Subcutaneous, 4x Daily With Meals & Nightly  methocarbamol, 750 mg, Oral, TID  montelukast, 10 mg, Oral, Nightly  rosuvastatin, 20 mg, Oral, Nightly  sodium chloride, 10 mL, Intravenous, Q12H         Continuous Infusions:          Kathi Alejandra [facility]  03/26/24   20:34 EDT

## 2024-03-27 NOTE — NURSING NOTE
WOCN note:    74 yr old female admitted 3/26/24 with reports of abdominal pain and increased BLE swelling. WOCN consult received for BLE lymphedema.     Patient presents with tender, non-pitting edema starting at the ankle with erythema and papillomatosis in the gaiter region. Patient reported the swelling is now extending into her thighs. She states she has worn compression in the past. Pedal pulses are palpable.   Both lower legs were wrapped with kerlix and two ACE wraps from the base of the toes to the bend of the knee. Patient reported relief with compression. Recommend to change every other day and re-wrap as needed if the wraps become too tight or out of place. We will follow as needed.

## 2024-03-27 NOTE — PLAN OF CARE
Problem: Fall Injury Risk  Goal: Absence of Fall and Fall-Related Injury  Outcome: Ongoing, Progressing  Intervention: Promote Injury-Free Environment  Recent Flowsheet Documentation  Taken 3/27/2024 [id number] by Brianna Briseno RN  Safety Promotion/Fall Prevention:   safety round/check completed   activity supervised   assistive device/personal items within reach   clutter free environment maintained   fall prevention program maintained   gait belt   nonskid shoes/slippers when out of bed     Problem: Pain Acute  Goal: Acceptable Pain Control and Functional Ability  Outcome: Ongoing, Progressing   Goal Outcome Evaluation: Pain controlled with prescribed pain medication.

## 2024-03-27 NOTE — PLAN OF CARE
Goal Outcome Evaluation:  Plan of Care Reviewed With: patient        Progress: no change  Outcome Evaluation: 74 y.o. female with PMHx of HTN, HLD, osteoporosis, h/o PE, DM  presented to [facility] for abdominal pain.  Admitted for colonic thickening, CT findings suggest ileus and she is scheduledfor colonoscopy tomorrow. PLOF is (I) with all ADLs, community mobility, and drives. Uses a RW or a cane regularly, has had 1 fall in the last year. Today she reqiures CGA for bed mobility and transfers, as well as 10 ft of gait with RW in the room d/t pain. Recommend home with HHPT at discharge for improved endurance training and she is below baseline function at this time.      Anticipated Discharge Disposition (PT): home with home health

## 2024-03-27 NOTE — CONSULTS
GI CONSULT  NOTE:    Referring Provider:  Dr. Alejandra    Chief complaint: Abdominal pain    Subjective .     History of present illness: Genny Soni is a 74 y.o. female with history of hypertension, hyperlipidemia, PE/DVT s/p IVC filter placement on Eliquis, diabetes, hysterectomy, and aorto bifemoral bypass who presents with complaints of abdominal pain.  The patient reports that she fell in 1/2024 and has been having intermittent epigastric pain since that time.  States that the pain will radiate to the right upper quadrant and around to her back.  The pain seems to be worse with palpation and with movement.  It does not seem to be affected by oral intake.  She also reports worsening abdominal distention recently.  States that normally she has regular bowel movements with senna, but had diarrhea yesterday.  Denies bright red blood per rectum or melena.  No nausea/vomiting, but does complain of early satiety.  No heartburn or dysphagia.  Denies unintentional weight loss.      Endo History:  11/2020 colonoscopy (Dr. Cuenca) -diverticulosis, ischemic colitis  2009 colonoscopy (Dr. Cuenca) -normal    Past Medical History:  Past Medical History:   Diagnosis Date    Arthritis     Benign essential hypertension 7/12/2018    Diabetes mellitus     Elevated cholesterol     H/O blood clots     History of transfusion     Hyperlipidemia     Low back pain     Sleep apnea        Past Surgical History:  Past Surgical History:   Procedure Laterality Date    BACK SURGERY      FEMORAL DISTAL BYPASS      HYSTERECTOMY      JOINT REPLACEMENT      LUMBAR LAMINECTOMY WITH FUSION N/A 5/28/2021    Procedure: Lumbar 5 Smith procedure.  Lumbar 5 6 and sacral 1 transpedicular Solera screws and rods.  Posterior fusion with autologous bone.;  Surgeon: Gagan Aguilar MD;  Location: [facility] OR;  Service: Neurosurgery;  Laterality: N/A;       Social History:  Social History     Tobacco Use    Smoking status: Former    Smokeless tobacco:  Never   Vaping Use    Vaping status: Never Used   Substance Use Topics    Alcohol use: Not Currently    Drug use: Never       Family History:  History reviewed. No pertinent family history.    Medications:  (Not in a hospital admission)      Scheduled Meds:cefTRIAXone (ROCEPHIN) 2,000 mg in sodium chloride 0.9 % 100 mL MBP, 2,000 mg, Intravenous, Q24H  escitalopram, 20 mg, Oral, Daily  gabapentin, 300 mg, Oral, BID  insulin glargine, 1-200 Units, Subcutaneous, Nightly - Glucommander  insulin lispro, 1-200 Units, Subcutaneous, 4x Daily With Meals & Nightly  methocarbamol, 750 mg, Oral, TID  montelukast, 10 mg, Oral, Nightly  pantoprazole, 40 mg, Intravenous, Q AM  pramipexole, 1 mg, Oral, Q12H  rosuvastatin, 20 mg, Oral, Nightly  sodium chloride, 10 mL, Intravenous, Q12H      Continuous Infusions:   PRN Meds:.  Calcium Replacement - Follow Nurse / BPA Driven Protocol    dextrose    dextrose    glucagon (human recombinant)    HYDROmorphone    insulin lispro    Magnesium Standard Dose Replacement - Follow Nurse / BPA Driven Protocol    midodrine    nitroglycerin    ondansetron ODT **OR** ondansetron    Phosphorus Replacement - Follow Nurse / BPA Driven Protocol    Potassium Replacement - Follow Nurse / BPA Driven Protocol    sodium chloride    [COMPLETED] Insert Peripheral IV **AND** sodium chloride    sodium chloride    sodium chloride    ALLERGIES:  Ambien  [zolpidem], Codeine, and Sulfa antibiotics    ROS:  The following systems were reviewed and negative;   Constitution:  No fevers, chills, no unintentional weight loss  Skin: no rash, no jaundice  Eyes:  No blurry vision, no eye pain  HENT:  No change in hearing or smell  Resp:  No dyspnea or cough  CV:  No chest pain or palpitations  :  No dysuria, hematuria  Musculoskeletal:  No leg cramps or arthralgias  Neuro:  No tremor, no numbness  Psych:  No depression or confusion    Objective     Vital Signs:   Vitals:    03/27/24 0200 03/27/24 0400 03/27/24 0605  03/27/24 0707   BP: 119/41 116/45 108/44 (!) 111/37   BP Location:   Left arm Left arm   Patient Position:    Sitting   Pulse: 71 67 73 72   Resp:  18 18 18   Temp:   98 °F (36.7 °C) 97.8 °F (36.6 °C)   TempSrc:   Oral Oral   SpO2: 94% 95% 95% 95%   Weight:       Height:           Physical Exam:       General Appearance:    Awake and alert, in no acute distress   Head:    Normocephalic, without obvious abnormality, atraumatic   Throat:   No oral lesions, no thrush, oral mucosa moist   Lungs:     Respirations regular, even and unlabored   Chest Wall:    No abnormalities observed   Abdomen:     Soft, epigastric/RUQ tenderness, no rebound or guarding, non-distended   Rectal:     Deferred   Extremities:   Moves all extremities, 4+ pitting edema of the bilateral lower extremities, no cyanosis   Pulses:   Pulses palpable and equal bilaterally   Skin:   No rash, no jaundice, normal palpation   Lymph nodes:   No cervical, supraclavicular or submandibular palpable adenopathy   Neurologic:   Cranial nerves 2 - 12 grossly intact, no asterixis       Results Review:   I reviewed the patient's labs and imaging.  CBC    Results from last 7 days   Lab Units 03/26/24  1620   WBC 10*3/mm3 6.35   HEMOGLOBIN g/dL 13.5   PLATELETS 10*3/mm3 159     CMP   Results from last 7 days   Lab Units 03/26/24  1620   SODIUM mmol/L 144   POTASSIUM mmol/L 3.9   CHLORIDE mmol/L 105   CO2 mmol/L 29.0   BUN mg/dL 20   CREATININE mg/dL 0.72   GLUCOSE mg/dL 100*   ALBUMIN g/dL 4.5   BILIRUBIN mg/dL 0.4   ALK PHOS U/L 105   AST (SGOT) U/L 33*   ALT (SGPT) U/L 31   LIPASE U/L 19     Cr Clearance Estimated Creatinine Clearance: 76 mL/min (by C-G formula based on SCr of 0.72 mg/dL).  Coag     HbA1C   Lab Results   Component Value Date    HGBA1C 6.5 (H) 08/18/2023    HGBA1C 6.30 (H) 08/10/2023    HGBA1C 6.7 (H) 03/01/2023     Blood Glucose   Glucose   Date/Time Value Ref Range Status   03/27/2024 0705 88 70 - 105 mg/dL Final     Comment:     Serial Number:  [id number]:  [id number]   03/26/2024 2126 85 70 - 105 mg/dL Final     Comment:     Serial Number: [id number]:  [id number]     Infection     UA    Results from last 7 days   Lab Units 03/26/24  1630   NITRITE UA  Positive*   WBC UA /HPF 11-20*   BACTERIA UA /HPF 2+*   SQUAM EPITHEL UA /HPF 0-2     Imaging Results (Last 72 Hours)       Procedure Component Value Units Date/Time    CT Abdomen Pelvis Without Contrast [[id number]] Collected: 03/26/24 1834     Updated: 03/26/24 1849    Narrative:      CT ABDOMEN PELVIS WO CONTRAST    Date of Exam: 3/26/2024 6:03 PM EDT    Indication: Abdominal pain, acute, nonlocalized.    Comparison: Contrast-enhanced CT of the abdomen and pelvis performed on June 6, 2021    Technique: Axial CT images were obtained of the abdomen and pelvis without the administration of contrast. Sagittal and coronal reconstructions were performed.  Automated exposure control and iterative reconstruction methods were used.      Findings:    Lung Bases:  The visualized lung bases and lower mediastinal structures are unremarkable.    Peritoneum:  No free intraperitoneal air or fluid.     Abdominal wall:  Unremarkable.    Liver:  Liver is normal in size and contour. No focal lesions.    Biliary/Gallbladder:  The gallbladder is normal without evidence of radiopaque gallstones. The biliary tree is nondilated.    Pancreas:  Pancreas is within normal limits. There is no evidence of pancreatic mass or peripancreatic inflammatory changes.    Spleen:  Spleen is normal in size and contour.    Gastrointestinal/Mesentery:   The stomach and duodenum appear within normal limits. Multiple mildly distended proximal and mid small bowel loops are visualized. A discrete transition point is not visualized. No inflammatory changes are seen. The appendix is not visualized. There are   no secondary signs of acute appendicitis. There is irregular wall thickening and luminal narrowing in the proximal ascending colon  measuring 3.6 cm. No definite evidence of obstruction.    Adrenals:  Adrenal glands are unremarkable.    Kidneys:  The kidneys are in anatomic position. No evidence of nephrolithiasis. No evidence of hydronephrosis or significant perinephric fat stranding.    Bladder:   The urinary bladder is unremarkable.    Reproductive organs:    The patient is post hysterectomy.    Lymph Nodes:  No significant adenopathy is identified.     Vasculature:  The patient is post aortobifemoral bypass grafting. The aorta is normal in caliber. Scattered atheromatous calcifications are visualized. Infrarenal IVC filter is visualized.    Bony Structures:    No acute fracture or aggressive lesions. Extensive thoracolumbar fixation hardware is visualized with multilevel posterior decompression. Patient is post bladder stimulator with the lead terminating in the left hemisacrum.        Impression:      Impression:  Mildly dilated proximal and mid small bowel loops without discrete transition point. Findings suggest ileus. Short-term follow-up abdominal radiographs would be of value.    There is irregular wall thickening and luminal narrowing in the proximal ascending colon measuring 3.6 cm in length. Cannot exclude colonic neoplasm. Correlation with colonoscopy is suggested.     IVC filter is present on imaging study. It is recommended that all patients with IVC filters in place have an active management care plan to monitor their IVC filter. If a care plan is not in place, patient should have a non emergent referral to an   interventional specialist for establishment of an IVC filter management care plan.            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    CT abdomen/pelvis WO shows mildly dilated small bowel loops without transition point suggestive of ileus, and irregular wall thickening and luminal narrowing in the proximal ascending colon.  CBC and CMP unremarkable.  2D echo has been ordered.  Results pending.  Plan bilateral lower extremity Dopplers.  Also plan MRI lumbar and thoracic spine.  If workup is unremarkable, could consider EGD/colonoscopy for further evaluation tomorrow.  Clear liquid diet.  Continue PPI.  Antiemetics/analgesics as needed.  Treatment of UTI per primary care team.  Supportive care.       I discussed the patients findings and my recommendations with the patient.  I will discuss the case with Dr. Zhang and change plan accordingly.    We appreciate the referral.    Electronically signed by CHANTALE Castaneda, 03/27/24, 10:57 AM EDT.

## 2024-03-27 NOTE — PROGRESS NOTES
"[facility] MEDICINE SERVICE  DAILY PROGRESS NOTE      Patient Name: Genny Soni  Date of Admission: 3/26/2024  Today's Date: 03/27/24  Length of Stay: 0  Primary Care Physician: Bonilla Rubio MD    Subjective   Patient is stable at this time.  She continues to have abdominal discomfort and nausea.  She has not passed any gas yet.  No other complaints.  No overnight events.      Objective    Temp:  [97.8 °F (36.6 °C)-98 °F (36.7 °C)] 97.8 °F (36.6 °C)  Heart Rate:  [67-78] 72  Resp:  [15-18] 18  BP: (108-154)/(30-55) 111/37  Physical Exam  General: NAD, AAOx3  HEENT: AT NC, EOMI  Neck: No JVD  CVS: S1/S2 present, RRR, no M/R/G  Lungs: CTA B/L  Abdomen: soft, tender to palpation diffusely, ND, BS+  Ext: bilateral lower extremity non pitting edema  Skin: no rashes, bruises or discolorations  Neuro: no focal deficits  Psych: Not agitated         Results Review:  I have reviewed the labs, radiology results, and diagnostic studies.    Laboratory Data:   Results from last 7 days   Lab Units 03/26/24  1620   WBC 10*3/mm3 6.35   HEMOGLOBIN g/dL 13.5   HEMATOCRIT % 43.5   PLATELETS 10*3/mm3 159        Results from last 7 days   Lab Units 03/26/24  1620   SODIUM mmol/L 144   POTASSIUM mmol/L 3.9   CHLORIDE mmol/L 105   CO2 mmol/L 29.0   BUN mg/dL 20   CREATININE mg/dL 0.72   CALCIUM mg/dL 10.0   BILIRUBIN mg/dL 0.4   ALK PHOS U/L 105   ALT (SGPT) U/L 31   AST (SGOT) U/L 33*   GLUCOSE mg/dL 100*       Culture Data:   No results found for: \"BLOODCX\"  No results found for: \"URINECX\"  No results found for: \"RESPCX\"  No results found for: \"WOUNDCX\"  No results found for: \"STOOLCX\"  No components found for: \"BODYFLD\"    Radiology Data:   Imaging Results (Last 24 Hours)       Procedure Component Value Units Date/Time    CT Abdomen Pelvis Without Contrast [[id number]] Collected: 03/26/24 1834     Updated: 03/26/24 1849    Narrative:      CT ABDOMEN PELVIS WO CONTRAST    Date of Exam: 3/26/2024 6:03 PM EDT    Indication: " Abdominal pain, acute, nonlocalized.    Comparison: Contrast-enhanced CT of the abdomen and pelvis performed on June 6, 2021    Technique: Axial CT images were obtained of the abdomen and pelvis without the administration of contrast. Sagittal and coronal reconstructions were performed.  Automated exposure control and iterative reconstruction methods were used.      Findings:    Lung Bases:  The visualized lung bases and lower mediastinal structures are unremarkable.    Peritoneum:  No free intraperitoneal air or fluid.     Abdominal wall:  Unremarkable.    Liver:  Liver is normal in size and contour. No focal lesions.    Biliary/Gallbladder:  The gallbladder is normal without evidence of radiopaque gallstones. The biliary tree is nondilated.    Pancreas:  Pancreas is within normal limits. There is no evidence of pancreatic mass or peripancreatic inflammatory changes.    Spleen:  Spleen is normal in size and contour.    Gastrointestinal/Mesentery:   The stomach and duodenum appear within normal limits. Multiple mildly distended proximal and mid small bowel loops are visualized. A discrete transition point is not visualized. No inflammatory changes are seen. The appendix is not visualized. There are   no secondary signs of acute appendicitis. There is irregular wall thickening and luminal narrowing in the proximal ascending colon measuring 3.6 cm. No definite evidence of obstruction.    Adrenals:  Adrenal glands are unremarkable.    Kidneys:  The kidneys are in anatomic position. No evidence of nephrolithiasis. No evidence of hydronephrosis or significant perinephric fat stranding.    Bladder:   The urinary bladder is unremarkable.    Reproductive organs:    The patient is post hysterectomy.    Lymph Nodes:  No significant adenopathy is identified.     Vasculature:  The patient is post aortobifemoral bypass grafting. The aorta is normal in caliber. Scattered atheromatous calcifications are visualized. Infrarenal IVC  filter is visualized.    Bony Structures:    No acute fracture or aggressive lesions. Extensive thoracolumbar fixation hardware is visualized with multilevel posterior decompression. Patient is post bladder stimulator with the lead terminating in the left hemisacrum.        Impression:      Impression:  Mildly dilated proximal and mid small bowel loops without discrete transition point. Findings suggest ileus. Short-term follow-up abdominal radiographs would be of value.    There is irregular wall thickening and luminal narrowing in the proximal ascending colon measuring 3.6 cm in length. Cannot exclude colonic neoplasm. Correlation with colonoscopy is suggested.     IVC filter is present on imaging study. It is recommended that all patients with IVC filters in place have an active management care plan to monitor their IVC filter. If a care plan is not in place, patient should have a non emergent referral to an   interventional specialist for establishment of an IVC filter management care plan.            Electronically Signed: Madi Dumont MD    3/26/2024 6:47 PM EDT    Workstation ID: [id number]            I have reviewed the patient's current medications.     Assessment/Plan     1) ileus  - GI consulted, recs pending  - possible colonoscopy later today  - NPO  - supportive care    2) UTI  - cultures pending  - rocephin    3) edema  - unclear etiology  - hold home meds for borderline BP  - compression wraps    4) HTN  - hold home meds for now    5) DM  - ISS, lantus, accuchecks, diet    6) HLD  - crestor    7) mood disorders  - home meds    8) hx DVT ppx  - hold eliquis for upcoming procedure    9) GI ppx  - protonix          Electronically signed by Jennifer Johnson MD, 03/27/24, 09:23 EDT.

## 2024-03-27 NOTE — CASE MANAGEMENT/SOCIAL WORK
Discharge Planning Assessment   [facility]     Patient Name: Genny Soni  MRN: [id number]  Today's Date: 3/27/2024    Admit Date: 3/26/2024    Plan: Routine home, pending PT/OT lanny.   Discharge Needs Assessment       Row Name 03/27/24 1107       Living Environment    People in Home alone    Current Living Arrangements home    Potentially Unsafe Housing Conditions none    In the past 12 months has the electric, gas, oil, or water company threatened to shut off services in your home? No    Primary Care Provided by self    Provides Primary Care For no one    Family Caregiver if Needed child(wes), adult    Family Caregiver Names Brian-Son    Quality of Family Relationships helpful;involved;supportive    Able to Return to Prior Arrangements yes       Resource/Environmental Concerns    Resource/Environmental Concerns none    Transportation Concerns none       Transportation Needs    In the past 12 months, has lack of transportation kept you from medical appointments or from getting medications? no    In the past 12 months, has lack of transportation kept you from meetings, work, or from getting things needed for daily living? No       Food Insecurity    Within the past 12 months, you worried that your food would run out before you got the money to buy more. Never true    Within the past 12 months, the food you bought just didn't last and you didn't have money to get more. Never true       Transition Planning    Patient/Family Anticipates Transition to home with family    Patient/Family Anticipated Services at Transition none    Transportation Anticipated family or friend will provide       Discharge Needs Assessment    Readmission Within the Last 30 Days no previous admission in last 30 days    Equipment Currently Used at Home walker, standard;cane, straight;bp cuff;glucometer    Concerns to be Addressed discharge planning    Anticipated Changes Related to Illness none    Equipment Needed After Discharge none                    Discharge Plan       Row Name 03/27/24 1109       Plan    Plan Routine home, pending PT/OT eval.    Patient/Family in Agreement with Plan yes    Plan Comments Patient lives at home by herself. Patient does drive and her sister, Maryam, will transport at discharge. Patient can do IADL. PCP and pharmacy confirmed. Patient wishes to be enrolled in the meds to bed program at this time, CM will update this in the chart. Denies financial asisstance needs for medications and/or food. Denies PT and/or HH needs at this time. Discharge barriers: GI following, NPO, IV abx, IV push meds, possible colonscopy today 3/27, 2L NC, blood cx pending, urine cx pending.              Demographic Summary       Row Name 03/27/24 110       General Information    Admission Type inpatient    Arrived From emergency department    Required Notices Provided Important Message from Medicare    Referral Source admission list    Reason for Consult discharge planning    Preferred Language English       Contact Information    Permission Granted to Share Info With                    Functional Status       Row Name 03/27/24 1102       Functional Status    Usual Activity Tolerance moderate    Current Activity Tolerance moderate       Functional Status, IADL    Medications independent    Meal Preparation independent    Housekeeping independent    Laundry independent    Shopping independent                 Met with patient in room wearing PPE: mask.    Maintained distance greater than six feet and spent less than 15 minutes in the room.     Deann Hernandez RN

## 2024-03-28 ENCOUNTER — APPOINTMENT (OUTPATIENT)
Dept: MRI IMAGING | Facility: HOSPITAL | Age: 74
DRG: 389 | End: 2024-03-28
Payer: MEDICARE

## 2024-03-28 ENCOUNTER — ANESTHESIA EVENT (OUTPATIENT)
Dept: GASTROENTEROLOGY | Facility: HOSPITAL | Age: 74
End: 2024-03-28
Payer: MEDICARE

## 2024-03-28 ENCOUNTER — INPATIENT HOSPITAL (OUTPATIENT)
Dept: URBAN - METROPOLITAN AREA HOSPITAL 84 | Facility: HOSPITAL | Age: 74
End: 2024-03-28
Payer: COMMERCIAL

## 2024-03-28 ENCOUNTER — ANESTHESIA (OUTPATIENT)
Dept: GASTROENTEROLOGY | Facility: HOSPITAL | Age: 74
End: 2024-03-28
Payer: MEDICARE

## 2024-03-28 ENCOUNTER — APPOINTMENT (OUTPATIENT)
Dept: CARDIOLOGY | Facility: HOSPITAL | Age: 74
DRG: 389 | End: 2024-03-28
Payer: MEDICARE

## 2024-03-28 DIAGNOSIS — D12.4 BENIGN NEOPLASM OF DESCENDING COLON: ICD-10-CM

## 2024-03-28 DIAGNOSIS — R11.2 NAUSEA WITH VOMITING, UNSPECIFIED: ICD-10-CM

## 2024-03-28 DIAGNOSIS — K20.80 OTHER ESOPHAGITIS WITHOUT BLEEDING: ICD-10-CM

## 2024-03-28 DIAGNOSIS — K63.9 DISEASE OF INTESTINE, UNSPECIFIED: ICD-10-CM

## 2024-03-28 DIAGNOSIS — K57.30 DIVERTICULOSIS OF LARGE INTESTINE WITHOUT PERFORATION OR ABS: ICD-10-CM

## 2024-03-28 DIAGNOSIS — K29.70 GASTRITIS, UNSPECIFIED, WITHOUT BLEEDING: ICD-10-CM

## 2024-03-28 LAB
ALBUMIN SERPL-MCNC: 3.7 G/DL (ref 3.5–5.2)
ALBUMIN/GLOB SERPL: 2.5 G/DL
ALP SERPL-CCNC: 82 U/L (ref 39–117)
ALT SERPL W P-5'-P-CCNC: 32 U/L (ref 1–33)
ANION GAP SERPL CALCULATED.3IONS-SCNC: 7 MMOL/L (ref 5–15)
AST SERPL-CCNC: 43 U/L (ref 1–32)
BACTERIA SPEC AEROBE CULT: ABNORMAL
BASOPHILS # BLD AUTO: 0 10*3/MM3 (ref 0–0.2)
BASOPHILS NFR BLD AUTO: 0 % (ref 0–1.5)
BH CV ECHO LEFT VENTRICLE GLOBAL LONGITUDINAL STRAIN: -18.8 %
BH CV ECHO MEAS - ACS: 2 CM
BH CV ECHO MEAS - AO MAX PG: 10.5 MMHG
BH CV ECHO MEAS - AO MEAN PG: 6 MMHG
BH CV ECHO MEAS - AO ROOT DIAM: 3.3 CM
BH CV ECHO MEAS - AO V2 MAX: 162 CM/SEC
BH CV ECHO MEAS - AO V2 VTI: 32.9 CM
BH CV ECHO MEAS - AVA(I,D): 2.3 CM2
BH CV ECHO MEAS - EDV(CUBED): 85.2 ML
BH CV ECHO MEAS - EDV(MOD-SP4): 68.9 ML
BH CV ECHO MEAS - EF(MOD-BP): 60 %
BH CV ECHO MEAS - EF(MOD-SP4): 60.4 %
BH CV ECHO MEAS - ESV(CUBED): 24.4 ML
BH CV ECHO MEAS - ESV(MOD-SP4): 27.3 ML
BH CV ECHO MEAS - FS: 34.1 %
BH CV ECHO MEAS - IVS/LVPW: 0.8 CM
BH CV ECHO MEAS - IVSD: 0.8 CM
BH CV ECHO MEAS - LA DIMENSION: 4.4 CM
BH CV ECHO MEAS - LAT PEAK E' VEL: 11.4 CM/SEC
BH CV ECHO MEAS - LV DIASTOLIC VOL/BSA (35-75): 34 CM2
BH CV ECHO MEAS - LV MASS(C)D: 128 GRAMS
BH CV ECHO MEAS - LV MAX PG: 7.3 MMHG
BH CV ECHO MEAS - LV MEAN PG: 4 MMHG
BH CV ECHO MEAS - LV SYSTOLIC VOL/BSA (12-30): 13.5 CM2
BH CV ECHO MEAS - LV V1 MAX: 135 CM/SEC
BH CV ECHO MEAS - LV V1 VTI: 29.7 CM
BH CV ECHO MEAS - LVIDD: 4.4 CM
BH CV ECHO MEAS - LVIDS: 2.9 CM
BH CV ECHO MEAS - LVOT AREA: 2.5 CM2
BH CV ECHO MEAS - LVOT DIAM: 1.8 CM
BH CV ECHO MEAS - LVPWD: 1 CM
BH CV ECHO MEAS - MED PEAK E' VEL: 10.2 CM/SEC
BH CV ECHO MEAS - MR MAX PG: 59.3 MMHG
BH CV ECHO MEAS - MR MAX VEL: 385 CM/SEC
BH CV ECHO MEAS - MV A MAX VEL: 116 CM/SEC
BH CV ECHO MEAS - MV DEC SLOPE: 330 CM/SEC2
BH CV ECHO MEAS - MV DEC TIME: 0.22 SEC
BH CV ECHO MEAS - MV E MAX VEL: 131 CM/SEC
BH CV ECHO MEAS - MV E/A: 1.13
BH CV ECHO MEAS - MV MAX PG: 5.9 MMHG
BH CV ECHO MEAS - MV MEAN PG: 3 MMHG
BH CV ECHO MEAS - MV P1/2T: 107.4 MSEC
BH CV ECHO MEAS - MV V2 VTI: 36.5 CM
BH CV ECHO MEAS - MVA(P1/2T): 2.05 CM2
BH CV ECHO MEAS - MVA(VTI): 2.07 CM2
BH CV ECHO MEAS - PA ACC TIME: 0.11 SEC
BH CV ECHO MEAS - PA V2 MAX: 128 CM/SEC
BH CV ECHO MEAS - PULM A REVS DUR: 0.11 SEC
BH CV ECHO MEAS - PULM A REVS VEL: 28.1 CM/SEC
BH CV ECHO MEAS - PULM DIAS VEL: 41.7 CM/SEC
BH CV ECHO MEAS - PULM S/D: 1.89
BH CV ECHO MEAS - PULM SYS VEL: 78.9 CM/SEC
BH CV ECHO MEAS - RV MAX PG: 5.1 MMHG
BH CV ECHO MEAS - RV V1 MAX: 113 CM/SEC
BH CV ECHO MEAS - RV V1 VTI: 27.4 CM
BH CV ECHO MEAS - SI(MOD-SP4): 20.5 ML/M2
BH CV ECHO MEAS - SV(LVOT): 75.6 ML
BH CV ECHO MEAS - SV(MOD-SP4): 41.6 ML
BH CV ECHO MEAS - TAPSE (>1.6): 2.22 CM
BH CV ECHO MEASUREMENTS AVERAGE E/E' RATIO: 12.13
BH CV XLRA - RV BASE: 2.9 CM
BH CV XLRA - RV MID: 2.7 CM
BH CV XLRA - TDI S': 14.4 CM/SEC
BILIRUB SERPL-MCNC: 0.3 MG/DL (ref 0–1.2)
BUN SERPL-MCNC: 9 MG/DL (ref 8–23)
BUN/CREAT SERPL: 15.5 (ref 7–25)
CALCIUM SPEC-SCNC: 8.2 MG/DL (ref 8.6–10.5)
CHLORIDE SERPL-SCNC: 107 MMOL/L (ref 98–107)
CO2 SERPL-SCNC: 28 MMOL/L (ref 22–29)
CREAT SERPL-MCNC: 0.58 MG/DL (ref 0.57–1)
DEPRECATED RDW RBC AUTO: 54.6 FL (ref 37–54)
EGFRCR SERPLBLD CKD-EPI 2021: 93.9 ML/MIN/1.73
EOSINOPHIL # BLD AUTO: 0.05 10*3/MM3 (ref 0–0.4)
EOSINOPHIL NFR BLD AUTO: 1.6 % (ref 0.3–6.2)
ERYTHROCYTE [DISTWIDTH] IN BLOOD BY AUTOMATED COUNT: 14.2 % (ref 12.3–15.4)
GLOBULIN UR ELPH-MCNC: 1.5 GM/DL
GLUCOSE BLDC GLUCOMTR-MCNC: 100 MG/DL (ref 70–105)
GLUCOSE BLDC GLUCOMTR-MCNC: 148 MG/DL (ref 70–105)
GLUCOSE BLDC GLUCOMTR-MCNC: 188 MG/DL (ref 70–105)
GLUCOSE BLDC GLUCOMTR-MCNC: 207 MG/DL (ref 70–105)
GLUCOSE BLDC GLUCOMTR-MCNC: 96 MG/DL (ref 70–105)
GLUCOSE SERPL-MCNC: 111 MG/DL (ref 65–99)
HCT VFR BLD AUTO: 35.4 % (ref 34–46.6)
HGB BLD-MCNC: 10.8 G/DL (ref 12–15.9)
IMM GRANULOCYTES # BLD AUTO: 0.01 10*3/MM3 (ref 0–0.05)
IMM GRANULOCYTES NFR BLD AUTO: 0.3 % (ref 0–0.5)
LEFT ATRIUM VOLUME INDEX: 34.6 ML/M2
LYMPHOCYTES # BLD AUTO: 0.74 10*3/MM3 (ref 0.7–3.1)
LYMPHOCYTES NFR BLD AUTO: 24 % (ref 19.6–45.3)
MCH RBC QN AUTO: 31.6 PG (ref 26.6–33)
MCHC RBC AUTO-ENTMCNC: 30.5 G/DL (ref 31.5–35.7)
MCV RBC AUTO: 103.5 FL (ref 79–97)
MONOCYTES # BLD AUTO: 0.32 10*3/MM3 (ref 0.1–0.9)
MONOCYTES NFR BLD AUTO: 10.4 % (ref 5–12)
NEUTROPHILS NFR BLD AUTO: 1.96 10*3/MM3 (ref 1.7–7)
NEUTROPHILS NFR BLD AUTO: 63.7 % (ref 42.7–76)
NRBC BLD AUTO-RTO: 0 /100 WBC (ref 0–0.2)
PLATELET # BLD AUTO: 106 10*3/MM3 (ref 140–450)
PMV BLD AUTO: 9.6 FL (ref 6–12)
POTASSIUM SERPL-SCNC: 3.5 MMOL/L (ref 3.5–5.2)
PROT SERPL-MCNC: 5.2 G/DL (ref 6–8.5)
RBC # BLD AUTO: 3.42 10*6/MM3 (ref 3.77–5.28)
SINUS: 2.6 CM
SODIUM SERPL-SCNC: 142 MMOL/L (ref 136–145)
WBC NRBC COR # BLD AUTO: 3.08 10*3/MM3 (ref 3.4–10.8)

## 2024-03-28 PROCEDURE — 25810000003 SODIUM CHLORIDE 0.9 % SOLUTION

## 2024-03-28 PROCEDURE — 25010000002 PROPOFOL 500 MG/50ML EMULSION

## 2024-03-28 PROCEDURE — 0DJ08ZZ INSPECTION OF UPPER INTESTINAL TRACT, VIA NATURAL OR ARTIFICIAL OPENING ENDOSCOPIC: ICD-10-PCS | Performed by: INTERNAL MEDICINE

## 2024-03-28 PROCEDURE — 43235 EGD DIAGNOSTIC BRUSH WASH: CPT | Performed by: INTERNAL MEDICINE

## 2024-03-28 PROCEDURE — 93356 MYOCRD STRAIN IMG SPCKL TRCK: CPT

## 2024-03-28 PROCEDURE — 25010000002 HYDROMORPHONE 1 MG/ML SOLUTION

## 2024-03-28 PROCEDURE — 72148 MRI LUMBAR SPINE W/O DYE: CPT

## 2024-03-28 PROCEDURE — 93306 TTE W/DOPPLER COMPLETE: CPT

## 2024-03-28 PROCEDURE — 88305 TISSUE EXAM BY PATHOLOGIST: CPT | Performed by: INTERNAL MEDICINE

## 2024-03-28 PROCEDURE — 82948 REAGENT STRIP/BLOOD GLUCOSE: CPT | Performed by: STUDENT IN AN ORGANIZED HEALTH CARE EDUCATION/TRAINING PROGRAM

## 2024-03-28 PROCEDURE — 45382 COLONOSCOPY W/CONTROL BLEED: CPT | Mod: 59 | Performed by: INTERNAL MEDICINE

## 2024-03-28 PROCEDURE — 25010000002 ONDANSETRON PER 1 MG: Performed by: STUDENT IN AN ORGANIZED HEALTH CARE EDUCATION/TRAINING PROGRAM

## 2024-03-28 PROCEDURE — 72146 MRI CHEST SPINE W/O DYE: CPT

## 2024-03-28 PROCEDURE — 25010000002 HYDROMORPHONE 1 MG/ML SOLUTION: Performed by: INTERNAL MEDICINE

## 2024-03-28 PROCEDURE — 85025 COMPLETE CBC W/AUTO DIFF WBC: CPT | Performed by: NURSE PRACTITIONER

## 2024-03-28 PROCEDURE — 25010000002 CEFTRIAXONE PER 250 MG: Performed by: STUDENT IN AN ORGANIZED HEALTH CARE EDUCATION/TRAINING PROGRAM

## 2024-03-28 PROCEDURE — 93306 TTE W/DOPPLER COMPLETE: CPT | Performed by: INTERNAL MEDICINE

## 2024-03-28 PROCEDURE — 45385 COLONOSCOPY W/LESION REMOVAL: CPT | Performed by: INTERNAL MEDICINE

## 2024-03-28 PROCEDURE — 80053 COMPREHEN METABOLIC PANEL: CPT | Performed by: NURSE PRACTITIONER

## 2024-03-28 PROCEDURE — 25010000002 HYDROMORPHONE 1 MG/ML SOLUTION: Performed by: STUDENT IN AN ORGANIZED HEALTH CARE EDUCATION/TRAINING PROGRAM

## 2024-03-28 PROCEDURE — 25010000002 PROPOFOL 200 MG/20ML EMULSION

## 2024-03-28 PROCEDURE — 25010000002 PHENYLEPHRINE 10 MG/ML SOLUTION

## 2024-03-28 PROCEDURE — 82948 REAGENT STRIP/BLOOD GLUCOSE: CPT

## 2024-03-28 PROCEDURE — 0W3P8ZZ CONTROL BLEEDING IN GASTROINTESTINAL TRACT, VIA NATURAL OR ARTIFICIAL OPENING ENDOSCOPIC: ICD-10-PCS | Performed by: INTERNAL MEDICINE

## 2024-03-28 PROCEDURE — 63710000001 INSULIN GLARGINE PER 5 UNITS: Performed by: INTERNAL MEDICINE

## 2024-03-28 PROCEDURE — 93356 MYOCRD STRAIN IMG SPCKL TRCK: CPT | Performed by: INTERNAL MEDICINE

## 2024-03-28 PROCEDURE — 63710000001 INSULIN LISPRO (HUMAN) PER 5 UNITS: Performed by: INTERNAL MEDICINE

## 2024-03-28 PROCEDURE — 0DBM8ZX EXCISION OF DESCENDING COLON, VIA NATURAL OR ARTIFICIAL OPENING ENDOSCOPIC, DIAGNOSTIC: ICD-10-PCS | Performed by: INTERNAL MEDICINE

## 2024-03-28 DEVICE — DEV CLIP ENDO RESOLUTION360 CONTRL ROT 235CM: Type: IMPLANTABLE DEVICE | Site: DESCENDING COLON | Status: FUNCTIONAL

## 2024-03-28 RX ORDER — SODIUM CHLORIDE 9 MG/ML
INJECTION, SOLUTION INTRAVENOUS CONTINUOUS PRN
Status: DISCONTINUED | OUTPATIENT
Start: 2024-03-28 | End: 2024-03-28 | Stop reason: SURG

## 2024-03-28 RX ORDER — PROPOFOL 10 MG/ML
INJECTION, EMULSION INTRAVENOUS AS NEEDED
Status: DISCONTINUED | OUTPATIENT
Start: 2024-03-28 | End: 2024-03-28 | Stop reason: SURG

## 2024-03-28 RX ORDER — SORBITOL SOLUTION 70 %
50 SOLUTION, ORAL MISCELLANEOUS ONCE
Status: COMPLETED | OUTPATIENT
Start: 2024-03-28 | End: 2024-03-28

## 2024-03-28 RX ORDER — LIDOCAINE HYDROCHLORIDE 20 MG/ML
INJECTION, SOLUTION INFILTRATION; PERINEURAL AS NEEDED
Status: DISCONTINUED | OUTPATIENT
Start: 2024-03-28 | End: 2024-03-28 | Stop reason: SURG

## 2024-03-28 RX ORDER — PROPOFOL 10 MG/ML
INJECTION, EMULSION INTRAVENOUS CONTINUOUS PRN
Status: DISCONTINUED | OUTPATIENT
Start: 2024-03-28 | End: 2024-03-28 | Stop reason: SURG

## 2024-03-28 RX ORDER — PHENYLEPHRINE HYDROCHLORIDE 10 MG/ML
INJECTION INTRAVENOUS AS NEEDED
Status: DISCONTINUED | OUTPATIENT
Start: 2024-03-28 | End: 2024-03-28 | Stop reason: SURG

## 2024-03-28 RX ORDER — ONDANSETRON 4 MG/1
4 TABLET, ORALLY DISINTEGRATING ORAL EVERY 6 HOURS PRN
Status: DISCONTINUED | OUTPATIENT
Start: 2024-03-28 | End: 2024-03-29 | Stop reason: HOSPADM

## 2024-03-28 RX ORDER — ONDANSETRON 2 MG/ML
4 INJECTION INTRAMUSCULAR; INTRAVENOUS EVERY 6 HOURS PRN
Status: DISCONTINUED | OUTPATIENT
Start: 2024-03-28 | End: 2024-03-29 | Stop reason: HOSPADM

## 2024-03-28 RX ADMIN — PHENYLEPHRINE HYDROCHLORIDE 100 MCG: 10 INJECTION INTRAVENOUS at 15:34

## 2024-03-28 RX ADMIN — HYDROMORPHONE HYDROCHLORIDE 0.5 MG: 1 INJECTION, SOLUTION INTRAMUSCULAR; INTRAVENOUS; SUBCUTANEOUS at 05:51

## 2024-03-28 RX ADMIN — CEFTRIAXONE 2000 MG: 2 INJECTION, POWDER, FOR SOLUTION INTRAMUSCULAR; INTRAVENOUS at 08:32

## 2024-03-28 RX ADMIN — METHOCARBAMOL 750 MG: 750 TABLET, FILM COATED ORAL at 21:04

## 2024-03-28 RX ADMIN — MONTELUKAST 10 MG: 10 TABLET, FILM COATED ORAL at 21:04

## 2024-03-28 RX ADMIN — HYDROMORPHONE HYDROCHLORIDE 0.5 MG: 1 INJECTION, SOLUTION INTRAMUSCULAR; INTRAVENOUS; SUBCUTANEOUS at 16:00

## 2024-03-28 RX ADMIN — METHOCARBAMOL 750 MG: 750 TABLET, FILM COATED ORAL at 17:12

## 2024-03-28 RX ADMIN — HYDROMORPHONE HYDROCHLORIDE 0.5 MG: 1 INJECTION, SOLUTION INTRAMUSCULAR; INTRAVENOUS; SUBCUTANEOUS at 21:05

## 2024-03-28 RX ADMIN — PRAMIPEXOLE DIHYDROCHLORIDE 1 MG: 1 TABLET ORAL at 21:04

## 2024-03-28 RX ADMIN — HYDROMORPHONE HYDROCHLORIDE 0.5 MG: 1 INJECTION, SOLUTION INTRAMUSCULAR; INTRAVENOUS; SUBCUTANEOUS at 03:10

## 2024-03-28 RX ADMIN — GABAPENTIN 300 MG: 300 CAPSULE ORAL at 21:04

## 2024-03-28 RX ADMIN — ONDANSETRON 4 MG: 2 INJECTION INTRAMUSCULAR; INTRAVENOUS at 12:09

## 2024-03-28 RX ADMIN — PROPOFOL 20 MG: 10 INJECTION, EMULSION INTRAVENOUS at 15:38

## 2024-03-28 RX ADMIN — SODIUM SULFATE, POTASSIUM SULFATE, MAGNESIUM SULFATE 1 BOTTLE: 17.5; 3.13; 1.6 SOLUTION, CONCENTRATE ORAL at 05:51

## 2024-03-28 RX ADMIN — INSULIN LISPRO 6 UNITS: 100 INJECTION, SOLUTION INTRAVENOUS; SUBCUTANEOUS at 18:36

## 2024-03-28 RX ADMIN — HYDROMORPHONE HYDROCHLORIDE 0.5 MG: 1 INJECTION, SOLUTION INTRAMUSCULAR; INTRAVENOUS; SUBCUTANEOUS at 18:40

## 2024-03-28 RX ADMIN — ONDANSETRON 4 MG: 2 INJECTION INTRAMUSCULAR; INTRAVENOUS at 03:15

## 2024-03-28 RX ADMIN — SORBITOL SOLUTION (BULK) 50 ML: 70 SOLUTION at 08:32

## 2024-03-28 RX ADMIN — ROSUVASTATIN CALCIUM 20 MG: 10 TABLET, FILM COATED ORAL at 21:04

## 2024-03-28 RX ADMIN — LIDOCAINE HYDROCHLORIDE 40 MG: 20 INJECTION, SOLUTION INFILTRATION; PERINEURAL at 15:16

## 2024-03-28 RX ADMIN — SODIUM CHLORIDE: 9 INJECTION, SOLUTION INTRAVENOUS at 15:10

## 2024-03-28 RX ADMIN — Medication 10 ML: at 08:40

## 2024-03-28 RX ADMIN — PROPOFOL 50 MG: 10 INJECTION, EMULSION INTRAVENOUS at 15:16

## 2024-03-28 RX ADMIN — INSULIN GLARGINE 20 UNITS: 100 INJECTION, SOLUTION SUBCUTANEOUS at 21:32

## 2024-03-28 RX ADMIN — Medication 10 ML: at 21:05

## 2024-03-28 RX ADMIN — PHENYLEPHRINE HYDROCHLORIDE 200 MCG: 10 INJECTION INTRAVENOUS at 15:43

## 2024-03-28 RX ADMIN — PANTOPRAZOLE SODIUM 40 MG: 40 INJECTION, POWDER, FOR SOLUTION INTRAVENOUS at 05:52

## 2024-03-28 RX ADMIN — PROPOFOL 100 MCG/KG/MIN: 10 INJECTION, EMULSION INTRAVENOUS at 15:16

## 2024-03-28 RX ADMIN — HYDROMORPHONE HYDROCHLORIDE 0.5 MG: 1 INJECTION, SOLUTION INTRAMUSCULAR; INTRAVENOUS; SUBCUTANEOUS at 09:00

## 2024-03-28 RX ADMIN — HYDROMORPHONE HYDROCHLORIDE 0.5 MG: 1 INJECTION, SOLUTION INTRAMUSCULAR; INTRAVENOUS; SUBCUTANEOUS at 12:09

## 2024-03-28 NOTE — ANESTHESIA PREPROCEDURE EVALUATION
Anesthesia Evaluation     Patient summary reviewed and Nursing notes reviewed   NPO Solid Status: > 8 hours  NPO Liquid Status: > 8 hours           Airway   Mallampati: II  TM distance: >3 FB  Neck ROM: full  No difficulty expected  Dental - normal exam     Pulmonary - normal exam   (+) pneumonia , pulmonary embolism,shortness of breath, sleep apnea  Cardiovascular - normal exam    ECG reviewed    (+) hypertension, PVD, DVT, hyperlipidemia      Neuro/Psych  (+) dizziness/light headedness, tremors, numbness, psychiatric history  GI/Hepatic/Renal/Endo    (+) renal disease-, diabetes mellitus    Musculoskeletal     Abdominal  - normal exam    Bowel sounds: normal.   Substance History      OB/GYN          Other   arthritis,     ROS/Med Hx Other: Sinus rhythm  Abnormal T, consider ischemia, lateral leads          Impression  Structurally and functionally normal cardiac valves except for mild mitral and tricuspid regurgitation..  Mild left atrial enlargement.  Normal left ventricular diastolic function.  Left ventricular size and contractility is normal with ejection fraction of 60%.                  Anesthesia Plan    ASA 3     general     intravenous induction     Anesthetic plan, risks, benefits, and alternatives have been provided, discussed and informed consent has been obtained with: patient.    Plan discussed with CRNA and CAA.      CODE STATUS:    Code Status (Patient has no pulse and is not breathing): CPR (Attempt to Resuscitate)  Medical Interventions (Patient has pulse or is breathing): Full Support

## 2024-03-28 NOTE — SIGNIFICANT NOTE
03/28/24 1227   Rehab Time/Intention   Session Not Performed patient unavailable for treatment  (getting ready for colonoscopy)   Recommendation   PT - Next Appointment 03/29/24

## 2024-03-28 NOTE — ANESTHESIA POSTPROCEDURE EVALUATION
Patient: Genny Soni    Procedure Summary       Date: 03/28/24 Room / Location: [facility] ENDOSCOPY 1 / [facility] ENDOSCOPY    Anesthesia Start: 1510 Anesthesia Stop: 1548    Procedures:       COLONOSCOPY with cold snare polypectomy x1 and endoscopic clipping of polypectomy site x2      ESOPHAGOGASTRODUODENOSCOPY Diagnosis:       Nausea and vomiting, unspecified vomiting type      Colonic thickening      (Nausea and vomiting, unspecified vomiting type [R11.2])      (Colonic thickening [K63.9])    Surgeons: Janell Zhang MD Provider: Lalo Pulido MD    Anesthesia Type: general, MAC ASA Status: 3            Anesthesia Type: general, MAC    Vitals  Vitals Value Taken Time   /38 03/28/24 1605   Temp     Pulse 67 03/28/24 1609   Resp 14 03/28/24 1605   SpO2 90 % 03/28/24 1609   Vitals shown include unfiled device data.        Post Anesthesia Care and Evaluation    Patient location during evaluation: PACU  Patient participation: complete - patient participated  Level of consciousness: awake  Pain scale: See nurse's notes for pain score.  Pain management: adequate    Airway patency: patent  Anesthetic complications: No anesthetic complications  PONV Status: none  Cardiovascular status: acceptable  Respiratory status: acceptable and spontaneous ventilation  Hydration status: acceptable    Comments: Patient seen and examined postoperatively; vital signs stable; SpO2 greater than or equal to 90%; cardiopulmonary status stable; nausea/vomiting adequately controlled; pain adequately controlled; no apparent anesthesia complications; patient discharged from anesthesia care when discharge criteria were met

## 2024-03-28 NOTE — CASE MANAGEMENT/SOCIAL WORK
Continued Stay Note  DANIELITO Stinson     Patient Name: Genny Soni  MRN: [id number]  Today's Date: 3/28/2024    Admit Date: 3/26/2024    Plan: Return home. Pt requesting outpt PT with [facility] on [address] (order obtained and faxed via [facility] to [facility].)   Discharge Plan       Row Name 03/28/24 1248       Plan    Plan Return home. Pt requesting outpt PT with [facility] on [address] (order obtained and faxed via [facility] to [facility].)                        Kim Elizabeth RN      Office phone: [phone]  Office fax: [phone]

## 2024-03-28 NOTE — OP NOTE
COLONOSCOPY, ESOPHAGOGASTRODUODENOSCOPY Procedure Report    Patient Name:  Genny Soni  YOB: 1950    Date of Surgery:  3/28/2024     Pre-Op Diagnosis:  Epigastric and right upper quadrant abdominal pain  Nausea and vomiting, unspecified vomiting type [R11.2]  Colonic thickening [K63.9]    Post Op Diagnosis:  Esophagitis, gastritis, colon polyp, diverticulosis      Procedure/CPT® Codes:      Procedure(s):  COLONOSCOPY with cold snare polypectomy x1 and endoscopic clipping of polypectomy site x2  ESOPHAGOGASTRODUODENOSCOPY    Staff:  Surgeon(s):  Janell Zhang MD         Anesthesia: Monitored Anesthesia Care    Implants:    Implant Name Type Inv. Item Serial No.  Lot No. LRB No. Used Action   DEV CLIP ENDO [id number] CONTRL ROT 235CM - [id number] Implant DEV CLIP ENDO [id number] CONTRL ROT 235CM  [employer] [id number] N/A 1 Implanted   DEV CLIP ENDO [id number] CONTRL ROT 235CM - [id number] Implant DEV CLIP ENDO [id number] CONTRL ROT 235CM  [employer] [id number] N/A 1 Implanted       Specimen:        See Below    No blood loss    Complications:  None     Description of Procedure:  Informed consent was obtained for the procedure, including sedation.  Risks of perforation, hemorrhage, adverse drug reaction and aspiration were discussed.  The patient was brought into the endoscopy suite. Continuous cardiopulmonary monitoring was performed. The patient was placed in the left lateral decubitus position.  The bite block was inserted into the patient's mouth. After adequate sedation was attained, the Olympus gastroscope was inserted into the patient's mouth and advanced to the second portion of the duodenum without difficulty.  Circumferential examination was performed. A retroflex exam was performed in the patient's stomach.  On completion of the exam, the bowel was decompressed, the scope was removed from the patient, the patient tolerated the procedure  well, there were no immediate post-operative complications.     Examination of the esophagus showed LA grade a erosive esophagitis  Examination of the stomach showed nonerosive gastritis.  Retroflex examination of the stomach was normal   Examination of the duodenum showed normal mucosa    Subsequently, the colonoscopy was performed with the patient in the left lateral decubitus position. The digital rectal exam was performed which was normal.  Subsequently, the pediatric Olympus colonoscope was inserted into the patient's rectum and advanced to the level of the cecum without difficulty.  The bowel prep was good.  Circumferential examination of the patient's colon was performed on scope withdrawal.  A retroflex exam was performed in the rectum which showed normal mucosa.  The bowel was decompressed, the scope was withdrawn from the patient, and the patient tolerated the procedure well. There were no immediate post-operative complications.     Findings:    Normal mucosa in the right colon.  No mass or colitis.  Descending colon polyp x 1, 5 mm in size, removed in single piece fashion with cold snare polypectomy.  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

## 2024-03-28 NOTE — DISCHARGE PLACEMENT REQUEST
"Levy Gross (74 y.o. Female)       Date of Birth   1950    Social Security Number       Address   [address] [place] [facility]    Home Phone   [phone]    MRN   [id number]       Nondenominational   Amish    Marital Status                               Admission Date   3/26/24    Admission Type   Emergency    Admitting Provider   Kathi Alejandra DO    Attending Provider   Fatmata Voss MD    Department, Room/Bed   [facility] 3A MEDICAL INPATIENT, 302/1       Discharge Date       Discharge Disposition       Discharge Destination                                 Attending Provider: Fatmata Voss MD    Allergies: Ambien  [Zolpidem], Codeine, Sulfa Antibiotics    Isolation: None   Infection: None   Code Status: CPR    Ht: 162.6 cm (64\")   Wt: 98.6 kg (217 lb 6 oz)    Admission Cmt: None   Principal Problem: Colonic thickening [K63.9]                   Active Insurance as of 3/26/2024       Primary Coverage       Payor Plan Insurance Group Employer/Plan Group    ANTHEM MEDICARE REPLACEMENT AN[employer]M MEDICARE ADVANTAGE [id number]       Payor Plan Address Payor Plan Phone Number Payor Plan Fax Number Effective Dates    [address] [phone]  4/1/2022 - None Entered    [facility] [address]         Subscriber Name Subscriber Birth Date Member ID       LEVY GROSS 1950 [id number]                     Emergency Contacts        (Rel.) Home Phone Work Phone Mobile Phone    ABIOLAJOHN (Son) [phone] -- [phone]    Chris Montesinos (Grandchild) -- -- [phone]    AISHA CEDENO (Relative) [phone] -- --                "

## 2024-03-28 NOTE — PROGRESS NOTES
[facility] MEDICINE SERVICE  DAILY PROGRESS NOTE      Patient Name: Genny Soni  Date of Admission: 3/26/2024  Today's Date: 03/28/24  Length of Stay: 1  Primary Care Physician: Bonilla Rubio MD    Subjective   Patient is stable at this time.  She continues to have abdominal discomfort and nausea.  She has not passed any gas yet.  No other complaints.  No overnight events.  3/28  Patient admitted with ileus  Having bowel movement overnight was using the bedside commode earlier today  Patient with pain in the upper abdomen radiating to her back  CAT scan with ileus and thickening of the ascending colon cannot rule out neoplasm  P patient scheduled for colonoscopy and EGD today  Patient had some spinal surgery done 3 months ago  Pt use walker and mri spie no acute abn  Will cancel neurosurgery consukt as pt use walker in ambulating at home and had previous surgery with no incontinece ,only issue is mid back pin      Objective    Temp:  [96.7 °F (35.9 °C)-98.8 °F (37.1 °C)] 98 °F (36.7 °C)  Heart Rate:  [70-84] 80  Resp:  [13-24] 19  BP: (117-157)/(38-65) 157/48  Physical Exam  General: NAD, AAOx3  HEENT: AT NC, EOMI  Neck: No JVD  CVS: S1/S2 present, RRR, no M/R/G  Lungs: CTA B/L  Abdomen: soft, tender to palpation diffusely, ND, BS+  Ext: bilateral lower extremity non pitting edema  Skin: no rashes, bruises or discolorations  Neuro: no focal deficits  Psych: Not agitated         Results Review:  I have reviewed the labs, radiology results, and diagnostic studies.    Laboratory Data:   Results from last 7 days   Lab Units 03/28/24  0538 03/27/24  1101 03/26/24  1620   WBC 10*3/mm3 3.08* 4.56 6.35   HEMOGLOBIN g/dL 10.8* 11.6* 13.5   HEMATOCRIT % 35.4 38.4 43.5   PLATELETS 10*3/mm3 106* 116* 159        Results from last 7 days   Lab Units 03/28/24  0538 03/27/24  1101 03/26/24  1620   SODIUM mmol/L 142 142 144   POTASSIUM mmol/L 3.5 3.9 3.9   CHLORIDE mmol/L 107 106 105   CO2 mmol/L 28.0 27.0 29.0   BUN  "mg/dL 9 20 20   CREATININE mg/dL 0.58 0.72 0.72   CALCIUM mg/dL 8.2* 8.6 10.0   BILIRUBIN mg/dL 0.3  --  0.4   ALK PHOS U/L 82  --  105   ALT (SGPT) U/L 32  --  31   AST (SGOT) U/L 43*  --  33*   GLUCOSE mg/dL 111* 91 100*       Culture Data:   Blood Culture   Date Value Ref Range Status   03/26/2024 No growth at 24 hours  Preliminary   03/26/2024 No growth at 24 hours  Preliminary     Urine Culture   Date Value Ref Range Status   03/26/2024 >100,000 CFU/mL Escherichia coli (A)  Final     No results found for: \"RESPCX\"  No results found for: \"WOUNDCX\"  No results found for: \"STOOLCX\"  No components found for: \"BODYFLD\"    Radiology Data:   Imaging Results (Last 24 Hours)       ** No results found for the last 24 hours. **            I have reviewed the patient's current medications.     Assessment/Plan     1) ileus  - GI consulted, recs pending  - possible colonoscopy later today  - NPO  - supportive care    2) UTI  - cultures pending  - rocephin    3) edema  - unclear etiology  - hold home meds for borderline BP  - compression wraps    4) HTN  - hold home meds for now    5) DM  - ISS, lantus, accuchecks, diet    6) HLD  - crestor    7) mood disorders  - home meds    8) hx DVT ppx  - hold eliquis for upcoming procedure    9) GI ppx  - protonix          Electronically signed by Fatmata Voss MD, 03/28/24, 10:58 EDT.    "

## 2024-03-28 NOTE — PLAN OF CARE
Goal Outcome Evaluation:  Plan of Care Reviewed With: patient        Progress: no change  Outcome Evaluation: Patient A&Ox4. Patient vitals stable during shift. Patient pain well controlled with PRN pain meds. Patient ambulates well with assist of one and walker. Patient using BSC. Patient noted to have mult BM's during shift. Patient awaiting colonoscopy today.

## 2024-03-28 NOTE — DISCHARGE PLACEMENT REQUEST
"Levy Gross (74 y.o. Female)       Date of Birth   1950    Social Security Number       Address   [address] [place] [address]    Home Phone   [phone]    MRN   [id number]       Rastafarian   Latter-day    Marital Status                               Admission Date   3/26/24    Admission Type   Emergency    Admitting Provider   Kathi Alejandra DO    Attending Provider   Fatmata Voss MD    Department, Room/Bed   [address] MEDICAL INPATIENT, 302/       Discharge Date       Discharge Disposition       Discharge Destination                                 Attending Provider: Fatmata Voss MD    Allergies: Ambien  [Zolpidem], Codeine, Sulfa Antibiotics    Isolation: None   Infection: None   Code Status: CPR    Ht: 162.6 cm (64\")   Wt: 98.6 kg (217 lb 6 oz)    Admission Cmt: None   Principal Problem: Colonic thickening [K63.9]                   Active Insurance as of 3/26/2024       Primary Coverage       Payor Plan Insurance Group Employer/Plan Group    ANTHEM MEDICARE REPLACEMENT ANTH MEDICARE [facility]AGE [id number]       Payor Plan Address Payor Plan Phone Number Payor Plan Fax Number Effective Dates    [address] [phone]  2022 - None Entered    [facility] [address]         Subscriber Name Subscriber Birth Date Member ID       LEVY GROSS 1950 [id number]                     Emergency Contacts        (Rel.) Home Phone Work Phone Mobile Phone    ABIOLASILVANAJOHN (Son) [phone] -- [phone]    Chris Montesinos (Grandchild) -- -- [phone]    AISHA CEDENO (Relative) [phone] -- --              [address] MEDICAL INPATIENT  [address] [facility] [place] [address]  Phone:  [phone]  Fax:  [phone] Date: Mar 28, 2024      Ambulatory Referral to Physical Therapy Evaluate and treat     Patient:  Levy Gross MRN:  [id number]   [address] [facility] [place] [address] :  1950  SSN:    Phone: " [phone] Sex:  F      INSURANCE PAYOR PLAN GROUP # SUBSCRIBER ID   Primary:    [employer] MEDICARE REPLACEMENT [id number]      Referring Provider Information:  HARRIS VOSS Phone: [phone] Fax: [phone]       Referral Information:   # Visits:  1 Referral Type: Physical Therapy [AE1]   Urgency:  Routine Referral Reason: Specialty Services Required   Start Date: Mar 28, 2024 End Date:  To be determined by Insurer   Diagnosis: Exertional dyspnea (R06.09 [ICD-10-CM] 786.09 [ICD-9-CM])      Refer to Dept:   Refer to Provider:   Refer to Provider Phone:   Refer to Facility:       Specialty needed: Evaluate and treat  Follow-up needed: Yes     This document serves as a request of services and does not constitute Insurance authorization or approval of services.  To determine eligibility, please contact the members Insurance carrier to verify and review coverage.     If you have medical questions regarding this request for services. Please contact [address] MEDICAL INPATIENT at [phone] during normal business hours.        Verbal Order Mode: Verbal with readback   Authorizing Provider: Harris Voss MD  Authorizing Provider's NPI: [id number]     Order Entered By: Kim Elizabeth RN 3/28/2024 12:04 PM     Electronically signed by: Harris Voss MD 3/28/2024 12:13 PM

## 2024-03-29 ENCOUNTER — READMISSION MANAGEMENT (OUTPATIENT)
Dept: CALL CENTER | Facility: HOSPITAL | Age: 74
End: 2024-03-29
Payer: MEDICARE

## 2024-03-29 VITALS
RESPIRATION RATE: 12 BRPM | TEMPERATURE: 98 F | DIASTOLIC BLOOD PRESSURE: 49 MMHG | BODY MASS INDEX: 37.45 KG/M2 | WEIGHT: 219.36 LBS | OXYGEN SATURATION: 93 % | HEIGHT: 64 IN | SYSTOLIC BLOOD PRESSURE: 136 MMHG | HEART RATE: 65 BPM

## 2024-03-29 LAB
ANION GAP SERPL CALCULATED.3IONS-SCNC: 8 MMOL/L (ref 5–15)
BUN SERPL-MCNC: 11 MG/DL (ref 8–23)
BUN/CREAT SERPL: 16.7 (ref 7–25)
CALCIUM SPEC-SCNC: 7.9 MG/DL (ref 8.6–10.5)
CHLORIDE SERPL-SCNC: 111 MMOL/L (ref 98–107)
CO2 SERPL-SCNC: 26 MMOL/L (ref 22–29)
CREAT SERPL-MCNC: 0.66 MG/DL (ref 0.57–1)
DEPRECATED RDW RBC AUTO: 54.6 FL (ref 37–54)
EGFRCR SERPLBLD CKD-EPI 2021: 91 ML/MIN/1.73
ERYTHROCYTE [DISTWIDTH] IN BLOOD BY AUTOMATED COUNT: 14.3 % (ref 12.3–15.4)
FERRITIN SERPL-MCNC: 112 NG/ML (ref 13–150)
FOLATE SERPL-MCNC: 9.22 NG/ML (ref 4.78–24.2)
GLUCOSE BLDC GLUCOMTR-MCNC: 152 MG/DL (ref 70–105)
GLUCOSE BLDC GLUCOMTR-MCNC: 164 MG/DL (ref 70–105)
GLUCOSE BLDC GLUCOMTR-MCNC: 221 MG/DL (ref 70–105)
GLUCOSE BLDC GLUCOMTR-MCNC: 281 MG/DL (ref 70–105)
GLUCOSE SERPL-MCNC: 186 MG/DL (ref 65–99)
HAPTOGLOB SERPL-MCNC: 152 MG/DL (ref 30–200)
HCT VFR BLD AUTO: 34.3 % (ref 34–46.6)
HGB BLD-MCNC: 10.4 G/DL (ref 12–15.9)
IRON 24H UR-MRATE: 27 MCG/DL (ref 37–145)
IRON 24H UR-MRATE: 27 MCG/DL (ref 37–145)
IRON SATN MFR SERPL: 7 % (ref 20–50)
LDH SERPL-CCNC: 226 U/L (ref 135–214)
MCH RBC QN AUTO: 31.6 PG (ref 26.6–33)
MCHC RBC AUTO-ENTMCNC: 30.3 G/DL (ref 31.5–35.7)
MCV RBC AUTO: 104.3 FL (ref 79–97)
PLATELET # BLD AUTO: 107 10*3/MM3 (ref 140–450)
PMV BLD AUTO: 9.2 FL (ref 6–12)
POTASSIUM SERPL-SCNC: 3.4 MMOL/L (ref 3.5–5.2)
POTASSIUM SERPL-SCNC: 4.3 MMOL/L (ref 3.5–5.2)
QT INTERVAL: 402 MS
QTC INTERVAL: 445 MS
RBC # BLD AUTO: 3.29 10*6/MM3 (ref 3.77–5.28)
RETICS # AUTO: 0.09 10*6/MM3 (ref 0.02–0.13)
RETICS/RBC NFR AUTO: 2.67 % (ref 0.7–1.9)
SODIUM SERPL-SCNC: 145 MMOL/L (ref 136–145)
TIBC SERPL-MCNC: 370 MCG/DL (ref 298–536)
TRANSFERRIN SERPL-MCNC: 248 MG/DL (ref 200–360)
VIT B12 BLD-MCNC: 439 PG/ML (ref 211–946)
WBC NRBC COR # BLD AUTO: 3.26 10*3/MM3 (ref 3.4–10.8)

## 2024-03-29 PROCEDURE — 83010 ASSAY OF HAPTOGLOBIN QUANT: CPT | Performed by: PHYSICIAN ASSISTANT

## 2024-03-29 PROCEDURE — 80048 BASIC METABOLIC PNL TOTAL CA: CPT | Performed by: STUDENT IN AN ORGANIZED HEALTH CARE EDUCATION/TRAINING PROGRAM

## 2024-03-29 PROCEDURE — 82728 ASSAY OF FERRITIN: CPT | Performed by: PHYSICIAN ASSISTANT

## 2024-03-29 PROCEDURE — 82948 REAGENT STRIP/BLOOD GLUCOSE: CPT

## 2024-03-29 PROCEDURE — 83615 LACTATE (LD) (LDH) ENZYME: CPT | Performed by: PHYSICIAN ASSISTANT

## 2024-03-29 PROCEDURE — 25010000002 HYDROMORPHONE 1 MG/ML SOLUTION: Performed by: INTERNAL MEDICINE

## 2024-03-29 PROCEDURE — 82607 VITAMIN B-12: CPT | Performed by: PHYSICIAN ASSISTANT

## 2024-03-29 PROCEDURE — 63710000001 INSULIN LISPRO (HUMAN) PER 5 UNITS: Performed by: INTERNAL MEDICINE

## 2024-03-29 PROCEDURE — 85045 AUTOMATED RETICULOCYTE COUNT: CPT | Performed by: PHYSICIAN ASSISTANT

## 2024-03-29 PROCEDURE — 84466 ASSAY OF TRANSFERRIN: CPT | Performed by: PHYSICIAN ASSISTANT

## 2024-03-29 PROCEDURE — 97116 GAIT TRAINING THERAPY: CPT

## 2024-03-29 PROCEDURE — 85027 COMPLETE CBC AUTOMATED: CPT | Performed by: INTERNAL MEDICINE

## 2024-03-29 PROCEDURE — 83540 ASSAY OF IRON: CPT | Performed by: PHYSICIAN ASSISTANT

## 2024-03-29 PROCEDURE — 82746 ASSAY OF FOLIC ACID SERUM: CPT | Performed by: PHYSICIAN ASSISTANT

## 2024-03-29 PROCEDURE — 82948 REAGENT STRIP/BLOOD GLUCOSE: CPT | Performed by: INTERNAL MEDICINE

## 2024-03-29 PROCEDURE — 25010000002 CEFTRIAXONE PER 250 MG: Performed by: INTERNAL MEDICINE

## 2024-03-29 PROCEDURE — 97530 THERAPEUTIC ACTIVITIES: CPT

## 2024-03-29 PROCEDURE — 84132 ASSAY OF SERUM POTASSIUM: CPT | Performed by: INTERNAL MEDICINE

## 2024-03-29 RX ORDER — POTASSIUM CHLORIDE 20 MEQ/1
40 TABLET, EXTENDED RELEASE ORAL EVERY 4 HOURS
Status: COMPLETED | OUTPATIENT
Start: 2024-03-29 | End: 2024-03-29

## 2024-03-29 RX ORDER — FERROUS SULFATE 324(65)MG
324 TABLET, DELAYED RELEASE (ENTERIC COATED) ORAL
Status: DISCONTINUED | OUTPATIENT
Start: 2024-03-30 | End: 2024-03-29 | Stop reason: HOSPADM

## 2024-03-29 RX ORDER — ACETAMINOPHEN 325 MG/1
650 TABLET ORAL EVERY 8 HOURS PRN
Status: DISCONTINUED | OUTPATIENT
Start: 2024-03-29 | End: 2024-03-29 | Stop reason: HOSPADM

## 2024-03-29 RX ORDER — OXYCODONE HYDROCHLORIDE 5 MG/1
10 TABLET ORAL EVERY 8 HOURS PRN
Status: DISCONTINUED | OUTPATIENT
Start: 2024-03-29 | End: 2024-03-29 | Stop reason: HOSPADM

## 2024-03-29 RX ADMIN — INSULIN LISPRO 8 UNITS: 100 INJECTION, SOLUTION INTRAVENOUS; SUBCUTANEOUS at 11:19

## 2024-03-29 RX ADMIN — POTASSIUM CHLORIDE 40 MEQ: 1500 TABLET, EXTENDED RELEASE ORAL at 08:42

## 2024-03-29 RX ADMIN — PANTOPRAZOLE SODIUM 40 MG: 40 INJECTION, POWDER, FOR SOLUTION INTRAVENOUS at 05:21

## 2024-03-29 RX ADMIN — METHOCARBAMOL 750 MG: 750 TABLET, FILM COATED ORAL at 08:42

## 2024-03-29 RX ADMIN — HYDROMORPHONE HYDROCHLORIDE 0.5 MG: 1 INJECTION, SOLUTION INTRAMUSCULAR; INTRAVENOUS; SUBCUTANEOUS at 05:21

## 2024-03-29 RX ADMIN — HYDROMORPHONE HYDROCHLORIDE 0.5 MG: 1 INJECTION, SOLUTION INTRAMUSCULAR; INTRAVENOUS; SUBCUTANEOUS at 01:39

## 2024-03-29 RX ADMIN — GABAPENTIN 300 MG: 300 CAPSULE ORAL at 08:43

## 2024-03-29 RX ADMIN — CEFTRIAXONE 2000 MG: 2 INJECTION, POWDER, FOR SOLUTION INTRAMUSCULAR; INTRAVENOUS at 08:43

## 2024-03-29 RX ADMIN — Medication 10 ML: at 08:43

## 2024-03-29 RX ADMIN — INSULIN LISPRO 2 UNITS: 100 INJECTION, SOLUTION INTRAVENOUS; SUBCUTANEOUS at 14:53

## 2024-03-29 RX ADMIN — POTASSIUM CHLORIDE 40 MEQ: 1500 TABLET, EXTENDED RELEASE ORAL at 11:19

## 2024-03-29 RX ADMIN — ESCITALOPRAM OXALATE 20 MG: 10 TABLET ORAL at 08:43

## 2024-03-29 RX ADMIN — PRAMIPEXOLE DIHYDROCHLORIDE 1 MG: 1 TABLET ORAL at 08:43

## 2024-03-29 RX ADMIN — OXYCODONE 10 MG: 5 TABLET ORAL at 09:58

## 2024-03-29 NOTE — PLAN OF CARE
Goal Outcome Evaluation:                 PRN pain meds were given. Pt was able to rest. Call light within reach. Plan of care ongoing. Pt is waiting in Neuro consult to be completed.

## 2024-03-29 NOTE — PLAN OF CARE
"Assessment: Genny Soni presents with functional mobility impairments which indicate the need for skilled intervention. Pt with improving upright endurance and progressed with gait x80 ft with Rwx, SBA, and SaO2 >92% with room air despite pt's reports of feeling some dyspnea. Tolerating session today without incident. Will continue to follow and progress as tolerated.     Plan/Recommendations:   If medically appropriate, Low Intensity Therapy recommended post-acute care - This is recommended as therapy feels this patient would require 2-3 visits per week. OP or HH would be the best option depending on patient's home bound status. Consider, if the patient has other  \"skilled\" needs such as wounds, IV antibiotics, etc. Combined with \"low intensity\" could also equate to a SNF. If patient is medically complex, consider LTAC. Pt requires rolling walker at discharge.     Pt desires Outpatient therapy at discharge. Pt cooperative; agreeable to therapeutic recommendations and plan of care.                                               "

## 2024-03-29 NOTE — PROGRESS NOTES
[facility] MEDICINE SERVICE  DAILY PROGRESS NOTE      Patient Name: Genny Soni  Date of Admission: 3/26/2024  Today's Date: 03/29/24  Length of Stay: 2  Primary Care Physician: Bonilla Rubio MD    Subjective   Patient is stable at this time.  She continues to have abdominal discomfort and nausea.  She has not passed any gas yet.  No other complaints.  No overnight events.  3/28  Patient admitted with ileus  Having bowel movement overnight was using the bedside commode earlier today  Patient with pain in the upper abdomen radiating to her back  CAT scan with ileus and thickening of the ascending colon cannot rule out neoplasm  P patient scheduled for colonoscopy and EGD today  Patient had some spinal surgery done 3 months ago  Pt use walker and mri spie no acute abn  Will cancel neurosurgery consukt as pt use walker in ambulating at home and had previous surgery with no incontinece ,only issue is mid back pin    3/29 mri Evaluation of the soft tissues is limited due to extensive hardware artifact from prior thoracolumbar fusion. In the thoracic spine, focally edematous spondylotic marrow endplate changes are seen at the adjacent T8 and T9 levels, potentially a source of   acute axial mid back pain.     No definite fracture, new malalignment or other acute findings in the lumbar spine. The spinal canal is suboptimally evaluated but grossly patent.  Case discussed with RN  Patient had colonoscopy and endoscopy with finding of polyp but no source of pain  Her pain is mostly neuropathic  Patient had fall event couple months ago and has followed up with neurosurgery  That happened after the spinal fusion  Discussed with the PA from neurosurgery there is no need for any intervention at this point based on MRI  Patient cannot tolerate Lyrica has caused her to be swollen  She is already on gabapentin  I will add Percocet for pain control  Patient is pancytopenic we will get hematology oncology consult for  "further recommendation  Pt seen per oncolgy ,cleared for dc fu with oncology  Objective    Temp:  [97.8 °F (36.6 °C)-98.7 °F (37.1 °C)] 97.8 °F (36.6 °C)  Heart Rate:  [57-76] 57  Resp:  [12-19] 19  BP: (100-132)/(31-50) 129/50  Physical Exam  General: NAD, AAOx3  HEENT: AT NC, EOMI  Neck: No JVD  CVS: S1/S2 present, RRR, no M/R/G  Lungs: CTA B/L  Abdomen: soft, tender to palpation diffusely, ND, BS+  Ext: bilateral lower extremity non pitting edema  Skin: no rashes, bruises or discolorations  Neuro: no focal deficits  Psych: Not agitated         Results Review:  I have reviewed the labs, radiology results, and diagnostic studies.    Laboratory Data:   Results from last 7 days   Lab Units 03/29/24  0131 03/28/24  0538 03/27/24  1101   WBC 10*3/mm3 3.26* 3.08* 4.56   HEMOGLOBIN g/dL 10.4* 10.8* 11.6*   HEMATOCRIT % 34.3 35.4 38.4   PLATELETS 10*3/mm3 107* 106* 116*        Results from last 7 days   Lab Units 03/29/24  0131 03/28/24  0538 03/27/24  1101 03/26/24  1620   SODIUM mmol/L 145 142 142 144   POTASSIUM mmol/L 3.4* 3.5 3.9 3.9   CHLORIDE mmol/L 111* 107 106 105   CO2 mmol/L 26.0 28.0 27.0 29.0   BUN mg/dL 11 9 20 20   CREATININE mg/dL 0.66 0.58 0.72 0.72   CALCIUM mg/dL 7.9* 8.2* 8.6 10.0   BILIRUBIN mg/dL  --  0.3  --  0.4   ALK PHOS U/L  --  82  --  105   ALT (SGPT) U/L  --  32  --  31   AST (SGOT) U/L  --  43*  --  33*   GLUCOSE mg/dL 186* 111* 91 100*       Culture Data:   Blood Culture   Date Value Ref Range Status   03/26/2024 No growth at 2 days  Preliminary   03/26/2024 No growth at 2 days  Preliminary     Urine Culture   Date Value Ref Range Status   03/26/2024 >100,000 CFU/mL Escherichia coli (A)  Final     No results found for: \"RESPCX\"  No results found for: \"WOUNDCX\"  No results found for: \"STOOLCX\"  No components found for: \"BODYFLD\"    Radiology Data:   Imaging Results (Last 24 Hours)       Procedure Component Value Units Date/Time    MRI Lumbar Spine Without Contrast [[id number]] Collected: " 03/28/24 1342     Updated: 03/28/24 1356    Narrative:      MRI THORACIC SPINE WO CONTRAST, MRI LUMBAR SPINE WO CONTRAST    Date of Exam: 3/28/2024 12:37 PM EDT    Indication: Mid-back pain.     Comparison: CT  October 6, 2023.    Technique:  Routine multiplanar/multisequence sequence images of the thoracic spine were obtained without contrast administration.      Findings:  Thoracic spine: There are redemonstrated postoperative changes from extensive thoracolumbar fusion spanning T10 through the sacrum, with somewhat limited evaluation of the fused levels due to hardware artifact. Some edematous spondylotic endplate changes   are seen at the adjacent T8 and T9 levels, otherwise without evidence of fracture or suspicious marrow replacing lesion. There is mild exaggeration of the usual thoracic kyphosis, otherwise without listhesis or subluxation. The thoracic spinal cord   demonstrates no definite areas of intramedullary signal abnormality, accounting for limited visualization through the fused levels. Spondylosis changes are present in the upper thoracic spine, generally mild and without associated spinal canal or   neuroforaminal impingement.    Lumbar spine: Operative changes from prior posterior fusion extend through the S1 level, with evaluation significantly limited by extensive hardware artifact. There is no definite fracture, new malalignment or aggressive osseous lesion noted. The conus   medullaris and cauda equina nerve roots are poorly visualized. The spinal canal appears patent throughout. There is no definite high-grade neuroforaminal narrowing. Prior compression deformity at L2 is noted with absent pedicle fixation at this level.   The paraspinal soft tissues demonstrate no acute or suspicious findings with favored benign small fluid collection seen along the mid lumbar pedicle screws.      Impression:      Impression:  Evaluation of the soft tissues is limited due to extensive hardware artifact from  prior thoracolumbar fusion. In the thoracic spine, focally edematous spondylotic marrow endplate changes are seen at the adjacent T8 and T9 levels, potentially a source of   acute axial mid back pain.    No definite fracture, new malalignment or other acute findings in the lumbar spine. The spinal canal is suboptimally evaluated but grossly patent.        Electronically Signed: Abdirahman Quinn MD    3/28/2024 1:54 PM EDT    Workstation ID: [id number]    MRI Thoracic Spine Without Contrast [[id number]] Collected: 03/28/24 1342     Updated: 03/28/24 1356    Narrative:      MRI THORACIC SPINE WO CONTRAST, MRI LUMBAR SPINE WO CONTRAST    Date of Exam: 3/28/2024 12:37 PM EDT    Indication: Mid-back pain.     Comparison: CT  October 6, 2023.    Technique:  Routine multiplanar/multisequence sequence images of the thoracic spine were obtained without contrast administration.      Findings:  Thoracic spine: There are redemonstrated postoperative changes from extensive thoracolumbar fusion spanning T10 through the sacrum, with somewhat limited evaluation of the fused levels due to hardware artifact. Some edematous spondylotic endplate changes   are seen at the adjacent T8 and T9 levels, otherwise without evidence of fracture or suspicious marrow replacing lesion. There is mild exaggeration of the usual thoracic kyphosis, otherwise without listhesis or subluxation. The thoracic spinal cord   demonstrates no definite areas of intramedullary signal abnormality, accounting for limited visualization through the fused levels. Spondylosis changes are present in the upper thoracic spine, generally mild and without associated spinal canal or   neuroforaminal impingement.    Lumbar spine: Operative changes from prior posterior fusion extend through the S1 level, with evaluation significantly limited by extensive hardware artifact. There is no definite fracture, new malalignment or aggressive osseous lesion noted. The conus   medullaris and  cauda equina nerve roots are poorly visualized. The spinal canal appears patent throughout. There is no definite high-grade neuroforaminal narrowing. Prior compression deformity at L2 is noted with absent pedicle fixation at this level.   The paraspinal soft tissues demonstrate no acute or suspicious findings with favored benign small fluid collection seen along the mid lumbar pedicle screws.      Impression:      Impression:  Evaluation of the soft tissues is limited due to extensive hardware artifact from prior thoracolumbar fusion. In the thoracic spine, focally edematous spondylotic marrow endplate changes are seen at the adjacent T8 and T9 levels, potentially a source of   acute axial mid back pain.    No definite fracture, new malalignment or other acute findings in the lumbar spine. The spinal canal is suboptimally evaluated but grossly patent.        Electronically Signed: Abdirahman Quinn MD    3/28/2024 1:54 PM EDT    Workstation ID: [id number]            I have reviewed the patient's current medications.     Assessment/Plan     1) ileus  - GI consulted, recs pending  - possible colonoscopy later today  - NPO  - supportive care    2) UTI  - cultures pending  - rocephin    3) edema  - unclear etiology  - hold home meds for borderline BP  - compression wraps    4) HTN  - hold home meds for now    5) DM  - ISS, lantus, accuchecks, diet    6) HLD  - crestor    7) mood disorders  - home meds    8) hx DVT ppx  - hold eliquis for upcoming procedure    9) GI ppx  - protonix          Electronically signed by Fatmata Voss MD, 03/29/24, 09:20 EDT.

## 2024-03-29 NOTE — CONSULTS
Hematology/Oncology Inpatient Consultation    Patient name: Genny Soni  : 1950  MRN: [id number]  Referring Provider: Dr. Voss  Reason for Consultation: Pancytopenia    Chief complaint: Abdominal pain    History of present illness:    Genny Soni is a 74 y.o. female who presented to [facility] on 3/26/2024 with complaints of abdominal pain radiating to the back.  She states symptoms have been present for over 3 months, but they worsened to the point where she had vomiting.  She denies diarrhea, melena, hemoptysis, hematochezia, chest pain.  In the ED urinalysis showed UTI.  CT imaging showed ileus complicated by wall thickening and luminal narrowing in the proximal ascending colon measuring 3.6 cm, neoplasm was not excluded.  Patient also reports 3-week history of bilateral lower extremity edema.  She denies any known heart disease.  Duplex ultrasound lower extremities was normal.    3/26/2024 CT abdomen pelvis  Impression:  Mildly dilated proximal and mid small bowel loops without discrete transition point. Findings suggest ileus. Short-term follow-up abdominal radiographs would be of value.     There is irregular wall thickening and luminal narrowing in the proximal ascending colon measuring 3.6 cm in length. Cannot exclude colonic neoplasm. Correlation with colonoscopy is suggested.      IVC filter is present on imaging study. It is recommended that all patients with IVC filters in place have an active management care plan to monitor their IVC filter. If a care plan is not in place, patient should have a non emergent referral to an   interventional specialist for establishment of an IVC filter management care plan.    Gastroenterology was consulted and she completed colonoscopy on 3/28/2024.  This showed no mass or colitis.  There was descending colon polyp.  Esophagitis and gastritis was noted as well.  GI considering pain is neuropathic related to thoracic spine disease.       3/28/2024 MRI thoracic and lumbar spine  Impression:  Evaluation of the soft tissues is limited due to extensive hardware artifact from prior thoracolumbar fusion. In the thoracic spine, focally edematous spondylotic marrow endplate changes are seen at the adjacent T8 and T9 levels, potentially a source of   acute axial mid back pain.  No definite fracture, new malalignment or other acute findings in the lumbar spine. The spinal canal is suboptimally evaluated but grossly patent.    3/29/24: EGD and Colonoscopy:  Findings:    Normal mucosa in the right colon.  No mass or colitis.  Descending colon polyp x 1, 5 mm in size, removed in single piece fashion with cold snare polypectomy.  Patient is more blood than would be expected, probably due to her recent use of Eliquis, so 2 hemoclips were applied for hemostasis  Sigmoid diverticulosis without diverticulitis       03/29/24  Hematology/Oncology was consulted for pancytopenia.  CBC today with WBC 3.26, hemoglobin 10.4, platelets 107.    He/She  has a past medical history of Arthritis, Benign essential hypertension (7/12/2018), Diabetes mellitus, Elevated cholesterol, H/O blood clots, History of transfusion, Hyperlipidemia, Low back pain, Nausea and vomiting (3/26/2024), and Sleep apnea.    PCP: Bonilla Rubio MD    INTERVAL HISTORY: patient seen today for initial evaluation. She reported  having bowel movement overnight with improvement in abd distension and pain. She is status post EGD and colonoscopy today, doing well following the procedure. Neurosurgery following the patient for thoracic spine neurological symptoms. She appears interested in returning to home today.    History:  Past Medical History:   Diagnosis Date    Arthritis     Benign essential hypertension 7/12/2018    Diabetes mellitus     Elevated cholesterol     H/O blood clots     History of transfusion     Hyperlipidemia     Low back pain     Nausea and vomiting 3/26/2024    Sleep apnea    ,    Past Surgical History:   Procedure Laterality Date    BACK SURGERY      COLONOSCOPY N/A 3/28/2024    Procedure: COLONOSCOPY with cold snare polypectomy x1 and endoscopic clipping of polypectomy site x2;  Surgeon: Janell Zhang MD;  Location: [facility] ENDOSCOPY;  Service: Gastroenterology;  Laterality: N/A;  Post- colon polyp    ENDOSCOPY N/A 3/28/2024    Procedure: ESOPHAGOGASTRODUODENOSCOPY;  Surgeon: Janell Zhang MD;  Location: [facility] ENDOSCOPY;  Service: Gastroenterology;  Laterality: N/A;  Post- Esophagitis, gastritis    FEMORAL DISTAL BYPASS      HYSTERECTOMY      JOINT REPLACEMENT      LUMBAR LAMINECTOMY WITH FUSION N/A 5/28/2021    Procedure: Lumbar 5 Smith procedure.  Lumbar 5 6 and sacral 1 transpedicular Solera screws and rods.  Posterior fusion with autologous bone.;  Surgeon: Gagan Aguilar MD;  Location: [facility] MAIN OR;  Service: Neurosurgery;  Laterality: N/A;   , History reviewed. No pertinent family history.,   Social History     Tobacco Use    Smoking status: Former    Smokeless tobacco: Never   Vaping Use    Vaping status: Never Used   Substance Use Topics    Alcohol use: Not Currently    Drug use: Never   ,   Medications Prior to Admission   Medication Sig Dispense Refill Last Dose    apixaban (ELIQUIS) 5 MG tablet tablet Take 1 tablet by mouth 2 (Two) Times a Day.       bumetanide (BUMEX) 1 MG tablet Take 1 tablet by mouth 2 (Two) Times a Day.       denosumab (Prolia) 60 MG/ML solution prefilled syringe syringe Inject 1 mL under the skin into the appropriate area as directed Every 6 (Six) Months.       escitalopram (LEXAPRO) 20 MG tablet Take 1 tablet by mouth Daily.       fluticasone (FLONASE) 50 MCG/ACT nasal spray 2 sprays into the nostril(s) as directed by provider Daily.       gabapentin (NEURONTIN) 300 MG capsule Take 1 capsule by mouth 2 (Two) Times a Day.       glipizide (GLUCOTROL XL) 10 MG 24 hr tablet Take 1 tablet by mouth Daily.       Insulin Glargine, 1 Unit Dial,  (TOUJEO) 300 UNIT/ML solution pen-injector injection Inject 100 Units under the skin into the appropriate area as directed Every Night.       losartan (COZAAR) 25 MG tablet Take 1 tablet by mouth Daily.       methocarbamol (ROBAXIN) 750 MG tablet Take 1 tablet by mouth 3 (Three) Times a Day.       metOLazone (ZAROXOLYN) 2.5 MG tablet Take 1 tablet by mouth Daily As Needed.       montelukast (SINGULAIR) 10 MG tablet Take 1 tablet by mouth Every Night.       naloxone (NARCAN) 4 MG/0.1ML nasal spray Call 911. Don't prime. Crowder in 1 nostril for overdose. Repeat in 2-3 minutes in other nostril if no or minimal breathing/responsiveness. 2 each 0     ondansetron (ZOFRAN) 4 MG tablet Take 1 tablet by mouth Every 6 (Six) Hours As Needed for Nausea or Vomiting.       oxyCODONE-acetaminophen (PERCOCET)  MG per tablet Take 1 tablet by mouth Every 8 (Eight) Hours As Needed for Moderate Pain.       polyethylene glycol (MIRALAX) 17 g packet Take 17 g by mouth Daily As Needed.       potassium chloride (KLOR-CON M20) 20 MEQ CR tablet Take 1 tablet by mouth 3 (Three) Times a Day.       pramipexole (MIRAPEX) 1 MG tablet Take 1 tablet by mouth 2 (Two) Times a Day.       primidone (MYSOLINE) 50 MG tablet Take 0.5 tablets by mouth Every Night.       rosuvastatin (CRESTOR) 20 MG tablet Take 1 tablet by mouth Every Night.       vitamin D (ERGOCALCIFEROL) 1.25 MG (28468 UT) capsule capsule Take 1 capsule by mouth 1 (One) Time Per Week. Thursdays      , Scheduled Meds:  cefTRIAXone (ROCEPHIN) 2,000 mg in sodium chloride 0.9 % 100 mL MBP, 2,000 mg, Intravenous, Q24H  escitalopram, 20 mg, Oral, Daily  gabapentin, 300 mg, Oral, BID  insulin glargine, 1-200 Units, Subcutaneous, Nightly - Glucommander  insulin lispro, 1-200 Units, Subcutaneous, 4x Daily With Meals & Nightly  methocarbamol, 750 mg, Oral, TID  montelukast, 10 mg, Oral, Nightly  pantoprazole, 40 mg, Intravenous, Q AM  pramipexole, 1 mg, Oral, Q12H  rosuvastatin, 20 mg, Oral,  "Nightly  sodium chloride, 10 mL, Intravenous, Q12H    , Continuous Infusions:   , PRN Meds:    acetaminophen    Calcium Replacement - Follow Nurse / BPA Driven Protocol    dextrose    dextrose    glucagon (human recombinant)    HYDROmorphone    insulin lispro    Magnesium Standard Dose Replacement - Follow Nurse / BPA Driven Protocol    midodrine    nitroglycerin    ondansetron ODT **OR** ondansetron    oxyCODONE    Phosphorus Replacement - Follow Nurse / BPA Driven Protocol    Potassium Replacement - Follow Nurse / BPA Driven Protocol    sodium chloride    [COMPLETED] Insert Peripheral IV **AND** sodium chloride    sodium chloride    sodium chloride   Allergies:  Ambien  [zolpidem], Codeine, and Sulfa antibiotics    Subjective     ROS:  Review of Systems     Objective   Vital Signs:   /49 (BP Location: Left arm, Patient Position: Sitting)   Pulse 65   Temp 98 °F (36.7 °C) (Oral)   Resp 12   Ht 162.6 cm (64\")   Wt 99.5 kg (219 lb 5.7 oz)   SpO2 93%   BMI 37.65 kg/m²     Physical Exam: (performed by MD)  Physical Exam    Results Review:  Lab Results (last 48 hours)       Procedure Component Value Units Date/Time    POC Glucose Once [[id number]]  (Abnormal) Collected: 03/29/24 1335    Specimen: Blood Updated: 03/29/24 1336     Glucose 221 mg/dL      Comment: Serial Number: [id number]:  [id number]       POC Glucose Once [[id number]]  (Abnormal) Collected: 03/29/24 1036    Specimen: Blood Updated: 03/29/24 1037     Glucose 152 mg/dL      Comment: Serial Number: [id number]:  [id number]       POC Glucose 4x Daily Before Meals & at Bedtime [[id number]]  (Abnormal) Collected: 03/29/24 0722    Specimen: Blood Updated: 03/29/24 0724     Glucose 164 mg/dL      Comment: Serial Number: [id number]:  [id number]       Tissue Pathology Exam [[id number]] Collected: 03/28/24 1543    Specimen: Polyp from Large Intestine, Left / Descending Colon Updated: 03/29/24 0640    Basic Metabolic Panel [[id number]]  " (Abnormal) Collected: 03/29/24 0131    Specimen: Blood from Hand, Right Updated: 03/29/24 0223     Glucose 186 mg/dL      BUN 11 mg/dL      Creatinine 0.66 mg/dL      Sodium 145 mmol/L      Potassium 3.4 mmol/L      Chloride 111 mmol/L      CO2 26.0 mmol/L      Calcium 7.9 mg/dL      BUN/Creatinine Ratio 16.7     Anion Gap 8.0 mmol/L      eGFR 91.0 mL/min/1.73     Narrative:      GFR Normal >60  Chronic Kidney Disease <60  Kidney Failure <15    The GFR formula is only valid for adults with stable renal function between ages 18 and 70.    CBC (No Diff) [[id number]]  (Abnormal) Collected: 03/29/24 0131    Specimen: Blood from Hand, Right Updated: 03/29/24 0155     WBC 3.26 10*3/mm3      RBC 3.29 10*6/mm3      Hemoglobin 10.4 g/dL      Hematocrit 34.3 %      .3 fL      MCH 31.6 pg      MCHC 30.3 g/dL      RDW 14.3 %      RDW-SD 54.6 fl      MPV 9.2 fL      Platelets 107 10*3/mm3     Blood Culture - Blood, Hand, Right [[id number]]  (Normal) Collected: 03/26/24 2134    Specimen: Blood from Hand, Right Updated: 03/28/24 2145     Blood Culture No growth at 2 days    Blood Culture - Blood, Arm, Left [[id number]]  (Normal) Collected: 03/26/24 2124    Specimen: Blood from Arm, Left Updated: 03/28/24 2145     Blood Culture No growth at 2 days    POC Glucose Once [[id number]]  (Abnormal) Collected: 03/28/24 2121    Specimen: Blood Updated: 03/28/24 2122     Glucose 188 mg/dL      Comment: Serial Number: [id number]:  [id number]       POC Glucose Once [[id number]]  (Abnormal) Collected: 03/28/24 1921    Specimen: Blood Updated: 03/28/24 1922     Glucose 207 mg/dL      Comment: Serial Number: [id number]:  [id number]       POC Glucose Once [[id number]]  (Normal) Collected: 03/28/24 1627    Specimen: Blood Updated: 03/28/24 1628     Glucose 100 mg/dL      Comment: Serial Number: [id number]:  [id number]       POC Glucose 4x Daily Before Meals & at Bedtime [[id number]]  (Abnormal) Collected: 03/28/24 5227     Specimen: Blood Updated: 03/28/24 1210     Glucose 148 mg/dL      Comment: Serial Number: [id number]:  [id number]       Urine Culture - Urine, Straight Cath [[id number]]  (Abnormal)  (Susceptibility) Collected: 03/26/24 1630    Specimen: Urine from Straight Cath Updated: 03/28/24 0953     Urine Culture >100,000 CFU/mL Escherichia coli    Narrative:      Colonization of the urinary tract without infection is common. Treatment is discouraged unless the patient is symptomatic, pregnant, or undergoing an invasive urologic procedure.    Susceptibility        Escherichia coli      [place]      Amoxicillin + Clavulanate Susceptible      Ampicillin Susceptible      Ampicillin + Sulbactam Susceptible      Cefazolin Susceptible      Cefepime Susceptible      Ceftazidime Susceptible      Ceftriaxone Susceptible      Gentamicin Susceptible      Levofloxacin Susceptible      Nitrofurantoin Susceptible      Piperacillin + Tazobactam Susceptible      Trimethoprim + Sulfamethoxazole Susceptible                           POC Glucose 4x Daily Before Meals & at Bedtime [[id number]]  (Normal) Collected: 03/28/24 0741    Specimen: Blood Updated: 03/28/24 0743     Glucose 96 mg/dL      Comment: Serial Number: [id number]:  [id number]       Comprehensive Metabolic Panel [[id number]]  (Abnormal) Collected: 03/28/24 0538    Specimen: Blood from Hand, Right Updated: 03/28/24 0630     Glucose 111 mg/dL      BUN 9 mg/dL      Creatinine 0.58 mg/dL      Sodium 142 mmol/L      Potassium 3.5 mmol/L      Chloride 107 mmol/L      CO2 28.0 mmol/L      Calcium 8.2 mg/dL      Total Protein 5.2 g/dL      Albumin 3.7 g/dL      ALT (SGPT) 32 U/L      AST (SGOT) 43 U/L      Alkaline Phosphatase 82 U/L      Total Bilirubin 0.3 mg/dL      Globulin 1.5 gm/dL      A/G Ratio 2.5 g/dL      BUN/Creatinine Ratio 15.5     Anion Gap 7.0 mmol/L      eGFR 93.9 mL/min/1.73     Narrative:      GFR Normal >60  Chronic Kidney Disease <60  Kidney Failure <15    The GFR  formula is only valid for adults with stable renal function between ages 18 and 70.    CBC & Differential [[id number]]  (Abnormal) Collected: 03/28/24 0538    Specimen: Blood from Hand, Right Updated: 03/28/24 0601    Narrative:      The following orders were created for panel order CBC & Differential.  Procedure                               Abnormality         Status                     ---------                               -----------         ------                     CBC Auto Differential[[id number]]        Abnormal            Final result                 Please view results for these tests on the individual orders.    CBC Auto Differential [[id number]]  (Abnormal) Collected: 03/28/24 0538    Specimen: Blood from Hand, Right Updated: 03/28/24 0601     WBC 3.08 10*3/mm3      RBC 3.42 10*6/mm3      Hemoglobin 10.8 g/dL      Hematocrit 35.4 %      .5 fL      MCH 31.6 pg      MCHC 30.5 g/dL      RDW 14.2 %      RDW-SD 54.6 fl      MPV 9.6 fL      Platelets 106 10*3/mm3      Neutrophil % 63.7 %      Lymphocyte % 24.0 %      Monocyte % 10.4 %      Eosinophil % 1.6 %      Basophil % 0.0 %      Immature Grans % 0.3 %      Neutrophils, Absolute 1.96 10*3/mm3      Lymphocytes, Absolute 0.74 10*3/mm3      Monocytes, Absolute 0.32 10*3/mm3      Eosinophils, Absolute 0.05 10*3/mm3      Basophils, Absolute 0.00 10*3/mm3      Immature Grans, Absolute 0.01 10*3/mm3      nRBC 0.0 /100 WBC     POC Glucose Once [[id number]]  (Normal) Collected: 03/27/24 2141    Specimen: Blood Updated: 03/27/24 2142     Glucose 105 mg/dL      Comment: Serial Number: [id number]:  [id number]       POC Glucose Once [[id number]]  (Normal) Collected: 03/27/24 1727    Specimen: Blood Updated: 03/27/24 1729     Glucose 86 mg/dL      Comment: Serial Number: [id number]:  [id number]                Pending Results:     Imaging Reviewed:   MRI Lumbar Spine Without Contrast    Result Date: 3/28/2024  Impression: Evaluation of the soft tissues  is limited due to extensive hardware artifact from prior thoracolumbar fusion. In the thoracic spine, focally edematous spondylotic marrow endplate changes are seen at the adjacent T8 and T9 levels, potentially a source of acute axial mid back pain. No definite fracture, new malalignment or other acute findings in the lumbar spine. The spinal canal is suboptimally evaluated but grossly patent. Electronically Signed: Abdirahman Quinn MD  3/28/2024 1:54 PM EDT  Workstation ID: [id number]    MRI Thoracic Spine Without Contrast    Result Date: 3/28/2024  Impression: Evaluation of the soft tissues is limited due to extensive hardware artifact from prior thoracolumbar fusion. In the thoracic spine, focally edematous spondylotic marrow endplate changes are seen at the adjacent T8 and T9 levels, potentially a source of acute axial mid back pain. No definite fracture, new malalignment or other acute findings in the lumbar spine. The spinal canal is suboptimally evaluated but grossly patent. Electronically Signed: Abdirahman Quinn MD  3/28/2024 1:54 PM EDT  Workstation ID: [id number]    CT Abdomen Pelvis Without Contrast    Result Date: 3/26/2024  Impression: Mildly dilated proximal and mid small bowel loops without discrete transition point. Findings suggest ileus. Short-term follow-up abdominal radiographs would be of value. There is irregular wall thickening and luminal narrowing in the proximal ascending colon measuring 3.6 cm in length. Cannot exclude colonic neoplasm. Correlation with colonoscopy is suggested.  IVC filter is present on imaging study. It is recommended that all patients with IVC filters in place have an active management care plan to monitor their IVC filter. If a care plan is not in place, patient should have a non emergent referral to an interventional specialist for establishment of an IVC filter management care plan. Electronically Signed: Madi Dumont MD  3/26/2024 6:47 PM EDT  Workstation ID: [id number]           Assessment & Plan     Pancytopenia:  -patient appears to have normal CBC on presentation and at baseline. Noted to have mild decline in WBC count, Hb and Plt counts over last 2-3 days.  -Myelosuppression may be associated with acute infection/antibiotics given acuity of cytopenias, less likely alternative etiology.  Will check for nutritional deficiencies & hemolysis. Will also check for a flow cytometry.  -anticipate improvement over next few days.  -GI evaluation as above not consistent with GI bleeding as cause of anemia.  -Iron panel showed iron deficiency anemia, Will start patient on oral iron supplementation.  - Continue to monitor CBC with transfusion support PRN to maintain Hb>7.0.    Acute UTI  -Urine culture E. coli species.  Patient currently On Rocephin.    Management and supportive care per primary team    Abdominal pain  -Likely secondary to Ileus/constipation versus referred spinal pain. Endoscopy showed gastritis and esophagitis, s/p polypectomy (path pending).  -Symptoms have improved since admission.  -Further management and supportive care per primary team      Electronically signed by Raquel Payne PA-C,  03/29/24    I agree with History & assessment/plan as documented by CHANTALE/PA in her note. Pertinent changes have been made to the history, ROS, physical exam and assessment/plan sections to reflect my own patient evaluation, examination and assessment.      Patient will follow up at [facility] following discharge. She is agreeable to it.   Thank you for this consult. Please feel free to reach out for any further clinical questions/concerns.

## 2024-03-29 NOTE — PLAN OF CARE
Goal Outcome Evaluation:                 Patient has discharge orders. IV removed. Follow up information provided.

## 2024-03-29 NOTE — CASE MANAGEMENT/SOCIAL WORK
Continued Stay Note  DANIELITO Stinson     Patient Name: Genny Soni  MRN: [id number]  Today's Date: 3/29/2024    Admit Date: 3/26/2024    Plan: Return home. Pt requesting outpt PT with [facility] [address] (order obtained and faxed via [employer] to [facility].)   Discharge Plan       Row Name 03/29/24 1410       Plan    Plan Comments DC barriers: electrolyte replacement, IV abx. Neuro, GI and oncology following                      Kim Elizabeth RN     Office phone: [phone]  Office fax: [phone]

## 2024-03-29 NOTE — THERAPY TREATMENT NOTE
"Subjective: Pt agreeable to therapeutic plan of care.    Objective:     Bed mobility - Modified-Independent  Transfers - Modified-Independent and with rolling walker  Ambulation - 80 feet Modified-Independent and with rolling walker    Vitals: WNL 96% post gait with room air; pulse 72 bpm.     Pain: 4 VAS   Location: low back  Intervention for pain: Repositioned    Education: Provided education on the importance of mobility in the acute care setting, Verbal/Tactile Cues, Transfer Training, and Gait Training    Assessment: Genny Soni presents with functional mobility impairments which indicate the need for skilled intervention. Pt with improving upright endurance and progressed with gait x80 ft with Rwx, SBA, and SaO2 >92% with room air despite pt's reports of feeling some dyspnea. Tolerating session today without incident. Will continue to follow and progress as tolerated.     Plan/Recommendations:   If medically appropriate, Low Intensity Therapy recommended post-acute care - This is recommended as therapy feels this patient would require 2-3 visits per week. OP or HH would be the best option depending on patient's home bound status. Consider, if the patient has other  \"skilled\" needs such as wounds, IV antibiotics, etc. Combined with \"low intensity\" could also equate to a SNF. If patient is medically complex, consider LTAC. Pt requires rolling walker at discharge.     Pt desires Outpatient therapy at discharge. Pt cooperative; agreeable to therapeutic recommendations and plan of care.       Post-Tx Position: Up in Chair, Alarms activated, and Call light and personal items within reach  PPE: gloves and surgical mask   "

## 2024-03-30 NOTE — DISCHARGE SUMMARY
[facility] MEDICINE SERVICE  [place]        Patient Name: Genny Soni  Date of Admission: 3/26/2024  Today's Date: 03/29/24  Length of Stay: 2  Primary Care Physician: Bonilla Rubio MD     Subjective   Patient is stable at this time.  She continues to have abdominal discomfort and nausea.  She has not passed any gas yet.  No other complaints.  No overnight events.  3/28  Patient admitted with ileus  Having bowel movement overnight was using the bedside commode earlier today  Patient with pain in the upper abdomen radiating to her back  CAT scan with ileus and thickening of the ascending colon cannot rule out neoplasm  P patient scheduled for colonoscopy and EGD today  Patient had some spinal surgery done 3 months ago  Pt use walker and mri spie no acute abn  Will cancel neurosurgery consukt as pt use walker in ambulating at home and had previous surgery with no incontinece ,only issue is mid back pin     3/29 mri Evaluation of the soft tissues is limited due to extensive hardware artifact from prior thoracolumbar fusion. In the thoracic spine, focally edematous spondylotic marrow endplate changes are seen at the adjacent T8 and T9 levels, potentially a source of   acute axial mid back pain.     No definite fracture, new malalignment or other acute findings in the lumbar spine. The spinal canal is suboptimally evaluated but grossly patent.  Case discussed with RN  Patient had colonoscopy and endoscopy with finding of polyp but no source of pain  Her pain is mostly neuropathic  Patient had fall event couple months ago and has followed up with neurosurgery  That happened after the spinal fusion  Discussed with the PA from neurosurgery there is no need for any intervention at this point based on MRI  Patient cannot tolerate Lyrica has caused her to be swollen  She is already on gabapentin  I will add Percocet for pain control  Patient is pancytopenic we will get hematology oncology consult for further  "recommendation  Pt seen per oncolgy ,cleared for dc fu with oncology  Time for dc 35 m  Objective    Temp:  [97.8 °F (36.6 °C)-98.7 °F (37.1 °C)] 97.8 °F (36.6 °C)  Heart Rate:  [57-76] 57  Resp:  [12-19] 19  BP: (100-132)/(31-50) 129/50  Physical Exam  General: NAD, AAOx3  HEENT: AT NC, EOMI  Neck: No JVD  CVS: S1/S2 present, RRR, no M/R/G  Lungs: CTA B/L  Abdomen: soft, tender to palpation diffusely, ND, BS+  Ext: bilateral lower extremity non pitting edema  Skin: no rashes, bruises or discolorations  Neuro: no focal deficits  Psych: Not agitated            Results Review:  I have reviewed the labs, radiology results, and diagnostic studies.     Laboratory Data:          Results from last 7 days   Lab Units 03/29/24  0131 03/28/24  0538 03/27/24  1101   WBC 10*3/mm3 3.26* 3.08* 4.56   HEMOGLOBIN g/dL 10.4* 10.8* 11.6*   HEMATOCRIT % 34.3 35.4 38.4   PLATELETS 10*3/mm3 107* 106* 116*                 Results from last 7 days   Lab Units 03/29/24  0131 03/28/24  0538 03/27/24  1101 03/26/24  1620   SODIUM mmol/L 145 142 142 144   POTASSIUM mmol/L 3.4* 3.5 3.9 3.9   CHLORIDE mmol/L 111* 107 106 105   CO2 mmol/L 26.0 28.0 27.0 29.0   BUN mg/dL 11 9 20 20   CREATININE mg/dL 0.66 0.58 0.72 0.72   CALCIUM mg/dL 7.9* 8.2* 8.6 10.0   BILIRUBIN mg/dL  --  0.3  --  0.4   ALK PHOS U/L  --  82  --  105   ALT (SGPT) U/L  --  32  --  31   AST (SGOT) U/L  --  43*  --  33*   GLUCOSE mg/dL 186* 111* 91 100*         Culture Data:         Blood Culture   Date Value Ref Range Status   03/26/2024 No growth at 2 days   Preliminary   03/26/2024 No growth at 2 days   Preliminary            Urine Culture   Date Value Ref Range Status   03/26/2024 >100,000 CFU/mL Escherichia coli (A)   Final      No results found for: \"RESPCX\"  No results found for: \"WOUNDCX\"  No results found for: \"STOOLCX\"  No components found for: \"BODYFLD\"     Radiology Data:   Imaging Results (Last 24 Hours)         Procedure Component Value Units Date/Time     MRI " Lumbar Spine Without Contrast [[id number]] Collected: 03/28/24 1342       Updated: 03/28/24 1356     Narrative:       MRI THORACIC SPINE WO CONTRAST, MRI LUMBAR SPINE WO CONTRAST     Date of Exam: 3/28/2024 12:37 PM EDT     Indication: Mid-back pain.     Comparison: CT  October 6, 2023.     Technique:  Routine multiplanar/multisequence sequence images of the thoracic spine were obtained without contrast administration.        Findings:  Thoracic spine: There are redemonstrated postoperative changes from extensive thoracolumbar fusion spanning T10 through the sacrum, with somewhat limited evaluation of the fused levels due to hardware artifact. Some edematous spondylotic endplate changes   are seen at the adjacent T8 and T9 levels, otherwise without evidence of fracture or suspicious marrow replacing lesion. There is mild exaggeration of the usual thoracic kyphosis, otherwise without listhesis or subluxation. The thoracic spinal cord   demonstrates no definite areas of intramedullary signal abnormality, accounting for limited visualization through the fused levels. Spondylosis changes are present in the upper thoracic spine, generally mild and without associated spinal canal or   neuroforaminal impingement.     Lumbar spine: Operative changes from prior posterior fusion extend through the S1 level, with evaluation significantly limited by extensive hardware artifact. There is no definite fracture, new malalignment or aggressive osseous lesion noted. The conus   medullaris and cauda equina nerve roots are poorly visualized. The spinal canal appears patent throughout. There is no definite high-grade neuroforaminal narrowing. Prior compression deformity at L2 is noted with absent pedicle fixation at this level.   The paraspinal soft tissues demonstrate no acute or suspicious findings with favored benign small fluid collection seen along the mid lumbar pedicle screws.        Impression:       Impression:  Evaluation of  the soft tissues is limited due to extensive hardware artifact from prior thoracolumbar fusion. In the thoracic spine, focally edematous spondylotic marrow endplate changes are seen at the adjacent T8 and T9 levels, potentially a source of   acute axial mid back pain.     No definite fracture, new malalignment or other acute findings in the lumbar spine. The spinal canal is suboptimally evaluated but grossly patent.           Electronically Signed: Abdirahman Quinn MD    3/28/2024 1:54 PM EDT    Workstation ID: [id number]     MRI Thoracic Spine Without Contrast [[id number]] Collected: 03/28/24 1342       Updated: 03/28/24 1356     Narrative:       MRI THORACIC SPINE WO CONTRAST, MRI LUMBAR SPINE WO CONTRAST     Date of Exam: 3/28/2024 12:37 PM EDT     Indication: Mid-back pain.     Comparison: CT  October 6, 2023.     Technique:  Routine multiplanar/multisequence sequence images of the thoracic spine were obtained without contrast administration.        Findings:  Thoracic spine: There are redemonstrated postoperative changes from extensive thoracolumbar fusion spanning T10 through the sacrum, with somewhat limited evaluation of the fused levels due to hardware artifact. Some edematous spondylotic endplate changes   are seen at the adjacent T8 and T9 levels, otherwise without evidence of fracture or suspicious marrow replacing lesion. There is mild exaggeration of the usual thoracic kyphosis, otherwise without listhesis or subluxation. The thoracic spinal cord   demonstrates no definite areas of intramedullary signal abnormality, accounting for limited visualization through the fused levels. Spondylosis changes are present in the upper thoracic spine, generally mild and without associated spinal canal or   neuroforaminal impingement.     Lumbar spine: Operative changes from prior posterior fusion extend through the S1 level, with evaluation significantly limited by extensive hardware artifact. There is no definite  fracture, new malalignment or aggressive osseous lesion noted. The conus   medullaris and cauda equina nerve roots are poorly visualized. The spinal canal appears patent throughout. There is no definite high-grade neuroforaminal narrowing. Prior compression deformity at L2 is noted with absent pedicle fixation at this level.   The paraspinal soft tissues demonstrate no acute or suspicious findings with favored benign small fluid collection seen along the mid lumbar pedicle screws.        Impression:       Impression:  Evaluation of the soft tissues is limited due to extensive hardware artifact from prior thoracolumbar fusion. In the thoracic spine, focally edematous spondylotic marrow endplate changes are seen at the adjacent T8 and T9 levels, potentially a source of   acute axial mid back pain.     No definite fracture, new malalignment or other acute findings in the lumbar spine. The spinal canal is suboptimally evaluated but grossly patent.           Electronically Signed: Abdirahman Quinn MD    3/28/2024 1:54 PM EDT    Workstation ID: [id number]                I have reviewed the patient's current medications.      Assessment/Plan      1) ileus  - GI consulted, recs pending  - possible colonoscopy later today  - NPO  - supportive care     2) UTI  - cultures pending  - rocephin     3) edema  - unclear etiology  - hold home meds for borderline BP  - compression wraps     4) HTN  - hold home meds for now     5) DM  - ISS, lantus, accuchecks, diet     6) HLD  - crestor     7) mood disorders  - home meds     8) hx DVT ppx  - hold eliquis for upcoming procedure     9) GI ppx  - protonix              Electronically signed by Fatmata Voss MD, 03/29/24, 09:20 EDT.           Revision History

## 2024-03-30 NOTE — OUTREACH NOTE
Prep Survey      Flowsheet Row Responses   [facility] patient discharged from? [facility]   Is LACE score < 7 ? No   Eligibility Readm Mgmt   Discharge diagnosis Colonic thickening   Does the patient have one of the following disease processes/diagnoses(primary or secondary)? Other   Does the patient have Home health ordered? No   Is there a DME ordered? No   Comments regarding appointments [facility] [place]   Prep survey completed? Yes            Argelia NORWOOD - Registered Nurse

## 2024-03-31 LAB
BACTERIA SPEC AEROBE CULT: NORMAL
BACTERIA SPEC AEROBE CULT: NORMAL

## 2024-04-01 LAB
LAB AP CASE REPORT: NORMAL
LAB AP DIAGNOSIS COMMENT: NORMAL
PATH REPORT.FINAL DX SPEC: NORMAL
PATH REPORT.GROSS SPEC: NORMAL

## 2024-04-01 NOTE — CASE MANAGEMENT/SOCIAL WORK
Case Management Discharge Note      Final Note: Home with outpt [facility]         Selected Continued Care - Discharged on 3/29/2024 Admission date: 3/26/2024 - Discharge disposition: Home or Self Care         Transportation Services  Private: Car    Final Discharge Disposition Code: 01 - home or self-care

## 2024-04-08 ENCOUNTER — READMISSION MANAGEMENT (OUTPATIENT)
Dept: CALL CENTER | Facility: HOSPITAL | Age: 74
End: 2024-04-08
Payer: MEDICARE

## 2024-04-08 NOTE — OUTREACH NOTE
Medical Week 2 Survey      Flowsheet Row Responses   [facility] patient discharged from? [facility]   Does the patient have one of the following disease processes/diagnoses(primary or secondary)? Other   Week 2 attempt successful? Yes   Call start time 1015   Call end time 1021   Medication alerts for this patient No changes.   Comments regarding appointments Pt has had pcp f/u.   Does the patient have a primary care provider?  Yes   Has the patient kept scheduled appointments due by today? Yes   Has home health visited the patient within 72 hours of discharge? Yes   Psychosocial issues? No   What is the patient's perception of their health status since discharge? Improving   Is the patient/caregiver able to teach back signs and symptoms related to disease process for when to call PCP? Yes   Week 2 Call Completed? Yes   Wrap up additional comments Pt reports improving some. Pt not satisified with the hospitalist while admitted. No changes to medication list. Pt has had pcp f/u. PT continues to visit.   Call end time 1021            Argelia DUMONT - Registered Nurse

## 2024-04-19 NOTE — PROGRESS NOTES
HEMATOLOGY ONCOLOGY OUTPATIENT HOSPITAL FOLLOW UP       Patient name: Genny Soni  : 1950  MRN: [id number]  Primary Care Physician: Bonilla Rubio MD  Referring Physician: No ref. provider found  Reason For Consult:       History of Present Illness:  Genny Soni is a 74 y.o. female who presented to [facility] on 3/26/2024 with complaints of abdominal pain radiating to the back.  She states symptoms have been present for over 3 months, but they worsened to the point where she had vomiting.  She denies diarrhea, melena, hemoptysis, hematochezia, chest pain.  In the ED urinalysis showed UTI.  CT imaging showed ileus complicated by wall thickening and luminal narrowing in the proximal ascending colon measuring 3.6 cm, neoplasm was not excluded.  Patient also reports 3-week history of bilateral lower extremity edema.  She denies any known heart disease.  Duplex ultrasound lower extremities was normal.     3/26/2024 CT abdomen pelvis  Impression:  Mildly dilated proximal and mid small bowel loops without discrete transition point. Findings suggest ileus. Short-term follow-up abdominal radiographs would be of value.     There is irregular wall thickening and luminal narrowing in the proximal ascending colon measuring 3.6 cm in length. Cannot exclude colonic neoplasm. Correlation with colonoscopy is suggested.      IVC filter is present on imaging study. It is recommended that all patients with IVC filters in place have an active management care plan to monitor their IVC filter. If a care plan is not in place, patient should have a non emergent referral to an   interventional specialist for establishment of an IVC filter management care plan.     Gastroenterology was consulted and she completed colonoscopy on 3/28/2024.  This showed no mass or colitis.  There was descending colon polyp.  Esophagitis and gastritis was noted as well.  GI considering pain is neuropathic related  to thoracic spine disease.       3/28/2024 MRI thoracic and lumbar spine  Impression:  Evaluation of the soft tissues is limited due to extensive hardware artifact from prior thoracolumbar fusion. In the thoracic spine, focally edematous spondylotic marrow endplate changes are seen at the adjacent T8 and T9 levels, potentially a source of   acute axial mid back pain.  No definite fracture, new malalignment or other acute findings in the lumbar spine. The spinal canal is suboptimally evaluated but grossly patent.     3/29/24: EGD and Colonoscopy:  Findings:    Normal mucosa in the right colon.  No mass or colitis.  Descending colon polyp x 1, 5 mm in size, removed in single piece fashion with cold snare polypectomy.  Patient is more blood than would be expected, probably due to her recent use of Eliquis, so 2 hemoclips were applied for hemostasis  Sigmoid diverticulosis without diverticulitis        03/29/24  Hematology/Oncology was consulted for pancytopenia.  CBC today with WBC 3.26, hemoglobin 10.4, platelets 107.patient was seen for initial evaluation. She reported  having bowel movement overnight with improvement in abd distension and pain. She is status post EGD and colonoscopy today, doing well following the procedure. Neurosurgery following the patient for thoracic spine neurological symptoms. She appears interested in returning to home today.       Subjective:  Patient presents for initial consultation following her hospital discharge. She reported doing well overall. Denied any acute complaints presently. No recent UTI episodes, ER Visits/hospitalizations. ROS otherwise unremarkable. She was recommended to start oral iron supplementation on discharge but this has not been started yet.    Genny RADHAMES Soni reports a pain score of 5.  Given her pain assessment as noted, treatment options were discussed and the following options were decided upon as a follow-up plan to address the patient's pain: continuation of  current treatment plan for pain.     Past Medical History:   Diagnosis Date    Arthritis     Benign essential hypertension 7/12/2018    Diabetes mellitus     Elevated cholesterol     H/O blood clots     History of transfusion     Hyperlipidemia     Low back pain     Nausea and vomiting 3/26/2024    Sleep apnea        Past Surgical History:   Procedure Laterality Date    BACK SURGERY      COLONOSCOPY N/A 3/28/2024    Procedure: COLONOSCOPY with cold snare polypectomy x1 and endoscopic clipping of polypectomy site x2;  Surgeon: Janell Zhang MD;  Location: [facility] ENDOSCOPY;  Service: Gastroenterology;  Laterality: N/A;  Post- colon polyp    ENDOSCOPY N/A 3/28/2024    Procedure: ESOPHAGOGASTRODUODENOSCOPY;  Surgeon: Janell Zhang MD;  Location: [facility] ENDOSCOPY;  Service: Gastroenterology;  Laterality: N/A;  Post- Esophagitis, gastritis    FEMORAL DISTAL BYPASS      HYSTERECTOMY      JOINT REPLACEMENT      LUMBAR LAMINECTOMY WITH FUSION N/A 5/28/2021    Procedure: Lumbar 5 Smith procedure.  Lumbar 5 6 and sacral 1 transpedicular Solera screws and rods.  Posterior fusion with autologous bone.;  Surgeon: Gagan Aguilar MD;  Location: [facility] MAIN OR;  Service: Neurosurgery;  Laterality: N/A;         Current Outpatient Medications:     apixaban (ELIQUIS) 5 MG tablet tablet, Take 1 tablet by mouth 2 (Two) Times a Day., Disp: , Rfl:     bumetanide (BUMEX) 1 MG tablet, Take 1 tablet by mouth 2 (Two) Times a Day., Disp: , Rfl:     denosumab (Prolia) 60 MG/ML solution prefilled syringe syringe, Inject 1 mL under the skin into the appropriate area as directed Every 6 (Six) Months., Disp: , Rfl:     escitalopram (LEXAPRO) 20 MG tablet, Take 1 tablet by mouth Daily., Disp: , Rfl:     fluticasone (FLONASE) 50 MCG/ACT nasal spray, 2 sprays into the nostril(s) as directed by provider Daily., Disp: , Rfl:     gabapentin (NEURONTIN) 300 MG capsule, Take 1 capsule by mouth 2 (Two) Times a Day., Disp: , Rfl:      glipizide (GLUCOTROL XL) 10 MG 24 hr tablet, Take 1 tablet by mouth Daily., Disp: , Rfl:     Insulin Glargine, 1 Unit Dial, (TOUJEO) 300 UNIT/ML solution pen-injector injection, Inject 100 Units under the skin into the appropriate area as directed Every Night., Disp: , Rfl:     losartan (COZAAR) 25 MG tablet, Take 1 tablet by mouth Daily., Disp: , Rfl:     methocarbamol (ROBAXIN) 750 MG tablet, Take 1 tablet by mouth 3 (Three) Times a Day., Disp: , Rfl:     metOLazone (ZAROXOLYN) 2.5 MG tablet, Take 1 tablet by mouth Daily As Needed., Disp: , Rfl:     montelukast (SINGULAIR) 10 MG tablet, Take 1 tablet by mouth Every Night., Disp: , Rfl:     naloxone (NARCAN) 4 MG/0.1ML nasal spray, Call 911. Don't prime. [employer] in 1 nostril for overdose. Repeat in 2-3 minutes in other nostril if no or minimal breathing/responsiveness., Disp: 2 each, Rfl: 0    ondansetron (ZOFRAN) 4 MG tablet, Take 1 tablet by mouth Every 6 (Six) Hours As Needed for Nausea or Vomiting., Disp: , Rfl:     oxyCODONE-acetaminophen (PERCOCET)  MG per tablet, Take 1 tablet by mouth Every 8 (Eight) Hours As Needed for Moderate Pain., Disp: , Rfl:     polyethylene glycol (MIRALAX) 17 g packet, Take 17 g by mouth Daily As Needed., Disp: , Rfl:     potassium chloride (KLOR-CON M20) 20 MEQ CR tablet, Take 1 tablet by mouth 3 (Three) Times a Day., Disp: , Rfl:     pramipexole (MIRAPEX) 1 MG tablet, Take 1 tablet by mouth 2 (Two) Times a Day., Disp: , Rfl:     primidone (MYSOLINE) 50 MG tablet, Take 0.5 tablets by mouth Every Night., Disp: , Rfl:     rosuvastatin (CRESTOR) 20 MG tablet, Take 1 tablet by mouth Every Night., Disp: , Rfl:     vitamin D (ERGOCALCIFEROL) 1.25 MG (71751 UT) capsule capsule, Take 1 capsule by mouth 1 (One) Time Per Week. Thursdays, Disp: , Rfl:     Allergies   Allergen Reactions    Ambien  [Zolpidem] Confusion    Codeine Itching    Sulfa Antibiotics Nausea And Vomiting     Makes her nauseated       No family history on  "file.    Cancer-related family history is not on file.      Social History     Tobacco Use    Smoking status: Former    Smokeless tobacco: Never   Vaping Use    Vaping status: Never Used   Substance Use Topics    Alcohol use: Not Currently    Drug use: Never     Social History     Social History Narrative    Not on file       ROS:   Review of Systems   Constitutional: Negative.    HENT: Negative.     Eyes: Negative.    Respiratory: Negative.     Cardiovascular: Negative.    Gastrointestinal: Negative.    Endocrine: Negative.    Genitourinary: Negative.    Musculoskeletal: Negative.    Skin: Negative.    Allergic/Immunologic: Negative.    Neurological: Negative.    Hematological: Negative.    Psychiatric/Behavioral: Negative.           Objective:    Vital Signs:  Vitals:    04/23/24 1318   BP: 110/58   Pulse: 70   SpO2: 98%   Weight: 91.6 kg (202 lb)   Height: 162.6 cm (64\")   PainSc:   5   PainLoc: Back     Body mass index is 34.67 kg/m².    ECOG  (1) Restricted in physically strenuous activity, ambulatory and able to do work of light nature    Physical Exam:   Physical Exam  Constitutional:       Appearance: Normal appearance. She is normal weight.   HENT:      Head: Normocephalic and atraumatic.      Right Ear: External ear normal.      Left Ear: External ear normal.      Nose: Nose normal.      Mouth/Throat:      Mouth: Mucous membranes are moist.      Pharynx: Oropharynx is clear.   Eyes:      Extraocular Movements: Extraocular movements intact.      Conjunctiva/sclera: Conjunctivae normal.      Pupils: Pupils are equal, round, and reactive to light.   Cardiovascular:      Rate and Rhythm: Normal rate.      Pulses: Normal pulses.   Pulmonary:      Effort: Pulmonary effort is normal.   Abdominal:      General: Abdomen is flat.      Palpations: Abdomen is soft.   Musculoskeletal:         General: Normal range of motion.      Cervical back: Normal range of motion and neck supple.   Skin:     General: Skin is warm. "   Neurological:      Mental Status: She is alert.   Psychiatric:         Mood and Affect: Mood normal.         Behavior: Behavior normal.         Thought Content: Thought content normal.         Judgment: Judgment normal.         Lab Results - Last 18 Months   Lab Units 03/29/24  0131 03/28/24  0538 03/27/24  1101   WBC 10*3/mm3 3.26* 3.08* 4.56   HEMOGLOBIN g/dL 10.4* 10.8* 11.6*   HEMATOCRIT % 34.3 35.4 38.4   PLATELETS 10*3/mm3 107* 106* 116*   MCV fL 104.3* 103.5* 103.5*     Lab Results - Last 18 Months   Lab Units 03/29/24  1516 03/29/24  0131 03/28/24  0538 03/27/24  1101 03/26/24  1620 10/08/23  0918 10/07/23  2327   SODIUM mmol/L  --  145 142 142 144  --  140   POTASSIUM mmol/L 4.3 3.4* 3.5 3.9 3.9   < > 3.4*   CHLORIDE mmol/L  --  111* 107 106 105  --  98   CO2 mmol/L  --  26.0 28.0 27.0 29.0  --  34.0*   BUN mg/dL  --  11 9 20 20  --  17   CREATININE mg/dL  --  0.66 0.58 0.72 0.72  --  0.71   CALCIUM mg/dL  --  7.9* 8.2* 8.6 10.0  --  9.3   BILIRUBIN mg/dL  --   --  0.3  --  0.4  --  0.2   ALK PHOS U/L  --   --  82  --  105  --  171*   ALT (SGPT) U/L  --   --  32  --  31  --  13   AST (SGOT) U/L  --   --  43*  --  33*  --  15   GLUCOSE mg/dL  --  186* 111* 91 100*  --  162*    < > = values in this interval not displayed.       Lab Results   Component Value Date    GLUCOSE 186 (H) 03/29/2024    BUN 11 03/29/2024    CREATININE 0.66 03/29/2024    EGFRIFNONA 76 06/11/2021    EGFRIFAFRI >60 03/13/2023    BCR 16.7 03/29/2024    K 4.3 03/29/2024    CO2 26.0 03/29/2024    CALCIUM 7.9 (L) 03/29/2024    ALBUMIN 3.7 03/28/2024    LABIL2 1.3 03/03/2023    AST 43 (H) 03/28/2024    ALT 32 03/28/2024       Lab Results - Last 18 Months   Lab Units 10/05/23  1437 08/18/23  0623 08/10/23  1324   INR  0.96 0.9 0.95   APTT seconds 25.8* 30.4 26.3       Lab Results   Component Value Date    IRON 27 (L) 03/29/2024    IRON 27 (L) 03/29/2024    TIBC 370 03/29/2024    FERRITIN 112.00 03/29/2024       Lab Results   Component Value  "Date    FOLATE 9.22 03/29/2024       No results found for: \"OCCULTBLD\"    Lab Results   Component Value Date    RETICCTPCT 2.67 (H) 03/29/2024     Lab Results   Component Value Date    UOWNPJHW63 439 03/29/2024     No results found for: \"SPEP\", \"UPEP\"  LDH   Date Value Ref Range Status   03/29/2024 226 (H) 135 - 214 U/L Final     Comment:     Specimen hemolyzed.  Results may be affected.     Lab Results   Component Value Date    LES Negative 05/10/2021    SEDRATE 8 08/10/2023     Lab Results   Component Value Date    HAPTOGLOBIN 152 03/29/2024     Lab Results   Component Value Date    PTT 25.8 (L) 10/05/2023    INR 0.96 10/05/2023     No results found for: \"\"  No results found for: \"CEA\"  No components found for: \"CA-19-9\"  No results found for: \"PSA\"  Lab Results   Component Value Date    SEDRATE 8 08/10/2023          Assessment & Plan     Pancytopenia:  -patient appears to have normal CBC on presentation and at baseline. Noted to have mild decline in WBC count, Hb and Plt counts over last 2-3 days.  -Myelosuppression may be associated with acute infection/antibiotics given acuity of cytopenias, less likely alternative etiology.  Will check for nutritional deficiencies & hemolysis. Will also check for a flow cytometry.  -anticipate improvement over next few days.  -GI evaluation as above not consistent with GI bleeding as cause of anemia.  -Iron panel showed iron deficiency anemia, Patient has not been started on oral iron supplementation yet.  -Repeat CBC showed normalization of WBC count, Plt count improved to 137K, Hb/Hct WNL.  -Will start her on oral Iron supplementation for JESS. This was explained to the patient.     Acute UTI: Resolved.  -Urine culture showed E. coli species. She has completed her Antibiotic course.   -No new symptoms presently.     Abdominal pain: Likely secondary to Ileus/constipation  -Endoscopy showed gastritis and esophagitis, s/p polypectomy, pathology consistent with Tubular " Adenoma.   -Symptoms have improved since discharge.  -continue Laxative use, advised on hydration and adequate dietary/lifestyle changes.    Follow up in 5-6 months with repeat labs, sooner as needed.         Thank you very much for providing the opportunity to participate in this patient’s care. Please do not hesitate to call if there are any other questions.

## 2024-04-22 DIAGNOSIS — D50.9 IRON DEFICIENCY ANEMIA, UNSPECIFIED IRON DEFICIENCY ANEMIA TYPE: Primary | ICD-10-CM

## 2024-04-23 ENCOUNTER — LAB (OUTPATIENT)
Dept: LAB | Facility: HOSPITAL | Age: 74
End: 2024-04-23
Payer: MEDICARE

## 2024-04-23 ENCOUNTER — OFFICE VISIT (OUTPATIENT)
Dept: ONCOLOGY | Facility: CLINIC | Age: 74
End: 2024-04-23
Payer: MEDICARE

## 2024-04-23 VITALS
BODY MASS INDEX: 34.49 KG/M2 | DIASTOLIC BLOOD PRESSURE: 58 MMHG | HEART RATE: 70 BPM | HEIGHT: 64 IN | SYSTOLIC BLOOD PRESSURE: 110 MMHG | OXYGEN SATURATION: 98 % | WEIGHT: 202 LBS

## 2024-04-23 DIAGNOSIS — D50.9 IRON DEFICIENCY ANEMIA, UNSPECIFIED IRON DEFICIENCY ANEMIA TYPE: Primary | ICD-10-CM

## 2024-04-23 DIAGNOSIS — N39.0 URINARY TRACT INFECTION WITHOUT HEMATURIA, SITE UNSPECIFIED: ICD-10-CM

## 2024-04-23 DIAGNOSIS — K56.7 ILEUS: ICD-10-CM

## 2024-04-23 LAB
BASOPHILS # BLD AUTO: 0.01 10*3/MM3 (ref 0–0.2)
BASOPHILS NFR BLD AUTO: 0.2 % (ref 0–1.5)
DEPRECATED RDW RBC AUTO: 50.3 FL (ref 37–54)
EOSINOPHIL # BLD AUTO: 0.18 10*3/MM3 (ref 0–0.4)
EOSINOPHIL NFR BLD AUTO: 2.8 % (ref 0.3–6.2)
ERYTHROCYTE [DISTWIDTH] IN BLOOD BY AUTOMATED COUNT: 13.8 % (ref 12.3–15.4)
FERRITIN SERPL-MCNC: 54.61 NG/ML (ref 13–150)
HCT VFR BLD AUTO: 42.8 % (ref 34–46.6)
HGB BLD-MCNC: 13.5 G/DL (ref 12–15.9)
HGB RETIC QN AUTO: 35 PG (ref 29.8–36.1)
HOLD SPECIMEN: NORMAL
IMM RETICS NFR: 24.8 % (ref 3–15.8)
IRON 24H UR-MRATE: 79 MCG/DL (ref 37–145)
IRON SATN MFR SERPL: 18 % (ref 20–50)
LYMPHOCYTES # BLD AUTO: 2.07 10*3/MM3 (ref 0.7–3.1)
LYMPHOCYTES NFR BLD AUTO: 32.5 % (ref 19.6–45.3)
MCH RBC QN AUTO: 31.8 PG (ref 26.6–33)
MCHC RBC AUTO-ENTMCNC: 31.5 G/DL (ref 31.5–35.7)
MCV RBC AUTO: 100.7 FL (ref 79–97)
MONOCYTES # BLD AUTO: 0.47 10*3/MM3 (ref 0.1–0.9)
MONOCYTES NFR BLD AUTO: 7.4 % (ref 5–12)
NEUTROPHILS NFR BLD AUTO: 3.64 10*3/MM3 (ref 1.7–7)
NEUTROPHILS NFR BLD AUTO: 57.1 % (ref 42.7–76)
PLATELET # BLD AUTO: 137 10*3/MM3 (ref 140–450)
PMV BLD AUTO: 9.2 FL (ref 6–12)
RBC # BLD AUTO: 4.25 10*6/MM3 (ref 3.77–5.28)
RETICS # AUTO: 0.1 10*6/MM3 (ref 0.02–0.13)
RETICS/RBC NFR AUTO: 2.43 % (ref 0.7–1.9)
TIBC SERPL-MCNC: 434 MCG/DL (ref 298–536)
TRANSFERRIN SERPL-MCNC: 291 MG/DL (ref 200–360)
WBC NRBC COR # BLD AUTO: 6.37 10*3/MM3 (ref 3.4–10.8)

## 2024-04-23 PROCEDURE — 85025 COMPLETE CBC W/AUTO DIFF WBC: CPT

## 2024-04-23 PROCEDURE — 82728 ASSAY OF FERRITIN: CPT | Performed by: STUDENT IN AN ORGANIZED HEALTH CARE EDUCATION/TRAINING PROGRAM

## 2024-04-23 PROCEDURE — 85046 RETICYTE/HGB CONCENTRATE: CPT | Performed by: STUDENT IN AN ORGANIZED HEALTH CARE EDUCATION/TRAINING PROGRAM

## 2024-04-23 PROCEDURE — 84466 ASSAY OF TRANSFERRIN: CPT | Performed by: STUDENT IN AN ORGANIZED HEALTH CARE EDUCATION/TRAINING PROGRAM

## 2024-04-23 PROCEDURE — 36415 COLL VENOUS BLD VENIPUNCTURE: CPT

## 2024-04-23 PROCEDURE — 83540 ASSAY OF IRON: CPT | Performed by: STUDENT IN AN ORGANIZED HEALTH CARE EDUCATION/TRAINING PROGRAM

## 2024-04-23 RX ORDER — FERROUS SULFATE 325(65) MG
325 TABLET ORAL
Qty: 90 TABLET | Refills: 1 | Status: SHIPPED | OUTPATIENT
Start: 2024-04-23

## 2024-10-24 ENCOUNTER — TELEPHONE (OUTPATIENT)
Dept: ONCOLOGY | Facility: CLINIC | Age: 74
End: 2024-10-24
Payer: MEDICARE

## 2024-10-24 NOTE — TELEPHONE ENCOUNTER
I spoke with pt to see if she would like to r/s her cancelled appt from September.     Genny says she has too many other things going on right now (such as sleep studies) and she will call us back when she is ready to reschedule.

## 2024-11-01 DIAGNOSIS — G47.33 OBSTRUCTIVE SLEEP APNEA: Primary | ICD-10-CM

## 2024-11-01 DIAGNOSIS — R09.02 HYPOXEMIA: ICD-10-CM

## 2024-12-02 RX ORDER — FERROUS SULFATE 325(65) MG
1 TABLET ORAL
Qty: 90 TABLET | Refills: 1 | OUTPATIENT
Start: 2024-12-02

## 2024-12-16 ENCOUNTER — HOSPITAL ENCOUNTER (OUTPATIENT)
Dept: SLEEP MEDICINE | Facility: HOSPITAL | Age: 74
Discharge: HOME | End: 2024-12-16
Admitting: INTERNAL MEDICINE
Payer: MEDICARE

## 2024-12-16 DIAGNOSIS — R09.02 HYPOXEMIA: ICD-10-CM

## 2024-12-16 DIAGNOSIS — G47.33 OBSTRUCTIVE SLEEP APNEA: ICD-10-CM

## 2024-12-16 PROCEDURE — 95811 POLYSOM 6/>YRS CPAP 4/> PARM: CPT

## 2024-12-17 VITALS — HEIGHT: 64 IN | WEIGHT: 201.94 LBS | BODY MASS INDEX: 34.48 KG/M2

## 2024-12-19 DIAGNOSIS — G47.33 OSA (OBSTRUCTIVE SLEEP APNEA): Primary | ICD-10-CM

## 2024-12-23 ENCOUNTER — HOSPITAL ENCOUNTER (OUTPATIENT)
Facility: HOSPITAL | Age: 74
Discharge: HOME | End: 2024-12-23
Attending: EMERGENCY MEDICINE | Admitting: EMERGENCY MEDICINE
Payer: MEDICARE

## 2024-12-23 VITALS
SYSTOLIC BLOOD PRESSURE: 104 MMHG | DIASTOLIC BLOOD PRESSURE: 77 MMHG | WEIGHT: 204 LBS | TEMPERATURE: 97.6 F | HEIGHT: 64 IN | BODY MASS INDEX: 34.83 KG/M2 | OXYGEN SATURATION: 93 % | HEART RATE: 87 BPM | RESPIRATION RATE: 16 BRPM

## 2024-12-23 DIAGNOSIS — N39.0 ACUTE UTI (URINARY TRACT INFECTION): Primary | ICD-10-CM

## 2024-12-23 LAB
BACTERIA UR QL AUTO: ABNORMAL /HPF
BILIRUB UR QL STRIP: NEGATIVE
CLARITY UR: ABNORMAL
COLOR UR: YELLOW
GLUCOSE UR STRIP-MCNC: NEGATIVE MG/DL
HGB UR QL STRIP.AUTO: ABNORMAL
HYALINE CASTS UR QL AUTO: ABNORMAL /LPF
KETONES UR QL STRIP: NEGATIVE
LEUKOCYTE ESTERASE UR QL STRIP.AUTO: ABNORMAL
NITRITE UR QL STRIP: NEGATIVE
PH UR STRIP.AUTO: 5.5 [PH] (ref 5–8)
PROT UR QL STRIP: ABNORMAL
RBC # UR STRIP: ABNORMAL /HPF
REF LAB TEST METHOD: ABNORMAL
SP GR UR STRIP: 1.02 (ref 1–1.03)
SQUAMOUS #/AREA URNS HPF: ABNORMAL /HPF
UROBILINOGEN UR QL STRIP: ABNORMAL
WBC # UR STRIP: ABNORMAL /HPF

## 2024-12-23 PROCEDURE — 87086 URINE CULTURE/COLONY COUNT: CPT | Performed by: EMERGENCY MEDICINE

## 2024-12-23 PROCEDURE — 25010000002 LIDOCAINE PF 1% 1 % SOLUTION 2 ML VIAL: Performed by: EMERGENCY MEDICINE

## 2024-12-23 PROCEDURE — G0463 HOSPITAL OUTPT CLINIC VISIT: HCPCS | Performed by: EMERGENCY MEDICINE

## 2024-12-23 PROCEDURE — 25010000002 CEFTRIAXONE PER 250 MG: Performed by: EMERGENCY MEDICINE

## 2024-12-23 PROCEDURE — 81001 URINALYSIS AUTO W/SCOPE: CPT | Performed by: EMERGENCY MEDICINE

## 2024-12-23 RX ORDER — CIPROFLOXACIN 500 MG/1
500 TABLET, FILM COATED ORAL 2 TIMES DAILY
Qty: 14 TABLET | Refills: 0 | Status: SHIPPED | OUTPATIENT
Start: 2024-12-23 | End: 2024-12-30

## 2024-12-23 RX ADMIN — LIDOCAINE HYDROCHLORIDE 1 G: 10 INJECTION, SOLUTION EPIDURAL; INFILTRATION; INTRACAUDAL; PERINEURAL at 12:49

## 2024-12-23 NOTE — FSED PROVIDER NOTE
Subjective   History of Present Illness  Patient is a 74-year-old female with frequent history of UTIs, presents emergency room with concern for UTI.  Patient reports dysuria, urgency and frequency.  Patient states that her infections usually respond to Rocephin and ciprofloxacin.  Denies any fever or flank pain.        Review of Systems   Constitutional: Negative.  Negative for chills, fatigue and fever.   Eyes: Negative.    Respiratory:  Negative for cough, chest tightness and shortness of breath.    Cardiovascular:  Negative for chest pain and palpitations.   Gastrointestinal:  Negative for abdominal pain, diarrhea, nausea and vomiting.   Genitourinary:  Positive for dysuria, frequency and urgency.   Musculoskeletal: Negative.    Skin: Negative.  Negative for rash.   Neurological: Negative.  Negative for syncope, weakness, numbness and headaches.   Psychiatric/Behavioral: Negative.     All other systems reviewed and are negative.      Past Medical History:   Diagnosis Date    Arthritis     Benign essential hypertension 7/12/2018    Diabetes mellitus     Elevated cholesterol     H/O blood clots     History of transfusion     Hyperlipidemia     Low back pain     Nausea and vomiting 3/26/2024    Sleep apnea        Allergies   Allergen Reactions    Ambien  [Zolpidem] Confusion    Codeine Itching    Pregabalin Swelling     Leg swelling    Sulfa Antibiotics Nausea And Vomiting     Makes her nauseated    Zolpidem Tartrate Confusion       Past Surgical History:   Procedure Laterality Date    BACK SURGERY      COLONOSCOPY N/A 3/28/2024    Procedure: COLONOSCOPY with cold snare polypectomy x1 and endoscopic clipping of polypectomy site x2;  Surgeon: Janell Zhang MD;  Location: [facility] ENDOSCOPY;  Service: Gastroenterology;  Laterality: N/A;  Post- colon polyp    ENDOSCOPY N/A 3/28/2024    Procedure: ESOPHAGOGASTRODUODENOSCOPY;  Surgeon: Janell Zhang MD;  Location: [facility] ENDOSCOPY;  Service:  Gastroenterology;  Laterality: N/A;  Post- Esophagitis, gastritis    FEMORAL DISTAL BYPASS      HYSTERECTOMY      JOINT REPLACEMENT      LUMBAR LAMINECTOMY WITH FUSION N/A 5/28/2021    Procedure: Lumbar 5 Smith procedure.  Lumbar 5 6 and sacral 1 transpedicular Solera screws and rods.  Posterior fusion with autologous bone.;  Surgeon: Gagan Aguilar MD;  Location: [facility] MAIN OR;  Service: Neurosurgery;  Laterality: N/A;       No family history on file.    Social History     Socioeconomic History    Marital status:    Tobacco Use    Smoking status: Former    Smokeless tobacco: Never   Vaping Use    Vaping status: Never Used   Substance and Sexual Activity    Alcohol use: Not Currently    Drug use: Never    Sexual activity: Defer           Objective   Physical Exam  Vitals and nursing note reviewed.   Constitutional:       General: She is not in acute distress.     Appearance: Normal appearance. She is normal weight.   HENT:      Right Ear: External ear normal.      Left Ear: External ear normal.      Nose: Nose normal.   Cardiovascular:      Rate and Rhythm: Normal rate.   Pulmonary:      Effort: Pulmonary effort is normal.   Musculoskeletal:         General: Normal range of motion.      Cervical back: Normal range of motion.   Skin:     Capillary Refill: Capillary refill takes less than 2 seconds.   Neurological:      General: No focal deficit present.      Mental Status: She is alert and oriented to person, place, and time.   Psychiatric:         Mood and Affect: Mood normal.         Procedures           ED Course  ED Course as of 12/23/24 1306   Mon Dec 23, 2024   1103 Leukocytes, UA(!): Moderate (2+) [BH]   1218 Patient's urine is positive for infection. [KZ]      ED Course User Index  [BH] Jesika Rios APRN  [KZ] Klever Elizabeth MD                                           Medical Decision Making  Patient presents emergency room with an emergent medical condition, dysuria with associated  abdominal/flank pain.  Differential diagnosis considered included STD, UTI, cystitis/pyelonephritis, appendicitis, ovarian torsion, ovarian cyst, diverticulitis.  Based on the patient's physical exam results, surgical condition considered unlikely.  Appropriate testing requested.    Patient is positive for UTI and treated with Rocephin and ciprofloxacin.    Problems Addressed:  Acute UTI (urinary tract infection): complicated acute illness or injury    Amount and/or Complexity of Data Reviewed  Labs: ordered. Decision-making details documented in ED Course.    Risk  Prescription drug management.        Final diagnoses:   Acute UTI (urinary tract infection)       ED Disposition  ED Disposition       ED Disposition   Discharge    Condition   Stable    Comment   --               Bonilla Rubio MD  [address] [place] [address] [place] [address] [phone]    Schedule an appointment as soon as possible for a visit in 1 week  For repeat evaluation         Medication List        New Prescriptions      ciprofloxacin 500 MG tablet  Commonly known as: CIPRO  Take 1 tablet by mouth 2 (Two) Times a Day for 7 days.               Where to Get Your Medications        These medications were sent to [id number] [place] [facility] [address] AT [place] [address] [address] - [phone] [facility] [phone] [place]  [address] [place] [address]      Phone: [phone]   ciprofloxacin 500 MG tablet

## 2024-12-25 LAB — BACTERIA SPEC AEROBE CULT: ABNORMAL

## 2025-03-11 ENCOUNTER — HOSPITAL ENCOUNTER (INPATIENT)
Facility: HOSPITAL | Age: 75
LOS: 3 days | Discharge: HOME | DRG: 637 | End: 2025-03-14
Attending: STUDENT IN AN ORGANIZED HEALTH CARE EDUCATION/TRAINING PROGRAM | Admitting: FAMILY MEDICINE
Payer: MEDICARE

## 2025-03-11 ENCOUNTER — APPOINTMENT (OUTPATIENT)
Dept: CARDIOLOGY | Facility: HOSPITAL | Age: 75
DRG: 637 | End: 2025-03-11
Payer: MEDICARE

## 2025-03-11 ENCOUNTER — APPOINTMENT (OUTPATIENT)
Dept: CT IMAGING | Facility: HOSPITAL | Age: 75
DRG: 637 | End: 2025-03-11
Payer: MEDICARE

## 2025-03-11 DIAGNOSIS — R07.89 CHEST TIGHTNESS: ICD-10-CM

## 2025-03-11 DIAGNOSIS — R60.0 BILATERAL LEG EDEMA: Primary | ICD-10-CM

## 2025-03-11 DIAGNOSIS — R42 LIGHTHEADEDNESS: ICD-10-CM

## 2025-03-11 DIAGNOSIS — R06.00 DYSPNEA, UNSPECIFIED TYPE: ICD-10-CM

## 2025-03-11 LAB
ALBUMIN SERPL-MCNC: 4 G/DL (ref 3.5–5.2)
ALBUMIN/GLOB SERPL: 1.9 G/DL
ALP SERPL-CCNC: 90 U/L (ref 39–117)
ALT SERPL W P-5'-P-CCNC: 31 U/L (ref 1–33)
ANION GAP SERPL CALCULATED.3IONS-SCNC: 9.9 MMOL/L (ref 5–15)
APTT PPP: 25.1 SECONDS (ref 22.7–35.4)
AST SERPL-CCNC: 28 U/L (ref 1–32)
BASOPHILS # BLD AUTO: 0.02 10*3/MM3 (ref 0–0.2)
BASOPHILS NFR BLD AUTO: 0.5 % (ref 0–1.5)
BH CV LOWER VASCULAR LEFT COMMON FEMORAL AUGMENT: NORMAL
BH CV LOWER VASCULAR LEFT COMMON FEMORAL COMPETENT: NORMAL
BH CV LOWER VASCULAR LEFT COMMON FEMORAL COMPRESS: NORMAL
BH CV LOWER VASCULAR LEFT COMMON FEMORAL PHASIC: NORMAL
BH CV LOWER VASCULAR LEFT COMMON FEMORAL SPONT: NORMAL
BH CV LOWER VASCULAR LEFT DISTAL FEMORAL COMPRESS: NORMAL
BH CV LOWER VASCULAR LEFT GASTRONEMIUS COMPRESS: NORMAL
BH CV LOWER VASCULAR LEFT GREATER SAPH AK COMPRESS: NORMAL
BH CV LOWER VASCULAR LEFT GREATER SAPH BK COMPRESS: NORMAL
BH CV LOWER VASCULAR LEFT LESSER SAPH COMPRESS: NORMAL
BH CV LOWER VASCULAR LEFT MID FEMORAL AUGMENT: NORMAL
BH CV LOWER VASCULAR LEFT MID FEMORAL COMPETENT: NORMAL
BH CV LOWER VASCULAR LEFT MID FEMORAL COMPRESS: NORMAL
BH CV LOWER VASCULAR LEFT MID FEMORAL PHASIC: NORMAL
BH CV LOWER VASCULAR LEFT MID FEMORAL SPONT: NORMAL
BH CV LOWER VASCULAR LEFT PERONEAL COMPRESS: NORMAL
BH CV LOWER VASCULAR LEFT POPLITEAL AUGMENT: NORMAL
BH CV LOWER VASCULAR LEFT POPLITEAL COMPETENT: NORMAL
BH CV LOWER VASCULAR LEFT POPLITEAL COMPRESS: NORMAL
BH CV LOWER VASCULAR LEFT POPLITEAL PHASIC: NORMAL
BH CV LOWER VASCULAR LEFT POPLITEAL SPONT: NORMAL
BH CV LOWER VASCULAR LEFT POSTERIOR TIBIAL COMPRESS: NORMAL
BH CV LOWER VASCULAR LEFT PROXIMAL FEMORAL COMPRESS: NORMAL
BH CV LOWER VASCULAR LEFT SAPHENOFEMORAL JUNCTION COMPRESS: NORMAL
BH CV LOWER VASCULAR RIGHT COMMON FEMORAL AUGMENT: NORMAL
BH CV LOWER VASCULAR RIGHT COMMON FEMORAL COMPETENT: NORMAL
BH CV LOWER VASCULAR RIGHT COMMON FEMORAL COMPRESS: NORMAL
BH CV LOWER VASCULAR RIGHT COMMON FEMORAL PHASIC: NORMAL
BH CV LOWER VASCULAR RIGHT COMMON FEMORAL SPONT: NORMAL
BH CV LOWER VASCULAR RIGHT DISTAL FEMORAL COMPRESS: NORMAL
BH CV LOWER VASCULAR RIGHT GASTRONEMIUS COMPRESS: NORMAL
BH CV LOWER VASCULAR RIGHT GREATER SAPH AK COMPRESS: NORMAL
BH CV LOWER VASCULAR RIGHT GREATER SAPH BK COMPRESS: NORMAL
BH CV LOWER VASCULAR RIGHT LESSER SAPH COMPRESS: NORMAL
BH CV LOWER VASCULAR RIGHT MID FEMORAL AUGMENT: NORMAL
BH CV LOWER VASCULAR RIGHT MID FEMORAL COMPETENT: NORMAL
BH CV LOWER VASCULAR RIGHT MID FEMORAL COMPRESS: NORMAL
BH CV LOWER VASCULAR RIGHT MID FEMORAL PHASIC: NORMAL
BH CV LOWER VASCULAR RIGHT MID FEMORAL SPONT: NORMAL
BH CV LOWER VASCULAR RIGHT PERONEAL COMPRESS: NORMAL
BH CV LOWER VASCULAR RIGHT POPLITEAL AUGMENT: NORMAL
BH CV LOWER VASCULAR RIGHT POPLITEAL COMPETENT: NORMAL
BH CV LOWER VASCULAR RIGHT POPLITEAL COMPRESS: NORMAL
BH CV LOWER VASCULAR RIGHT POPLITEAL PHASIC: NORMAL
BH CV LOWER VASCULAR RIGHT POPLITEAL SPONT: NORMAL
BH CV LOWER VASCULAR RIGHT POSTERIOR TIBIAL COMPRESS: NORMAL
BH CV LOWER VASCULAR RIGHT PROXIMAL FEMORAL COMPRESS: NORMAL
BH CV LOWER VASCULAR RIGHT SAPHENOFEMORAL JUNCTION COMPRESS: NORMAL
BH CV VAS PRELIMINARY FINDINGS SCRIPTING: 1
BILIRUB SERPL-MCNC: 0.3 MG/DL (ref 0–1.2)
BUN SERPL-MCNC: 25 MG/DL (ref 8–23)
BUN/CREAT SERPL: 30.9 (ref 7–25)
CALCIUM SPEC-SCNC: 9.2 MG/DL (ref 8.6–10.5)
CHLORIDE SERPL-SCNC: 101 MMOL/L (ref 98–107)
CO2 SERPL-SCNC: 27.1 MMOL/L (ref 22–29)
CREAT SERPL-MCNC: 0.81 MG/DL (ref 0.57–1)
DEPRECATED RDW RBC AUTO: 52.8 FL (ref 37–54)
EGFRCR SERPLBLD CKD-EPI 2021: 75.3 ML/MIN/1.73
EOSINOPHIL # BLD AUTO: 0.1 10*3/MM3 (ref 0–0.4)
EOSINOPHIL NFR BLD AUTO: 2.5 % (ref 0.3–6.2)
ERYTHROCYTE [DISTWIDTH] IN BLOOD BY AUTOMATED COUNT: 14.4 % (ref 12.3–15.4)
GEN 5 1HR TROPONIN T REFLEX: 19 NG/L
GLOBULIN UR ELPH-MCNC: 2.1 GM/DL
GLUCOSE BLDC GLUCOMTR-MCNC: 172 MG/DL (ref 70–105)
GLUCOSE SERPL-MCNC: 300 MG/DL (ref 65–99)
HCT VFR BLD AUTO: 37.3 % (ref 34–46.6)
HGB BLD-MCNC: 11.4 G/DL (ref 12–15.9)
HOLD SPECIMEN: NORMAL
HOLD SPECIMEN: NORMAL
IMM GRANULOCYTES # BLD AUTO: 0.01 10*3/MM3 (ref 0–0.05)
IMM GRANULOCYTES NFR BLD AUTO: 0.2 % (ref 0–0.5)
INR PPP: 1.12 (ref 0.9–1.1)
LYMPHOCYTES # BLD AUTO: 1.17 10*3/MM3 (ref 0.7–3.1)
LYMPHOCYTES NFR BLD AUTO: 29 % (ref 19.6–45.3)
MAGNESIUM SERPL-MCNC: 2.1 MG/DL (ref 1.6–2.4)
MCH RBC QN AUTO: 31.1 PG (ref 26.6–33)
MCHC RBC AUTO-ENTMCNC: 30.6 G/DL (ref 31.5–35.7)
MCV RBC AUTO: 101.6 FL (ref 79–97)
MONOCYTES # BLD AUTO: 0.37 10*3/MM3 (ref 0.1–0.9)
MONOCYTES NFR BLD AUTO: 9.2 % (ref 5–12)
NEUTROPHILS NFR BLD AUTO: 2.37 10*3/MM3 (ref 1.7–7)
NEUTROPHILS NFR BLD AUTO: 58.6 % (ref 42.7–76)
NRBC BLD AUTO-RTO: 0 /100 WBC (ref 0–0.2)
NT-PROBNP SERPL-MCNC: 38.8 PG/ML (ref 0–1800)
PLATELET # BLD AUTO: 118 10*3/MM3 (ref 140–450)
PMV BLD AUTO: 11 FL (ref 6–12)
POTASSIUM SERPL-SCNC: 3.9 MMOL/L (ref 3.5–5.2)
PROCALCITONIN SERPL-MCNC: 0.15 NG/ML (ref 0–0.25)
PROT SERPL-MCNC: 6.1 G/DL (ref 6–8.5)
PROTHROMBIN TIME: 14.4 SECONDS (ref 11.7–14.2)
RBC # BLD AUTO: 3.67 10*6/MM3 (ref 3.77–5.28)
RBC MORPH BLD: NORMAL
SMALL PLATELETS BLD QL SMEAR: NORMAL
SODIUM SERPL-SCNC: 138 MMOL/L (ref 136–145)
TROPONIN T % DELTA: 0
TROPONIN T NUMERIC DELTA: 0 NG/L
TROPONIN T SERPL HS-MCNC: 19 NG/L
WBC MORPH BLD: NORMAL
WBC NRBC COR # BLD AUTO: 4.04 10*3/MM3 (ref 3.4–10.8)

## 2025-03-11 PROCEDURE — 36415 COLL VENOUS BLD VENIPUNCTURE: CPT

## 2025-03-11 PROCEDURE — 85730 THROMBOPLASTIN TIME PARTIAL: CPT

## 2025-03-11 PROCEDURE — 82948 REAGENT STRIP/BLOOD GLUCOSE: CPT

## 2025-03-11 PROCEDURE — 84484 ASSAY OF TROPONIN QUANT: CPT

## 2025-03-11 PROCEDURE — 71275 CT ANGIOGRAPHY CHEST: CPT

## 2025-03-11 PROCEDURE — 70450 CT HEAD/BRAIN W/O DYE: CPT

## 2025-03-11 PROCEDURE — 25510000001 IOPAMIDOL PER 1 ML

## 2025-03-11 PROCEDURE — 99285 EMERGENCY DEPT VISIT HI MDM: CPT

## 2025-03-11 PROCEDURE — 93970 EXTREMITY STUDY: CPT | Performed by: STUDENT IN AN ORGANIZED HEALTH CARE EDUCATION/TRAINING PROGRAM

## 2025-03-11 PROCEDURE — 74177 CT ABD & PELVIS W/CONTRAST: CPT

## 2025-03-11 PROCEDURE — 93005 ELECTROCARDIOGRAM TRACING: CPT

## 2025-03-11 PROCEDURE — 80053 COMPREHEN METABOLIC PANEL: CPT

## 2025-03-11 PROCEDURE — 83735 ASSAY OF MAGNESIUM: CPT

## 2025-03-11 PROCEDURE — 85025 COMPLETE CBC W/AUTO DIFF WBC: CPT

## 2025-03-11 PROCEDURE — 25010000002 ONDANSETRON PER 1 MG

## 2025-03-11 PROCEDURE — 84145 PROCALCITONIN (PCT): CPT

## 2025-03-11 PROCEDURE — 85610 PROTHROMBIN TIME: CPT

## 2025-03-11 PROCEDURE — 85007 BL SMEAR W/DIFF WBC COUNT: CPT

## 2025-03-11 PROCEDURE — 83880 ASSAY OF NATRIURETIC PEPTIDE: CPT

## 2025-03-11 PROCEDURE — 25010000002 HYDROMORPHONE 1 MG/ML SOLUTION

## 2025-03-11 PROCEDURE — 93970 EXTREMITY STUDY: CPT

## 2025-03-11 RX ORDER — SODIUM CHLORIDE 0.9 % (FLUSH) 0.9 %
10 SYRINGE (ML) INJECTION EVERY 12 HOURS SCHEDULED
Status: DISCONTINUED | OUTPATIENT
Start: 2025-03-11 | End: 2025-03-14 | Stop reason: HOSPADM

## 2025-03-11 RX ORDER — AMOXICILLIN 250 MG
2 CAPSULE ORAL 2 TIMES DAILY PRN
Status: DISCONTINUED | OUTPATIENT
Start: 2025-03-11 | End: 2025-03-14 | Stop reason: HOSPADM

## 2025-03-11 RX ORDER — IOPAMIDOL 755 MG/ML
100 INJECTION, SOLUTION INTRAVASCULAR
Status: COMPLETED | OUTPATIENT
Start: 2025-03-11 | End: 2025-03-11

## 2025-03-11 RX ORDER — ONDANSETRON 2 MG/ML
4 INJECTION INTRAMUSCULAR; INTRAVENOUS ONCE
Status: COMPLETED | OUTPATIENT
Start: 2025-03-11 | End: 2025-03-11

## 2025-03-11 RX ORDER — INSULIN LISPRO 100 [IU]/ML
2-7 INJECTION, SOLUTION INTRAVENOUS; SUBCUTANEOUS
Status: DISCONTINUED | OUTPATIENT
Start: 2025-03-12 | End: 2025-03-14 | Stop reason: HOSPADM

## 2025-03-11 RX ORDER — POLYETHYLENE GLYCOL 3350 17 G/17G
17 POWDER, FOR SOLUTION ORAL DAILY PRN
Status: DISCONTINUED | OUTPATIENT
Start: 2025-03-11 | End: 2025-03-14 | Stop reason: HOSPADM

## 2025-03-11 RX ORDER — NICOTINE POLACRILEX 4 MG
15 LOZENGE BUCCAL
Status: DISCONTINUED | OUTPATIENT
Start: 2025-03-11 | End: 2025-03-14 | Stop reason: HOSPADM

## 2025-03-11 RX ORDER — HYDROMORPHONE HYDROCHLORIDE 1 MG/ML
0.5 INJECTION, SOLUTION INTRAMUSCULAR; INTRAVENOUS; SUBCUTANEOUS EVERY 4 HOURS PRN
Status: DISCONTINUED | OUTPATIENT
Start: 2025-03-11 | End: 2025-03-12

## 2025-03-11 RX ORDER — IPRATROPIUM BROMIDE AND ALBUTEROL SULFATE 2.5; .5 MG/3ML; MG/3ML
3 SOLUTION RESPIRATORY (INHALATION)
Status: DISCONTINUED | OUTPATIENT
Start: 2025-03-11 | End: 2025-03-12

## 2025-03-11 RX ORDER — SODIUM CHLORIDE 0.9 % (FLUSH) 0.9 %
10 SYRINGE (ML) INJECTION AS NEEDED
Status: DISCONTINUED | OUTPATIENT
Start: 2025-03-11 | End: 2025-03-14 | Stop reason: HOSPADM

## 2025-03-11 RX ORDER — GABAPENTIN 300 MG/1
300 CAPSULE ORAL 2 TIMES DAILY
Status: DISCONTINUED | OUTPATIENT
Start: 2025-03-11 | End: 2025-03-14 | Stop reason: HOSPADM

## 2025-03-11 RX ORDER — INSULIN LISPRO 100 [IU]/ML
20 INJECTION, SOLUTION INTRAVENOUS; SUBCUTANEOUS
COMMUNITY

## 2025-03-11 RX ORDER — BUDESONIDE 0.5 MG/2ML
0.5 INHALANT ORAL
Status: DISCONTINUED | OUTPATIENT
Start: 2025-03-11 | End: 2025-03-12

## 2025-03-11 RX ORDER — NITROGLYCERIN 0.4 MG/1
0.4 TABLET SUBLINGUAL
Status: DISCONTINUED | OUTPATIENT
Start: 2025-03-11 | End: 2025-03-14 | Stop reason: HOSPADM

## 2025-03-11 RX ORDER — ROSUVASTATIN CALCIUM 10 MG/1
20 TABLET, COATED ORAL NIGHTLY
Status: DISCONTINUED | OUTPATIENT
Start: 2025-03-12 | End: 2025-03-14 | Stop reason: HOSPADM

## 2025-03-11 RX ORDER — INSULIN LISPRO 100 [IU]/ML
20 INJECTION, SOLUTION INTRAVENOUS; SUBCUTANEOUS NIGHTLY
COMMUNITY
End: 2025-03-11 | Stop reason: SDUPTHER

## 2025-03-11 RX ORDER — IBUPROFEN 600 MG/1
1 TABLET ORAL
Status: DISCONTINUED | OUTPATIENT
Start: 2025-03-11 | End: 2025-03-14 | Stop reason: HOSPADM

## 2025-03-11 RX ORDER — DEXTROSE MONOHYDRATE 25 G/50ML
25 INJECTION, SOLUTION INTRAVENOUS
Status: DISCONTINUED | OUTPATIENT
Start: 2025-03-11 | End: 2025-03-14 | Stop reason: HOSPADM

## 2025-03-11 RX ORDER — SODIUM CHLORIDE 9 MG/ML
40 INJECTION, SOLUTION INTRAVENOUS AS NEEDED
Status: DISCONTINUED | OUTPATIENT
Start: 2025-03-11 | End: 2025-03-14 | Stop reason: HOSPADM

## 2025-03-11 RX ORDER — BISACODYL 5 MG/1
5 TABLET, DELAYED RELEASE ORAL DAILY PRN
Status: DISCONTINUED | OUTPATIENT
Start: 2025-03-11 | End: 2025-03-14 | Stop reason: HOSPADM

## 2025-03-11 RX ORDER — POLYETHYLENE GLYCOL 3350 17 G/17G
17 POWDER, FOR SOLUTION ORAL ONCE
Status: COMPLETED | OUTPATIENT
Start: 2025-03-12 | End: 2025-03-12

## 2025-03-11 RX ORDER — FUROSEMIDE 10 MG/ML
60 INJECTION INTRAMUSCULAR; INTRAVENOUS EVERY 12 HOURS
Status: DISCONTINUED | OUTPATIENT
Start: 2025-03-12 | End: 2025-03-14 | Stop reason: HOSPADM

## 2025-03-11 RX ORDER — BISACODYL 10 MG
10 SUPPOSITORY, RECTAL RECTAL DAILY PRN
Status: DISCONTINUED | OUTPATIENT
Start: 2025-03-11 | End: 2025-03-14 | Stop reason: HOSPADM

## 2025-03-11 RX ORDER — FUROSEMIDE 10 MG/ML
80 INJECTION INTRAMUSCULAR; INTRAVENOUS ONCE
Status: COMPLETED | OUTPATIENT
Start: 2025-03-11 | End: 2025-03-12

## 2025-03-11 RX ADMIN — ONDANSETRON 4 MG: 2 INJECTION, SOLUTION INTRAMUSCULAR; INTRAVENOUS at 20:22

## 2025-03-11 RX ADMIN — HYDROMORPHONE HYDROCHLORIDE 0.5 MG: 1 INJECTION, SOLUTION INTRAMUSCULAR; INTRAVENOUS; SUBCUTANEOUS at 20:22

## 2025-03-11 RX ADMIN — IOPAMIDOL 100 ML: 755 INJECTION, SOLUTION INTRAVENOUS at 20:04

## 2025-03-11 NOTE — Clinical Note
Level of Care: Telemetry [5]   Admitting Physician: RAINER VASQUEZ [[id number]]   Attending Physician: RAINER VASQUEZ [[id number]]

## 2025-03-11 NOTE — ED PROVIDER NOTES
Subjective   History of Present Illness  Due to significant overcrowding in the emergency department patient was evaluated by myself in a hallway bed. This exam may be limited by privacy, noise level and the patient not wearing a hospital gown.  Explained to the patient our limitations and our overcrowding.  They were in agreement to continue the exam and treatment at this time.     74-year-old female with history of hypertension, pulmonary emboli, hyperlipidemia, chronic back pain presents the ED today with complaints of 30 pound weight gain, abdominal bloating, bilateral leg swelling, shortness of breath, chest heaviness and a nonproductive cough for the last 2 weeks.  She reports she had COVID 3 weeks ago.  She does report lightheadedness with activity and intermittent bilateral blurry vision but denies blurry vision at this time.  She also reports that when she was at the doctor's office today they said that she now has a heart murmur which she has never had before.  She is on multiple diuretics and is compliant with these medications but states she still continues to swell.  She has a history of IVC filter in 2021 that is still in place from her pulmonary emboli, she is on Eliquis 5 mg twice daily.  Denies sick contacts, congestion, fever, nausea vomiting diarrhea, syncope    PCP: Joanne        Review of Systems   Constitutional:  Negative for fever.   HENT:  Negative for congestion.    Respiratory:  Positive for cough and shortness of breath.    Cardiovascular:  Positive for chest pain and leg swelling.   Gastrointestinal:  Positive for abdominal pain (bloating). Negative for diarrhea, nausea and vomiting.   Neurological:  Positive for light-headedness. Negative for syncope.       Past Medical History:   Diagnosis Date    Arthritis     Benign essential hypertension 7/12/2018    Diabetes mellitus     Elevated cholesterol     H/O blood clots     History of transfusion     Hyperlipidemia     Low back pain      Nausea and vomiting 3/26/2024    Sleep apnea        Allergies   Allergen Reactions    Ambien  [Zolpidem] Confusion    Codeine Itching    Pregabalin Swelling     Leg swelling    Sulfa Antibiotics Nausea And Vomiting     Makes her nauseated    Zolpidem Tartrate Confusion       Past Surgical History:   Procedure Laterality Date    BACK SURGERY      COLONOSCOPY N/A 3/28/2024    Procedure: COLONOSCOPY with cold snare polypectomy x1 and endoscopic clipping of polypectomy site x2;  Surgeon: Janell Zhang MD;  Location: [facility] ENDOSCOPY;  Service: Gastroenterology;  Laterality: N/A;  Post- colon polyp    ENDOSCOPY N/A 3/28/2024    Procedure: ESOPHAGOGASTRODUODENOSCOPY;  Surgeon: Janell Zhang MD;  Location: [facility] ENDOSCOPY;  Service: Gastroenterology;  Laterality: N/A;  Post- Esophagitis, gastritis    FEMORAL DISTAL BYPASS      HYSTERECTOMY      JOINT REPLACEMENT      LUMBAR LAMINECTOMY WITH FUSION N/A 5/28/2021    Procedure: Lumbar 5 Smith procedure.  Lumbar 5 6 and sacral 1 transpedicular Solera screws and rods.  Posterior fusion with autologous bone.;  Surgeon: Gagan Aguilar MD;  Location: [facility] MAIN OR;  Service: Neurosurgery;  Laterality: N/A;       History reviewed. No pertinent family history.    Social History     Socioeconomic History    Marital status:    Tobacco Use    Smoking status: Former    Smokeless tobacco: Never   Vaping Use    Vaping status: Never Used   Substance and Sexual Activity    Alcohol use: Not Currently    Drug use: Never    Sexual activity: Defer           Objective   Physical Exam  Vitals reviewed.   HENT:      Head: Normocephalic.   Eyes:      Extraocular Movements: Extraocular movements intact.      Conjunctiva/sclera: Conjunctivae normal.   Cardiovascular:      Rate and Rhythm: Normal rate and regular rhythm.      Heart sounds: Murmur heard.   Pulmonary:      Effort: Pulmonary effort is normal.      Comments: Diminished bilaterally  Abdominal:      General: Bowel  "sounds are normal. There is distension.      Palpations: Abdomen is soft.      Tenderness: There is abdominal tenderness.      Comments: Diffuse bloating throughout the abdomen with mild tenderness throughout   Musculoskeletal:         General: Normal range of motion.      Right lower leg: Edema present.      Left lower leg: Edema present.      Comments: 3-4+ edema bilat lower extremities   Skin:     General: Skin is warm.      Comments: Conrad appearance of bilateral lower extremities likely due to edema.  No signs of cellulitis or open wounds at this time   Neurological:      Mental Status: She is alert and oriented to person, place, and time.   Psychiatric:         Mood and Affect: Mood normal.         Behavior: Behavior normal.         Procedures    EKG independently interpreted by Dr. Constantino sinus rhythm at a rate of 76.  Compared to previous from 3/26/2024 that was sinus rhythm at a rate of 74         ED Course  ED Course as of 03/12/25 0003   Tue Mar 11, 2025   1816 EKG requested [KB]   1927 Vascular tech reports neg for blood clots bilateral lower extremities [KB]   1940 Blood work hemolyzed [KB]   2013 Patient takes perc 10mg tablets at home and is requesting something for pain, dilaudid ordered at this time [KB]   2142 Awaiting second trop [KB]   2228 3/2024 echo:  Structurally and functionally normal cardiac valves except for mild mitral and tricuspid regurgitation..  Mild left atrial enlargement.  Normal left ventricular diastolic function.  Left ventricular size and contractility is normal with ejection fraction of 60%.   [KB]   5977 Discussed case with Dr. Lantigua with hospitalist group who agrees to admit patient. [KB]      ED Course User Index  [KB] Dimitry Garay, CHANTALE      /55   Pulse 81   Temp 97.7 °F (36.5 °C) (Oral)   Resp 18   Ht 162.6 cm (64\")   Wt 99.7 kg (219 lb 12.8 oz)   SpO2 90%   BMI 37.73 kg/m²   Labs Reviewed   COMPREHENSIVE METABOLIC PANEL - Abnormal; Notable for the " following components:       Result Value    Glucose 300 (*)     BUN 25 (*)     BUN/Creatinine Ratio 30.9 (*)     All other components within normal limits    Narrative:     GFR Categories in Chronic Kidney Disease (CKD)      GFR Category          GFR (mL/min/1.73)    Interpretation  G1                     90 or greater         Normal or high (1)  G2                      60-89                Mild decrease (1)  G3a                   45-59                Mild to moderate decrease  G3b                   30-44                Moderate to severe decrease  G4                    15-29                Severe decrease  G5                    14 or less           Kidney failure          (1)In the absence of evidence of kidney disease, neither GFR category G1 or G2 fulfill the criteria for CKD.    eGFR calculation 2021 CKD-EPI creatinine equation, which does not include race as a factor   TROPONIN - Abnormal; Notable for the following components:    HS Troponin T 19 (*)     All other components within normal limits    Narrative:     High Sensitive Troponin T Reference Range:  <14.0 ng/L- Negative Female for AMI  <22.0 ng/L- Negative Male for AMI  >=14 - Abnormal Female indicating possible myocardial injury.  >=22 - Abnormal Male indicating possible myocardial injury.   Clinicians would have to utilize clinical acumen, EKG, Troponin, and serial changes to determine if it is an Acute Myocardial Infarction or myocardial injury due to an underlying chronic condition.        CBC WITH AUTO DIFFERENTIAL - Abnormal; Notable for the following components:    RBC 3.67 (*)     Hemoglobin 11.4 (*)     .6 (*)     MCHC 30.6 (*)     Platelets 118 (*)     All other components within normal limits   PROTIME-INR - Abnormal; Notable for the following components:    Protime 14.4 (*)     INR 1.12 (*)     All other components within normal limits   HIGH SENSITIVITIY TROPONIN T 1HR - Abnormal; Notable for the following components:    HS Troponin T  19 (*)     All other components within normal limits    Narrative:     High Sensitive Troponin T Reference Range:  <14.0 ng/L- Negative Female for AMI  <22.0 ng/L- Negative Male for AMI  >=14 - Abnormal Female indicating possible myocardial injury.  >=22 - Abnormal Male indicating possible myocardial injury.   Clinicians would have to utilize clinical acumen, EKG, Troponin, and serial changes to determine if it is an Acute Myocardial Infarction or myocardial injury due to an underlying chronic condition.        POCT GLUCOSE FINGERSTICK - Abnormal; Notable for the following components:    Glucose 172 (*)     All other components within normal limits   BNP (IN-HOUSE) - Normal    Narrative:     This assay is used as an aid in the diagnosis of individuals suspected of having heart failure. It can be used as an aid in the diagnosis of acute decompensated heart failure (ADHF) in patients presenting with signs and symptoms of ADHF to the emergency department (ED). In addition, NT-proBNP of <300 pg/mL indicates ADHF is not likely.    Age Range Result Interpretation  NT-proBNP Concentration (pg/mL:      <50             Positive            >450                   Gray                 300-450                    Negative             <300    50-75           Positive            >900                  Gray                300-900                  Negative            <300      >75             Positive            >1800                  Gray                300-1800                  Negative            <300   MAGNESIUM - Normal   APTT - Normal   PROCALCITONIN - Normal    Narrative:     As a Marker for Sepsis (Non-Neonates):    1. <0.5 ng/mL represents a low risk of severe sepsis and/or septic shock.  2. >2 ng/mL represents a high risk of severe sepsis and/or septic shock.    As a Marker for Lower Respiratory Tract Infections that require antibiotic therapy:    PCT on Admission    Antibiotic Therapy       6-12 Hrs later    >0.5             "    Strongly Recommended  >0.25 - <0.5        Recommended   0.1 - 0.25          Discouraged              Remeasure/reassess PCT  <0.1                Strongly Discouraged     Remeasure/reassess PCT    As 28 day mortality risk marker: \"Change in Procalcitonin Result\" (>80% or <=80%) if Day 0 (or Day 1) and Day 4 values are available. Refer to http://[email or web address]dfyndpct-calculator.com    Change in PCT <=80%  A decrease of PCT levels below or equal to 80% defines a positive change in PCT test result representing a higher risk for 28-day all-cause mortality of patients diagnosed with severe sepsis for septic shock.    Change in PCT >80%  A decrease of PCT levels of more than 80% defines a negative change in PCT result representing a lower risk for 28-day all-cause mortality of patients diagnosed with severe sepsis or septic shock.      RESPIRATORY PANEL PCR W/ COVID-19 (SARS-COV-2), NP SWAB IN UTM/VTP, 2 HR TAT   SCAN SLIDE   VITAMIN B12   FOLATE   COMPREHENSIVE METABOLIC PANEL   MAGNESIUM   PHOSPHORUS   CBC WITH AUTO DIFFERENTIAL   POCT GLUCOSE FINGERSTICK   POCT GLUCOSE FINGERSTICK   POCT GLUCOSE FINGERSTICK   POCT GLUCOSE FINGERSTICK   CBC AND DIFFERENTIAL    Narrative:     The following orders were created for panel order CBC & Differential.  Procedure                               Abnormality         Status                     ---------                               -----------         ------                     CBC Auto Differential[[id number]]        Abnormal            Final result               Scan Slide[[id number]]                                       Final result                 Please view results for these tests on the individual orders.   EXTRA TUBES    Narrative:     The following orders were created for panel order Extra Tubes.  Procedure                               Abnormality         Status                     ---------                               -----------         ------                     Gold Top - " SST[[id number]]                                   Final result                 Please view results for these tests on the individual orders.   GOLD TOP - SST   EXTRA TUBES    Narrative:     The following orders were created for panel order Extra Tubes.  Procedure                               Abnormality         Status                     ---------                               -----------         ------                     Green Top (Gel)[[id number]]                                  Final result                 Please view results for these tests on the individual orders.   GREEN TOP   CBC AND DIFFERENTIAL    Narrative:     The following orders were created for panel order CBC & Differential.  Procedure                               Abnormality         Status                     ---------                               -----------         ------                     CBC Auto Differential[[id number]]                                                         Please view results for these tests on the individual orders.     Medications   sodium chloride 0.9 % flush 10 mL (has no administration in time range)   sodium chloride 0.9 % flush 10 mL (has no administration in time range)   sodium chloride 0.9 % flush 10 mL (has no administration in time range)   sodium chloride 0.9 % infusion 40 mL (has no administration in time range)   Potassium Replacement - Follow Nurse / BPA Driven Protocol (has no administration in time range)   Magnesium Standard Dose Replacement - Follow Nurse / BPA Driven Protocol (has no administration in time range)   Phosphorus Replacement - Follow Nurse / BPA Driven Protocol (has no administration in time range)   Calcium Replacement - Follow Nurse / BPA Driven Protocol (has no administration in time range)   sennosides-docusate (PERICOLACE) 8.6-50 MG per tablet 2 tablet (has no administration in time range)     And   polyethylene glycol (MIRALAX) packet 17 g (has no administration in time range)      And   bisacodyl (DULCOLAX) EC tablet 5 mg (has no administration in time range)     And   bisacodyl (DULCOLAX) suppository 10 mg (has no administration in time range)   nitroglycerin (NITROSTAT) SL tablet 0.4 mg (has no administration in time range)   furosemide (LASIX) injection 80 mg (has no administration in time range)   furosemide (LASIX) injection 60 mg (has no administration in time range)   cefTRIAXone (ROCEPHIN) 2,000 mg in sodium chloride 0.9 % 100 mL MBP (has no administration in time range)   ipratropium-albuterol (DUO-NEB) nebulizer solution 3 mL (3 mL Nebulization Not Given 3/11/25 2346)   budesonide (PULMICORT) nebulizer solution 0.5 mg (0.5 mg Nebulization Not Given 3/11/25 2346)   apixaban (ELIQUIS) tablet 5 mg (has no administration in time range)   dextrose (GLUTOSE) oral gel 15 g (has no administration in time range)   dextrose (D50W) (25 g/50 mL) IV injection 25 g (has no administration in time range)   glucagon (GLUCAGEN) injection 1 mg (has no administration in time range)   insulin lispro (HUMALOG/ADMELOG) injection 2-7 Units (has no administration in time range)   rosuvastatin (CRESTOR) tablet 20 mg (has no administration in time range)   gabapentin (NEURONTIN) capsule 300 mg (300 mg Oral Not Given 3/11/25 2359)   HYDROmorphone (DILAUDID) injection 0.5 mg (has no administration in time range)   polyethylene glycol (MIRALAX) packet 17 g (has no administration in time range)   insulin glargine (LANTUS, SEMGLEE) injection 50 Units (has no administration in time range)   iopamidol (ISOVUE-370) 76 % injection 100 mL (100 mL Intravenous Given 3/11/25 2004)   HYDROmorphone (DILAUDID) injection 0.5 mg (0.5 mg Intravenous Given 3/11/25 2022)   ondansetron (ZOFRAN) injection 4 mg (4 mg Intravenous Given 3/11/25 2022)     CT Abdomen Pelvis With Contrast  Result Date: 3/11/2025  Impression: 1. Patchy subpleural airspace disease within the left lower lobe and new 5 mm noncalcified nodule within the right  lower lobe. Follow-up CT scan of the chest would be recommended in 3 to 6 months to document stability or resolution of these findings. 2. No acute process seen within the abdomen or pelvis. Electronically Signed: Juanjose Zamora MD  3/11/2025 8:52 PM EDT  Workstation ID: [id number]    CT Angiogram Chest Pulmonary Embolism  Result Date: 3/11/2025  Impression: No evidence of pulmonary embolism. Mild patchy groundglass opacities in both lower lobes may be infectious/inflammatory. Electronically Signed: Stone Restrepo  3/11/2025 8:50 PM EDT  Workstation ID: [id number]    CT Head Without Contrast  Result Date: 3/11/2025  Impression: No acute intracranial abnormality identified. Electronically Signed: Madi Dumont MD  3/11/2025 7:46 PM EDT  Workstation ID: [id number]                                                     Medical Decision Making  Patient was seen for the above complaints.  IV was established and blood work was obtained to assess for electrolyte abnormalities, cardiac function, infection.  Initial troponin 19 with repeat of 19 without previous for comparison, glucose 300, mag 2.1, Pro-Alden 0.15, BNP 38.8, hemoglobin 11.4 along baseline, white blood cell count 4.04.  Doppler study of bilateral lower extremities were negative for DVT or SVT.  Head CT independently interpreted by the radiologist as no acute intercranial abnormality.  CT PE was obtained and independently interpreted by the radiologist as:  -No evidence of pulmonary embolism.   -Mild patchy groundglass opacities in both lower lobes may be infectious/inflammatory.     Abdominal CT with contrast was also obtained and independently interpreted by the radiologist:  1. Patchy subpleural airspace disease within the left lower lobe and new 5 mm noncalcified nodule within the right lower lobe. Follow-up CT scan of the chest would be recommended in 3 to 6 months to document stability or resolution of these findings.   2. No acute process seen within the abdomen  or pelvis.     Do not see signs of pneumonia and Pro-Alden and white blood cell count within normal limits.  This appears to be more fluid overload.  Patient has not had an echocardiogram since March 2024 that showed an EF of 60%.  Will be admitted to the hospitalist service for further evaluation and management along with echocardiogram. Discussed with Dr. Vasquez with the hospitalist group who agrees to admit patient.  Discussed plan of care with patient who verbalized understanding was agreeable plan of care at this time.    Based on the clinical findings at this time I anticipate the patient will require a 2 midnight stay      Problems Addressed:  Bilateral leg edema: acute illness or injury  Dyspnea, unspecified type: acute illness or injury  Lightheadedness: acute illness or injury    Amount and/or Complexity of Data Reviewed  Labs: ordered. Decision-making details documented in ED Course.  Radiology: ordered and independent interpretation performed. Decision-making details documented in ED Course.  ECG/medicine tests: ordered and independent interpretation performed. Decision-making details documented in ED Course.    Risk  Prescription drug management.  Decision regarding hospitalization.        Final diagnoses:   Bilateral leg edema   Dyspnea, unspecified type   Lightheadedness   Chest tightness       ED Disposition  ED Disposition       ED Disposition   Decision to Admit    Condition   --    Comment   Level of Care: Med/Surg [1]   Diagnosis: Bilateral leg edema [984445]   Admitting Physician: RAINER VASQUEZ [[id number]]   Certification: I Certify That Inpatient Hospital Services Are Medically Necessary For Greater Than 2 Midnights                 No follow-up provider specified.       Medication List        ASK your doctor about these medications      Insulin Lispro 100 UNIT/ML injection  Commonly known as: humaLOG  Ask about: Which instructions should I use?                 Dimitry Garay, APRN  03/12/25  0003

## 2025-03-12 ENCOUNTER — APPOINTMENT (OUTPATIENT)
Dept: CARDIOLOGY | Facility: HOSPITAL | Age: 75
DRG: 637 | End: 2025-03-12
Payer: MEDICARE

## 2025-03-12 LAB
ALBUMIN SERPL-MCNC: 3.5 G/DL (ref 3.5–5.2)
ALBUMIN/GLOB SERPL: 1.9 G/DL
ALP SERPL-CCNC: 69 U/L (ref 39–117)
ALT SERPL W P-5'-P-CCNC: 27 U/L (ref 1–33)
ANION GAP SERPL CALCULATED.3IONS-SCNC: 8.4 MMOL/L (ref 5–15)
AORTIC DIMENSIONLESS INDEX: 0.83 (DI)
AST SERPL-CCNC: 26 U/L (ref 1–32)
AV MEAN PRESS GRAD SYS DOP V1V2: 6 MMHG
AV VMAX SYS DOP: 165 CM/SEC
B PARAPERT DNA SPEC QL NAA+PROBE: NOT DETECTED
B PERT DNA SPEC QL NAA+PROBE: NOT DETECTED
BASOPHILS # BLD AUTO: 0.02 10*3/MM3 (ref 0–0.2)
BASOPHILS NFR BLD AUTO: 0.5 % (ref 0–1.5)
BH CV ECHO LEFT VENTRICLE GLOBAL LONGITUDINAL STRAIN: -18 %
BH CV ECHO MEAS - ACS: 1.8 CM
BH CV ECHO MEAS - AO MAX PG: 10.9 MMHG
BH CV ECHO MEAS - AO V2 VTI: 38.3 CM
BH CV ECHO MEAS - AVA(I,D): 2.12 CM2
BH CV ECHO MEAS - EDV(CUBED): 103.8 ML
BH CV ECHO MEAS - EDV(MOD-SP4): 69.9 ML
BH CV ECHO MEAS - EF(MOD-SP4): 65.1 %
BH CV ECHO MEAS - ESV(CUBED): 27 ML
BH CV ECHO MEAS - ESV(MOD-SP4): 24.4 ML
BH CV ECHO MEAS - FS: 36.2 %
BH CV ECHO MEAS - IVS/LVPW: 0.91 CM
BH CV ECHO MEAS - IVSD: 1 CM
BH CV ECHO MEAS - LA DIMENSION: 4.4 CM
BH CV ECHO MEAS - LAT PEAK E' VEL: 8.3 CM/SEC
BH CV ECHO MEAS - LV DIASTOLIC VOL/BSA (35-75): 34.4 CM2
BH CV ECHO MEAS - LV MASS(C)D: 175.8 GRAMS
BH CV ECHO MEAS - LV MAX PG: 6.8 MMHG
BH CV ECHO MEAS - LV MEAN PG: 4 MMHG
BH CV ECHO MEAS - LV SYSTOLIC VOL/BSA (12-30): 12 CM2
BH CV ECHO MEAS - LV V1 MAX: 130 CM/SEC
BH CV ECHO MEAS - LV V1 VTI: 31.9 CM
BH CV ECHO MEAS - LVIDD: 4.7 CM
BH CV ECHO MEAS - LVIDS: 3 CM
BH CV ECHO MEAS - LVOT AREA: 2.5 CM2
BH CV ECHO MEAS - LVOT DIAM: 1.8 CM
BH CV ECHO MEAS - LVPWD: 1.1 CM
BH CV ECHO MEAS - MED PEAK E' VEL: 8.3 CM/SEC
BH CV ECHO MEAS - MR MAX PG: 55.1 MMHG
BH CV ECHO MEAS - MR MAX VEL: 371 CM/SEC
BH CV ECHO MEAS - MV A MAX VEL: 115 CM/SEC
BH CV ECHO MEAS - MV DEC SLOPE: 419 CM/SEC2
BH CV ECHO MEAS - MV DEC TIME: 0.24 SEC
BH CV ECHO MEAS - MV E MAX VEL: 103 CM/SEC
BH CV ECHO MEAS - MV E/A: 0.9
BH CV ECHO MEAS - MV MAX PG: 6.9 MMHG
BH CV ECHO MEAS - MV MEAN PG: 3 MMHG
BH CV ECHO MEAS - MV P1/2T: 76.2 MSEC
BH CV ECHO MEAS - MV V2 VTI: 40.2 CM
BH CV ECHO MEAS - MVA(P1/2T): 2.9 CM2
BH CV ECHO MEAS - MVA(VTI): 2.02 CM2
BH CV ECHO MEAS - PA ACC TIME: 0.11 SEC
BH CV ECHO MEAS - PA V2 MAX: 138 CM/SEC
BH CV ECHO MEAS - PULM A REVS DUR: 0.13 SEC
BH CV ECHO MEAS - PULM A REVS VEL: 26.1 CM/SEC
BH CV ECHO MEAS - PULM DIAS VEL: 44.1 CM/SEC
BH CV ECHO MEAS - PULM S/D: 1.45
BH CV ECHO MEAS - PULM SYS VEL: 63.9 CM/SEC
BH CV ECHO MEAS - RAP SYSTOLE: 3 MMHG
BH CV ECHO MEAS - RV MAX PG: 3.4 MMHG
BH CV ECHO MEAS - RV V1 MAX: 92.6 CM/SEC
BH CV ECHO MEAS - RV V1 VTI: 22 CM
BH CV ECHO MEAS - RVDD: 2.9 CM
BH CV ECHO MEAS - RVSP: 27.8 MMHG
BH CV ECHO MEAS - SV(LVOT): 81.2 ML
BH CV ECHO MEAS - SV(MOD-SP4): 45.5 ML
BH CV ECHO MEAS - SVI(LVOT): 39.9 ML/M2
BH CV ECHO MEAS - SVI(MOD-SP4): 22.4 ML/M2
BH CV ECHO MEAS - TAPSE (>1.6): 1.88 CM
BH CV ECHO MEAS - TR MAX PG: 24.8 MMHG
BH CV ECHO MEAS - TR MAX VEL: 249 CM/SEC
BH CV ECHO MEASUREMENTS AVERAGE E/E' RATIO: 12.41
BH CV XLRA - TDI S': 12.9 CM/SEC
BILIRUB SERPL-MCNC: 0.2 MG/DL (ref 0–1.2)
BUN SERPL-MCNC: 26 MG/DL (ref 8–23)
BUN/CREAT SERPL: 28 (ref 7–25)
C PNEUM DNA NPH QL NAA+NON-PROBE: NOT DETECTED
CALCIUM SPEC-SCNC: 8.8 MG/DL (ref 8.6–10.5)
CHLORIDE SERPL-SCNC: 103 MMOL/L (ref 98–107)
CO2 SERPL-SCNC: 30.6 MMOL/L (ref 22–29)
CREAT SERPL-MCNC: 0.93 MG/DL (ref 0.57–1)
DEPRECATED RDW RBC AUTO: 52 FL (ref 37–54)
EGFRCR SERPLBLD CKD-EPI 2021: 63.8 ML/MIN/1.73
EOSINOPHIL # BLD AUTO: 0.13 10*3/MM3 (ref 0–0.4)
EOSINOPHIL NFR BLD AUTO: 3.1 % (ref 0.3–6.2)
ERYTHROCYTE [DISTWIDTH] IN BLOOD BY AUTOMATED COUNT: 14.6 % (ref 12.3–15.4)
FLUAV SUBTYP SPEC NAA+PROBE: NOT DETECTED
FLUBV RNA ISLT QL NAA+PROBE: NOT DETECTED
FOLATE SERPL-MCNC: 17 NG/ML (ref 4.78–24.2)
GLOBULIN UR ELPH-MCNC: 1.8 GM/DL
GLUCOSE BLDC GLUCOMTR-MCNC: 173 MG/DL (ref 70–105)
GLUCOSE BLDC GLUCOMTR-MCNC: 180 MG/DL (ref 70–105)
GLUCOSE BLDC GLUCOMTR-MCNC: 223 MG/DL (ref 70–105)
GLUCOSE BLDC GLUCOMTR-MCNC: 233 MG/DL (ref 70–105)
GLUCOSE SERPL-MCNC: 297 MG/DL (ref 65–99)
HADV DNA SPEC NAA+PROBE: NOT DETECTED
HCOV 229E RNA SPEC QL NAA+PROBE: NOT DETECTED
HCOV HKU1 RNA SPEC QL NAA+PROBE: NOT DETECTED
HCOV NL63 RNA SPEC QL NAA+PROBE: NOT DETECTED
HCOV OC43 RNA SPEC QL NAA+PROBE: NOT DETECTED
HCT VFR BLD AUTO: 34.7 % (ref 34–46.6)
HGB BLD-MCNC: 10.7 G/DL (ref 12–15.9)
HMPV RNA NPH QL NAA+NON-PROBE: NOT DETECTED
HPIV1 RNA ISLT QL NAA+PROBE: NOT DETECTED
HPIV2 RNA SPEC QL NAA+PROBE: NOT DETECTED
HPIV3 RNA NPH QL NAA+PROBE: NOT DETECTED
HPIV4 P GENE NPH QL NAA+PROBE: NOT DETECTED
IMM GRANULOCYTES # BLD AUTO: 0.02 10*3/MM3 (ref 0–0.05)
IMM GRANULOCYTES NFR BLD AUTO: 0.5 % (ref 0–0.5)
LEFT ATRIUM VOLUME INDEX: 34.1 ML/M2
LV EF BIPLANE MOD: 65 %
LYMPHOCYTES # BLD AUTO: 1.4 10*3/MM3 (ref 0.7–3.1)
LYMPHOCYTES NFR BLD AUTO: 33.3 % (ref 19.6–45.3)
M PNEUMO IGG SER IA-ACNC: NOT DETECTED
MAGNESIUM SERPL-MCNC: 1.9 MG/DL (ref 1.6–2.4)
MCH RBC QN AUTO: 30.8 PG (ref 26.6–33)
MCHC RBC AUTO-ENTMCNC: 30.8 G/DL (ref 31.5–35.7)
MCV RBC AUTO: 100 FL (ref 79–97)
MONOCYTES # BLD AUTO: 0.36 10*3/MM3 (ref 0.1–0.9)
MONOCYTES NFR BLD AUTO: 8.6 % (ref 5–12)
NEUTROPHILS NFR BLD AUTO: 2.28 10*3/MM3 (ref 1.7–7)
NEUTROPHILS NFR BLD AUTO: 54 % (ref 42.7–76)
NRBC BLD AUTO-RTO: 0 /100 WBC (ref 0–0.2)
PHOSPHATE SERPL-MCNC: 4.6 MG/DL (ref 2.5–4.5)
PLATELET # BLD AUTO: 97 10*3/MM3 (ref 140–450)
PMV BLD AUTO: 9.6 FL (ref 6–12)
POTASSIUM SERPL-SCNC: 4.5 MMOL/L (ref 3.5–5.2)
PROT SERPL-MCNC: 5.3 G/DL (ref 6–8.5)
QT INTERVAL: 385 MS
QTC INTERVAL: 434 MS
RBC # BLD AUTO: 3.47 10*6/MM3 (ref 3.77–5.28)
RHINOVIRUS RNA SPEC NAA+PROBE: NOT DETECTED
RSV RNA NPH QL NAA+NON-PROBE: NOT DETECTED
SARS-COV-2 RNA RESP QL NAA+PROBE: DETECTED
SINUS: 2.8 CM
SODIUM SERPL-SCNC: 142 MMOL/L (ref 136–145)
VIT B12 BLD-MCNC: 753 PG/ML (ref 211–946)
WBC NRBC COR # BLD AUTO: 4.21 10*3/MM3 (ref 3.4–10.8)

## 2025-03-12 PROCEDURE — 82746 ASSAY OF FOLIC ACID SERUM: CPT | Performed by: STUDENT IN AN ORGANIZED HEALTH CARE EDUCATION/TRAINING PROGRAM

## 2025-03-12 PROCEDURE — 80053 COMPREHEN METABOLIC PANEL: CPT | Performed by: STUDENT IN AN ORGANIZED HEALTH CARE EDUCATION/TRAINING PROGRAM

## 2025-03-12 PROCEDURE — 82948 REAGENT STRIP/BLOOD GLUCOSE: CPT

## 2025-03-12 PROCEDURE — 63710000001 INSULIN GLARGINE PER 5 UNITS: Performed by: STUDENT IN AN ORGANIZED HEALTH CARE EDUCATION/TRAINING PROGRAM

## 2025-03-12 PROCEDURE — 25010000002 CEFTRIAXONE PER 250 MG: Performed by: STUDENT IN AN ORGANIZED HEALTH CARE EDUCATION/TRAINING PROGRAM

## 2025-03-12 PROCEDURE — 94640 AIRWAY INHALATION TREATMENT: CPT

## 2025-03-12 PROCEDURE — 84100 ASSAY OF PHOSPHORUS: CPT | Performed by: STUDENT IN AN ORGANIZED HEALTH CARE EDUCATION/TRAINING PROGRAM

## 2025-03-12 PROCEDURE — 83735 ASSAY OF MAGNESIUM: CPT | Performed by: STUDENT IN AN ORGANIZED HEALTH CARE EDUCATION/TRAINING PROGRAM

## 2025-03-12 PROCEDURE — 93356 MYOCRD STRAIN IMG SPCKL TRCK: CPT | Performed by: INTERNAL MEDICINE

## 2025-03-12 PROCEDURE — 0202U NFCT DS 22 TRGT SARS-COV-2: CPT | Performed by: STUDENT IN AN ORGANIZED HEALTH CARE EDUCATION/TRAINING PROGRAM

## 2025-03-12 PROCEDURE — 82948 REAGENT STRIP/BLOOD GLUCOSE: CPT | Performed by: STUDENT IN AN ORGANIZED HEALTH CARE EDUCATION/TRAINING PROGRAM

## 2025-03-12 PROCEDURE — 93306 TTE W/DOPPLER COMPLETE: CPT

## 2025-03-12 PROCEDURE — 82607 VITAMIN B-12: CPT | Performed by: STUDENT IN AN ORGANIZED HEALTH CARE EDUCATION/TRAINING PROGRAM

## 2025-03-12 PROCEDURE — 25010000002 HYDROMORPHONE PER 4 MG: Performed by: STUDENT IN AN ORGANIZED HEALTH CARE EDUCATION/TRAINING PROGRAM

## 2025-03-12 PROCEDURE — 94761 N-INVAS EAR/PLS OXIMETRY MLT: CPT

## 2025-03-12 PROCEDURE — 93356 MYOCRD STRAIN IMG SPCKL TRCK: CPT

## 2025-03-12 PROCEDURE — 93306 TTE W/DOPPLER COMPLETE: CPT | Performed by: INTERNAL MEDICINE

## 2025-03-12 PROCEDURE — 85025 COMPLETE CBC W/AUTO DIFF WBC: CPT | Performed by: STUDENT IN AN ORGANIZED HEALTH CARE EDUCATION/TRAINING PROGRAM

## 2025-03-12 PROCEDURE — 94799 UNLISTED PULMONARY SVC/PX: CPT

## 2025-03-12 PROCEDURE — 63710000001 INSULIN LISPRO (HUMAN) PER 5 UNITS: Performed by: STUDENT IN AN ORGANIZED HEALTH CARE EDUCATION/TRAINING PROGRAM

## 2025-03-12 PROCEDURE — 25010000002 FUROSEMIDE PER 20 MG: Performed by: STUDENT IN AN ORGANIZED HEALTH CARE EDUCATION/TRAINING PROGRAM

## 2025-03-12 RX ORDER — OXYCODONE HYDROCHLORIDE 5 MG/1
10 TABLET ORAL EVERY 8 HOURS PRN
Refills: 0 | Status: DISCONTINUED | OUTPATIENT
Start: 2025-03-12 | End: 2025-03-14 | Stop reason: HOSPADM

## 2025-03-12 RX ORDER — HYDROMORPHONE HYDROCHLORIDE 1 MG/ML
0.5 INJECTION, SOLUTION INTRAMUSCULAR; INTRAVENOUS; SUBCUTANEOUS
Status: DISCONTINUED | OUTPATIENT
Start: 2025-03-12 | End: 2025-03-14 | Stop reason: HOSPADM

## 2025-03-12 RX ORDER — ESCITALOPRAM OXALATE 10 MG/1
20 TABLET ORAL DAILY
Status: DISCONTINUED | OUTPATIENT
Start: 2025-03-12 | End: 2025-03-14 | Stop reason: HOSPADM

## 2025-03-12 RX ORDER — PRIMIDONE 50 MG/1
25 TABLET ORAL NIGHTLY
Status: DISCONTINUED | OUTPATIENT
Start: 2025-03-12 | End: 2025-03-14 | Stop reason: HOSPADM

## 2025-03-12 RX ORDER — PRAMIPEXOLE DIHYDROCHLORIDE 0.25 MG/1
1 TABLET ORAL EVERY 12 HOURS SCHEDULED
Status: DISCONTINUED | OUTPATIENT
Start: 2025-03-12 | End: 2025-03-14 | Stop reason: HOSPADM

## 2025-03-12 RX ORDER — ALBUTEROL SULFATE 90 UG/1
2 INHALANT RESPIRATORY (INHALATION)
Status: DISCONTINUED | OUTPATIENT
Start: 2025-03-12 | End: 2025-03-14 | Stop reason: HOSPADM

## 2025-03-12 RX ADMIN — Medication 10 ML: at 08:40

## 2025-03-12 RX ADMIN — APIXABAN 5 MG: 5 TABLET, FILM COATED ORAL at 00:13

## 2025-03-12 RX ADMIN — Medication 10 ML: at 00:16

## 2025-03-12 RX ADMIN — CEFTRIAXONE 2000 MG: 2 INJECTION, POWDER, FOR SOLUTION INTRAMUSCULAR; INTRAVENOUS at 23:58

## 2025-03-12 RX ADMIN — HYDROMORPHONE HYDROCHLORIDE 0.5 MG: 1 INJECTION, SOLUTION INTRAMUSCULAR; INTRAVENOUS; SUBCUTANEOUS at 03:31

## 2025-03-12 RX ADMIN — CEFTRIAXONE 2000 MG: 2 INJECTION, POWDER, FOR SOLUTION INTRAMUSCULAR; INTRAVENOUS at 00:16

## 2025-03-12 RX ADMIN — INSULIN GLARGINE 50 UNITS: 100 INJECTION, SOLUTION SUBCUTANEOUS at 08:44

## 2025-03-12 RX ADMIN — INSULIN LISPRO 2 UNITS: 100 INJECTION, SOLUTION INTRAVENOUS; SUBCUTANEOUS at 18:29

## 2025-03-12 RX ADMIN — APIXABAN 5 MG: 5 TABLET, FILM COATED ORAL at 21:57

## 2025-03-12 RX ADMIN — HYDROMORPHONE HYDROCHLORIDE 0.5 MG: 1 INJECTION, SOLUTION INTRAMUSCULAR; INTRAVENOUS; SUBCUTANEOUS at 00:10

## 2025-03-12 RX ADMIN — FUROSEMIDE 60 MG: 10 INJECTION, SOLUTION INTRAMUSCULAR; INTRAVENOUS at 08:40

## 2025-03-12 RX ADMIN — Medication 10 ML: at 21:58

## 2025-03-12 RX ADMIN — ALBUTEROL SULFATE 2 PUFF: 108 AEROSOL, METERED RESPIRATORY (INHALATION) at 22:51

## 2025-03-12 RX ADMIN — OXYCODONE HYDROCHLORIDE 10 MG: 5 TABLET ORAL at 15:46

## 2025-03-12 RX ADMIN — ROSUVASTATIN CALCIUM 20 MG: 10 TABLET, FILM COATED ORAL at 21:57

## 2025-03-12 RX ADMIN — INSULIN LISPRO 3 UNITS: 100 INJECTION, SOLUTION INTRAVENOUS; SUBCUTANEOUS at 21:58

## 2025-03-12 RX ADMIN — FUROSEMIDE 80 MG: 10 INJECTION, SOLUTION INTRAMUSCULAR; INTRAVENOUS at 00:13

## 2025-03-12 RX ADMIN — POLYETHYLENE GLYCOL 3350 17 G: 17 POWDER, FOR SOLUTION ORAL at 00:13

## 2025-03-12 RX ADMIN — HYDROMORPHONE HYDROCHLORIDE 0.5 MG: 1 INJECTION, SOLUTION INTRAMUSCULAR; INTRAVENOUS; SUBCUTANEOUS at 21:58

## 2025-03-12 RX ADMIN — PRIMIDONE 25 MG: 50 TABLET ORAL at 21:57

## 2025-03-12 RX ADMIN — ESCITALOPRAM 20 MG: 10 TABLET, FILM COATED ORAL at 15:46

## 2025-03-12 RX ADMIN — APIXABAN 5 MG: 5 TABLET, FILM COATED ORAL at 08:40

## 2025-03-12 RX ADMIN — ROSUVASTATIN CALCIUM 20 MG: 10 TABLET, FILM COATED ORAL at 00:13

## 2025-03-12 RX ADMIN — INSULIN GLARGINE 50 UNITS: 100 INJECTION, SOLUTION SUBCUTANEOUS at 21:58

## 2025-03-12 RX ADMIN — PRAMIPEXOLE DIHYDROCHLORIDE 1 MG: 0.25 TABLET ORAL at 15:46

## 2025-03-12 RX ADMIN — FUROSEMIDE 60 MG: 10 INJECTION, SOLUTION INTRAMUSCULAR; INTRAVENOUS at 21:58

## 2025-03-12 RX ADMIN — INSULIN LISPRO 4 UNITS: 100 INJECTION, SOLUTION INTRAVENOUS; SUBCUTANEOUS at 12:38

## 2025-03-12 RX ADMIN — INSULIN GLARGINE 50 UNITS: 100 INJECTION, SOLUTION SUBCUTANEOUS at 00:13

## 2025-03-12 NOTE — PLAN OF CARE
Problem: Adult Inpatient Plan of Care  Goal: Plan of Care Review  Outcome: Progressing  Goal: Patient-Specific Goal (Individualized)  Outcome: Progressing  Goal: Absence of Hospital-Acquired Illness or Injury  Outcome: Progressing  Intervention: Identify and Manage Fall Risk  Recent Flowsheet Documentation  Taken 3/12/2025 1800 by Anita Emery RN  Safety Promotion/Fall Prevention: safety round/check completed  Taken 3/12/2025 1600 by Anita Emery RN  Safety Promotion/Fall Prevention: safety round/check completed  Taken 3/12/2025 1200 by Anita Emery RN  Safety Promotion/Fall Prevention: safety round/check completed  Taken 3/12/2025 1000 by Anita Emery RN  Safety Promotion/Fall Prevention: safety round/check completed  Taken 3/12/2025 0850 by Anita Emery RN  Safety Promotion/Fall Prevention: safety round/check completed  Goal: Optimal Comfort and Wellbeing  Outcome: Progressing  Goal: Readiness for Transition of Care  Outcome: Progressing     Problem: Pain Acute  Goal: Optimal Pain Control and Function  Outcome: Progressing     Problem: Fall Injury Risk  Goal: Absence of Fall and Fall-Related Injury  Outcome: Progressing  Intervention: Promote Injury-Free Environment  Recent Flowsheet Documentation  Taken 3/12/2025 1800 by Anita Emery RN  Safety Promotion/Fall Prevention: safety round/check completed  Taken 3/12/2025 1600 by Anita Emery RN  Safety Promotion/Fall Prevention: safety round/check completed  Taken 3/12/2025 1200 by Anita Emery RN  Safety Promotion/Fall Prevention: safety round/check completed  Taken 3/12/2025 1000 by Anita Emery RN  Safety Promotion/Fall Prevention: safety round/check completed  Taken 3/12/2025 0850 by Anita Emery RN  Safety Promotion/Fall Prevention: safety round/check completed   Goal Outcome Evaluation:

## 2025-03-12 NOTE — PLAN OF CARE
Goal Outcome Evaluation:                                          Patient admitted from ED for cellulitis and BLE edema. Patient started on IV lasix. Patient also having back spasms and pain. Pain medication given PRN.

## 2025-03-12 NOTE — CASE MANAGEMENT/SOCIAL WORK
Discharge Planning Assessment   [facility]     Patient Name: Genny Soni  MRN: [id number]  Today's Date: 3/12/2025    Admit Date: 3/11/2025    Plan: Anticipate routine home.   Discharge Needs Assessment       Row Name 03/12/25 1315       Living Environment    People in Home alone    Current Living Arrangements home    Potentially Unsafe Housing Conditions none    In the past 12 months has the electric, gas, oil, or water company threatened to shut off services in your home? No    Primary Care Provided by self    Provides Primary Care For no one    Family Caregiver if Needed child(wes), adult;sibling(s)    Family Caregiver Names Sons-Brian, Cecilio; Sister-Maryam    Quality of Family Relationships helpful;involved;supportive    Able to Return to Prior Arrangements yes       Resource/Environmental Concerns    Resource/Environmental Concerns none    Transportation Concerns none       Transportation Needs    In the past 12 months, has lack of transportation kept you from medical appointments or from getting medications? no    In the past 12 months, has lack of transportation kept you from meetings, work, or from getting things needed for daily living? No       Food Insecurity    Within the past 12 months, you worried that your food would run out before you got the money to buy more. Never true    Within the past 12 months, the food you bought just didn't last and you didn't have money to get more. Never true       Transition Planning    Patient/Family Anticipates Transition to home    Patient/Family Anticipated Services at Transition none    Transportation Anticipated car, drives self;family or friend will provide       Discharge Needs Assessment    Readmission Within the Last 30 Days no previous admission in last 30 days    Equipment Currently Used at Home cane, straight;walker, rolling;rollator;bp cuff;glucometer;shower chair    Concerns to be Addressed denies needs/concerns at this time;no discharge needs identified    Do  "you want help finding or keeping work or a job? Patient declined    Do you want help with school or training? For example, starting or completing job training or getting a high school diploma, GED or equivalent No    Anticipated Changes Related to Illness none    Equipment Needed After Discharge none    Provided Post Acute Provider List? N/A    Provided Post Acute Provider Quality & Resource List? N/A                   Discharge Plan       Row Name 03/12/25 4576       Plan    Plan Anticipate routine home.    Patient/Family in Agreement with Plan yes    Plan Comments CM met with patient at bedside. Pt lives at home, drives, and is independent with ADL's. PCP and pharmacy confirmed- agreeable to use [facility]. DME includes canes, walkers, BP cuff, glucometer, and a shower chair that's built-in. Pt denies current HHC/PT services but may be open to services if needed at discharge. Pt reports her sister Maryam \"[employer]\" will transport at time of dc.              Demographic Summary       Row Name 03/12/25 7435       General Information    Admission Type inpatient    Arrived From emergency department    Referral Source admission list    Reason for Consult care coordination/care conference;discharge planning    Preferred Language English       Contact Information    Permission Granted to Share Info With                    Functional Status       Row Name 03/12/25 5627       Functional Status    Usual Activity Tolerance moderate    Current Activity Tolerance moderate       Functional Status, IADL    Medications independent    Meal Preparation independent    Housekeeping independent    Laundry independent    Shopping independent    If for any reason you need help with day-to-day activities such as bathing, preparing meals, shopping, managing finances, etc., do you get the help you need? I get all the help I need             Megan Naegele, RN     Office Phone: [phone]  Office Cell: " [phone]

## 2025-03-12 NOTE — H&P
[facility]  History & Physical    Patient Name: Genny Soni  : 1950  MRN: [id number]  Primary Care Physician:  Bonilla Rubio MD  Date of admission: 3/11/2025  Date and Time of Service: 3/11/2025 at 2300    Subjective      Chief Complaint: Orthopnea, Bilateral lower extremity edema    History of Present Illness:   Assessment & Plan    Patient is a 74-year-old female with a past medical history of DVT/PE on anticoagulation, diastolic dysfunction per echo in 3/20/2024 with LVEF 55 to 60%, on Bumex/metolazone outpatient, type 2 diabetes, hypertension, hyperlipidemia, status post back surgery, presented to [facility] on 3/11/2025 for worsening bilateral lower extremity edema with cellulitis.    Patient is currently awake alert [place] x 3, reports her bilateral lower leg edema has been worsening, now started noticed some redness around her lower extremities, denies any fever chills dysuria.  Patient reports shortness of breath, orthopnea, unable to lie flat, denies chest pain.  She takes Bumex as well as metolazone, which has been prescribed by her PCP, however she is not seeing any improvement which prompted her to come to the hospital today.  She stated she was diagnosed with COVID recently, currently on room air.    In ED, patient's vital stable, afebrile, her troponins mildly elevated 19 with delta of 0, EKG showing sinus rhythm without any acute ischemic changes, CMP mostly unremarkable, glucose 300s, procalcitonin 0.15, WBC 4.0, hemoglobin 11.4, CTA chest shows no PE, mild patchy groundglass opacities and poor lower lobes, CT abdomen pelvis showing no acute process.  Admitted to medicine team further inpatient management.    Review of Systems negative unless mentioned above    Personal History     Past Medical History:   Diagnosis Date    Arthritis     Benign essential hypertension 2018    Diabetes mellitus     Elevated cholesterol     H/O blood clots      History of transfusion     Hyperlipidemia     Low back pain     Nausea and vomiting 3/26/2024    Sleep apnea        Past Surgical History:   Procedure Laterality Date    BACK SURGERY      COLONOSCOPY N/A 3/28/2024    Procedure: COLONOSCOPY with cold snare polypectomy x1 and endoscopic clipping of polypectomy site x2;  Surgeon: Janell Zhang MD;  Location: [facility] ENDOSCOPY;  Service: Gastroenterology;  Laterality: N/A;  Post- colon polyp    ENDOSCOPY N/A 3/28/2024    Procedure: ESOPHAGOGASTRODUODENOSCOPY;  Surgeon: Janell Zhang MD;  Location: [facility] ENDOSCOPY;  Service: Gastroenterology;  Laterality: N/A;  Post- Esophagitis, gastritis    FEMORAL DISTAL BYPASS      HYSTERECTOMY      JOINT REPLACEMENT      LUMBAR LAMINECTOMY WITH FUSION N/A 5/28/2021    Procedure: Lumbar 5 Smith procedure.  Lumbar 5 6 and sacral 1 transpedicular Solera screws and rods.  Posterior fusion with autologous bone.;  Surgeon: Gagan Aguilar MD;  Location: [facility] MAIN OR;  Service: Neurosurgery;  Laterality: N/A;       Family History: family history is not on file. Otherwise pertinent FHx was reviewed and not pertinent to current issue.    Social History:  reports that she has quit smoking. She has never used smokeless tobacco. She reports that she does not currently use alcohol. She reports that she does not use drugs.    Home Medications:  Prior to Admission Medications       Prescriptions Last Dose Informant Patient Reported? Taking?    apixaban (ELIQUIS) 5 MG tablet tablet   Yes No    Take 1 tablet by mouth 2 (Two) Times a Day.    bumetanide (BUMEX) 1 MG tablet   Yes No    Take 1 tablet by mouth 2 (Two) Times a Day.    denosumab (Prolia) 60 MG/ML solution prefilled syringe syringe   Yes No    Inject 1 mL under the skin into the appropriate area as directed Every 6 (Six) Months.    escitalopram (LEXAPRO) 20 MG tablet   Yes No    Take 1 tablet by mouth Daily.    ferrous sulfate 325 (65 FE) MG tablet   No No    Take 1  tablet by mouth Daily With Breakfast.    fluticasone (FLONASE) 50 MCG/ACT nasal spray   Yes No    2 sprays into the nostril(s) as directed by provider Daily.    gabapentin (NEURONTIN) 300 MG capsule   Yes No    Take 1 capsule by mouth 2 (Two) Times a Day.    glipizide (GLUCOTROL XL) 10 MG 24 hr tablet   Yes No    Take 1 tablet by mouth Daily.    Insulin Glargine, 1 Unit Dial, (TOUJEO) 300 UNIT/ML solution pen-injector injection   Yes No    Inject 100 Units under the skin into the appropriate area as directed Every Night.    losartan (COZAAR) 25 MG tablet   Yes No    Take 1 tablet by mouth Daily.    methocarbamol (ROBAXIN) 750 MG tablet   Yes No    Take 1 tablet by mouth 3 (Three) Times a Day.    metOLazone (ZAROXOLYN) 2.5 MG tablet   Yes No    Take 1 tablet by mouth Daily As Needed.    Patient not taking:  Reported on 4/23/2024    montelukast (SINGULAIR) 10 MG tablet   Yes No    Take 1 tablet by mouth Every Night.    naloxone (NARCAN) 4 MG/0.1ML nasal spray   No No    Call 911. Don't prime. [place] in 1 nostril for overdose. Repeat in 2-3 minutes in other nostril if no or minimal breathing/responsiveness.    Patient not taking:  Reported on 4/23/2024    ondansetron (ZOFRAN) 4 MG tablet   Yes No    Take 1 tablet by mouth Every 6 (Six) Hours As Needed for Nausea or Vomiting.    Patient not taking:  Reported on 4/23/2024    oxyCODONE-acetaminophen (PERCOCET)  MG per tablet   Yes No    Take 1 tablet by mouth Every 8 (Eight) Hours As Needed for Moderate Pain.    polyethylene glycol (MIRALAX) 17 g packet  Self Yes No    Take 17 g by mouth Daily As Needed.    potassium chloride (KLOR-CON M20) 20 MEQ CR tablet   Yes No    Take 1 tablet by mouth 3 (Three) Times a Day.    pramipexole (MIRAPEX) 1 MG tablet   Yes No    Take 1 tablet by mouth 2 (Two) Times a Day.    primidone (MYSOLINE) 50 MG tablet   Yes No    Take 0.5 tablets by mouth Every Night.    rosuvastatin (CRESTOR) 20 MG tablet   Yes No    Take 1 tablet by mouth  Every Night.    vitamin D (ERGOCALCIFEROL) 1.25 MG (42375 UT) capsule capsule   Yes No    Take 1 capsule by mouth 1 (One) Time Per Week. Thursdays              Allergies:  Allergies   Allergen Reactions    Ambien  [Zolpidem] Confusion    Codeine Itching    Pregabalin Swelling     Leg swelling    Sulfa Antibiotics Nausea And Vomiting     Makes her nauseated    Zolpidem Tartrate Confusion       Objective      Vitals:   Temp:  [97.7 °F (36.5 °C)] 97.7 °F (36.5 °C)  Heart Rate:  [75-83] 81  Resp:  [18] 18  BP: (112-159)/(45-64) 151/55  Body mass index is 37.73 kg/m².  Physical Exam  General: Awake alert [place] x 3, conversational  HEENT: Atraumatic normocephalic  Respiratory: Clear to auscultation on my exam, nonlabored breathing room air  Cardio: Heart rate normal, rhythm regular  Abdomen: Soft, nontender, no rebound or guarding  Extremities: Bilateral leg edema with cellulitis  Neuro: Awake alert [place] x 3, conversational, moves all extremities      Diagnostic Data:  Lab Results (last 24 hours)       Procedure Component Value Units Date/Time    High Sensitivity Troponin T 1Hr [[id number]]  (Abnormal) Collected: 03/11/25 2127    Specimen: Blood Updated: 03/11/25 2155     HS Troponin T 19 ng/L      Troponin T Numeric Delta 0 ng/L      Troponin T % Delta 0    Narrative:      High Sensitive Troponin T Reference Range:  <14.0 ng/L- Negative Female for AMI  <22.0 ng/L- Negative Male for AMI  >=14 - Abnormal Female indicating possible myocardial injury.  >=22 - Abnormal Male indicating possible myocardial injury.   Clinicians would have to utilize clinical acumen, EKG, Troponin, and serial changes to determine if it is an Acute Myocardial Infarction or myocardial injury due to an underlying chronic condition.         Extra Tubes [[id number]] Collected: 03/11/25 2127    Specimen: Blood, Venous Line Updated: 03/11/25 2146    Narrative:      The following orders were created for panel order Extra Tubes.  Procedure                 "               Abnormality         Status                     ---------                               -----------         ------                     Green Top (Gel)[[id number]]                                  Final result                 Please view results for these tests on the individual orders.    Green Top (Gel) [[id number]] Collected: 03/11/25 2127    Specimen: Blood Updated: 03/11/25 2146     Extra Tube Hold for add-ons.     Comment: Auto resulted.       Procalcitonin [[id number]]  (Normal) Collected: 03/11/25 1947    Specimen: Blood Updated: 03/11/25 2134     Procalcitonin 0.15 ng/mL     Narrative:      As a Marker for Sepsis (Non-Neonates):    1. <0.5 ng/mL represents a low risk of severe sepsis and/or septic shock.  2. >2 ng/mL represents a high risk of severe sepsis and/or septic shock.    As a Marker for Lower Respiratory Tract Infections that require antibiotic therapy:    PCT on Admission    Antibiotic Therapy       6-12 Hrs later    >0.5                Strongly Recommended  >0.25 - <0.5        Recommended   0.1 - 0.25          Discouraged              Remeasure/reassess PCT  <0.1                Strongly Discouraged     Remeasure/reassess PCT    As 28 day mortality risk marker: \"Change in Procalcitonin Result\" (>80% or <=80%) if Day 0 (or Day 1) and Day 4 values are available. Refer to http://www.BCNXs-pct-calculator.com    Change in PCT <=80%  A decrease of PCT levels below or equal to 80% defines a positive change in PCT test result representing a higher risk for 28-day all-cause mortality of patients diagnosed with severe sepsis for septic shock.    Change in PCT >80%  A decrease of PCT levels of more than 80% defines a negative change in PCT result representing a lower risk for 28-day all-cause mortality of patients diagnosed with severe sepsis or septic shock.       Comprehensive Metabolic Panel [[id number]]  (Abnormal) Collected: 03/11/25 1947    Specimen: Blood Updated: 03/11/25 2015     Glucose " 300 mg/dL      BUN 25 mg/dL      Creatinine 0.81 mg/dL      Sodium 138 mmol/L      Potassium 3.9 mmol/L      Chloride 101 mmol/L      CO2 27.1 mmol/L      Calcium 9.2 mg/dL      Total Protein 6.1 g/dL      Albumin 4.0 g/dL      ALT (SGPT) 31 U/L      AST (SGOT) 28 U/L      Alkaline Phosphatase 90 U/L      Total Bilirubin 0.3 mg/dL      Globulin 2.1 gm/dL      A/G Ratio 1.9 g/dL      BUN/Creatinine Ratio 30.9     Anion Gap 9.9 mmol/L      eGFR 75.3 mL/min/1.73     Narrative:      GFR Categories in Chronic Kidney Disease (CKD)      GFR Category          GFR (mL/min/1.73)    Interpretation  G1                     90 or greater         Normal or high (1)  G2                      60-89                Mild decrease (1)  G3a                   45-59                Mild to moderate decrease  G3b                   30-44                Moderate to severe decrease  G4                    15-29                Severe decrease  G5                    14 or less           Kidney failure          (1)In the absence of evidence of kidney disease, neither GFR category G1 or G2 fulfill the criteria for CKD.    eGFR calculation 2021 CKD-EPI creatinine equation, which does not include race as a factor    High Sensitivity Troponin T [[id number]]  (Abnormal) Collected: 03/11/25 1947    Specimen: Blood Updated: 03/11/25 2015     HS Troponin T 19 ng/L     Narrative:      High Sensitive Troponin T Reference Range:  <14.0 ng/L- Negative Female for AMI  <22.0 ng/L- Negative Male for AMI  >=14 - Abnormal Female indicating possible myocardial injury.  >=22 - Abnormal Male indicating possible myocardial injury.   Clinicians would have to utilize clinical acumen, EKG, Troponin, and serial changes to determine if it is an Acute Myocardial Infarction or myocardial injury due to an underlying chronic condition.         Magnesium [[id number]]  (Normal) Collected: 03/11/25 1947    Specimen: Blood Updated: 03/11/25 2015     Magnesium 2.1 mg/dL     CBC &  Differential [[id number]]  (Abnormal) Collected: 03/11/25 1840    Specimen: Blood Updated: 03/11/25 1929    Narrative:      The following orders were created for panel order CBC & Differential.  Procedure                               Abnormality         Status                     ---------                               -----------         ------                     CBC Auto Differential[[id number]]        Abnormal            Final result               Scan Slide[[id number]]                                       Final result                 Please view results for these tests on the individual orders.    CBC Auto Differential [[id number]]  (Abnormal) Collected: 03/11/25 1840    Specimen: Blood Updated: 03/11/25 1929     WBC 4.04 10*3/mm3      RBC 3.67 10*6/mm3      Hemoglobin 11.4 g/dL      Hematocrit 37.3 %      .6 fL      MCH 31.1 pg      MCHC 30.6 g/dL      RDW 14.4 %      RDW-SD 52.8 fl      MPV 11.0 fL      Platelets 118 10*3/mm3      Comment: Result checked        Neutrophil % 58.6 %      Lymphocyte % 29.0 %      Monocyte % 9.2 %      Eosinophil % 2.5 %      Basophil % 0.5 %      Immature Grans % 0.2 %      Neutrophils, Absolute 2.37 10*3/mm3      Lymphocytes, Absolute 1.17 10*3/mm3      Monocytes, Absolute 0.37 10*3/mm3      Eosinophils, Absolute 0.10 10*3/mm3      Basophils, Absolute 0.02 10*3/mm3      Immature Grans, Absolute 0.01 10*3/mm3      nRBC 0.0 /100 WBC     Scan Slide [[id number]] Collected: 03/11/25 1840    Specimen: Blood Updated: 03/11/25 1929     RBC Morphology Normal     WBC Morphology Normal     Platelet Estimate Decreased    BNP [[id number]]  (Normal) Collected: 03/11/25 1840    Specimen: Blood Updated: 03/11/25 1906     proBNP 38.8 pg/mL     Narrative:      This assay is used as an aid in the diagnosis of individuals suspected of having heart failure. It can be used as an aid in the diagnosis of acute decompensated heart failure (ADHF) in patients presenting with signs and symptoms of ADHF  to the emergency department (ED). In addition, NT-proBNP of <300 pg/mL indicates ADHF is not likely.    Age Range Result Interpretation  NT-proBNP Concentration (pg/mL:      <50             Positive            >450                   Gray                 300-450                    Negative             <300    50-75           Positive            >900                  Gray                300-900                  Negative            <300      >75             Positive            >1800                  Gray                300-1800                  Negative            <300    aPTT [[id number]]  (Normal) Collected: 03/11/25 1840    Specimen: Blood Updated: 03/11/25 1856     PTT 25.1 seconds     Protime-INR [[id number]]  (Abnormal) Collected: 03/11/25 1840    Specimen: Blood Updated: 03/11/25 1856     Protime 14.4 Seconds      INR 1.12    Extra Tubes [[id number]] Collected: 03/11/25 1840    Specimen: Blood, Venous Line Updated: 03/11/25 1845    Narrative:      The following orders were created for panel order Extra Tubes.  Procedure                               Abnormality         Status                     ---------                               -----------         ------                     Gold Top - SST[[id number]]                                   Final result                 Please view results for these tests on the individual orders.    Gold Top - SST [[id number]] Collected: 03/11/25 1840    Specimen: Blood Updated: 03/11/25 1845     Extra Tube Hold for add-ons.     Comment: Auto resulted.                Imaging Results (Last 24 Hours)       Procedure Component Value Units Date/Time    CT Abdomen Pelvis With Contrast [[id number]] Collected: 03/11/25 2045     Updated: 03/11/25 2054    Narrative:      CT ABDOMEN PELVIS W CONTRAST    Date of Exam: 3/11/2025 7:59 PM EDT    Indication: abd bloating, SOA, CP, lower ext edema, new heart murmur, hx PE on eliquis, hx IVC filter.    Comparison: 3/26/2024    Technique: Axial CT  images were obtained of the abdomen and pelvis following the uneventful intravenous administration of iodinated contrast. Sagittal and coronal reconstructions were performed.  Automated exposure control and iterative reconstruction   methods were used.        Findings:  New 5 mm noncalcified nodule within the right lower lobe axial mage 36. There are patchy subpleural areas of airspace disease within the left lower lobe compatible with infectious or inflammatory process.    Liver, pancreas, and spleen are within normal limits. Gallbladder appears normal.    Bilateral adrenal glands are normal.    Kidneys appear normal bilaterally. No hydronephrosis. No abdominal or retroperitoneal adenopathy.    Inferior vena cava filter is noted to be in place. The upper GI tract is within normal limits.    Pelvis: Urinary bladder is within normal limits. Patient is status post hysterectomy.    There are scattered colonic diverticula. No evidence of diverticulitis. The appendix is not visualized. There is no pelvic or inguinal adenopathy. No free intraperitoneal fluid.    Patient is status post bilateral aorto by femoral bypass graft placement. Bypass grafts appear patent.    There are no lytic or sclerotic bony lesions. There are degenerative changes of the spine. Patient is status post fusion from T10 level through the sacrum. Sacral neurostimulator device is noted to be in place with the leads on the left and the generator   pack on the right.      Impression:      Impression:    1. Patchy subpleural airspace disease within the left lower lobe and new 5 mm noncalcified nodule within the right lower lobe. Follow-up CT scan of the chest would be recommended in 3 to 6 months to document stability or resolution of these findings.  2. No acute process seen within the abdomen or pelvis.            Electronically Signed: Juanjose Zamora MD    3/11/2025 8:52 PM EDT    Workstation ID: [id number]    CT Angiogram Chest Pulmonary Embolism  [[id number]] Collected: 03/11/25 2044     Updated: 03/11/25 2052    Narrative:      CT ANGIOGRAM CHEST PULMONARY EMBOLISM    Date of Exam: 3/11/2025 7:59 PM EDT    Indication: abd bloating, SOA, CP, lower ext edema, new heart murmur, hx PE on eliquis, hx IVC filter.    Comparison: Chest CT 6/6/2021. Chest radiograph 10/5/2023. MR thoracic spine 3/28/2024.    Technique: Axial CT images were obtained of the chest after the uneventful intravenous administration of iodinated contrast utilizing pulmonary embolism protocol.  In addition, a 3-D volume rendered image was created for interpretation.  Sagittal and   coronal reconstructions were performed.  Automated exposure control and iterative reconstruction methods were used.      Findings:    Thyroid and thoracic inlet: No significant abnormality.    Lymph nodes: No pathologic appearing lymph nodes by imaging criteria.    Cardiovascular: Mild cardiomegaly. No pericardial effusion. Aorta and main pulmonary artery diameters are within normal range.. There are coronary artery calcifications. Aortic atherosclerosis.. No evidence of pulmonary embolism.    Esophagus: No significant abnormality.    Lung parenchyma: Similarly elevated right hemidiaphragm with adjacent atelectasis. Scattered atelectasis elsewhere in the lungs including in the dependent portions of both lower lobes.. Calcified granulomas. More focal mild patchy groundglass opacities   in both lower lobes.    Airways: Patent trachea and mainstem bronchi.    Pleura: No pleural effusion or pneumothorax.    Chest wall and osseous structures: Degenerative changes of the imaged spine. No acute osseous abnormality. Thoracolumbar fusion hardware with chronic appearing compression fractures.    Included abdomen: Refer to separately reported CT abdomen pelvis.      Impression:      Impression:  No evidence of pulmonary embolism.    Mild patchy groundglass opacities in both lower lobes may be  infectious/inflammatory.        Electronically Signed: Stone Restrepo    3/11/2025 8:50 PM EDT    Workstation ID: [id number]    CT Head Without Contrast [[id number]] Collected: 03/11/25 1944     Updated: 03/1950    Narrative:      CT HEAD WO CONTRAST    Date of Exam: 3/11/2025 7:22 PM EDT    Indication: intermittent lightheadedness.    Comparison: Noncontrast CT of the head performed on April 5, 2021    Technique: Axial CT images were obtained of the head without contrast administration.  Coronal reconstructions were performed.  Automated exposure control and iterative reconstruction methods were used.      Findings:  There is no evidence of hemorrhage. There is no mass effect or midline shift. Age-related involutional changes are visualized.    There is no extra-axial collections.    Ventricles are normal in size and configuration for patient's stated age.    Posterior fossa is within normal limits.    Calvarium and skull base appear intact. Visualized sinuses show no air fluid levels. The mastoid air cells are clear. Visualized orbits are unremarkable.        Impression:      Impression:  No acute intracranial abnormality identified.        Electronically Signed: Madi Dumont MD    3/11/2025 7:46 PM EDT    Workstation ID: [id number]              Assessment & Plan    Patient is a 74-year-old female with a past medical history of DVT/PE on anticoagulation, diastolic dysfunction per echo in 3/20/2024 with LVEF 55 to 60%, on Bumex/metolazone outpatient, type 2 diabetes, hypertension, hyperlipidemia, status post back surgery, presented to [facility], [place], operated by [facility] on 3/11/2025 for worsening bilateral lower extremity edema with cellulitis.    Assessments  HFpEF exacerbation  Bilateral leg edema with cellulitis  History of DVT/PE now on anticoagulation  S/p IVC filter 1-2 yrs ago, for surgery as AC was on hold  Status post back surgery  Type 2 diabetes  Hypertension  Hyperlipidemia  Macrocytic anemia, hemoglobin of  11.4    Plan:   -Give her Lasix once, start 60 Lasix IV twice daily, monitor strict ins and outs  -Will check her echo, last echo 3/20/2024 shows LVEF of 55 to 60%  -Started on ceftriaxone for cellulitis of the bilateral lower extremities  -Continue home Eliquis, monitor for signs of bleeding  -SSI for type 2 diabetes, monitor sugars closely adjust as needed  -Check folate and B12 for macrocytic anemia  -Resume Home medications once reconciled by pharmacy or medically appropriate  -AM labs    VTE Prophylaxis:  Pharmacologic VTE prophylaxis orders are present.      CODE STATUS:    Code Status (Patient has no pulse and is not breathing): No CPR (Do Not Attempt to Resuscitate)  Medical Interventions (Patient has pulse or is breathing): Full Support  Comments: DNR/DNI per the patient  Level Of Support Discussed With: Patient    I discussed the patient's findings and my recommendations with patient.      This document has been electronically signed by Oliver Lantigua MD on March 11, 2025 23:11 EDT   [facility]

## 2025-03-12 NOTE — ACP (ADVANCE CARE PLANNING)
Informed patient that we do not have a copy of AD/Living Will/POA on file. Patient reports to have completed POA paperwork and will try to have arranged to be brought in. Patient reports being a DNR. Education over POST and Out of Hospital DNR. Patient requested a copy of both forms. Patient to contact if needing further assistance.    Chaplain Andi Truong

## 2025-03-12 NOTE — PROGRESS NOTES
"[facility] MEDICINE SERVICE  DAILY PROGRESS NOTE    NAME: Genny Soni  : 1950  MRN: [id number]      LOS: 1 day     PROVIDER OF SERVICE: CHANTALE Kraft    Chief Complaint: Bilateral leg edema    Subjective:     Interval History:  History taken from: patient    Patient otherwise doing well, requesting home restless leg medication and pain medication. Patient reports progressive bilateral lower extrremity swelling that has worsened over the last few days. Reports the swelling goes \"up to thigh\". Patient endorses significant pain with ambulation due to the swelling. Patient reports BLE painful to touch as well. Ptient states she has been \"dealing with increase in abdominal distention over the last 2 years and no one can figure out why\"        Review of Systems:   Review of Systems   Constitutional:  Positive for fatigue. Negative for chills and fever.   Respiratory:  Negative for shortness of breath.    Cardiovascular:  Positive for leg swelling. Negative for chest pain.   Gastrointestinal:  Positive for abdominal distention. Negative for diarrhea, nausea and vomiting.   Musculoskeletal:  Positive for myalgias.   Skin:  Positive for color change. Negative for wound.   Neurological:  Negative for dizziness and headaches.       Objective:     Vital Signs  Temp:  [97 °F (36.1 °C)-97.9 °F (36.6 °C)] 97.4 °F (36.3 °C)  Heart Rate:  [67-85] 85  Resp:  [13-18] 13  BP: (105-159)/(38-64) 124/44  Flow (L/min) (Oxygen Therapy):  [1-2] 1   Body mass index is 37.59 kg/m².    Physical Exam  Physical Exam  Constitutional:       Appearance: She is obese.   HENT:      Head: Normocephalic and atraumatic.      Nose: Nose normal.      Mouth/Throat:      Mouth: Mucous membranes are moist.      Pharynx: Oropharynx is clear.   Eyes:      Extraocular Movements: Extraocular movements intact.      Conjunctiva/sclera: Conjunctivae normal.      Pupils: Pupils are equal, round, and reactive to light.   Cardiovascular:      " Rate and Rhythm: Normal rate and regular rhythm.      Pulses: Normal pulses.   Pulmonary:      Effort: Pulmonary effort is normal.   Abdominal:      General: Abdomen is flat.      Palpations: Abdomen is soft.   Musculoskeletal:         General: Tenderness present. Normal range of motion.      Cervical back: Normal range of motion and neck supple.      Right lower leg: Edema present.      Left lower leg: Edema present.   Skin:     General: Skin is warm and dry.      Findings: Erythema present.   Neurological:      General: No focal deficit present.      Mental Status: She is alert and oriented to person, place, and time. Mental status is at baseline.   Psychiatric:         Mood and Affect: Mood normal.         Behavior: Behavior normal.         Thought Content: Thought content normal.         Judgment: Judgment normal.            Diagnostic Data    Results from last 7 days   Lab Units 03/12/25  0326   WBC 10*3/mm3 4.21   HEMOGLOBIN g/dL 10.7*   HEMATOCRIT % 34.7   PLATELETS 10*3/mm3 97*   GLUCOSE mg/dL 297*   CREATININE mg/dL 0.93   BUN mg/dL 26*   SODIUM mmol/L 142   POTASSIUM mmol/L 4.5   AST (SGOT) U/L 26   ALT (SGPT) U/L 27   ALK PHOS U/L 69   BILIRUBIN mg/dL 0.2   ANION GAP mmol/L 8.4       CT Abdomen Pelvis With Contrast  Result Date: 3/11/2025  Impression: 1. Patchy subpleural airspace disease within the left lower lobe and new 5 mm noncalcified nodule within the right lower lobe. Follow-up CT scan of the chest would be recommended in 3 to 6 months to document stability or resolution of these findings. 2. No acute process seen within the abdomen or pelvis. Electronically Signed: Juanjose Zamora MD  3/11/2025 8:52 PM EDT  Workstation ID: [id number]    CT Angiogram Chest Pulmonary Embolism  Result Date: 3/11/2025  Impression: No evidence of pulmonary embolism. Mild patchy groundglass opacities in both lower lobes may be infectious/inflammatory. Electronically Signed: Stone Restrepo  3/11/2025 8:50 PM EDT   "Workstation ID: [id number]    CT Head Without Contrast  Result Date: 3/11/2025  Impression: No acute intracranial abnormality identified. Electronically Signed: Madi Dumont MD  3/11/2025 7:46 PM EDT  Workstation ID: [id number]        I reviewed the patient's new clinical results.    Assessment/Plan:     Active and Resolved Problems  Active Hospital Problems    Diagnosis  POA    **Bilateral leg edema [R60.0]  Yes      Resolved Hospital Problems   No resolved problems to display.       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  S/p IVC filter 1-2 yrs ago, for surgery as AC was on hold  Status post back surgery  Type 2 diabetes  Hypertension  Hyperlipidemia  Macrocytic anemia, hemoglobin of 11.4     Plan:   -Given 80mg Lasix once, continue 60 Lasix IV twice daily, monitor strict ins and outs  -BLE duplex negative for acute findings  -Echo shows LVEF 65%  -RVP + COVID-19  -CT PE negative for PE but does show \"Mild patchy groundglass opacities in both lower lobes may be infectious/inflammatory\"  -CT abdomen pelvis was performed showing \"Patchy subpleural airspace disease within the left lower lobe and new 5 mm noncalcified nodule within the right lower lobe\", otherwise negative  -Started on ceftriaxone for cellulitis of the bilateral lower extremities, continue at this time due to concern for possible cellulitis given increased lower extremity pain and warmth as well as significant LE edema  -Continue home Eliquis, monitor for signs of bleeding  -SSI for type 2 diabetes, monitor sugars closely adjust as needed  -folate and B12 for macrocytic anemia: WNL  -Resume Home medications once reconciled by pharmacy or medically appropriate  -AM labs    VTE Prophylaxis:  Pharmacologic VTE prophylaxis orders are present.             Disposition Planning:     Barriers to Discharge:continued care  Anticipated Date of Discharge: 3/14/2025  Place of " Discharge: pending course, PT/OT      Time: 35 minutes     Code Status and Medical Interventions: No CPR (Do Not Attempt to Resuscitate); Full Support; DNR/DNI per the patient   Ordered at: 03/11/25 3957     Code Status (Patient has no pulse and is not breathing):    No CPR (Do Not Attempt to Resuscitate)     Medical Interventions (Patient has pulse or is breathing):    Full Support     Comments:    DNR/DNI per the patient     Level Of Support Discussed With:    Patient       Signature: Electronically signed by CHANTALE Kraft, 03/12/25, 14:05 EDT.  [facility]ist [facility]

## 2025-03-12 NOTE — ED NOTES
Patient requesting more pain medication for shooting pain in back every time she moves, provider aware, asking for food or drink, informed to wait until the CT is resulted

## 2025-03-13 LAB
ANION GAP SERPL CALCULATED.3IONS-SCNC: 8.5 MMOL/L (ref 5–15)
BASOPHILS # BLD AUTO: 0.01 10*3/MM3 (ref 0–0.2)
BASOPHILS NFR BLD AUTO: 0.2 % (ref 0–1.5)
BUN SERPL-MCNC: 22 MG/DL (ref 8–23)
BUN/CREAT SERPL: 30.6 (ref 7–25)
CALCIUM SPEC-SCNC: 9 MG/DL (ref 8.6–10.5)
CHLORIDE SERPL-SCNC: 100 MMOL/L (ref 98–107)
CO2 SERPL-SCNC: 32.5 MMOL/L (ref 22–29)
CREAT SERPL-MCNC: 0.72 MG/DL (ref 0.57–1)
DEPRECATED RDW RBC AUTO: 53.4 FL (ref 37–54)
EGFRCR SERPLBLD CKD-EPI 2021: 86.8 ML/MIN/1.73
EOSINOPHIL # BLD AUTO: 0.08 10*3/MM3 (ref 0–0.4)
EOSINOPHIL NFR BLD AUTO: 1.7 % (ref 0.3–6.2)
ERYTHROCYTE [DISTWIDTH] IN BLOOD BY AUTOMATED COUNT: 14.7 % (ref 12.3–15.4)
GLUCOSE BLDC GLUCOMTR-MCNC: 200 MG/DL (ref 70–105)
GLUCOSE BLDC GLUCOMTR-MCNC: 216 MG/DL (ref 70–105)
GLUCOSE BLDC GLUCOMTR-MCNC: 219 MG/DL (ref 70–105)
GLUCOSE BLDC GLUCOMTR-MCNC: 95 MG/DL (ref 70–105)
GLUCOSE SERPL-MCNC: 276 MG/DL (ref 65–99)
HCT VFR BLD AUTO: 37.6 % (ref 34–46.6)
HGB BLD-MCNC: 11.5 G/DL (ref 12–15.9)
IMM GRANULOCYTES # BLD AUTO: 0.01 10*3/MM3 (ref 0–0.05)
IMM GRANULOCYTES NFR BLD AUTO: 0.2 % (ref 0–0.5)
LYMPHOCYTES # BLD AUTO: 1.18 10*3/MM3 (ref 0.7–3.1)
LYMPHOCYTES NFR BLD AUTO: 25.4 % (ref 19.6–45.3)
MCH RBC QN AUTO: 30.7 PG (ref 26.6–33)
MCHC RBC AUTO-ENTMCNC: 30.6 G/DL (ref 31.5–35.7)
MCV RBC AUTO: 100.5 FL (ref 79–97)
MONOCYTES # BLD AUTO: 0.35 10*3/MM3 (ref 0.1–0.9)
MONOCYTES NFR BLD AUTO: 7.5 % (ref 5–12)
NEUTROPHILS NFR BLD AUTO: 3.02 10*3/MM3 (ref 1.7–7)
NEUTROPHILS NFR BLD AUTO: 65 % (ref 42.7–76)
NRBC BLD AUTO-RTO: 0 /100 WBC (ref 0–0.2)
PLATELET # BLD AUTO: 99 10*3/MM3 (ref 140–450)
PMV BLD AUTO: 9.9 FL (ref 6–12)
POTASSIUM SERPL-SCNC: 4.1 MMOL/L (ref 3.5–5.2)
RBC # BLD AUTO: 3.74 10*6/MM3 (ref 3.77–5.28)
SODIUM SERPL-SCNC: 141 MMOL/L (ref 136–145)
WBC NRBC COR # BLD AUTO: 4.65 10*3/MM3 (ref 3.4–10.8)

## 2025-03-13 PROCEDURE — 63710000001 INSULIN GLARGINE PER 5 UNITS: Performed by: STUDENT IN AN ORGANIZED HEALTH CARE EDUCATION/TRAINING PROGRAM

## 2025-03-13 PROCEDURE — 94799 UNLISTED PULMONARY SVC/PX: CPT

## 2025-03-13 PROCEDURE — 25010000002 FUROSEMIDE PER 20 MG: Performed by: STUDENT IN AN ORGANIZED HEALTH CARE EDUCATION/TRAINING PROGRAM

## 2025-03-13 PROCEDURE — 94761 N-INVAS EAR/PLS OXIMETRY MLT: CPT

## 2025-03-13 PROCEDURE — 80048 BASIC METABOLIC PNL TOTAL CA: CPT

## 2025-03-13 PROCEDURE — 63710000001 INSULIN LISPRO (HUMAN) PER 5 UNITS: Performed by: STUDENT IN AN ORGANIZED HEALTH CARE EDUCATION/TRAINING PROGRAM

## 2025-03-13 PROCEDURE — 85025 COMPLETE CBC W/AUTO DIFF WBC: CPT

## 2025-03-13 PROCEDURE — 82948 REAGENT STRIP/BLOOD GLUCOSE: CPT

## 2025-03-13 PROCEDURE — 97161 PT EVAL LOW COMPLEX 20 MIN: CPT

## 2025-03-13 PROCEDURE — 82948 REAGENT STRIP/BLOOD GLUCOSE: CPT | Performed by: STUDENT IN AN ORGANIZED HEALTH CARE EDUCATION/TRAINING PROGRAM

## 2025-03-13 RX ADMIN — ESCITALOPRAM 20 MG: 10 TABLET, FILM COATED ORAL at 08:43

## 2025-03-13 RX ADMIN — ALBUTEROL SULFATE 2 PUFF: 108 AEROSOL, METERED RESPIRATORY (INHALATION) at 14:15

## 2025-03-13 RX ADMIN — INSULIN GLARGINE 50 UNITS: 100 INJECTION, SOLUTION SUBCUTANEOUS at 21:00

## 2025-03-13 RX ADMIN — INSULIN LISPRO 3 UNITS: 100 INJECTION, SOLUTION INTRAVENOUS; SUBCUTANEOUS at 17:43

## 2025-03-13 RX ADMIN — OXYCODONE HYDROCHLORIDE 10 MG: 5 TABLET ORAL at 08:45

## 2025-03-13 RX ADMIN — OXYCODONE HYDROCHLORIDE 10 MG: 5 TABLET ORAL at 17:43

## 2025-03-13 RX ADMIN — INSULIN GLARGINE 50 UNITS: 100 INJECTION, SOLUTION SUBCUTANEOUS at 08:44

## 2025-03-13 RX ADMIN — APIXABAN 5 MG: 5 TABLET, FILM COATED ORAL at 21:02

## 2025-03-13 RX ADMIN — PRIMIDONE 25 MG: 50 TABLET ORAL at 21:01

## 2025-03-13 RX ADMIN — INSULIN LISPRO 3 UNITS: 100 INJECTION, SOLUTION INTRAVENOUS; SUBCUTANEOUS at 12:12

## 2025-03-13 RX ADMIN — FUROSEMIDE 60 MG: 10 INJECTION, SOLUTION INTRAMUSCULAR; INTRAVENOUS at 08:43

## 2025-03-13 RX ADMIN — PRAMIPEXOLE DIHYDROCHLORIDE 1 MG: 0.25 TABLET ORAL at 21:18

## 2025-03-13 RX ADMIN — ALBUTEROL SULFATE 2 PUFF: 108 AEROSOL, METERED RESPIRATORY (INHALATION) at 20:31

## 2025-03-13 RX ADMIN — Medication 10 ML: at 21:02

## 2025-03-13 RX ADMIN — Medication 10 ML: at 08:44

## 2025-03-13 RX ADMIN — PRAMIPEXOLE DIHYDROCHLORIDE 1 MG: 0.25 TABLET ORAL at 08:43

## 2025-03-13 RX ADMIN — POLYETHYLENE GLYCOL 3350 17 G: 17 POWDER, FOR SOLUTION ORAL at 08:54

## 2025-03-13 RX ADMIN — FUROSEMIDE 60 MG: 10 INJECTION, SOLUTION INTRAMUSCULAR; INTRAVENOUS at 21:18

## 2025-03-13 RX ADMIN — APIXABAN 5 MG: 5 TABLET, FILM COATED ORAL at 08:43

## 2025-03-13 RX ADMIN — INSULIN LISPRO 3 UNITS: 100 INJECTION, SOLUTION INTRAVENOUS; SUBCUTANEOUS at 21:00

## 2025-03-13 RX ADMIN — ALBUTEROL SULFATE 2 PUFF: 108 AEROSOL, METERED RESPIRATORY (INHALATION) at 06:20

## 2025-03-13 RX ADMIN — ALBUTEROL SULFATE 2 PUFF: 108 AEROSOL, METERED RESPIRATORY (INHALATION) at 11:10

## 2025-03-13 RX ADMIN — ROSUVASTATIN CALCIUM 20 MG: 10 TABLET, FILM COATED ORAL at 21:01

## 2025-03-13 RX ADMIN — OXYCODONE HYDROCHLORIDE 10 MG: 5 TABLET ORAL at 00:40

## 2025-03-13 NOTE — PROGRESS NOTES
[facility] MEDICINE SERVICE  DAILY PROGRESS NOTE    NAME: Genny Soni  : 1950  MRN: [id number]      LOS: 2 days     PROVIDER OF SERVICE: Valarie Oswald PA-C    Chief Complaint: Bilateral leg edema    Subjective:     Interval History:  History taken from: patient    Patient states she is feeling worse today compared to yesterday. Still expresses concerns about swelling in her legs and it not improving. Endorses continued pain in lower extremities bilaterally.         Review of Systems:   Review of Systems   Constitutional:  Positive for fatigue. Negative for chills and fever.   Respiratory:  Negative for cough and shortness of breath.    Cardiovascular:  Positive for leg swelling. Negative for chest pain.   Gastrointestinal:  Positive for abdominal distention. Negative for constipation and diarrhea.   Musculoskeletal:  Positive for back pain and myalgias. Negative for neck stiffness.   Skin:  Positive for color change. Negative for wound.   Neurological:  Positive for weakness.       Objective:     Vital Signs  Temp:  [97.3 °F (36.3 °C)-98.4 °F (36.9 °C)] 97.9 °F (36.6 °C)  Heart Rate:  [63-82] 76  Resp:  [12-19] 15  BP: (124-150)/(37-49) 124/37  Flow (L/min) (Oxygen Therapy):  [2] 2   Body mass index is 37.59 kg/m².    Physical Exam  Physical Exam  Constitutional:       Appearance: She is obese.   Cardiovascular:      Rate and Rhythm: Normal rate and regular rhythm.      Heart sounds: Normal heart sounds.   Pulmonary:      Effort: Pulmonary effort is normal.      Breath sounds: Normal breath sounds.   Abdominal:      General: Bowel sounds are normal.      Palpations: Abdomen is soft.      Tenderness: There is no abdominal tenderness.   Musculoskeletal:         General: Tenderness present. No signs of injury. Normal range of motion.      Right lower leg: Edema present.      Left lower leg: Edema present.   Skin:     General: Skin is warm and dry.      Findings: Erythema present.   Neurological:       General: No focal deficit present.      Mental Status: She is alert.   Psychiatric:         Mood and Affect: Mood normal.            Diagnostic Data    Results from last 7 days   Lab Units 03/12/25  0326   WBC 10*3/mm3 4.21   HEMOGLOBIN g/dL 10.7*   HEMATOCRIT % 34.7   PLATELETS 10*3/mm3 97*   GLUCOSE mg/dL 297*   CREATININE mg/dL 0.93   BUN mg/dL 26*   SODIUM mmol/L 142   POTASSIUM mmol/L 4.5   AST (SGOT) U/L 26   ALT (SGPT) U/L 27   ALK PHOS U/L 69   BILIRUBIN mg/dL 0.2   ANION GAP mmol/L 8.4       CT Abdomen Pelvis With Contrast  Result Date: 3/11/2025  Impression: 1. Patchy subpleural airspace disease within the left lower lobe and new 5 mm noncalcified nodule within the right lower lobe. Follow-up CT scan of the chest would be recommended in 3 to 6 months to document stability or resolution of these findings. 2. No acute process seen within the abdomen or pelvis. Electronically Signed: Juanjose Zamora MD  3/11/2025 8:52 PM EDT  Workstation ID: [id number]    CT Angiogram Chest Pulmonary Embolism  Result Date: 3/11/2025  Impression: No evidence of pulmonary embolism. Mild patchy groundglass opacities in both lower lobes may be infectious/inflammatory. Electronically Signed: Stone Restrepo  3/11/2025 8:50 PM EDT  Workstation ID: [id number]    CT Head Without Contrast  Result Date: 3/11/2025  Impression: No acute intracranial abnormality identified. Electronically Signed: Madi Dumont MD  3/11/2025 7:46 PM EDT  Workstation ID: [id number]        I reviewed the patient's new clinical results.    Assessment/Plan:     Active and Resolved Problems  Active Hospital Problems    Diagnosis  POA    **Bilateral leg edema [R60.0]  Yes      Resolved Hospital Problems   No resolved problems to display.       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  S/p IVC filter 1-2 yrs ago, for surgery as AC was on hold  Status post back surgery  Type 2  "diabetes  Hypertension  Hyperlipidemia  Macrocytic anemia, hemoglobin of 11.4     Plan:   -Continue 60 Lasix IV twice daily, monitor strict ins and outs  -BLE duplex negative for acute findings  -Echo shows LVEF 65%  -RVP + COVID-19  -CT PE negative for PE but does show \"Mild patchy groundglass opacities in both lower lobes may be infectious/inflammatory\"  -CT abdomen pelvis was performed showing \"Patchy subpleural airspace disease within the left lower lobe and new 5 mm noncalcified nodule within the right lower lobe\", otherwise negative  -Continue on ceftriaxone for cellulitis of the bilateral lower extremities, continue at this time due to concern for possible cellulitis given increased lower extremity pain and warmth as well as significant LE edema  -Continue home Eliquis, monitor for signs of bleeding  -SSI for type 2 diabetes, monitor sugars closely adjust as needed  -folate and B12 for macrocytic anemia: WNL  -Resume Home medications once reconciled by pharmacy or medically appropriate  -AM labs    VTE Prophylaxis:  Pharmacologic VTE prophylaxis orders are present.             Disposition Planning:     Barriers to Discharge: continued care  Anticipated Date of Discharge: 3/14/25  Place of Discharge: pending course, PT/OT       Time: 35 minutes     Code Status and Medical Interventions: No CPR (Do Not Attempt to Resuscitate); Full Support; DNR/DNI per the patient   Ordered at: 03/11/25 6886     Code Status (Patient has no pulse and is not breathing):    No CPR (Do Not Attempt to Resuscitate)     Medical Interventions (Patient has pulse or is breathing):    Full Support     Comments:    DNR/DNI per the patient     Level Of Support Discussed With:    Patient       Signature: Electronically signed by Valarie Oswald PA-C, 03/13/25, 13:56 EDT.  [facility]    "

## 2025-03-13 NOTE — PLAN OF CARE
Goal Outcome Evaluation:  Plan of Care Reviewed With: patient           Outcome Evaluation: 73 y/o female admitted on 3/11 due to BLE edema, orthopnea. Patient tested + Covid-19. PMH includes hx of DVT/PE, htn, back sx, diastolic dysfunction, TKR. Patient lives alone and normally independent, using O2 at night. Patient remains independent with all aspects of mobility. No desat on room air. Mostly reporting of tenderness to R groin, lower abd area. Patient has no skilled Physical therapy needs at this time.    Anticipated Discharge Disposition (PT): home

## 2025-03-13 NOTE — PLAN OF CARE
Problem: Adult Inpatient Plan of Care  Goal: Plan of Care Review  Outcome: Progressing  Goal: Patient-Specific Goal (Individualized)  Outcome: Progressing  Goal: Absence of Hospital-Acquired Illness or Injury  Outcome: Progressing  Intervention: Identify and Manage Fall Risk  Recent Flowsheet Documentation  Taken 3/13/2025 1600 by Anita Emery RN  Safety Promotion/Fall Prevention: safety round/check completed  Taken 3/13/2025 1400 by Anita Emery RN  Safety Promotion/Fall Prevention: safety round/check completed  Taken 3/13/2025 1000 by Anita Emery RN  Safety Promotion/Fall Prevention: safety round/check completed  Taken 3/13/2025 0815 by Anita Emery RN  Safety Promotion/Fall Prevention: safety round/check completed  Goal: Optimal Comfort and Wellbeing  Outcome: Progressing  Goal: Readiness for Transition of Care  Outcome: Progressing     Problem: Pain Acute  Goal: Optimal Pain Control and Function  Outcome: Progressing     Problem: Fall Injury Risk  Goal: Absence of Fall and Fall-Related Injury  Outcome: Progressing  Intervention: Promote Injury-Free Environment  Recent Flowsheet Documentation  Taken 3/13/2025 1600 by Anita Emery RN  Safety Promotion/Fall Prevention: safety round/check completed  Taken 3/13/2025 1400 by Anita Emery RN  Safety Promotion/Fall Prevention: safety round/check completed  Taken 3/13/2025 1000 by Anita Emery RN  Safety Promotion/Fall Prevention: safety round/check completed  Taken 3/13/2025 0815 by Anita Emery RN  Safety Promotion/Fall Prevention: safety round/check completed   Goal Outcome Evaluation:

## 2025-03-13 NOTE — THERAPY EVALUATION
Patient Name: Genny Soni  : 1950    MRN: [id number]                              Today's Date: 3/13/2025       Admit Date: 3/11/2025    Visit Dx:     ICD-10-CM ICD-9-CM   1. Bilateral leg edema  R60.0 782.3   2. Dyspnea, unspecified type  R06.00 786.09   3. Lightheadedness  R42 780.4   4. 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, bilateral legs    Deep venous thrombosis of lower extremity    Dizziness    Drug-induced constipation    Edema of lower extremity    Essential tremor    Exertional dyspnea    Fibromyalgia    Former smoker    Generalized anxiety disorder    H/O total knee replacement    Hypokalemia    Injury of hand    Hyperlipidemia    PERLA (obstructive sleep apnea)    Peripheral vascular disease    Persistent insomnia    Piriformis syndrome    Pain management    Polyarthropathy    Restless leg syndrome    Strain of muscle of right groin region    Type II diabetes mellitus with HbA1C goal to be determined    Uncontrolled type 2 diabetes mellitus    Vitamin D deficiency    Pulmonary embolism    Acute kidney injury    Tremors of nervous system    Left lumbar radiculopathy    Acute saddle pulmonary embolism    Cellulitis of back    Cellulitis of lower extremity    Postoperative cellulitis of surgical wound    PNA (pneumonia)    Postoperative wound infection    Pneumonia, unspecified organism    Colonic thickening    Ileus    Nausea and vomiting    Bilateral leg edema     Past Medical History:   Diagnosis Date    Arthritis     Benign essential  hypertension 7/12/2018    Diabetes mellitus     Elevated cholesterol     H/O blood clots     History of transfusion     Hyperlipidemia     Low back pain     Nausea and vomiting 3/26/2024    Sleep apnea      Past Surgical History:   Procedure Laterality Date    BACK SURGERY      COLONOSCOPY N/A 3/28/2024    Procedure: COLONOSCOPY with cold snare polypectomy x1 and endoscopic clipping of polypectomy site x2;  Surgeon: Janell Zhang MD;  Location: [facility] ENDOSCOPY;  Service: Gastroenterology;  Laterality: N/A;  Post- colon polyp    ENDOSCOPY N/A 3/28/2024    Procedure: ESOPHAGOGASTRODUODENOSCOPY;  Surgeon: Janell Zhang MD;  Location: [facility] ENDOSCOPY;  Service: Gastroenterology;  Laterality: N/A;  Post- Esophagitis, gastritis    FEMORAL DISTAL BYPASS      HYSTERECTOMY      JOINT REPLACEMENT      LUMBAR LAMINECTOMY WITH FUSION N/A 5/28/2021    Procedure: Lumbar 5 Smith procedure.  Lumbar 5 6 and sacral 1 transpedicular Solera screws and rods.  Posterior fusion with autologous bone.;  Surgeon: Gagan Aguilar MD;  Location: [facility] MAIN OR;  Service: Neurosurgery;  Laterality: N/A;      General Information       Row Name 03/13/25 1332          Physical Therapy Time and Intention    Document Type evaluation  -CR     Mode of Treatment physical therapy  -CR       Row Name 03/13/25 1332          General Information    Patient Profile Reviewed yes  -CR     Prior Level of Function independent:;all household mobility;community mobility;ADL's;home management  -CR     Barriers to Rehab medically complex  -CR       Row Name 03/13/25 1332          Living Environment    Current Living Arrangements home  -CR     People in Home alone  -CR       Row Name 03/13/25 1332          Home Main Entrance    Number of Stairs, Main Entrance one  -CR       Row Name 03/13/25 1332          Stairs Within Home, Primary    Number of Stairs, Within Home, Primary none  -CR       Row Name 03/13/25 1332          Cognition    Orientation  Status (Cognition) oriented x 4  -CR       Row Name 03/13/25 1332          Safety Issues/Impairments Affecting Functional Mobility    Impairments Affecting Function (Mobility) shortness of breath  -CR               User Key  (r) = Recorded By, (t) = Taken By, (c) = Cosigned By      Initials Name Provider Type    CR Reyes, Carmela, PT Physical Therapist                   Mobility       Row Name 03/13/25 1332          Bed Mobility    Bed Mobility bed mobility (all) activities  -CR     All Activities, [place] (Bed Mobility) standby assist  -CR     Assistive Device (Bed Mobility) bed rails  -CR       Row Name 03/13/25 1332          Bed-Chair Transfer    Bed-Chair [place] (Transfers) standby assist  -CR       Row Name 03/13/25 1332          Sit-Stand Transfer    Sit-Stand [place] (Transfers) standby assist  -CR       Row Name 03/13/25 1332          Gait/Stairs (Locomotion)    [place] Level (Gait) standby assist  -CR     Distance in Feet (Gait) 30  -CR               User Key  (r) = Recorded By, (t) = Taken By, (c) = Cosigned By      Initials Name Provider Type    CR Reyes, Carmela, PT Physical Therapist                   Obj/Interventions       Row Name 03/13/25 1340          Range of Motion Comprehensive    General Range of Motion no range of motion deficits identified  -CR     Comment, General Range of Motion BLE min limit due to swelling  -CR       Row Name 03/13/25 1340          Strength Comprehensive (MMT)    General Manual Muscle Testing (MMT) Assessment no strength deficits identified  -CR       Row Name 03/13/25 1340          Balance    Balance Assessment sitting static balance;sitting dynamic balance;standing static balance;standing dynamic balance  -CR     Static Sitting Balance independent  -CR     Dynamic Sitting Balance independent  -CR     Position, Sitting Balance sitting edge of bed  -CR     Static Standing Balance modified independence  -CR     Dynamic Standing Balance modified  independence  -CR       Row Name 03/13/25 1340          Sensory Assessment (Somatosensory)    Sensory Assessment (Somatosensory) sensation intact  -CR               User Key  (r) = Recorded By, (t) = Taken By, (c) = Cosigned By      Initials Name Provider Type    CR Reyes, Carmela, PT Physical Therapist                   Goals/Plan    No documentation.                  Clinical Impression       Row Name 03/13/25 1400          Pain    Pretreatment Pain Rating 7/10  -CR     Posttreatment Pain Rating 9/10  -CR     Pain Location groin;abdomen  -CR     Pain Side/Orientation right  -CR       Row Name 03/13/25 1400          Plan of Care Review    Plan of Care Reviewed With patient  -CR     Outcome Evaluation 75 y/o female admitted on 3/11 due to BLE edema, orthopnea. Patient tested + Covid-19. PMH includes hx of DVT/PE, htn, back sx, diastolic dysfunction, TKR. Patient lives alone and normally independent, using O2 at night. Patient remains independent with all aspects of mobility. No desat on room air. Mostly reporting of tenderness to R groin, lower abd area. Patient has no skilled Physical therapy needs at this time.  -CR       Row Name 03/13/25 1400          Therapy Assessment/Plan (PT)    Patient/Family Therapy Goals Statement (PT) home  -CR     Criteria for Skilled Interventions Met (PT) no;does not meet criteria for skilled intervention  -CR     Therapy Frequency (PT) evaluation only  -CR               User Key  (r) = Recorded By, (t) = Taken By, (c) = Cosigned By      Initials Name Provider Type    CR Reyes, Carmela, PT Physical Therapist                   Outcome Measures       Row Name 03/13/25 1416 03/13/25 0815       How much help from another person do you currently need...    Turning from your back to your side while in flat bed without using bedrails? 4  -CR 3  -DP    Moving from lying on back to sitting on the side of a flat bed without bedrails? 4  -CR 3  -DP    Moving to and from a bed to a chair  (including a wheelchair)? 4  -CR 2  -DP    Standing up from a chair using your arms (e.g., wheelchair, bedside chair)? 4  -CR 2  -DP    Climbing 3-5 steps with a railing? 4  -CR 1  -DP    To walk in hospital room? 4  -CR 2  -DP    AM-PAC 6 Clicks Score (PT) 24  -CR 13  -DP              User Key  (r) = Recorded By, (t) = Taken By, (c) = Cosigned By      Initials Name Provider Type    CR Reyes, Carmela, PT Physical Therapist    Anita Kay, RN Registered Nurse                                 Physical Therapy Education       Title: PT OT SLP Therapies (In Progress)       Topic: Physical Therapy (Not Started)       Point: Mobility training (Not Started)       Learner Progress:  Not documented in this visit.              Point: Home exercise program (Not Started)       Learner Progress:  Not documented in this visit.              Point: Body mechanics (Not Started)       Learner Progress:  Not documented in this visit.              Point: Precautions (Not Started)       Learner Progress:  Not documented in this visit.                                  PT Recommendation and Plan     Outcome Evaluation: 75 y/o female admitted on 3/11 due to BLE edema, orthopnea. Patient tested + Covid-19. PMH includes hx of DVT/PE, htn, back sx, diastolic dysfunction, TKR. Patient lives alone and normally independent, using O2 at night. Patient remains independent with all aspects of mobility. No desat on room air. Mostly reporting of tenderness to R groin, lower abd area. Patient has no skilled Physical therapy needs at this time.     Time Calculation:         PT Charges       Row Name 03/13/25 1416             Time Calculation    Start Time 1028  -CR      Stop Time 1048  -CR      Time Calculation (min) 20 min  -CR      PT Received On 03/13/25  -CR         Time Calculation- PT    Total Timed Code Minutes- PT 0 minute(s)  -CR                User Key  (r) = Recorded By, (t) = Taken By, (c) = Cosigned By      Initials Name Provider  Type    CR Reyes, Carmela, PT Physical Therapist                  Therapy Charges for Today       Code Description Service Date Service Provider Modifiers Qty    71723353022 HC PT EVAL LOW COMPLEXITY 3 3/13/2025 Reyes, Carmela, PT GP 1            PT G-Codes  AM-PAC 6 Clicks Score (PT): 24  PT Discharge Summary  Anticipated Discharge Disposition (PT): home    Carmela Reyes, PT  3/13/2025

## 2025-03-13 NOTE — PLAN OF CARE
Goal Outcome Evaluation:  Plan of Care Reviewed With: patient        Progress: no change  Outcome Evaluation: pt has slept on and off throuhgout the night, complaints of back pain treated per MAR, pt remains on IV diuretics, pts diastolic remains low, pt currently on 2l NC, pt remains on IV abx, no new complaints at this time, d/c home when appropriate

## 2025-03-14 VITALS
DIASTOLIC BLOOD PRESSURE: 48 MMHG | BODY MASS INDEX: 37.39 KG/M2 | SYSTOLIC BLOOD PRESSURE: 125 MMHG | HEIGHT: 64 IN | WEIGHT: 219 LBS | RESPIRATION RATE: 20 BRPM | HEART RATE: 71 BPM | TEMPERATURE: 97.2 F | OXYGEN SATURATION: 95 %

## 2025-03-14 LAB
GLUCOSE BLDC GLUCOMTR-MCNC: 187 MG/DL (ref 70–105)
GLUCOSE BLDC GLUCOMTR-MCNC: 255 MG/DL (ref 70–105)

## 2025-03-14 PROCEDURE — 94799 UNLISTED PULMONARY SVC/PX: CPT

## 2025-03-14 PROCEDURE — 63710000001 INSULIN LISPRO (HUMAN) PER 5 UNITS: Performed by: STUDENT IN AN ORGANIZED HEALTH CARE EDUCATION/TRAINING PROGRAM

## 2025-03-14 PROCEDURE — 25010000002 CEFTRIAXONE PER 250 MG: Performed by: STUDENT IN AN ORGANIZED HEALTH CARE EDUCATION/TRAINING PROGRAM

## 2025-03-14 PROCEDURE — 25010000002 FUROSEMIDE PER 20 MG: Performed by: STUDENT IN AN ORGANIZED HEALTH CARE EDUCATION/TRAINING PROGRAM

## 2025-03-14 PROCEDURE — 94618 PULMONARY STRESS TESTING: CPT

## 2025-03-14 PROCEDURE — 63710000001 INSULIN GLARGINE PER 5 UNITS: Performed by: STUDENT IN AN ORGANIZED HEALTH CARE EDUCATION/TRAINING PROGRAM

## 2025-03-14 PROCEDURE — 82948 REAGENT STRIP/BLOOD GLUCOSE: CPT

## 2025-03-14 PROCEDURE — 94761 N-INVAS EAR/PLS OXIMETRY MLT: CPT

## 2025-03-14 PROCEDURE — 94664 DEMO&/EVAL PT USE INHALER: CPT

## 2025-03-14 PROCEDURE — 82948 REAGENT STRIP/BLOOD GLUCOSE: CPT | Performed by: STUDENT IN AN ORGANIZED HEALTH CARE EDUCATION/TRAINING PROGRAM

## 2025-03-14 RX ORDER — BUMETANIDE 1 MG/1
2 TABLET ORAL 2 TIMES DAILY
Qty: 30 TABLET | Refills: 1 | Status: SHIPPED | OUTPATIENT
Start: 2025-03-14

## 2025-03-14 RX ADMIN — ALBUTEROL SULFATE 2 PUFF: 108 AEROSOL, METERED RESPIRATORY (INHALATION) at 07:42

## 2025-03-14 RX ADMIN — ALBUTEROL SULFATE 2 PUFF: 108 AEROSOL, METERED RESPIRATORY (INHALATION) at 11:41

## 2025-03-14 RX ADMIN — INSULIN LISPRO 2 UNITS: 100 INJECTION, SOLUTION INTRAVENOUS; SUBCUTANEOUS at 08:24

## 2025-03-14 RX ADMIN — Medication 10 ML: at 08:24

## 2025-03-14 RX ADMIN — FUROSEMIDE 60 MG: 10 INJECTION, SOLUTION INTRAMUSCULAR; INTRAVENOUS at 08:24

## 2025-03-14 RX ADMIN — APIXABAN 5 MG: 5 TABLET, FILM COATED ORAL at 08:24

## 2025-03-14 RX ADMIN — CEFTRIAXONE 2000 MG: 2 INJECTION, POWDER, FOR SOLUTION INTRAMUSCULAR; INTRAVENOUS at 00:05

## 2025-03-14 RX ADMIN — ESCITALOPRAM 20 MG: 10 TABLET, FILM COATED ORAL at 08:23

## 2025-03-14 RX ADMIN — PRAMIPEXOLE DIHYDROCHLORIDE 1 MG: 0.25 TABLET ORAL at 08:23

## 2025-03-14 RX ADMIN — OXYCODONE HYDROCHLORIDE 10 MG: 5 TABLET ORAL at 11:54

## 2025-03-14 RX ADMIN — OXYCODONE HYDROCHLORIDE 10 MG: 5 TABLET ORAL at 02:18

## 2025-03-14 RX ADMIN — INSULIN GLARGINE 50 UNITS: 100 INJECTION, SOLUTION SUBCUTANEOUS at 08:24

## 2025-03-14 NOTE — PLAN OF CARE
Goal Outcome Evaluation:              Outcome Evaluation: Patient admitted for BLE edema, covid +. Patient is a&o x4, RA. Patient stayed up almost throughout the night, complaint of back spasm/pain, treated as prescribed. Patient remain on lasix. No new complaint at this time.

## 2025-03-14 NOTE — CASE MANAGEMENT/SOCIAL WORK
Continued Stay Note  DANIELITO Stinson     Patient Name: Genny Soni  MRN: [id number]  Today's Date: 3/14/2025    Admit Date: 3/11/2025    Plan: Routine home. New home O2 3L w/ [place].   Discharge Plan       Row Name 03/14/25 1406       Plan    Plan Routine home. New home O2 3L w/ [place].    Patient/Family in Agreement with Plan yes    Plan Comments CM met with patient at bedside and delivered IMM copy. Notified by RT at bedside that pt will require 3L O2. Orders in place by hospitalist and Rosalina Foss contacted regarding processing/delivery. Portable tank delivered to pt at bedside.             Megan Naegele, RN     Office Phone: [phone]  Office Cell: [phone]

## 2025-03-14 NOTE — DISCHARGE SUMMARY
[facility] Medicine Services  Discharge Summary    Date of Service: 3/14/2025  Patient Name: Genny Soni  : 1950  MRN: [id number]    Date of Admission: 3/11/2025  Discharge Diagnosis: Bilateral leg edema  Date of Discharge: 3/14/2025  Primary Care Physician: Bonilla Rubio MD      Presenting Problem:   Lightheadedness [R42]  Chest tightness [R07.89]  Bilateral leg edema [R60.0]  Dyspnea, unspecified type [R06.00]    Active and Resolved Hospital Problems:  Active Hospital Problems    Diagnosis POA   • **Bilateral leg edema [R60.0] Yes      Resolved Hospital Problems   No resolved problems to display.         Hospital Course     HPI:    Patient is a 74-year-old female with a past medical history of DVT/PE on anticoagulation, diastolic dysfunction per echo in 3/20/2024 with LVEF 55 to 60%, on Bumex/metolazone outpatient, type 2 diabetes, hypertension, hyperlipidemia, status post back surgery, presented to [facility] on 3/11/2025 for worsening bilateral lower extremity edema with cellulitis.     Patient is currently awake alert Gerry x 3, reports her bilateral lower leg edema has been worsening, now started noticed some redness around her lower extremities, denies any fever chills dysuria.  Patient reports shortness of breath, orthopnea, unable to lie flat, denies chest pain.  She takes Bumex as well as metolazone, which has been prescribed by her PCP, however she is not seeing any improvement which prompted her to come to the hospital today.  She stated she was diagnosed with COVID recently, currently on room air.     In ED, patient's vital stable, afebrile, her troponins mildly elevated 19 with delta of 0, EKG showing sinus rhythm without any acute ischemic changes, CMP mostly unremarkable, glucose 300s, procalcitonin 0.15, WBC 4.0, hemoglobin 11.4, CTA chest shows no PE, mild patchy groundglass opacities and poor lower lobes, CT abdomen pelvis showing no acute process.   Admitted to medicine team further inpatient managemen    Hospital Course:    Patient was admitted with above.  Please refer to original H&P for details.  Incidentally patient was noted to be COVID-positive.  She has been diagnosed recently as an outpatient.  She really did not have any symptoms.  She was noted to be afebrile on day of admission.  She was breathing comfortably on room air on day of admission however through hospital course she had interval episodes of acute hypoxia requiring O2 via nasal cannula.  She was therefore placed on 2 L for support.  She did not require any further medications in regards to her COVID-positive status.    Patient did undergo a CTA chest initially which did not reveal pulmonary embolism however did reveal mild patchy groundglass opacities.  CT ab pelvis was also done did not show any acute process.  Patient was admitted for the same.  She was noted to have significant orthopnea as well as bilateral lower extremity edema.  She is noted to have chronic diastolic heart failure.  Her last 2D echocardiogram was in March 2024 showing an EF of 55 to 60%.  She was then placed on Lasix 60 mg IV every 12.  She has diuresed well.  Her creatinine has remained stable through hospital course.  She feels much better regards to her lower extremity edema as well as in regards to her breathing status today.    In an effort to reduce polypharmacy as well as medication adherence I have increased patient's Bumex to 2 mg p.o. twice daily and discontinued her metolazone altogether.  At time of discharge have encouraged her to follow-up with her primary care provider within the next week or 2 for repeat BMP.  She is to continue on her potassium supplement as well.  She will be restarted on her routine diabetic management and I will defer further diabetic treatment as per her primary care provider.    Patient has been cleared by physical and Occupational Therapy for discharge home.  With ambulation  patient was noted to require 3 L O2 via nasal cannula to maintain O2 saturations greater than 90%.  She will require O2 via nasal cannula 24/7.  She has been asked to follow-up with her primary care provider in the next 2 weeks or so for reevaluation in regards to O2 status.      Patient otherwise appears clinically stable for discharge home today.  She has remained afebrile throughout hospital course.  As detailed above her laboratory studies are also currently stable.  Please see below for final details.      DISCHARGE Follow Up Recommendations for labs and diagnostics:     Patient should have a BMP, CBC in approximate 1 to 2 weeks.  A new prescription for Bumex 2 mg p.o. twice daily has been given to patient at time of discharge.    Day of Discharge     Vital Signs:  Temp:  [97.2 °F (36.2 °C)-97.9 °F (36.6 °C)] 97.6 °F (36.4 °C)  Heart Rate:  [65-96] 96  Resp:  [14-19] 17  BP: (124-151)/(37-52) 147/52  Flow (L/min) (Oxygen Therapy):  [1-2] 1    Physical Exam:  Physical Exam       Pertinent  and/or Most Recent Results     LAB RESULTS:      Lab 03/13/25  1421 03/12/25  0326 03/11/25 1947 03/11/25  1840   WBC 4.65 4.21  --  4.04   HEMOGLOBIN 11.5* 10.7*  --  11.4*   HEMATOCRIT 37.6 34.7  --  37.3   PLATELETS 99* 97*  --  118*   NEUTROS ABS 3.02 2.28  --  2.37   IMMATURE GRANS (ABS) 0.01 0.02  --  0.01   LYMPHS ABS 1.18 1.40  --  1.17   MONOS ABS 0.35 0.36  --  0.37   EOS ABS 0.08 0.13  --  0.10   .5* 100.0*  --  101.6*   PROCALCITONIN  --   --  0.15  --    PROTIME  --   --   --  14.4*   APTT  --   --   --  25.1         Lab 03/13/25  1421 03/12/25 0326 03/11/25 1947   SODIUM 141 142 138   POTASSIUM 4.1 4.5 3.9   CHLORIDE 100 103 101   CO2 32.5* 30.6* 27.1   ANION GAP 8.5 8.4 9.9   BUN 22 26* 25*   CREATININE 0.72 0.93 0.81   EGFR 86.8 63.8 75.3   GLUCOSE 276* 297* 300*   CALCIUM 9.0 8.8 9.2   MAGNESIUM  --  1.9 2.1   PHOSPHORUS  --  4.6*  --          Lab 03/12/25  0326 03/11/25  1947   TOTAL PROTEIN 5.3*  6.1   ALBUMIN 3.5 4.0   GLOBULIN 1.8 2.1   ALT (SGPT) 27 31   AST (SGOT) 26 28   BILIRUBIN 0.2 0.3   ALK PHOS 69 90         Lab 03/11/25 2127 03/11/25  1947 03/11/25  1840   PROBNP  --   --  38.8   HSTROP T 19* 19*  --    PROTIME  --   --  14.4*   INR  --   --  1.12*             Lab 03/12/25  0326   FOLATE 17.00   VITAMIN B 12 753         Brief Urine Lab Results  (Last result in the past 365 days)        Color   Clarity   Blood   Leuk Est   Nitrite   Protein   CREAT   Urine HCG        12/23/24 1051 Yellow   Cloudy   Small (1+)   Moderate (2+)   Negative   30 mg/dL (1+)                 Microbiology Results (last 10 days)       Procedure Component Value - Date/Time    Respiratory Panel PCR w/COVID-19(SARS-CoV-2) JOEL/KAYLAH/ANKUR/PAD/COR/NATHALIE In-House, NP Swab in UTM/VTM, 2 HR TAT - Swab, Nasopharynx [[id number]]  (Abnormal) Collected: 03/12/25 0010    Lab Status: Final result Specimen: Swab from Nasopharynx Updated: 03/12/25 0150     ADENOVIRUS, PCR Not Detected     Coronavirus 229E Not Detected     Coronavirus HKU1 Not Detected     Coronavirus NL63 Not Detected     Coronavirus OC43 Not Detected     COVID19 Detected     Human Metapneumovirus Not Detected     Human Rhinovirus/Enterovirus Not Detected     Influenza A PCR Not Detected     Influenza B PCR Not Detected     Parainfluenza Virus 1 Not Detected     Parainfluenza Virus 2 Not Detected     Parainfluenza Virus 3 Not Detected     Parainfluenza Virus 4 Not Detected     RSV, PCR Not Detected     Bordetella pertussis pcr Not Detected     Bordetella parapertussis PCR Not Detected     Chlamydophila pneumoniae PCR Not Detected     Mycoplasma pneumo by PCR Not Detected    Narrative:      In the setting of a positive respiratory panel with a viral infection PLUS a negative procalcitonin without other underlying concern for bacterial infection, consider observing off antibiotics or discontinuation of antibiotics and continue supportive care. If the respiratory panel is positive  for atypical bacterial infection (Bordetella pertussis, Chlamydophila pneumoniae, or Mycoplasma pneumoniae), consider antibiotic de-escalation to target atypical bacterial infection.            CT Abdomen Pelvis With Contrast  Result Date: 3/11/2025  Impression: Impression: 1. Patchy subpleural airspace disease within the left lower lobe and new 5 mm noncalcified nodule within the right lower lobe. Follow-up CT scan of the chest would be recommended in 3 to 6 months to document stability or resolution of these findings. 2. No acute process seen within the abdomen or pelvis. Electronically Signed: Juanjose Zamora MD  3/11/2025 8:52 PM EDT  Workstation ID: [id number]    CT Angiogram Chest Pulmonary Embolism  Result Date: 3/11/2025  Impression: Impression: No evidence of pulmonary embolism. Mild patchy groundglass opacities in both lower lobes may be infectious/inflammatory. Electronically Signed: Stone Restrepo  3/11/2025 8:50 PM EDT  Workstation ID: [id number]    CT Head Without Contrast  Result Date: 3/11/2025  Impression: Impression: No acute intracranial abnormality identified. Electronically Signed: Madi Dumont MD  3/11/2025 7:46 PM EDT  Workstation ID: [id number]      Results for orders placed during the hospital encounter of 03/11/25    Duplex Venous Lower Extremity - Bilateral CAR    Interpretation Summary  •  Normal bilateral lower extremity venous duplex scan.      Results for orders placed during the hospital encounter of 03/11/25    Duplex Venous Lower Extremity - Bilateral CAR    Interpretation Summary  •  Normal bilateral lower extremity venous duplex scan.      Results for orders placed during the hospital encounter of 03/11/25    Adult Transthoracic Echo Complete W/ Cont if Necessary Per Protocol    Interpretation Summary  •  Left ventricular systolic function is normal. Calculated left ventricular EF = 65% Left ventricular ejection fraction appears to be 61 - 65%.  •  Left ventricular diastolic function  is consistent with (grade I) impaired relaxation.  •  The left atrial cavity is mild to moderately dilated.  •  Estimated right ventricular systolic pressure from tricuspid regurgitation is normal (<35 mmHg).      Labs Pending at Discharge:  Pending Results       Procedure [Order ID] Specimen - Date/Time    Basic Metabolic Panel [[id number]]     Specimen: Blood     CBC & Differential [[id number]]     Specimen: Blood     Narrative:      The following orders were created for panel order CBC & Differential.  Procedure                               Abnormality         Status                     ---------                               -----------         ------                     CBC Auto Differential[[id number]]                                                         Please view results for these tests on the individual orders.    CBC Auto Differential [[id number]]     Specimen: Blood     Urinalysis With Culture If Indicated - [[id number]]     Specimen: Urine             Procedures Performed           Consults:   Consults       Date and Time Order Name Status Description    3/11/2025 10:39 PM Hospitalist (on-call MD unless specified)                Discharge Details        Discharge Medications        Changes to Medications        Instructions Start Date   bumetanide 1 MG tablet  Commonly known as: BUMEX  What changed: how much to take   2 mg, Oral, 2 Times Daily             Continue These Medications        Instructions Start Date   apixaban 5 MG tablet tablet  Commonly known as: ELIQUIS   5 mg, 2 Times Daily      escitalopram 20 MG tablet  Commonly known as: LEXAPRO   20 mg, Daily      fluticasone 50 MCG/ACT nasal spray  Commonly known as: FLONASE   2 sprays, Daily      Insulin Glargine (1 Unit Dial) 300 UNIT/ML solution pen-injector injection  Commonly known as: TOUJEO   50 Units, 2 Times Daily      Insulin Lispro 100 UNIT/ML injection  Commonly known as: humaLOG   20 Units, 4 Times Daily Before Meals & Nightly       losartan 25 MG tablet  Commonly known as: COZAAR   25 mg, Daily      ondansetron 4 MG tablet  Commonly known as: ZOFRAN   4 mg, Every 6 Hours PRN      oxyCODONE-acetaminophen  MG per tablet  Commonly known as: PERCOCET   1 tablet, Every 8 Hours PRN      polyethylene glycol 17 g packet  Commonly known as: MIRALAX   17 g, Daily PRN      potassium chloride 20 MEQ CR tablet  Commonly known as: KLOR-CON M20   20 mEq, 3 Times Daily      pramipexole 1 MG tablet  Commonly known as: MIRAPEX   1 mg, 2 Times Daily      primidone 50 MG tablet  Commonly known as: MYSOLINE   25 mg, Nightly      Prolia 60 MG/ML solution prefilled syringe syringe  Generic drug: denosumab   60 mg, Subcutaneous, Every 6 Months      rosuvastatin 20 MG tablet  Commonly known as: CRESTOR   20 mg, Nightly             Stop These Medications      metOLazone 2.5 MG tablet  Commonly known as: ZAROXOLYN     naloxone 4 MG/0.1ML nasal spray  Commonly known as: NARCAN              Allergies   Allergen Reactions   • Ambien  [Zolpidem] Confusion   • Codeine Itching   • Pregabalin Swelling     Leg swelling   • Sulfa Antibiotics Nausea And Vomiting     Makes her nauseated   • Zolpidem Tartrate Confusion         Discharge Disposition:   Home or Self Care    Diet:  Hospital:  Diet Order   Procedures   • Diet: Diabetic; Consistent Carbohydrate; Fluid Consistency: Thin (IDDSI 0)         Discharge Activity:   As tolerated      CODE STATUS:  Code Status and Medical Interventions: No CPR (Do Not Attempt to Resuscitate); Full Support; DNR/DNI per the patient   Ordered at: 03/11/25 3905     Code Status (Patient has no pulse and is not breathing):    No CPR (Do Not Attempt to Resuscitate)     Medical Interventions (Patient has pulse or is breathing):    Full Support     Comments:    DNR/DNI per the patient     Level Of Support Discussed With:    Patient         No future appointments.        Time spent on Discharge including face to face service: 45  minutes    Signature: Electronically signed by Juan Cleaning MD, 03/14/25, 11:00 EDT.  [facility] Hospitalist Team

## 2025-03-14 NOTE — CASE MANAGEMENT/SOCIAL WORK
Case Management Discharge Note      Final Note: Home.    Selected Continued Care - Discharged on 3/14/2025 Admission date: 3/11/2025 - Discharge disposition: Home or Self Care      Durable Medical Equipment Coordination complete.      Service Provider Services Address Phone Fax Patient Preferred    [employer] [facility] [employer] [address] [address] [phone] --             Transportation Services  Private: Car    Final Discharge Disposition Code: 01 - home or self-care

## 2025-03-14 NOTE — PROCEDURES
Exercise Oximetry    Patient Name:Genny Soni   MRN: [id number]   Date: 03/14/25             ROOM AIR BASELINE   SpO2% 93   Heart Rate 73   Blood Pressure      EXERCISE ON ROOM AIR SpO2% EXERCISE ON O2 @ 3LPM SpO2%   1 MINUTE 90 1 MINUTE    2 MINUTES 88 2 MINUTES    3 MINUTES 86 3 MINUTES 92   4 MINUTES  4 MINUTES 90   5 MINUTES  5 MINUTES 88   6 MINUTES  6 MINUTES 92              Distance Walked   Distance Walked 6 mins    Dyspnea (Janet Scale)   Dyspnea (Janet Scale)   Fatigue (Janet Scale)   Fatigue (Janet Scale)   SpO2% Post Exercise   SpO2% Post Exercise 96   HR Post Exercise   HR Post Exercise 97   Time to Recovery   Time to Recovery 1 min     Comments: On room air while sitting patient was 93%spo2. At approximately 3min felipa, patient desat to 86% and required 3L O2 to maintain spo2 88% and above while moving around.

## 2025-03-15 ENCOUNTER — READMISSION MANAGEMENT (OUTPATIENT)
Dept: CALL CENTER | Facility: HOSPITAL | Age: 75
End: 2025-03-15
Payer: MEDICARE

## 2025-03-15 NOTE — OUTREACH NOTE
Prep Survey      Flowsheet Row Responses   [facility] facility patient discharged from? [facility]   Is LACE score < 7 ? No   Eligibility Readm Mgmt   Discharge diagnosis Bilateral leg edema   Does the patient have one of the following disease processes/diagnoses(primary or secondary)? Other   Does the patient have Home health ordered? No   Is there a DME ordered? Yes   What DME was ordered? Home Oxygen[facility]   Medication alerts for this patient see AVS   Prep survey completed? Yes            Kim FARIAS - Registered Nurse              Kim FARIAS - Registered Nurse

## 2025-03-17 NOTE — PROGRESS NOTES
Enter Query Response Below      Query Response:   - Active COVID 19 disease            If applicable, please update the problem list.

## 2025-03-19 ENCOUNTER — READMISSION MANAGEMENT (OUTPATIENT)
Dept: CALL CENTER | Facility: HOSPITAL | Age: 75
End: 2025-03-19
Payer: MEDICARE

## 2025-03-19 NOTE — OUTREACH NOTE
Medical Week 1 Survey      Flowsheet Row Responses   [facility] patient discharged from? [facility]   Does the patient have one of the following disease processes/diagnoses(primary or secondary)? Other   Week 1 attempt successful? No   Unsuccessful attempts Attempt 1  [States she is at the drug store and can't talk right now.]            Maryam VASQUEZ - Licensed Nurse

## 2025-03-19 NOTE — PROGRESS NOTES
Enter Query Response Below      Query Response: > Cellulitis due to/related to diabetes mellitus              If applicable, please update the problem list.

## 2025-03-20 ENCOUNTER — LAB (OUTPATIENT)
Dept: LAB | Facility: HOSPITAL | Age: 75
End: 2025-03-20
Payer: MEDICARE

## 2025-03-20 ENCOUNTER — HOSPITAL ENCOUNTER (OUTPATIENT)
Dept: GENERAL RADIOLOGY | Facility: HOSPITAL | Age: 75
Discharge: HOME | End: 2025-03-20
Payer: MEDICARE

## 2025-03-20 ENCOUNTER — TRANSCRIBE ORDERS (OUTPATIENT)
Dept: GENERAL RADIOLOGY | Facility: HOSPITAL | Age: 75
End: 2025-03-20
Payer: MEDICARE

## 2025-03-20 ENCOUNTER — TRANSCRIBE ORDERS (OUTPATIENT)
Dept: LAB | Facility: HOSPITAL | Age: 75
End: 2025-03-20
Payer: MEDICARE

## 2025-03-20 DIAGNOSIS — M25.549 ARTHRALGIA OF HAND, UNSPECIFIED LATERALITY: Primary | ICD-10-CM

## 2025-03-20 DIAGNOSIS — M25.572 PAIN IN JOINT, FOOT, LEFT: ICD-10-CM

## 2025-03-20 DIAGNOSIS — M25.572 PAIN IN JOINT, FOOT, LEFT: Primary | ICD-10-CM

## 2025-03-20 DIAGNOSIS — M25.549 ARTHRALGIA OF HAND, UNSPECIFIED LATERALITY: ICD-10-CM

## 2025-03-20 LAB
25(OH)D3 SERPL-MCNC: 22.3 NG/ML (ref 30–100)
ALBUMIN SERPL-MCNC: 4.4 G/DL (ref 3.5–5.2)
ALBUMIN/GLOB SERPL: 2 G/DL
ALP SERPL-CCNC: 81 U/L (ref 39–117)
ALT SERPL W P-5'-P-CCNC: 32 U/L (ref 1–33)
ANION GAP SERPL CALCULATED.3IONS-SCNC: 8.2 MMOL/L (ref 5–15)
AST SERPL-CCNC: 34 U/L (ref 1–32)
BASOPHILS # BLD AUTO: 0.02 10*3/MM3 (ref 0–0.2)
BASOPHILS NFR BLD AUTO: 0.4 % (ref 0–1.5)
BILIRUB SERPL-MCNC: 0.3 MG/DL (ref 0–1.2)
BUN SERPL-MCNC: 32 MG/DL (ref 8–23)
BUN/CREAT SERPL: 40 (ref 7–25)
CALCIUM SPEC-SCNC: 9.9 MG/DL (ref 8.6–10.5)
CHLORIDE SERPL-SCNC: 102 MMOL/L (ref 98–107)
CO2 SERPL-SCNC: 31.8 MMOL/L (ref 22–29)
CREAT SERPL-MCNC: 0.8 MG/DL (ref 0.57–1)
DEPRECATED RDW RBC AUTO: 52.7 FL (ref 37–54)
EGFRCR SERPLBLD CKD-EPI 2021: 76.5 ML/MIN/1.73
EOSINOPHIL # BLD AUTO: 0.15 10*3/MM3 (ref 0–0.4)
EOSINOPHIL NFR BLD AUTO: 2.9 % (ref 0.3–6.2)
ERYTHROCYTE [DISTWIDTH] IN BLOOD BY AUTOMATED COUNT: 14.4 % (ref 12.3–15.4)
GLOBULIN UR ELPH-MCNC: 2.2 GM/DL
GLUCOSE SERPL-MCNC: 279 MG/DL (ref 65–99)
HCT VFR BLD AUTO: 38 % (ref 34–46.6)
HGB BLD-MCNC: 12 G/DL (ref 12–15.9)
IMM GRANULOCYTES # BLD AUTO: 0.02 10*3/MM3 (ref 0–0.05)
IMM GRANULOCYTES NFR BLD AUTO: 0.4 % (ref 0–0.5)
LYMPHOCYTES # BLD AUTO: 1.64 10*3/MM3 (ref 0.7–3.1)
LYMPHOCYTES NFR BLD AUTO: 31.5 % (ref 19.6–45.3)
MCH RBC QN AUTO: 31.1 PG (ref 26.6–33)
MCHC RBC AUTO-ENTMCNC: 31.6 G/DL (ref 31.5–35.7)
MCV RBC AUTO: 98.4 FL (ref 79–97)
MONOCYTES # BLD AUTO: 0.36 10*3/MM3 (ref 0.1–0.9)
MONOCYTES NFR BLD AUTO: 6.9 % (ref 5–12)
NEUTROPHILS NFR BLD AUTO: 3.01 10*3/MM3 (ref 1.7–7)
NEUTROPHILS NFR BLD AUTO: 57.9 % (ref 42.7–76)
NRBC BLD AUTO-RTO: 0 /100 WBC (ref 0–0.2)
PLATELET # BLD AUTO: 148 10*3/MM3 (ref 140–450)
PMV BLD AUTO: 9.7 FL (ref 6–12)
POTASSIUM SERPL-SCNC: 4.5 MMOL/L (ref 3.5–5.2)
PROT SERPL-MCNC: 6.6 G/DL (ref 6–8.5)
RBC # BLD AUTO: 3.86 10*6/MM3 (ref 3.77–5.28)
SODIUM SERPL-SCNC: 142 MMOL/L (ref 136–145)
WBC NRBC COR # BLD AUTO: 5.2 10*3/MM3 (ref 3.4–10.8)

## 2025-03-20 PROCEDURE — 73130 X-RAY EXAM OF HAND: CPT

## 2025-03-20 PROCEDURE — 86431 RHEUMATOID FACTOR QUANT: CPT

## 2025-03-20 PROCEDURE — 73630 X-RAY EXAM OF FOOT: CPT

## 2025-03-20 PROCEDURE — 85025 COMPLETE CBC W/AUTO DIFF WBC: CPT

## 2025-03-20 PROCEDURE — 80053 COMPREHEN METABOLIC PANEL: CPT

## 2025-03-20 PROCEDURE — 36415 COLL VENOUS BLD VENIPUNCTURE: CPT

## 2025-03-20 PROCEDURE — 82085 ASSAY OF ALDOLASE: CPT

## 2025-03-20 PROCEDURE — 82550 ASSAY OF CK (CPK): CPT

## 2025-03-20 PROCEDURE — 82306 VITAMIN D 25 HYDROXY: CPT

## 2025-03-20 PROCEDURE — 82552 ASSAY OF CPK IN BLOOD: CPT

## 2025-03-20 PROCEDURE — 86200 CCP ANTIBODY: CPT

## 2025-03-21 LAB
ALDOLASE SERPL-CCNC: 6.2 U/L (ref 3.3–10.3)
CCP IGA+IGG SERPL IA-ACNC: 5 UNITS (ref 0–19)
RHEUMATOID FACT SERPL-ACNC: <10 IU/ML

## 2025-03-24 ENCOUNTER — READMISSION MANAGEMENT (OUTPATIENT)
Dept: CALL CENTER | Facility: HOSPITAL | Age: 75
End: 2025-03-24
Payer: MEDICARE

## 2025-03-24 ENCOUNTER — HOSPITAL ENCOUNTER (OUTPATIENT)
Dept: GENERAL RADIOLOGY | Facility: HOSPITAL | Age: 75
Discharge: HOME | End: 2025-03-24
Payer: MEDICARE

## 2025-03-24 ENCOUNTER — TRANSCRIBE ORDERS (OUTPATIENT)
Dept: ADMINISTRATIVE | Facility: HOSPITAL | Age: 75
End: 2025-03-24
Payer: MEDICARE

## 2025-03-24 DIAGNOSIS — M25.571 PAIN, JOINT, FOOT, RIGHT: Primary | ICD-10-CM

## 2025-03-24 DIAGNOSIS — M25.571 PAIN, JOINT, FOOT, RIGHT: ICD-10-CM

## 2025-03-24 LAB
CK BB CFR SERPL ELPH: 0 %
CK MACRO1 CFR SERPL: 0 %
CK MACRO2 CFR SERPL: 0 %
CK MB CFR SERPL ELPH: 0 % (ref 0–3)
CK MM CFR SERPL ELPH: 100 % (ref 97–100)
CK SERPL-CCNC: 156 U/L (ref 32–182)

## 2025-03-24 PROCEDURE — 73610 X-RAY EXAM OF ANKLE: CPT

## 2025-03-24 PROCEDURE — 73630 X-RAY EXAM OF FOOT: CPT

## 2025-03-24 NOTE — OUTREACH NOTE
Medical Week 2 Survey      Flowsheet Row Responses   [facility] facility patient discharged from? [facility]   Does the patient have one of the following disease processes/diagnoses(primary or secondary)? Other   Week 2 attempt successful? No   Unsuccessful attempts Attempt 1            Shaneka Rasmussen Registered Nurse

## 2025-03-28 ENCOUNTER — READMISSION MANAGEMENT (OUTPATIENT)
Dept: CALL CENTER | Facility: HOSPITAL | Age: 75
End: 2025-03-28
Payer: MEDICARE

## 2025-03-28 NOTE — OUTREACH NOTE
Medical Week 2 Survey      Flowsheet Row Responses   [facility] patient discharged from? [facility]   Does the patient have one of the following disease processes/diagnoses(primary or secondary)? Other   Week 2 attempt successful? Yes   Call start time 1412   Discharge diagnosis Bilateral leg edema   Call end time 1412   Person spoke with today (if not patient) and relationship patient   Meds reviewed with patient/caregiver? Yes   Does the patient have a primary care provider?  Yes   Did the patient receive a copy of their discharge instructions? Yes   Nursing interventions Reviewed instructions with patient   What is the patient's perception of their health status since discharge? Improving   Is the patient/caregiver able to teach back signs and symptoms related to disease process for when to call PCP? Yes   Is the patient/caregiver able to teach back signs and symptoms related to disease process for when to call 911? Yes   Is the patient/caregiver able to teach back the hierarchy of who to call/visit for symptoms/problems? PCP, Specialist, Home health nurse, Urgent Care, ED, 911 Yes   Week 2 Call Completed? Yes   Graduated Yes   Wrap up additional comments Patient reports doing well no concerns or questions noted.   Call end time 1412            Argelia NORWOOD - Registered Nurse

## (undated) DEVICE — PK BASIC SPINE 50

## (undated) DEVICE — SNAR POLYP HOTSNARE/BRAIDED OVL/MINI 7F 2.8X10MM 230CM 1P/U

## (undated) DEVICE — GAUZE,SPONGE,4"X4",16PLY,XRAY,STRL,LF: Brand: MEDLINE

## (undated) DEVICE — SYR LL 3CC

## (undated) DEVICE — SPONGE,LAP,12"X12",XR,ST,5/PK,40PK/CS: Brand: MEDLINE

## (undated) DEVICE — SUT MNCRYL 3/0 Y936H

## (undated) DEVICE — BITEBLOCK ENDO W/STRAP 60F A/ LF DISP

## (undated) DEVICE — SUT VIC 0 CT1 CR8 18IN J840D

## (undated) DEVICE — PK ENDO GI 50

## (undated) DEVICE — 1010 S-DRAPE TOWEL DRAPE 10/BX: Brand: STERI-DRAPE™

## (undated) DEVICE — SYR LUERLOK 5CC

## (undated) DEVICE — GLV SURG SIGNATURE ESSENTIAL PF LTX SZ8

## (undated) DEVICE — Device

## (undated) DEVICE — DRP C/ARMOR

## (undated) DEVICE — TRAP WIDEEYE POLYP

## (undated) DEVICE — SOLUTION,WATER,IRRIGATION,1000ML,STERILE: Brand: MEDLINE

## (undated) DEVICE — ADHS SKIN PREMIERPRO EXOFIN TOPICAL HI/VISC .5ML

## (undated) DEVICE — DECANTER: Brand: UNBRANDED

## (undated) DEVICE — SUT VIC 2/0 CT1 CR8 18IN J839D

## (undated) DEVICE — SPK10122 WILSON FRAME KIT: Brand: SPK10122 WILSON FRAME KIT

## (undated) DEVICE — BLD SCLPL SURG PREM SS SZ15C

## (undated) DEVICE — COVADERM: Brand: DEROYAL

## (undated) DEVICE — 6.0MM PRECISION ROUND

## (undated) DEVICE — CODMAN® SURGICAL PATTIES 3/4" X 3/4" (1.91CM X 1.91CM): Brand: CODMAN®